# Patient Record
Sex: MALE | Race: WHITE | NOT HISPANIC OR LATINO | Employment: OTHER | URBAN - METROPOLITAN AREA
[De-identification: names, ages, dates, MRNs, and addresses within clinical notes are randomized per-mention and may not be internally consistent; named-entity substitution may affect disease eponyms.]

---

## 2020-09-10 ENCOUNTER — TELEPHONE (OUTPATIENT)
Dept: GASTROENTEROLOGY | Facility: CLINIC | Age: 75
End: 2020-09-10

## 2020-09-10 ENCOUNTER — OFFICE VISIT (OUTPATIENT)
Dept: GASTROENTEROLOGY | Facility: CLINIC | Age: 75
End: 2020-09-10
Payer: OTHER GOVERNMENT

## 2020-09-10 VITALS
BODY MASS INDEX: 26.67 KG/M2 | HEIGHT: 72 IN | SYSTOLIC BLOOD PRESSURE: 122 MMHG | DIASTOLIC BLOOD PRESSURE: 56 MMHG | HEART RATE: 72 BPM | WEIGHT: 196.88 LBS

## 2020-09-10 DIAGNOSIS — Z12.11 COLON CANCER SCREENING: Primary | ICD-10-CM

## 2020-09-10 DIAGNOSIS — Z86.010 HISTORY OF COLON POLYPS: ICD-10-CM

## 2020-09-10 DIAGNOSIS — I85.10 SECONDARY ESOPHAGEAL VARICES WITHOUT BLEEDING: ICD-10-CM

## 2020-09-10 DIAGNOSIS — Z80.0 FAMILY HISTORY OF COLON CANCER: ICD-10-CM

## 2020-09-10 DIAGNOSIS — K74.60 CIRRHOSIS OF LIVER WITHOUT ASCITES, UNSPECIFIED HEPATIC CIRRHOSIS TYPE: ICD-10-CM

## 2020-09-10 PROCEDURE — 99999 PR PBB SHADOW E&M-NEW PATIENT-LVL III: ICD-10-PCS | Mod: PBBFAC,,, | Performed by: NURSE PRACTITIONER

## 2020-09-10 PROCEDURE — 99203 OFFICE O/P NEW LOW 30 MIN: CPT | Mod: PBBFAC | Performed by: NURSE PRACTITIONER

## 2020-09-10 PROCEDURE — 99999 PR PBB SHADOW E&M-NEW PATIENT-LVL III: CPT | Mod: PBBFAC,,, | Performed by: NURSE PRACTITIONER

## 2020-09-10 PROCEDURE — 99204 PR OFFICE/OUTPT VISIT, NEW, LEVL IV, 45-59 MIN: ICD-10-PCS | Mod: S$PBB,,, | Performed by: NURSE PRACTITIONER

## 2020-09-10 PROCEDURE — 99204 OFFICE O/P NEW MOD 45 MIN: CPT | Mod: S$PBB,,, | Performed by: NURSE PRACTITIONER

## 2020-09-10 NOTE — H&P (VIEW-ONLY)
Clinic Consult:  [facility] Consultation Note    Reason for Consult:  The primary encounter diagnosis was Colon cancer screening. Diagnoses of History of colon polyps, Family history of colon cancer, Cirrhosis of liver without ascites, unspecified hepatic cirrhosis type, and Secondary esophageal varices without bleeding were also pertinent to this visit.    PCP: [facility]   [address] / [place] [address]    HPI:  This is a 74 y.o. male here for evaluation of the above.     # colon cancer screening: due. Last done in 2016. +ve polyps. Recall in 3 years. No bowel changes, hematochezia or melena.     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  [facility] [place] Last visit yesterday. Last EGD done at [facility] on 9/10/2020. Grade 3 EV in mid esophagus. Banded x 3. Was told he needed repeat banding.     Review of Systems   Constitutional: Negative for fever, malaise/fatigue and weight loss.   HENT: Negative for sore throat.    Respiratory: Negative for cough and wheezing.    Cardiovascular: Negative for chest pain and palpitations.   Gastrointestinal: Negative for abdominal pain, blood in stool, constipation, diarrhea, heartburn, melena, nausea and vomiting.   Genitourinary: Negative for dysuria and frequency.   Musculoskeletal: Negative for back pain, joint pain, myalgias and neck pain.   Skin: Negative for itching and rash.   Neurological: Negative for dizziness, speech change, seizures, loss of consciousness and headaches.   Psychiatric/Behavioral: Negative for depression and substance abuse. The patient is not nervous/anxious.        Medical History:  has a past medical history of CAD (coronary artery disease), Diabetes, GERD (gastroesophageal reflux disease), HTN (hypertension), and Hyperlipidemia.    Surgical History:  has a past surgical history that includes Coronary stent placement; Elbow surgery; Tonsillectomy;  Colonoscopy; Vasectomy; Cataract extraction; and Colonoscopy (N/A, 7/18/2016).    Family History: family history includes Colon cancer in his brother; Heart disease in his father; No Known Problems in his mother..     Social History:  reports that he has quit smoking. He has a 40.00 pack-year smoking history. He does not have any smokeless tobacco history on file. He reports that he does not drink alcohol or use drugs.    Allergies: Reviewed    Home Medications:   Current Outpatient Medications on File Prior to Visit   Medication Sig Dispense Refill    BLOOD SUGAR DIAGNOSTIC (ACCU-CHEK SHANTELL PLUS TEST STRP MISC) by Misc.(Non-Drug; Combo Route) route.      clopidogrel (PLAVIX) 75 mg tablet Take 75 mg by mouth once daily.      lisinopril (PRINIVIL,ZESTRIL) 40 MG tablet Take 40 mg by mouth once daily.      metformin (GLUCOPHAGE) 1000 MG tablet Take 1,000 mg by mouth 2 (two) times daily with meals.      multivitamin capsule Take 1 capsule by mouth once daily.      trazodone (DESYREL) 100 MG tablet Take 100 mg by mouth every evening.      aspirin (ECOTRIN) 81 MG EC tablet Take 81 mg by mouth once daily.      fish oil-omega-3 fatty acids 300-1,000 mg capsule Take 2 g by mouth once daily.      hydrochlorothiazide (HYDRODIURIL) 50 MG tablet Take 50 mg by mouth once daily.      lovastatin (MEVACOR) 40 MG tablet Take 40 mg by mouth every evening.      metoprolol tartrate (LOPRESSOR) 50 MG tablet Take 50 mg by mouth 2 (two) times daily.      ranitidine (ZANTAC) 150 MG tablet Take 150 mg by mouth 2 (two) times daily.       No current facility-administered medications on file prior to visit.        Physical Exam:  BP (!) 122/56   Pulse 72   Ht 6' (1.829 m)   Wt 89.3 kg (196 lb 13.9 oz)   BMI 26.70 kg/m²   Body mass index is 26.7 kg/m².  Physical Exam   Constitutional: He is oriented to person, place, and time and well-developed, well-nourished, and in no distress. No distress.   HENT:   Head: Normocephalic.    Eyes: Pupils are equal, round, and reactive to light. Conjunctivae are normal.   Cardiovascular: Normal rate, regular rhythm and normal heart sounds.   Pulmonary/Chest: Effort normal and breath sounds normal. No respiratory distress.   Abdominal: Soft. Bowel sounds are normal. He exhibits no distension. There is no abdominal tenderness.   Neurological: He is alert and oriented to person, place, and time. No cranial nerve deficit.   Skin: Skin is warm and dry. No rash noted.   Psychiatric: Mood and affect normal.       Labs: Pertinent labs reviewed.  CRC Screening:     Assessment:  1. Colon cancer screening    2. History of colon polyps    3. Family history of colon cancer    4. Cirrhosis of liver without ascites, unspecified hepatic cirrhosis type    5. Secondary esophageal varices without bleeding        Recommendations:   Colonoscopy with EGV with banding.   Request approval to hold Plavix for procedure.  He is unsure of his Cardiologist's name. [facility].     Colon cancer screening  -     Case request GI: ESOPHAGOGASTRODUODENOSCOPY (EGD), COLONOSCOPY  -     COVID-19 Routine Screening; Future; Expected date: 09/10/2020    History of colon polyps  -     Case request GI: ESOPHAGOGASTRODUODENOSCOPY (EGD), COLONOSCOPY  -     COVID-19 Routine Screening; Future; Expected date: 09/10/2020    Family history of colon cancer  -     Case request GI: ESOPHAGOGASTRODUODENOSCOPY (EGD), COLONOSCOPY  -     COVID-19 Routine Screening; Future; Expected date: 09/10/2020    Cirrhosis of liver without ascites, unspecified hepatic cirrhosis type  -     Case request GI: ESOPHAGOGASTRODUODENOSCOPY (EGD), COLONOSCOPY  -     COVID-19 Routine Screening; Future; Expected date: 09/10/2020    Secondary esophageal varices without bleeding  -     Case request GI: ESOPHAGOGASTRODUODENOSCOPY (EGD), COLONOSCOPY  -     COVID-19 Routine Screening; Future; Expected date: 09/10/2020        Follow up if symptoms worsen or fail to  improve.    Thank you so much for allowing me to participate in the care of RADHA Smith

## 2020-09-10 NOTE — LETTER
September 10, 2020      [facility]  [address] [address]           [facility]  [address] [address]  Phone: [phone]  Fax: [phone]          Patient: Korin Vivas   MR Number: [id number]   YOB: 1945   Date of Visit: 9/10/2020       Dear [facility]:    Thank you for referring Korin Vivas to me for evaluation. Attached you will find relevant portions of my assessment and plan of care.    If you have questions, please do not hesitate to call me. I look forward to following Korin Vivas along with you.    Sincerely,    Kyra Carranza, NP    Enclosure  CC:  No Recipients    If you would like to receive this communication electronically, please contact [email or web address] or [phone] to request more information on [employer] access.    For providers and/or their staff who would like to refer a patient to [facility], please contact us through our one-stop-shop provider referral line, Rebecca Mcclain, at [phone].    If you feel you have received this communication in error or would no longer like to receive these types of communications, please e-mail [email or web address]

## 2020-09-10 NOTE — TELEPHONE ENCOUNTER
[phone][facility] to get clearance for patient to hold Plavix for EGD/Colon on 10/6/20. Unable to reach their office left message to return call regarding patient.

## 2020-09-10 NOTE — PROGRESS NOTES
Clinic Consult:  [facility] Consultation Note    Reason for Consult:  The primary encounter diagnosis was Colon cancer screening. Diagnoses of History of colon polyps, Family history of colon cancer, Cirrhosis of liver without ascites, unspecified hepatic cirrhosis type, and Secondary esophageal varices without bleeding were also pertinent to this visit.    PCP: [facility]   [address] / [place] [address]    HPI:  This is a 74 y.o. male here for evaluation of the above.     # colon cancer screening: due. Last done in 2016. +ve polyps. Recall in 3 years. No bowel changes, hematochezia or melena.     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  [facility] [place] Last visit yesterday. Last EGD done at [facility] on 9/10/2020. Grade 3 EV in mid esophagus. Banded x 3. Was told he needed repeat banding.     Review of Systems   Constitutional: Negative for fever, malaise/fatigue and weight loss.   HENT: Negative for sore throat.    Respiratory: Negative for cough and wheezing.    Cardiovascular: Negative for chest pain and palpitations.   Gastrointestinal: Negative for abdominal pain, blood in stool, constipation, diarrhea, heartburn, melena, nausea and vomiting.   Genitourinary: Negative for dysuria and frequency.   Musculoskeletal: Negative for back pain, joint pain, myalgias and neck pain.   Skin: Negative for itching and rash.   Neurological: Negative for dizziness, speech change, seizures, loss of consciousness and headaches.   Psychiatric/Behavioral: Negative for depression and substance abuse. The patient is not nervous/anxious.        Medical History:  has a past medical history of CAD (coronary artery disease), Diabetes, GERD (gastroesophageal reflux disease), HTN (hypertension), and Hyperlipidemia.    Surgical History:  has a past surgical history that includes Coronary stent placement; Elbow surgery; Tonsillectomy;  Colonoscopy; Vasectomy; Cataract extraction; and Colonoscopy (N/A, 7/18/2016).    Family History: family history includes Colon cancer in his brother; Heart disease in his father; No Known Problems in his mother..     Social History:  reports that he has quit smoking. He has a 40.00 pack-year smoking history. He does not have any smokeless tobacco history on file. He reports that he does not drink alcohol or use drugs.    Allergies: Reviewed    Home Medications:   Current Outpatient Medications on File Prior to Visit   Medication Sig Dispense Refill    BLOOD SUGAR DIAGNOSTIC (ACCU-CHEK SHANTELL PLUS TEST STRP MISC) by Misc.(Non-Drug; Combo Route) route.      clopidogrel (PLAVIX) 75 mg tablet Take 75 mg by mouth once daily.      lisinopril (PRINIVIL,ZESTRIL) 40 MG tablet Take 40 mg by mouth once daily.      metformin (GLUCOPHAGE) 1000 MG tablet Take 1,000 mg by mouth 2 (two) times daily with meals.      multivitamin capsule Take 1 capsule by mouth once daily.      trazodone (DESYREL) 100 MG tablet Take 100 mg by mouth every evening.      aspirin (ECOTRIN) 81 MG EC tablet Take 81 mg by mouth once daily.      fish oil-omega-3 fatty acids 300-1,000 mg capsule Take 2 g by mouth once daily.      hydrochlorothiazide (HYDRODIURIL) 50 MG tablet Take 50 mg by mouth once daily.      lovastatin (MEVACOR) 40 MG tablet Take 40 mg by mouth every evening.      metoprolol tartrate (LOPRESSOR) 50 MG tablet Take 50 mg by mouth 2 (two) times daily.      ranitidine (ZANTAC) 150 MG tablet Take 150 mg by mouth 2 (two) times daily.       No current facility-administered medications on file prior to visit.        Physical Exam:  BP (!) 122/56   Pulse 72   Ht 6' (1.829 m)   Wt 89.3 kg (196 lb 13.9 oz)   BMI 26.70 kg/m²   Body mass index is 26.7 kg/m².  Physical Exam   Constitutional: He is oriented to person, place, and time and well-developed, well-nourished, and in no distress. No distress.   HENT:   Head: Normocephalic.    Eyes: Pupils are equal, round, and reactive to light. Conjunctivae are normal.   Cardiovascular: Normal rate, regular rhythm and normal heart sounds.   Pulmonary/Chest: Effort normal and breath sounds normal. No respiratory distress.   Abdominal: Soft. Bowel sounds are normal. He exhibits no distension. There is no abdominal tenderness.   Neurological: He is alert and oriented to person, place, and time. No cranial nerve deficit.   Skin: Skin is warm and dry. No rash noted.   Psychiatric: Mood and affect normal.       Labs: Pertinent labs reviewed.  CRC Screening:     Assessment:  1. Colon cancer screening    2. History of colon polyps    3. Family history of colon cancer    4. Cirrhosis of liver without ascites, unspecified hepatic cirrhosis type    5. Secondary esophageal varices without bleeding        Recommendations:   Colonoscopy with EGV with banding.   Request approval to hold Plavix for procedure.  He is unsure of his Cardiologist's name. [facility]     Colon cancer screening  -     Case request GI: ESOPHAGOGASTRODUODENOSCOPY (EGD), COLONOSCOPY  -     COVID-19 Routine Screening; Future; Expected date: 09/10/2020    History of colon polyps  -     Case request GI: ESOPHAGOGASTRODUODENOSCOPY (EGD), COLONOSCOPY  -     COVID-19 Routine Screening; Future; Expected date: 09/10/2020    Family history of colon cancer  -     Case request GI: ESOPHAGOGASTRODUODENOSCOPY (EGD), COLONOSCOPY  -     COVID-19 Routine Screening; Future; Expected date: 09/10/2020    Cirrhosis of liver without ascites, unspecified hepatic cirrhosis type  -     Case request GI: ESOPHAGOGASTRODUODENOSCOPY (EGD), COLONOSCOPY  -     COVID-19 Routine Screening; Future; Expected date: 09/10/2020    Secondary esophageal varices without bleeding  -     Case request GI: ESOPHAGOGASTRODUODENOSCOPY (EGD), COLONOSCOPY  -     COVID-19 Routine Screening; Future; Expected date: 09/10/2020        Follow up if symptoms worsen or fail to  improve.    Thank you so much for allowing me to participate in the care of RADHA Smith

## 2020-09-10 NOTE — TELEPHONE ENCOUNTER
"  ENDO screening    1. Have you been admitted for cardiac, kidney or pulmonary causes to the hospital in the past 3 months? no   If yes, schedule an appointment with PCP before scheduling endoscopic procedure.     2. Have you had a stent placed in the last 12 months? no   If yes, for a screening visit, cancel and message the ordering provider.  The patient will need a new order when the time is appropriate.     3. Have you had a stroke or heart attack in the past 6 months? no   If yes, cancel and refer patient to ordering provider for clearance, also message ordering provider to inform.     4. Have you had any chest pain in the past 3 months? no   If yes, Have you been evaluated by your PCP and/or cardiologist and it was determined to not be heart related? not applicable   If No, Pt needs to be seen by PCP or Cardiologist .  Pt can be scheduled once clearance obtained by either of those providers.     5. Do you take prescription weight loss medications?  no   If yes, must stop for 2 weeks prior to procedure.     6. Have you been diagnosed with diverticulitis within the past 3 months? no   If yes, must have been seen by GI within the last 3 months, if not schedule with GI [facility].    If pt has been seen by GI, schedule procedure 8-12 weeks post antibiotic treatment.     7. Are you on Dialysis? no  If yes, schedule procedure for the day AFTER dialysis.  Appt time should be 9am or later, patient arrival time is 2 hours prior.  Nulytely or miralax prep for all patients with kidney disease.     8. Are you diabetic?  yes   If yes, schedule morning appt. Advise pt to hold all diabetic meds day of procedure.     9. If pt is older than 80 years of age and HAS NOT been seen by GI or PCP within the last 6 months, needs appt with GI [facility].   If pt has been seen by the GI provider or PCP within the past 6  months AND meets criteria, schedule procedure AND send message to the endoscopist.     10. Is patient on a "high risk" medication " (blood thinner/antiplatelet agent)?  yes   If yes, has cardiac clearance been obtained within the last 60 days? No   If no, a new clearance needs to be obtained.     Final Questions:    1.I have reviewed the last colonoscopy for recommendations regarding next procedure bowel prep.  yes  2. I have reviewed medications and allergies.  yes  3. I have verified the pharmacy information and appropriate prep sent if needed. yes  4. Prep instructions have been mailed or sent to portal per patient request. yes        All schedulers will have ability to reach out to the ordering GI provider to clarify any issues.

## 2020-09-17 NOTE — TELEPHONE ENCOUNTER
[facility] ([phone]) to return call regarding patient's clearance.

## 2020-10-01 NOTE — TELEPHONE ENCOUNTER
Spoke with patient and he stated that [facility] this morning and told him told hold his Plavix. I informed him that I have been faxing over a clearance for the past two weeks but have not received clearance.  Patient aware that I have not received clearance but will proceed to hold Plavix based off the phone call he received this morning. I informed him that I did refax the clearance and left a message. Clearance is still pending approval.

## 2020-10-02 ENCOUNTER — TELEPHONE (OUTPATIENT)
Dept: GASTROENTEROLOGY | Facility: CLINIC | Age: 75
End: 2020-10-02

## 2020-10-03 ENCOUNTER — LAB VISIT (OUTPATIENT)
Dept: OTOLARYNGOLOGY | Facility: CLINIC | Age: 75
End: 2020-10-03
Payer: COMMERCIAL

## 2020-10-03 DIAGNOSIS — Z12.11 COLON CANCER SCREENING: ICD-10-CM

## 2020-10-03 DIAGNOSIS — Z80.0 FAMILY HISTORY OF COLON CANCER: ICD-10-CM

## 2020-10-03 DIAGNOSIS — Z86.010 HISTORY OF COLON POLYPS: ICD-10-CM

## 2020-10-03 DIAGNOSIS — K74.60 CIRRHOSIS OF LIVER WITHOUT ASCITES, UNSPECIFIED HEPATIC CIRRHOSIS TYPE: ICD-10-CM

## 2020-10-03 DIAGNOSIS — I85.10 SECONDARY ESOPHAGEAL VARICES WITHOUT BLEEDING: ICD-10-CM

## 2020-10-03 PROCEDURE — U0003 INFECTIOUS AGENT DETECTION BY NUCLEIC ACID (DNA OR RNA); SEVERE ACUTE RESPIRATORY SYNDROME CORONAVIRUS 2 (SARS-COV-2) (CORONAVIRUS DISEASE [COVID-19]), AMPLIFIED PROBE TECHNIQUE, MAKING USE OF HIGH THROUGHPUT TECHNOLOGIES AS DESCRIBED BY CMS-2020-01-R: HCPCS

## 2020-10-04 LAB — SARS-COV-2 RNA RESP QL NAA+PROBE: NOT DETECTED

## 2020-10-06 ENCOUNTER — ANESTHESIA EVENT (OUTPATIENT)
Dept: ENDOSCOPY | Facility: HOSPITAL | Age: 75
End: 2020-10-06
Payer: OTHER GOVERNMENT

## 2020-10-06 ENCOUNTER — ANESTHESIA (OUTPATIENT)
Dept: ENDOSCOPY | Facility: HOSPITAL | Age: 75
End: 2020-10-06
Payer: OTHER GOVERNMENT

## 2020-10-06 ENCOUNTER — HOSPITAL ENCOUNTER (OUTPATIENT)
Facility: HOSPITAL | Age: 75
Discharge: HOME | End: 2020-10-06
Attending: INTERNAL MEDICINE | Admitting: INTERNAL MEDICINE
Payer: OTHER GOVERNMENT

## 2020-10-06 VITALS
RESPIRATION RATE: 15 BRPM | HEIGHT: 72 IN | SYSTOLIC BLOOD PRESSURE: 155 MMHG | BODY MASS INDEX: 25.68 KG/M2 | TEMPERATURE: 97 F | WEIGHT: 189.63 LBS | DIASTOLIC BLOOD PRESSURE: 83 MMHG | HEART RATE: 60 BPM | OXYGEN SATURATION: 99 %

## 2020-10-06 DIAGNOSIS — Z86.010 HISTORY OF COLON POLYPS: Primary | ICD-10-CM

## 2020-10-06 DIAGNOSIS — Z12.11 ENCOUNTER FOR SCREENING COLONOSCOPY: ICD-10-CM

## 2020-10-06 DIAGNOSIS — K55.20 AVM (ARTERIOVENOUS MALFORMATION) OF COLON: ICD-10-CM

## 2020-10-06 DIAGNOSIS — I85.10 SECONDARY ESOPHAGEAL VARICES WITHOUT BLEEDING: Primary | ICD-10-CM

## 2020-10-06 DIAGNOSIS — Z12.11 COLON CANCER SCREENING: ICD-10-CM

## 2020-10-06 LAB — POCT GLUCOSE: 156 MG/DL (ref 70–110)

## 2020-10-06 PROCEDURE — 45380 PR COLONOSCOPY,BIOPSY: ICD-10-PCS | Mod: ,,, | Performed by: INTERNAL MEDICINE

## 2020-10-06 PROCEDURE — 37000008 HC ANESTHESIA 1ST 15 MINUTES: Performed by: INTERNAL MEDICINE

## 2020-10-06 PROCEDURE — 43244 PR EGD, FLEX, W/BAND LIGATION, ESOPH/GASTR VARICES: ICD-10-PCS | Mod: 51,,, | Performed by: INTERNAL MEDICINE

## 2020-10-06 PROCEDURE — 00813 ANES UPR LWR GI NDSC PX: CPT | Performed by: INTERNAL MEDICINE

## 2020-10-06 PROCEDURE — 88305 TISSUE EXAM BY PATHOLOGIST: CPT | Mod: 26,,, | Performed by: PATHOLOGY

## 2020-10-06 PROCEDURE — 88305 TISSUE EXAM BY PATHOLOGIST: CPT | Performed by: PATHOLOGY

## 2020-10-06 PROCEDURE — 82962 GLUCOSE BLOOD TEST: CPT | Performed by: INTERNAL MEDICINE

## 2020-10-06 PROCEDURE — 27201012 HC FORCEPS, HOT/COLD, DISP: Performed by: INTERNAL MEDICINE

## 2020-10-06 PROCEDURE — 63600175 PHARM REV CODE 636 W HCPCS: Performed by: NURSE ANESTHETIST, CERTIFIED REGISTERED

## 2020-10-06 PROCEDURE — 45380 COLONOSCOPY AND BIOPSY: CPT | Performed by: INTERNAL MEDICINE

## 2020-10-06 PROCEDURE — 25000003 PHARM REV CODE 250: Performed by: NURSE ANESTHETIST, CERTIFIED REGISTERED

## 2020-10-06 PROCEDURE — 27201022: Performed by: INTERNAL MEDICINE

## 2020-10-06 PROCEDURE — 43244 EGD VARICES LIGATION: CPT | Performed by: INTERNAL MEDICINE

## 2020-10-06 PROCEDURE — 45380 COLONOSCOPY AND BIOPSY: CPT | Mod: ,,, | Performed by: INTERNAL MEDICINE

## 2020-10-06 PROCEDURE — 37000009 HC ANESTHESIA EA ADD 15 MINS: Performed by: INTERNAL MEDICINE

## 2020-10-06 PROCEDURE — 43244 EGD VARICES LIGATION: CPT | Mod: 51,,, | Performed by: INTERNAL MEDICINE

## 2020-10-06 PROCEDURE — 88305 TISSUE EXAM BY PATHOLOGIST: ICD-10-PCS | Mod: 26,,, | Performed by: PATHOLOGY

## 2020-10-06 RX ORDER — PROPOFOL 10 MG/ML
VIAL (ML) INTRAVENOUS
Status: DISCONTINUED | OUTPATIENT
Start: 2020-10-06 | End: 2020-10-06

## 2020-10-06 RX ORDER — LIDOCAINE HYDROCHLORIDE 10 MG/ML
INJECTION, SOLUTION EPIDURAL; INFILTRATION; INTRACAUDAL; PERINEURAL
Status: DISCONTINUED | OUTPATIENT
Start: 2020-10-06 | End: 2020-10-06

## 2020-10-06 RX ORDER — SODIUM CHLORIDE, SODIUM LACTATE, POTASSIUM CHLORIDE, CALCIUM CHLORIDE 600; 310; 30; 20 MG/100ML; MG/100ML; MG/100ML; MG/100ML
INJECTION, SOLUTION INTRAVENOUS CONTINUOUS
Status: DISCONTINUED | OUTPATIENT
Start: 2020-10-06 | End: 2020-10-06 | Stop reason: HOSPADM

## 2020-10-06 RX ADMIN — PROPOFOL 40 MG: 10 INJECTION, EMULSION INTRAVENOUS at 08:10

## 2020-10-06 RX ADMIN — PROPOFOL 80 MG: 10 INJECTION, EMULSION INTRAVENOUS at 08:10

## 2020-10-06 RX ADMIN — LIDOCAINE HYDROCHLORIDE 100 MG: 10 INJECTION, SOLUTION EPIDURAL; INFILTRATION; INTRACAUDAL; PERINEURAL at 08:10

## 2020-10-06 NOTE — ANESTHESIA POSTPROCEDURE EVALUATION
Anesthesia Post Evaluation    Patient: Korin Vivas    Procedure(s) Performed: Procedure(s) (LRB):  ESOPHAGOGASTRODUODENOSCOPY (EGD) (N/A)  COLONOSCOPY (N/A)    Final Anesthesia Type: MAC    Patient location during evaluation: GI PACU  Patient participation: Yes- Able to Participate  Level of consciousness: awake and alert and oriented  Post-procedure vital signs: reviewed and stable  Pain management: adequate  Airway patency: patent    PONV status at discharge: No PONV  Anesthetic complications: no      Cardiovascular status: blood pressure returned to baseline, stable and hemodynamically stable  Respiratory status: unassisted, room air and spontaneous ventilation  Hydration status: euvolemic  Follow-up not needed.          Vitals Value Taken Time   /83 10/06/20 0859   Temp 36.3 °C (97.3 °F) 10/06/20 0839   Pulse 60 10/06/20 0859   Resp 15 10/06/20 0859   SpO2 99 % 10/06/20 0859         Event Time   Out of Recovery 09:25:44         Pain/Sp Score: Sp Score: 10 (10/6/2020  8:59 AM)

## 2020-10-06 NOTE — DISCHARGE INSTRUCTIONS
Upper GI Endoscopy     During endoscopy, a long, flexible tube is used to view the inside of your upper GI tract.      Upper GI endoscopy allows your healthcare provider to look directly into the beginning of your gastrointestinal (GI) tract. The esophagus, stomach, and duodenum (the first part of the small intestine) make up the upper GI tract.   Before the exam  Follow these and any other instructions you are given before your endoscopy. If you dont follow the healthcare providers instructions carefully, the test may need to be canceled or done over:  · Don't eat or drink anything after midnight the night before your exam. If your exam is in the afternoon, drink only clear liquids in the morning. Don't eat or drink anything for 8 hours before the exam. In some cases, you may be able to take medicines with sips of water until 2 hours before the procedure. Speak with your healthcare provider about this.   · Bring your X-rays and any other test results you have.  · Because you will be sedated, arrange for an adult to drive you home after the exam.  · Tell your healthcare provider before the exam if you are taking any medicines or have any medical problems.  The procedure  Here is what to expect:  · You will lie on the endoscopy table. Usually patients lie on the left side.  · You will be monitored and given oxygen.  · Your throat may be numbed with a spray or gargle. You are given medicine through an intravenous (IV) line that will help you relax and remain comfortable. You may be awake or asleep during the procedure.  · The healthcare provider will put the endoscope in your mouth and down your esophagus. It is thinner than most pieces of food that you swallow. It will not affect your breathing. The medicine helps keep you from gagging.  · Air is put into your GI tract to expand it. It can make you burp.  · During the procedure, the healthcare provider can take biopsies (tissue samples), remove abnormalities,  such as polyps, or treat abnormalities through a variety of devices placed through the endoscope. You will not feel this.   · The endoscope carries images of your upper GI tract to a video screen. If you are awake, you may be able to look at the images.  · After the procedure is done, you will rest for a time. An adult must drive you home.  When to call your healthcare provider  Contact your healthcare provider if you have:  · Black or tarry stools, or blood in your stool  · Fever  · Pain in your belly that does not go away  · Nausea and vomiting, or vomiting blood   Date Last Reviewed: 7/1/2016 © 2000-2017 [employer]. [address] [place] [address], [place] [address]. All rights reserved. This information is not intended as a substitute for professional medical care. Always follow your healthcare professional's instructions.        Understanding Colon and Rectal Polyps    The colon (also called the large intestine) is a muscular tube that forms the last part of the digestive tract. It absorbs water and stores food waste. The colon is about 4 to 6 feet long. The rectum is the last 6 inches of the colon. The colon and rectum have a smooth lining composed of millions of cells. Changes in these cells can lead to growths in the colon that can become cancerous and should be removed. Multiple tests are available to screen for colon cancer, but the colonoscopy is the most recommended test. During colonoscopy, these polyps can be removed. How often you need this test depends on many things including your condition, your family history, symptoms, and what the findings were at the previous colonoscopy.   When the colon lining changes  Changes that happen in the cells that line the colon or rectum can lead to growths called polyps. Over a period of years, polyps can turn cancerous. Removing polyps early may prevent cancer from ever forming.  Polyps  Polyps are fleshy clumps of tissue that form on the lining of the  colon or rectum. Small polyps are usually benign (not cancerous). However, over time, cells in a polyp can change and become cancerous. Certain types of polyps known as adenomatous polyps are premalignant. The risk for invasive cancer increases with the size of the polyp and certain cell and gene features. This means that they can become cancerous if they're not removed. Hyperplastic polyps are benign. They can grow quite large and not turn cancerous.   Cancer  Almost all colorectal cancers start when polyp cells begin growing abnormally. As a cancerous tumor grows, it may involve more and more of the colon or rectum. In time, cancer can also grow beyond the colon or rectum and spread to nearby organs or to glands called lymph nodes. The cells can also travel to other parts of the body. This is known as metastasis. The earlier a cancerous tumor is removed, the better the chance of preventing its spread.    Date Last Reviewed: 8/1/2016  © 1159-4708 [employer]. [address] [place] [address], [place] [address]. All rights reserved. This information is not intended as a substitute for professional medical care. Always follow your healthcare professional's instructions.

## 2020-10-06 NOTE — PROVATION PATIENT INSTRUCTIONS
Discharge Summary/Instructions after an Endoscopic Procedure  Patient Name: Korin Vivas  Patient MRN: [id number]  Patient YOB: 1945  Tuesday, October 6, 2020 Kait Isaacs MD  RESTRICTIONS:  During your procedure today, you received medications for sedation.  These   medications may affect your judgment, balance and coordination.  Therefore,   for 24 hours, you have the following restrictions:   - DO NOT drive a car, operate machinery, make legal/financial decisions,   sign important papers or drink alcohol.    ACTIVITY:  Today: no heavy lifting, straining or running due to procedural   sedation/anesthesia.  The following day: return to full activity including work.  DIET:  Eat and drink normally unless instructed otherwise.     TREATMENT FOR COMMON SIDE EFFECTS:  - Mild abdominal pain, nausea, belching, bloating or excessive gas:  rest,   eat lightly and use a heating pad.  - Sore Throat: treat with throat lozenges and/or gargle with warm salt   water.  - Because air was used during the procedure, expelling large amounts of air   from your rectum or belching is normal.  - If a bowel prep was taken, you may not have a bowel movement for 1-3 days.    This is normal.  SYMPTOMS TO WATCH FOR AND REPORT TO YOUR PHYSICIAN:  1. Abdominal pain or bloating, other than gas cramps.  2. Chest pain.  3. Back pain.  4. Signs of infection such as: chills or fever occurring within 24 hours   after the procedure.  5. Rectal bleeding, which would show as bright red, maroon, or black stools.   (A tablespoon of blood from the rectum is not serious, especially if   hemorrhoids are present.)  6. Vomiting.  7. Weakness or dizziness.  GO DIRECTLY TO THE NEAREST EMERGENCY ROOM IF YOU HAVE ANY OF THE FOLLOWING:      Difficulty breathing              Chills and/or fever over 101 F   Persistent vomiting and/or vomiting blood   Severe abdominal pain   Severe chest pain   Black, tarry stools   Bleeding- more than one  tablespoon   Any other symptom or condition that you feel may need urgent attention  Your doctor recommends these additional instructions:  If any biopsies were taken, your doctors clinic will contact you in 1 to 2   weeks with any results.  - Discharge patient to home (with escort).   - Clear liquid diet today.   - Continue present medications.   - Resume Plavix (clopidogrel) at prior dose in 5 days.  Refer to referring   physician for further adjustment of therapy.   - No aspirin, ibuprofen, naproxen, or other non-steroidal anti-inflammatory   drugs.   - Await pathology results.   - Repeat colonoscopy in 6 months because the bowel preparation was poor.   - Multiple attempts were made to reach patient's PCP Silvia Serrano   [phone] with no success.  For questions, problems or results please call your physician Kait Isaacs MD at Work:  [phone]  If you have any questions about the above instructions, call the GI   department at [phone] or call the endoscopy unit at [phone]   from 7am until 3 pm.  [facility] [place], EMERGENCY ROOM PHONE NUMBER:   [phone]  IF A COMPLICATION OR EMERGENCY SITUATION ARISES AND YOU ARE UNABLE TO REACH   YOUR PHYSICIAN - GO DIRECTLY TO THE EMERGENCY ROOM.  I have read or have had read to me these discharge instructions for my   procedure and have received a written copy.  I understand these   instructions and will follow-up with my physician if I have any questions.     __________________________________       _____________________________________  Nurse Signature                                          Patient/Designated   Responsible Party Signature  MD Kait Grace MD  10/6/2020 9:18:32 AM  This report has been verified and signed electronically.  [employer]ATION

## 2020-10-06 NOTE — ANESTHESIA PREPROCEDURE EVALUATION
10/06/2020  Korin Vivas is a 74 y.o., male.    Anesthesia Evaluation    I have reviewed the Patient Summary Reports.    I have reviewed the Nursing Notes. I have reviewed the NPO Status.   I have reviewed the Medications.     Review of Systems  Anesthesia Hx:  No problems with previous Anesthesia Denies Hx of Anesthetic complications  History of prior surgery of interest to airway management or planning: Denies Family Hx of Anesthesia complications.   Denies Personal Hx of Anesthesia complications.   Social:  Former Smoker, No Alcohol Use    Hematology/Oncology:  Hematology Normal   Oncology Normal     EENT/Dental:EENT/Dental Normal   Cardiovascular:   Hypertension CAD  CABG/stent  hyperlipidemia    Pulmonary:  Pulmonary Normal    Renal/:  Renal/ Normal     Hepatic/GI:   GERD Liver Disease,    Musculoskeletal:  Musculoskeletal Normal    Neurological:  Neurology Normal    Endocrine:   Diabetes, type 2    Dermatological:  Skin Normal    Psych:  Psychiatric Normal           Physical Exam  General:  Well nourished    Airway/Jaw/Neck:  Airway Findings: Mouth Opening: Normal Tongue: Normal  General Airway Assessment: Adult  Mallampati: II  TM Distance: Normal, at least 6 cm      Dental:  Dental Findings:Denies loose teeth. Dental implants present.  Pt notified of risk to damage of implants if left in.             Anesthesia Plan  Type of Anesthesia, risks & benefits discussed:  Anesthesia Type:  MAC  Patient's Preference:   Intra-op Monitoring Plan: standard ASA monitors  Intra-op Monitoring Plan Comments:   Post Op Pain Control Plan:   Post Op Pain Control Plan Comments:   Induction:   IV  Beta Blocker:  Patient is on a Beta-Blocker and has received one dose within the past 24 hours (No further documentation required).       Informed Consent: Patient understands risks and agrees with Anesthesia plan.   Questions answered. Anesthesia consent signed with patient.  ASA Score: 3     Day of Surgery Review of History & Physical: I have interviewed and examined the patient. I have reviewed the patient's H&P dated:    H&P update referred to the provider.         Ready For Surgery From Anesthesia Perspective.

## 2020-10-06 NOTE — PROVATION PATIENT INSTRUCTIONS
Discharge Summary/Instructions after an Endoscopic Procedure  Patient Name: Korin Vivas  Patient MRN: [id number]  Patient YOB: 1945  Tuesday, October 6, 2020 Kait Isaacs MD  RESTRICTIONS:  During your procedure today, you received medications for sedation.  These   medications may affect your judgment, balance and coordination.  Therefore,   for 24 hours, you have the following restrictions:   - DO NOT drive a car, operate machinery, make legal/financial decisions,   sign important papers or drink alcohol.    ACTIVITY:  Today: no heavy lifting, straining or running due to procedural   sedation/anesthesia.  The following day: return to full activity including work.  DIET:  Eat and drink normally unless instructed otherwise.     TREATMENT FOR COMMON SIDE EFFECTS:  - Mild abdominal pain, nausea, belching, bloating or excessive gas:  rest,   eat lightly and use a heating pad.  - Sore Throat: treat with throat lozenges and/or gargle with warm salt   water.  - Because air was used during the procedure, expelling large amounts of air   from your rectum or belching is normal.  - If a bowel prep was taken, you may not have a bowel movement for 1-3 days.    This is normal.  SYMPTOMS TO WATCH FOR AND REPORT TO YOUR PHYSICIAN:  1. Abdominal pain or bloating, other than gas cramps.  2. Chest pain.  3. Back pain.  4. Signs of infection such as: chills or fever occurring within 24 hours   after the procedure.  5. Rectal bleeding, which would show as bright red, maroon, or black stools.   (A tablespoon of blood from the rectum is not serious, especially if   hemorrhoids are present.)  6. Vomiting.  7. Weakness or dizziness.  GO DIRECTLY TO THE NEAREST EMERGENCY ROOM IF YOU HAVE ANY OF THE FOLLOWING:      Difficulty breathing              Chills and/or fever over 101 F   Persistent vomiting and/or vomiting blood   Severe abdominal pain   Severe chest pain   Black, tarry stools   Bleeding- more than one  tablespoon   Any other symptom or condition that you feel may need urgent attention  Your doctor recommends these additional instructions:  If any biopsies were taken, your doctors clinic will contact you in 1 to 2   weeks with any results.  - Discharge patient to home after colonoscopy.   - Clear liquid diet today.   - Resume Plavix (clopidogrel) at prior dose in 5 days.  Refer to referring   physician for further adjustment of therapy.   - No aspirin, ibuprofen, naproxen, or other non-steroidal anti-inflammatory   drugs.   - Continue present medications.   - Proceed with colonoscopy.  - Recommend starting a low dose non-selective beta blocker such as   Propanolol 10mg BID. Patient is presently on Metoprolol.  For questions, problems or results please call your physician Kait Isaacs MD at Work:  [phone]  If you have any questions about the above instructions, call the GI   department at [phone] or call the endoscopy unit at [phone]   from 7am until 3 pm.  [facility] [place] [facility], EMERGENCY ROOM PHONE NUMBER:   [phone]  IF A COMPLICATION OR EMERGENCY SITUATION ARISES AND YOU ARE UNABLE TO REACH   YOUR PHYSICIAN - GO DIRECTLY TO THE EMERGENCY ROOM.  I have read or have had read to me these discharge instructions for my   procedure and have received a written copy.  I understand these   instructions and will follow-up with my physician if I have any questions.     __________________________________       _____________________________________  Nurse Signature                                          Patient/Designated   Responsible Party Signature  MD Kait Grace MD  10/6/2020 9:10:55 AM  This report has been verified and signed electronically.  PROVATION

## 2020-10-06 NOTE — INTERVAL H&P NOTE
The patient has been examined and the H&P has been reviewed:    I concur with the findings and changes have been noted since the H&P was written: Held Plavix since 10/1/20.  Reports he had a cardiac stent 9 years ago per [facility] Everywhere.     The risks, benefits and alternatives of the procedure were discussed with the patient in detail. This discussion was had in the presence of endoscopy staff. The risks include, risks of adverse reaction to sedation requiring the use of reversal agents, bleeding requiring blood transfusion, perforation requiring surgical intervention and technical failure. Other risks include aspiration leading to respiratory distress and respiratory failure resulting in endotracheal intubation and mechanical ventilation including death. If anesthesia is being utilized for this procedure, it is up to the anesthesiologist to determine airway safety including elective endotracheal intubation. Questions were answered, they agree to proceed. There was no language barriers.     Anesthesia/Surgery risks, benefits and alternative options discussed and understood by patient/family.          Active Hospital Problems    Diagnosis  POA    Encounter for screening colonoscopy [Z12.11]  Not Applicable      Resolved Hospital Problems   No resolved problems to display.

## 2020-10-08 LAB
FINAL PATHOLOGIC DIAGNOSIS: NORMAL
GROSS: NORMAL
Lab: NORMAL

## 2020-10-12 NOTE — PROGRESS NOTES
AN staff: inform patient that polyp removed was benign. His bowel prep was poor we recommend repeating the colonoscopy. MsHelio LopezKyra to inquire if we need additional approval. She will place order when we know how to proceed.

## 2020-10-19 ENCOUNTER — TELEPHONE (OUTPATIENT)
Dept: GASTROENTEROLOGY | Facility: CLINIC | Age: 75
End: 2020-10-19

## 2020-10-19 NOTE — TELEPHONE ENCOUNTER
----- Message from Nicki Rene LPN sent at 10/8/2020  3:53 PM CDT -----  Carol Carballo Staff  Caller: Unspecified (Today, 10:47 AM)         Good morning,       Lenora with [employer] called due to the number you provided was not working. She can be reached at [phone] ext [phone]         Thanks,   Carol De Los Santos

## 2021-04-08 ENCOUNTER — TELEPHONE (OUTPATIENT)
Dept: HEPATOLOGY | Facility: CLINIC | Age: 76
End: 2021-04-08

## 2021-04-28 ENCOUNTER — PATIENT MESSAGE (OUTPATIENT)
Dept: RESEARCH | Facility: HOSPITAL | Age: 76
End: 2021-04-28

## 2022-06-13 DIAGNOSIS — M79.601 RIGHT ARM PAIN: Primary | ICD-10-CM

## 2022-06-14 ENCOUNTER — OFFICE VISIT (OUTPATIENT)
Dept: ORTHOPEDICS | Facility: CLINIC | Age: 77
End: 2022-06-14
Payer: OTHER GOVERNMENT

## 2022-06-14 ENCOUNTER — HOSPITAL ENCOUNTER (OUTPATIENT)
Dept: RADIOLOGY | Facility: HOSPITAL | Age: 77
Discharge: HOME | End: 2022-06-14
Attending: STUDENT IN AN ORGANIZED HEALTH CARE EDUCATION/TRAINING PROGRAM
Payer: OTHER GOVERNMENT

## 2022-06-14 ENCOUNTER — HOSPITAL ENCOUNTER (OUTPATIENT)
Dept: RADIOLOGY | Facility: HOSPITAL | Age: 77
Discharge: HOME | End: 2022-06-14
Attending: STUDENT IN AN ORGANIZED HEALTH CARE EDUCATION/TRAINING PROGRAM
Payer: MEDICARE

## 2022-06-14 VITALS — HEIGHT: 72 IN | WEIGHT: 189 LBS | BODY MASS INDEX: 25.6 KG/M2

## 2022-06-14 DIAGNOSIS — M79.601 RIGHT ARM PAIN: ICD-10-CM

## 2022-06-14 DIAGNOSIS — S42.411A CLOSED DISPLACED SIMPLE SUPRACONDYLAR FRACTURE OF RIGHT HUMERUS WITHOUT INTERCONDYLAR FRACTURE, INITIAL ENCOUNTER: Primary | ICD-10-CM

## 2022-06-14 DIAGNOSIS — M79.601 RIGHT ARM PAIN: Primary | ICD-10-CM

## 2022-06-14 PROCEDURE — 73080 X-RAY EXAM OF ELBOW: CPT | Mod: 26,RT,, | Performed by: RADIOLOGY

## 2022-06-14 PROCEDURE — 99203 OFFICE O/P NEW LOW 30 MIN: CPT | Mod: S$PBB,,, | Performed by: STUDENT IN AN ORGANIZED HEALTH CARE EDUCATION/TRAINING PROGRAM

## 2022-06-14 PROCEDURE — 99213 OFFICE O/P EST LOW 20 MIN: CPT | Mod: PBBFAC | Performed by: STUDENT IN AN ORGANIZED HEALTH CARE EDUCATION/TRAINING PROGRAM

## 2022-06-14 PROCEDURE — 99999 PR PBB SHADOW E&M-EST. PATIENT-LVL III: ICD-10-PCS | Mod: PBBFAC,,, | Performed by: STUDENT IN AN ORGANIZED HEALTH CARE EDUCATION/TRAINING PROGRAM

## 2022-06-14 PROCEDURE — 73080 XR ELBOW COMPLETE 3 VIEW RIGHT: ICD-10-PCS | Mod: 26,RT,, | Performed by: RADIOLOGY

## 2022-06-14 PROCEDURE — 73080 X-RAY EXAM OF ELBOW: CPT | Mod: TC,RT

## 2022-06-14 PROCEDURE — 99999 PR PBB SHADOW E&M-EST. PATIENT-LVL III: CPT | Mod: PBBFAC,,, | Performed by: STUDENT IN AN ORGANIZED HEALTH CARE EDUCATION/TRAINING PROGRAM

## 2022-06-14 PROCEDURE — 99203 PR OFFICE/OUTPT VISIT, NEW, LEVL III, 30-44 MIN: ICD-10-PCS | Mod: S$PBB,,, | Performed by: STUDENT IN AN ORGANIZED HEALTH CARE EDUCATION/TRAINING PROGRAM

## 2022-06-14 RX ORDER — OXYCODONE AND ACETAMINOPHEN 10; 325 MG/1; MG/1
1 TABLET ORAL EVERY 6 HOURS PRN
COMMUNITY
End: 2022-10-05

## 2022-06-14 NOTE — PROGRESS NOTES
Orthopaedics Sports Medicine     Elbow Initial Visit         6/14/2022    Referring MD: Self, Aaareferral    Chief Complaint   Patient presents with    Right Forearm - Injury         History of Present Illness:   Korin Vivas is a 76 y.o. male who presents with Right elbow pain and dysfunction.    Onset of the symptoms was 6/3/22     Inciting event: had a fall onto the right elbow after tripping on a rug in the bathroom     Current symptoms include pain in the right elbow     Pain is aggravated by ROM of the elbow      Evaluation to date: XR     Treatment to date: splint immobilization     Past Medical History:   Past Medical History:   Diagnosis Date    Arm fracture, right 2022, 2010    CAD (coronary artery disease)     Diabetes     GERD (gastroesophageal reflux disease)     HTN (hypertension)     Hyperlipidemia        Past Surgical History:   Past Surgical History:   Procedure Laterality Date    CATARACT EXTRACTION      COLONOSCOPY      COLONOSCOPY N/A 7/18/2016    Procedure: COLONOSCOPY;  Surgeon: Bryon Hickey MD;  Location: [facility];  Service: Endoscopy;  Laterality: N/A;    COLONOSCOPY N/A 10/6/2020    Procedure: COLONOSCOPY;  Surgeon: Kait Isaacs MD;  Location: [facility];  Service: Endoscopy;  Laterality: N/A;    CORONARY STENT PLACEMENT      ELBOW SURGERY      ESOPHAGOGASTRODUODENOSCOPY N/A 10/6/2020    Procedure: ESOPHAGOGASTRODUODENOSCOPY (EGD);  Surgeon: Kait Isaacs MD;  Location: [facility];  Service: Endoscopy;  Laterality: N/A;    HERNIA REPAIR      2022    TONSILLECTOMY      VASECTOMY         Medications:  Patient's Medications   New Prescriptions    No medications on file   Previous Medications    BLOOD SUGAR DIAGNOSTIC (ACCU-CHEK SHANTELL PLUS TEST STRP MISC)    by Misc.(Non-Drug; Combo Route) route.    CLOPIDOGREL (PLAVIX) 75 MG TABLET    Take 75 mg by mouth once daily.    FISH OIL-OMEGA-3 FATTY ACIDS 300-1,000 MG CAPSULE    Take 2 g by mouth once daily.     HYDROCHLOROTHIAZIDE (HYDRODIURIL) 50 MG TABLET    Take 50 mg by mouth once daily.    LISINOPRIL (PRINIVIL,ZESTRIL) 40 MG TABLET    Take 40 mg by mouth once daily.    LOVASTATIN (MEVACOR) 40 MG TABLET    Take 40 mg by mouth every evening.    METFORMIN (GLUCOPHAGE) 1000 MG TABLET    Take 1,000 mg by mouth 2 (two) times daily with meals.    METOPROLOL TARTRATE (LOPRESSOR) 50 MG TABLET    Take 50 mg by mouth 2 (two) times daily.    MULTIVITAMIN CAPSULE    Take 1 capsule by mouth once daily.    OXYCODONE-ACETAMINOPHEN (PERCOCET)  MG PER TABLET    Take 1 tablet by mouth every 6 (six) hours as needed for Pain.    RANITIDINE (ZANTAC) 150 MG TABLET    Take 150 mg by mouth 2 (two) times daily.    TRAZODONE (DESYREL) 100 MG TABLET    Take 100 mg by mouth every evening.   Modified Medications    No medications on file   Discontinued Medications    No medications on file       Allergies:   Review of patient's allergies indicates:   Allergen Reactions    Lipitor [atorvastatin] Other (See Comments)     Left arm/shooulder pain    Statins-hmg-coa reductase inhibitors Other (See Comments)     muscle aches, joint pain  Joint pain  Joint pain         Social History:   Home town: [place]  Occupation: retired  Alcohol use: He reports no history of alcohol use.  Tobacco use: He reports that he has quit smoking. He has a 40.00 pack-year smoking history. He has never used smokeless tobacco.    Review of systems:  History of recent illness, fevers, shakes, or chills: yes, recent hosptilization for kidney failure  History of cardiac problems or chest pain: HTN  History of pulmonary problems or asthma: no  History of diabetes: yes  History of prior dvt or clotting problems: on plavix  History of sleep apnea: no      Physical Examination:  Estimated body mass index is 25.63 kg/m² as calculated from the following:    Height as of this encounter: 6' (1.829 m).    Weight as of this encounter: 85.7 kg (189 lb).    General  Healthy  appearing male in no acute distress  Alert and oriented, normal mood, appropriate affect    Ortho Exam   Right upper extremity:  Skin is intact  There is significant swelling and bruising around the right elbow  Tenderness palpation along the lateral and medial epicondyles  Tolerates gentle passive range of motion of the elbow  Demonstrates thumb IP joint extension and flexion, finger D IP joint flexion, finger abduction and adduction  Hand is warm well perfused with good capillary refill to the digits  Radial pulses palpable  Sensation is intact to light touch over the hand    Imaging:  X-Ray Elbow Complete 3 view Right  Narrative: EXAMINATION:  XR ELBOW COMPLETE 3 VIEW RIGHT    CLINICAL HISTORY:  . Pain in right arm    TECHNIQUE:  AP, lateral, and oblique views of the right elbow were performed.    COMPARISON:  07/09/2012    FINDINGS:  There is an acute appearing nondisplaced transverse fracture seen in the supracondylar region of the distal right humerus.  Chronic fracture deformity of the olecranon process of the proximal ulna appears similar to prior.  No evidence of dislocation.  Moderate to severe degenerative findings are present at the ulnotrochlear articulation which may be slightly worsened from prior.  Mild degenerative findings also noted at the radiocapitellar articulation which appears similar to prior.  Impression: As above    This report was flagged in Epic as abnormal.    Electronically signed by: Frankie Chavez MD  Date:    06/14/2022  Time:    14:00         Physician Read: I agree with the above impression.      Impression:  76 y.o. male with right distal humerus fracture, extra-articular, minimally displaced      Plan:  1. Discussed diagnosis and treatment options with the patient today.  He has a right distal humerus fracture, extra-articular that is minimally displaced.  Overall good bony contact and alignment.  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  2. 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  3. We discussed treatment options including open reduction and internal fixation versus immobilization followed by range of motion.  Considering his medical comorbidities, we elected to proceed with nonoperative management.  We will immobilize him in a splint for another 10 days.  This will be 3 weeks out from his injury.  4. At that point we will get x-rays of his right elbow.  If he has maintained alignment, then we will transition him into a brace and begin working on range of motion.  5. If he has displaced his fracture has 3 weeks, then we will discuss moving forward operative treatment.  6. Follow up with me in 3 weeks with x-rays of the right elbow out of splint           Gabo Roger MD

## 2022-06-16 ENCOUNTER — TELEPHONE (OUTPATIENT)
Dept: HEPATOLOGY | Facility: CLINIC | Age: 77
End: 2022-06-16
Payer: MEDICARE

## 2022-06-16 NOTE — TELEPHONE ENCOUNTER
----- Message from Dafne Freitas MA sent at 6/16/2022  1:33 PM CDT -----  Contact: patient/[phone]/ Jessica seaman wife    ----- Message -----  From: Azeb Mirza  Sent: 6/16/2022  12:42 PM CDT  To: Russell DOZIER Staff    Type:  Sooner Apoointment Request    Caller is requesting a sooner appointment.  Caller declined first available appointment listed below.  Caller will not accept being placed on the waitlist and is requesting a message be sent to doctor.  Name of Caller:Korin  When is the first available appointment?8'2022  Symptoms:Fluid in Stomach/ Cirrhosis of the Liver not alcohol related  Would the patient rather a call back or a response via [facility]?callback   Best Call Back Number:[phone] or [phone]  Additional Information: He was referred to you by Dr Olivia Cali and the reason is because he can no longer travel that far due to his current condition. [facility] with [facility]. Her [phone]. Ext [phone]  Or [phone].  [email or web address].    Thanks KL

## 2022-06-17 ENCOUNTER — HOSPITAL ENCOUNTER (OUTPATIENT)
Dept: RADIOLOGY | Facility: HOSPITAL | Age: 77
Discharge: HOME | End: 2022-06-17
Attending: INTERNAL MEDICINE
Payer: OTHER GOVERNMENT

## 2022-06-17 ENCOUNTER — OFFICE VISIT (OUTPATIENT)
Dept: HEPATOLOGY | Facility: CLINIC | Age: 77
End: 2022-06-17
Payer: MEDICARE

## 2022-06-17 VITALS
BODY MASS INDEX: 25.24 KG/M2 | HEIGHT: 72 IN | SYSTOLIC BLOOD PRESSURE: 140 MMHG | HEART RATE: 112 BPM | DIASTOLIC BLOOD PRESSURE: 80 MMHG | WEIGHT: 186.31 LBS

## 2022-06-17 VITALS
OXYGEN SATURATION: 100 % | BODY MASS INDEX: 24.92 KG/M2 | SYSTOLIC BLOOD PRESSURE: 138 MMHG | WEIGHT: 184 LBS | RESPIRATION RATE: 18 BRPM | HEIGHT: 72 IN | HEART RATE: 103 BPM | DIASTOLIC BLOOD PRESSURE: 71 MMHG

## 2022-06-17 DIAGNOSIS — K74.60 LIVER CIRRHOSIS SECONDARY TO NASH: ICD-10-CM

## 2022-06-17 DIAGNOSIS — K74.60 LIVER CIRRHOSIS SECONDARY TO NASH: Primary | ICD-10-CM

## 2022-06-17 DIAGNOSIS — K75.81 LIVER CIRRHOSIS SECONDARY TO NASH: ICD-10-CM

## 2022-06-17 DIAGNOSIS — K75.81 LIVER CIRRHOSIS SECONDARY TO NASH: Primary | ICD-10-CM

## 2022-06-17 PROCEDURE — 99999 PR PBB SHADOW E&M-EST. PATIENT-LVL IV: CPT | Mod: PBBFAC,,, | Performed by: INTERNAL MEDICINE

## 2022-06-17 PROCEDURE — 49083 ABD PARACENTESIS W/IMAGING: CPT

## 2022-06-17 PROCEDURE — P9047 ALBUMIN (HUMAN), 25%, 50ML: HCPCS | Mod: JG | Performed by: INTERNAL MEDICINE

## 2022-06-17 PROCEDURE — 99214 OFFICE O/P EST MOD 30 MIN: CPT | Mod: PBBFAC,25 | Performed by: INTERNAL MEDICINE

## 2022-06-17 PROCEDURE — 63600175 PHARM REV CODE 636 W HCPCS: Mod: JG | Performed by: INTERNAL MEDICINE

## 2022-06-17 PROCEDURE — 99999 PR PBB SHADOW E&M-EST. PATIENT-LVL IV: ICD-10-PCS | Mod: PBBFAC,,, | Performed by: INTERNAL MEDICINE

## 2022-06-17 PROCEDURE — 99205 OFFICE O/P NEW HI 60 MIN: CPT | Mod: S$PBB,,, | Performed by: INTERNAL MEDICINE

## 2022-06-17 PROCEDURE — 99205 PR OFFICE/OUTPT VISIT, NEW, LEVL V, 60-74 MIN: ICD-10-PCS | Mod: S$PBB,,, | Performed by: INTERNAL MEDICINE

## 2022-06-17 RX ORDER — FAMOTIDINE 40 MG/1
40 TABLET, FILM COATED ORAL
COMMUNITY
Start: 2022-03-07 | End: 2022-06-22

## 2022-06-17 RX ORDER — ALBUMIN HUMAN 250 G/1000ML
25 SOLUTION INTRAVENOUS ONCE
Status: COMPLETED | OUTPATIENT
Start: 2022-06-17 | End: 2022-06-17

## 2022-06-17 RX ORDER — SPIRONOLACTONE 100 MG/1
100 TABLET, FILM COATED ORAL
COMMUNITY
Start: 2022-04-29 | End: 2022-08-18

## 2022-06-17 RX ORDER — CARVEDILOL 6.25 MG/1
6.25 TABLET ORAL 2 TIMES DAILY
Status: ON HOLD | COMMUNITY
Start: 2022-03-07 | End: 2023-02-14 | Stop reason: HOSPADM

## 2022-06-17 RX ORDER — LACTULOSE 10 G/15ML
SOLUTION ORAL; RECTAL
COMMUNITY
Start: 2022-06-10 | End: 2022-08-18 | Stop reason: SDUPTHER

## 2022-06-17 RX ORDER — FUROSEMIDE 40 MG/1
40 TABLET ORAL DAILY
Qty: 30 TABLET | Refills: 11 | Status: SHIPPED | OUTPATIENT
Start: 2022-06-17 | End: 2023-06-17

## 2022-06-17 RX ORDER — OXYCODONE HYDROCHLORIDE 5 MG/1
2.5 TABLET ORAL
COMMUNITY
Start: 2022-06-10 | End: 2022-06-22 | Stop reason: SDUPTHER

## 2022-06-17 RX ADMIN — ALBUMIN (HUMAN) 25 G: 25 SOLUTION INTRAVENOUS at 03:06

## 2022-06-17 NOTE — H&P (VIEW-ONLY)
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  He was hospitalized on May 31st for ascites, underwent paracentesis, reportedly also had episodes of hepatic encephalopathy.  Then she had incarcerated hernia repaired at [facility] and discharged from the hospital on June 18th.  Previously he had episodes of GI bleed, underwent an upper endoscopy with banding of esophageal varices, taken off of Plavix in 2019, since then no episodes of bleed per patient.  He was referred to us not liver Clinic for management of cirrhosis of liver            Review of Systems   Constitutional: Positive for fatigue. Negative for fever and unexpected weight change.   Respiratory: Positive for shortness of breath.    Gastrointestinal: Positive for abdominal distention. Negative for abdominal pain, blood in stool, nausea and vomiting.   Musculoskeletal: Positive for arthralgias. Negative for gait problem.   Skin: Negative for pallor and rash.   Neurological: Negative for dizziness.   Hematological: Does not bruise/bleed easily.   Psychiatric/Behavioral: Negative for confusion, hallucinations and sleep disturbance.       Objective:     Physical Exam  Vitals and nursing note reviewed.   Constitutional:       Appearance: Normal appearance.   Eyes:      General: No scleral icterus.  Cardiovascular:      Heart sounds: No murmur heard.  Pulmonary:      Effort: Pulmonary effort is normal.      Breath sounds: No wheezing, rhonchi or rales.   Abdominal:      General: Bowel sounds are normal. There is distension.      Palpations: There is no mass.      Tenderness: There is no abdominal tenderness.   Musculoskeletal:         General: No tenderness.      Right lower leg: Edema present.      Left lower leg: Edema present.   Lymphadenopathy:      Cervical: No cervical adenopathy.   Skin:     Coloration:  Skin is not jaundiced.      Findings: No bruising or rash.   Neurological:      Mental Status: He is alert and oriented to person, place, and time.      Coordination: Coordination normal.   Psychiatric:         Behavior: Behavior normal.         Thought Content: Thought content normal.         Computed MELD-Na score unavailable. Necessary lab results were not found in the last year.  Computed MELD score unavailable. Necessary lab results were not found in the last year.    No results found for: WBC, HGB, HCT, PLT  No results found for: BUN, CREATININE, GLU, CALCIUM, NA, K, CL, MG  No results found for: AST, ALT, ALKPHOS, BILITOT, ALBUMIN  No results found for: INR      Assessment/Plan:     1.Cirrhosis:  Decompensated by ascites, hepatic encephalopathy  Will obtain current MELD labs   Add furosemide 40 mg 2 all duct on 100 mg, monitor potassium level and renal function in 1-2 weeks  Will send for therapeutic paracentesis today  Continue with HCC surveillance: US 4/9/2022: shows no liver lesions  Continue lactulose 15 mL twice a day, adjust for 2-3 soft bowel movements per day  Esophageal varices surveillance:  Last EGD 12/2021 with 3 columns of G grade 1 esophagealV varices, questionable gastric polyp versus GV, next  EGD due 12/2022     His comorbidities include hypertension, coronary artery disease with stent in place, long history of diabetes mellitus, hyperlipidemia     RTC - 2 weeks     I have reviewed existing labs, imaging. Educated patient and family about disease process, prognosis. Ordered required labs, images, procedures and discussed treatment plan.       Litzy Goodman MD  Transplant Hepatologist  Dept of Hepatology, [place] [facility]

## 2022-06-17 NOTE — PROGRESS NOTES
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  He was hospitalized on May 31st for ascites, underwent paracentesis, reportedly also had episodes of hepatic encephalopathy.  Then she had incarcerated hernia repaired at [facility] and discharged from the hospital on June 18th.  Previously he had episodes of GI bleed, underwent an upper endoscopy with banding of esophageal varices, taken off of Plavix in 2019, since then no episodes of bleed per patient.  He was referred to us not liver Clinic for management of cirrhosis of liver            Review of Systems   Constitutional: Positive for fatigue. Negative for fever and unexpected weight change.   Respiratory: Positive for shortness of breath.    Gastrointestinal: Positive for abdominal distention. Negative for abdominal pain, blood in stool, nausea and vomiting.   Musculoskeletal: Positive for arthralgias. Negative for gait problem.   Skin: Negative for pallor and rash.   Neurological: Negative for dizziness.   Hematological: Does not bruise/bleed easily.   Psychiatric/Behavioral: Negative for confusion, hallucinations and sleep disturbance.       Objective:     Physical Exam  Vitals and nursing note reviewed.   Constitutional:       Appearance: Normal appearance.   Eyes:      General: No scleral icterus.  Cardiovascular:      Heart sounds: No murmur heard.  Pulmonary:      Effort: Pulmonary effort is normal.      Breath sounds: No wheezing, rhonchi or rales.   Abdominal:      General: Bowel sounds are normal. There is distension.      Palpations: There is no mass.      Tenderness: There is no abdominal tenderness.   Musculoskeletal:         General: No tenderness.      Right lower leg: Edema present.      Left lower leg: Edema present.   Lymphadenopathy:      Cervical: No cervical adenopathy.   Skin:     Coloration:  Skin is not jaundiced.      Findings: No bruising or rash.   Neurological:      Mental Status: He is alert and oriented to person, place, and time.      Coordination: Coordination normal.   Psychiatric:         Behavior: Behavior normal.         Thought Content: Thought content normal.         Computed MELD-Na score unavailable. Necessary lab results were not found in the last year.  Computed MELD score unavailable. Necessary lab results were not found in the last year.    No results found for: WBC, HGB, HCT, PLT  No results found for: BUN, CREATININE, GLU, CALCIUM, NA, K, CL, MG  No results found for: AST, ALT, ALKPHOS, BILITOT, ALBUMIN  No results found for: INR      Assessment/Plan:     1.Cirrhosis:  Decompensated by ascites, hepatic encephalopathy  Will obtain current MELD labs   Add furosemide 40 mg 2 all duct on 100 mg, monitor potassium level and renal function in 1-2 weeks  Will send for therapeutic paracentesis today  Continue with HCC surveillance: US 4/9/2022: shows no liver lesions  Continue lactulose 15 mL twice a day, adjust for 2-3 soft bowel movements per day  Esophageal varices surveillance:  Last EGD 12/2021 with 3 columns of G grade 1 esophagealV varices, questionable gastric polyp versus GV, next  EGD due 12/2022     His comorbidities include hypertension, coronary artery disease with stent in place, long history of diabetes mellitus, hyperlipidemia     RTC - 2 weeks     I have reviewed existing labs, imaging. Educated patient and family about disease process, prognosis. Ordered required labs, images, procedures and discussed treatment plan.       Litzy Goodman MD  Transplant Hepatologist  Dept of Hepatology, [place] [facility]

## 2022-06-17 NOTE — PLAN OF CARE
7.5 liters of suellen fluid removed from abdomen. Patient AAOx4, denies any pain or discomfort related to paracentesis. Catheter removed from right abdominal wall without difficulty, tip intact. Bandage applied, CDI. Vital signs WNL.

## 2022-06-22 ENCOUNTER — OFFICE VISIT (OUTPATIENT)
Dept: ORTHOPEDICS | Facility: CLINIC | Age: 77
End: 2022-06-22
Payer: MEDICARE

## 2022-06-22 ENCOUNTER — HOSPITAL ENCOUNTER (OUTPATIENT)
Dept: RADIOLOGY | Facility: HOSPITAL | Age: 77
Discharge: HOME | End: 2022-06-22
Attending: STUDENT IN AN ORGANIZED HEALTH CARE EDUCATION/TRAINING PROGRAM
Payer: MEDICARE

## 2022-06-22 VITALS — WEIGHT: 184 LBS | HEIGHT: 72 IN | BODY MASS INDEX: 24.92 KG/M2

## 2022-06-22 DIAGNOSIS — M79.601 RIGHT ARM PAIN: ICD-10-CM

## 2022-06-22 DIAGNOSIS — S42.411D CLOSED DISPLACED SIMPLE SUPRACONDYLAR FRACTURE OF RIGHT HUMERUS WITHOUT INTERCONDYLAR FRACTURE WITH ROUTINE HEALING, SUBSEQUENT ENCOUNTER: Primary | ICD-10-CM

## 2022-06-22 PROCEDURE — 99213 PR OFFICE/OUTPT VISIT, EST, LEVL III, 20-29 MIN: ICD-10-PCS | Mod: S$PBB,,, | Performed by: STUDENT IN AN ORGANIZED HEALTH CARE EDUCATION/TRAINING PROGRAM

## 2022-06-22 PROCEDURE — 99999 PR PBB SHADOW E&M-EST. PATIENT-LVL V: CPT | Mod: PBBFAC,,, | Performed by: STUDENT IN AN ORGANIZED HEALTH CARE EDUCATION/TRAINING PROGRAM

## 2022-06-22 PROCEDURE — 99215 OFFICE O/P EST HI 40 MIN: CPT | Mod: PBBFAC,PO | Performed by: STUDENT IN AN ORGANIZED HEALTH CARE EDUCATION/TRAINING PROGRAM

## 2022-06-22 PROCEDURE — 73080 XR ELBOW COMPLETE 3 VIEW RIGHT: ICD-10-PCS | Mod: 26,RT,, | Performed by: RADIOLOGY

## 2022-06-22 PROCEDURE — 99213 OFFICE O/P EST LOW 20 MIN: CPT | Mod: S$PBB,,, | Performed by: STUDENT IN AN ORGANIZED HEALTH CARE EDUCATION/TRAINING PROGRAM

## 2022-06-22 PROCEDURE — 73080 X-RAY EXAM OF ELBOW: CPT | Mod: 26,RT,, | Performed by: RADIOLOGY

## 2022-06-22 PROCEDURE — 73080 X-RAY EXAM OF ELBOW: CPT | Mod: TC,FY,PO,RT

## 2022-06-22 PROCEDURE — 99999 PR PBB SHADOW E&M-EST. PATIENT-LVL V: ICD-10-PCS | Mod: PBBFAC,,, | Performed by: STUDENT IN AN ORGANIZED HEALTH CARE EDUCATION/TRAINING PROGRAM

## 2022-06-22 RX ORDER — OXYCODONE HYDROCHLORIDE 5 MG/1
5 TABLET ORAL EVERY 6 HOURS PRN
Qty: 24 TABLET | Refills: 0 | Status: SHIPPED | OUTPATIENT
Start: 2022-06-22 | End: 2022-10-05
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
## 2022-06-29 ENCOUNTER — CLINICAL SUPPORT (OUTPATIENT)
Dept: REHABILITATION | Facility: HOSPITAL | Age: 77
End: 2022-06-29
Attending: STUDENT IN AN ORGANIZED HEALTH CARE EDUCATION/TRAINING PROGRAM
Payer: MEDICARE

## 2022-06-29 DIAGNOSIS — R53.1 DECREASED STRENGTH: ICD-10-CM

## 2022-06-29 DIAGNOSIS — R60.9 SWELLING: ICD-10-CM

## 2022-06-29 DIAGNOSIS — M25.60 DECREASED RANGE OF MOTION: ICD-10-CM

## 2022-06-29 DIAGNOSIS — Z78.9 SELF-CARE DEFICIT: ICD-10-CM

## 2022-06-29 DIAGNOSIS — S42.411D CLOSED DISPLACED SIMPLE SUPRACONDYLAR FRACTURE OF RIGHT HUMERUS WITHOUT INTERCONDYLAR FRACTURE WITH ROUTINE HEALING, SUBSEQUENT ENCOUNTER: ICD-10-CM

## 2022-06-29 DIAGNOSIS — R52 PAIN: ICD-10-CM

## 2022-06-29 PROCEDURE — 97165 OT EVAL LOW COMPLEX 30 MIN: CPT | Performed by: OCCUPATIONAL THERAPIST

## 2022-06-29 PROCEDURE — 97110 THERAPEUTIC EXERCISES: CPT | Performed by: OCCUPATIONAL THERAPIST
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
## 2022-07-01 ENCOUNTER — CLINICAL SUPPORT (OUTPATIENT)
Dept: REHABILITATION | Facility: HOSPITAL | Age: 77
End: 2022-07-01
Payer: MEDICARE

## 2022-07-01 DIAGNOSIS — R52 PAIN: ICD-10-CM

## 2022-07-01 DIAGNOSIS — R53.1 DECREASED STRENGTH: ICD-10-CM

## 2022-07-01 DIAGNOSIS — Z78.9 SELF-CARE DEFICIT: ICD-10-CM

## 2022-07-01 DIAGNOSIS — R60.9 SWELLING: ICD-10-CM

## 2022-07-01 DIAGNOSIS — M25.60 DECREASED RANGE OF MOTION: Primary | ICD-10-CM

## 2022-07-01 PROCEDURE — 97110 THERAPEUTIC EXERCISES: CPT | Performed by: OCCUPATIONAL THERAPIST

## 2022-07-01 NOTE — PROGRESS NOTES
OCCUPATIONAL THERAPY DAILY NOTE    Name: Korin Vivas  Clinic Number: [id number]    Therapy Diagnosis: No diagnosis found.  Physician: Gabo Roger*    Visit Date: 7/1/2022    Physician Orders: Evaluation and treatment   Right distal humerus fracture  Begin working on elbow ROM  for 3 weeks then progress, continue with hand/wrist/finger ROM  eval and treat with modalities as needed     Medical Diagnosis: S42.411D (ICD-10-CM) - Closed displaced simple supracondylar fracture of right humerus without intercondylar fracture with routine healing,   Surgical Procedure and Date: NA,   Evaluation Date: 6/29/2022  Insurance Authorization Period Expiration: 6/22/2022 - 6/22/2023  Plan of Care Certification Period: 6/29/2022 to 7/29/2022  Progress Note Due: 7/29/2022   Date of Return to MD: 7/13/2022  Visit # / Visits authorized: 2 / 20  FOTO: 1/3     Precautions:  -30 - 110 degrees of elbow range of motion for 3 weeks     Time In:1010  Time Out: 1100  Total Appointment Time (timed & untimed codes): 50 minutes    SUBJECTIVE     Today, pt reports: that he wears his elbow brace when he feels like it.He has his brace on today, but it is not on correctly.  He/She was compliant with home exercise program.  Response to previous treatment: None  Functional change: None    Pre-Treatment Pain: 5/10  Post-Treatment Pain: 4/10  Location: elbow  TREATMENT     Korin received therapeutic exercises to develop ROM and flexibility for 45 minutes including:    Exercise 7/1/2022   Supine shoulder flexion with dowel with brace on. 30x   Sitting elbow flexion/extension with dowel 30x   Forearm rotation with dowel 30x   Passive range of motion of the elbow in flexion and forearm rotation    Scapular protraction/retraction in side lying 20x   Active assist shoulder flexion and abduction in sidelying 20x each               Korin received the following manual therapy techniques: Soft tissue Mobilization were applied to the: elbow  for 5 minutes, including:  STM of right elbow muscles.    Korin received the following supervised modalities after being cleared for contradictions:     Korin received hot pack for 0 minutes.    Korin received cold pack for 0 minutes.      Home Exercises Provided and Patient Education Provided     Education/Self-Care provided: (5 minutes)   Patient educated on biomechanical justification for therapeutic exercise and importance of compliance with HEP in order to improve overall impairments and QOL    Patient educated on postural awareness.    Patient educated on wearing his brace at all times when out of the house and using his arm.Can remove it while sitting in the house.   Proper fit for his elbow brace.   Passive range of motion exercises   Avoid shoulder external rotation    Written Home Exercises Provided: yes.  Exercises were reviewed and Korin was able to demonstrate them prior to the end of the session.  Korin demonstrated good  understanding of the education provided.     See EMR under Media for exercises provided 7/1/2022.    ASSESSMENT   Pt tolerated manual therapy well with reports of decreased pain and tension in musculature following intervention. Pt tolerated exercise well with reports of increased fatigue but no increased pain. Pt demonstrated good understanding of exercises and required minimal cueing to maintain proper form.  Patient has developed an elbow contracture and has significant limitations in elbow flexion and forearm supination.Shoulder range of motion in flexion/abduction and extension improving.    Korin Is progressing well towards his goals.   Pt prognosis is Good.     Pt will continue to benefit from skilled outpatient occupational therapy to address the deficits listed in the problem list box on initial evaluation, provide pt/family education and to maximize pt's level of independence in the home and community environment.     Pt's spiritual, cultural and educational  needs considered and pt agreeable to plan of care and goals.     Anticipated barriers to occupational therapy: previous injury to his elbow with limited    GOALS:     Short Term Goals:  4 weeks     1. Pain: Pt will demonstrate improved pain by reports of less than or equal to 5/10 worst pain on the verbal rating scale in order to progress toward maximal functional ability and improve QOL.     2. Mobility: Patient will improve AROM to 50% of stated goals, listed in objective measures above, in order to progress towards independence with functional activities.      3. Strength: Patient will improve strength to 50% of stated goals, listed in objective measures above, in order to progress towards independence with functional activities.      4. Self Care: Patient will demonstrate improved self care skills listed in objective measure above,in order to progress towards independence with functional activities.      5. HEP: Patient will demonstrate independence with HEP in order to progress toward functional independence.        Long Term Goals:  8 weeks     1. Pain: Pt will demonstrate improved pain by reports of less than or equal to 1/10 worst pain on the verbal rating scale in order to progress toward maximal functional ability and improve QOL.       2. Function: Patient will demonstrate improved function as indicated by a functional limitation score of less than or equal to 37 out of 100 on FOTO.     3. Mobility: Patient will improve AROM to stated goals, listed in objective measures above, in order to return to maximal functional potential and improve quality of life.     4. Strength: Patient will improve strength to stated goals, listed in objective measures above, in order to improve functional independence and quality of life.     5. Self Care: Patient will demonstrate increased self care skills to an independent level or modified independent level with adaptive equipment as needed.     6. Patient will return to  normal ADL's, IADL's, community involvement, recreational activities, and work-related activities with less than or equal to 0/10 pain and maximal function.                  PLAN   Continue Plan of Care (POC) and progress per patient tolerance.    Maday Anderson, OTR, LULÚR

## 2022-07-05 ENCOUNTER — TELEPHONE (OUTPATIENT)
Dept: HEPATOLOGY | Facility: CLINIC | Age: 77
End: 2022-07-05
Payer: MEDICARE

## 2022-07-05 DIAGNOSIS — K74.60 LIVER CIRRHOSIS SECONDARY TO NASH: Primary | ICD-10-CM

## 2022-07-05 DIAGNOSIS — K75.81 LIVER CIRRHOSIS SECONDARY TO NASH: Primary | ICD-10-CM

## 2022-07-05 NOTE — TELEPHONE ENCOUNTER
Spoke with patient who states that he would like to have a paracentesis scheduled for today. He states that he has fluid build up and his abdomen is distended.     Informed patient that I would make Dr. Goodman aware so that she can place an order for the paracentesis. Patient verbalized understanding.

## 2022-07-05 NOTE — TELEPHONE ENCOUNTER
----- Message from Baron Hairston sent at 7/5/2022  9:16 AM CDT -----  Contact: self  Type:  Same Day Appointment Request    Caller is requesting a same day appointment.  Caller declined first available appointment listed below.    Name of Caller:Korin Vivas   When is the first available appointment?7/7  Symptoms:fluid build up  Best Call Back Number:[phone]  Additional Information:

## 2022-07-05 NOTE — TELEPHONE ENCOUNTER
Spoke with patient and made him aware that the soonest appointment for a paracentesis is for Thursday, 07/07/2022 at 2:30 pm with an arrival time of 2:00 pm.     Patient verbalized understanding with no concerns at this time.

## 2022-07-06 ENCOUNTER — CLINICAL SUPPORT (OUTPATIENT)
Dept: REHABILITATION | Facility: HOSPITAL | Age: 77
End: 2022-07-06
Payer: MEDICARE

## 2022-07-06 DIAGNOSIS — R52 PAIN: ICD-10-CM

## 2022-07-06 DIAGNOSIS — Z78.9 SELF-CARE DEFICIT: ICD-10-CM

## 2022-07-06 DIAGNOSIS — K75.81 LIVER CIRRHOSIS SECONDARY TO NASH: Primary | ICD-10-CM

## 2022-07-06 DIAGNOSIS — K74.60 LIVER CIRRHOSIS SECONDARY TO NASH: Primary | ICD-10-CM

## 2022-07-06 DIAGNOSIS — R53.1 DECREASED STRENGTH: ICD-10-CM

## 2022-07-06 DIAGNOSIS — M25.60 DECREASED RANGE OF MOTION: Primary | ICD-10-CM

## 2022-07-06 DIAGNOSIS — R60.9 SWELLING: ICD-10-CM

## 2022-07-06 PROCEDURE — 97140 MANUAL THERAPY 1/> REGIONS: CPT | Performed by: OCCUPATIONAL THERAPIST

## 2022-07-06 PROCEDURE — 97110 THERAPEUTIC EXERCISES: CPT | Performed by: OCCUPATIONAL THERAPIST

## 2022-07-06 NOTE — PROGRESS NOTES
OCCUPATIONAL THERAPY DAILY NOTE    Name: Korin Vivas  Clinic Number: [id number]    Therapy Diagnosis:   Encounter Diagnoses   Name Primary?    Decreased range of motion Yes    Pain     Self-care deficit     Decreased strength     Swelling      Physician: Gabo Roger*    Visit Date: 7/6/2022    Physician Orders: Evaluation and treatment   Right distal humerus fracture  Begin working on elbow ROM  for 3 weeks then progress, continue with hand/wrist/finger ROM  eval and treat with modalities as needed     Medical Diagnosis: S42.411D (ICD-10-CM) - Closed displaced simple supracondylar fracture of right humerus without intercondylar fracture with routine healing,   Surgical Procedure and Date: NA,   Evaluation Date: 6/29/2022  Insurance Authorization Period Expiration: 6/22/2022 - 6/22/2023  Plan of Care Certification Period: 6/29/2022 to 7/29/2022  Progress Note Due: 7/29/2022   Date of Return to MD: 7/13/2022  Visit # / Visits authorized: 3 / 20  FOTO: 1/3     Precautions:  -30 - 110 degrees of elbow range of motion for 3 weeks     Time In: 2:20 pm  Time Out: 3:20 pm  Total Appointment Time (timed & untimed codes): 60 minutes    SUBJECTIVE     Today, pt reports: that his back went out and he fell, but did not hurt his elbow. He informed me of the fall after the treatment session was over.  He/She was compliant with home exercise program.  Response to previous treatment: None  Functional change: None    Pre-Treatment Pain: 5/10  Post-Treatment Pain: 4/10  Location: elbow  TREATMENT     Korin received therapeutic exercises to develop ROM and flexibility for 40 minutes including:    Exercise 7/6/2022   Supine shoulder flexion with dowel  30x   Sitting elbow flexion/extension with dowel 30x   Forearm rotation with dowel 30x   Passive range of motion of the elbow in flexion/extension and forearm rotation    Scapular protraction/retraction in side lying 20x   Active assist shoulder flexion and  abduction in sidelying 20x each   Patient self stretches of forearm supination with towel 4/15 second holds   Supine shoulder flexion self stretches 4/15 second holds   Gravity assisted elbow flexion stretch 10/10 second holds       Korin received the following manual therapy techniques: Soft tissue Mobilization were applied to the: elbow for 10 minutes, including:  STM of right forearm and humerus muscles and shoulder girdle.    Korin received the following supervised modalities after being cleared for contradictions:     Korin received hot pack for 10 minutes.    Korin received cold pack for 0 minutes.      Home Exercises Provided and Patient Education Provided     Education/Self-Care provided: (5 minutes) 7/6/2022   Patient educated on biomechanical justification for therapeutic exercise and importance of compliance with HEP in order to improve overall impairments and QOL     Patient educated on wearing his brace at all times when out of the house and using his arm.Can remove it while sitting in the house.   Proper fit for his elbow brace.   Passive range of motion exercises   Avoid shoulder external rotation    Written Home Exercises Provided: yes.  Exercises were reviewed and Korin was able to demonstrate them prior to the end of the session.  Korin demonstrated good  understanding of the education provided.     See EMR under Media for exercises provided 7/1/2022.    ASSESSMENT   Pt tolerated manual therapy and incisional scar massage well. Pt tolerated exercise well with reports of mild increased pain. Pt demonstrated good understanding of exercises and required minimal cueing to maintain proper form.  Patient demonstrates increased elbow extension and forearm supination and significant limitations in elbow flexion.    Korin Is progressing well towards his goals.   Pt prognosis is Good.     Pt will continue to benefit from skilled outpatient occupational therapy to address the deficits listed in  the problem list box on initial evaluation, provide pt/family education and to maximize pt's level of independence in the home and community environment.     Pt's spiritual, cultural and educational needs considered and pt agreeable to plan of care and goals.     Anticipated barriers to occupational therapy: previous injury to his elbow with limited    GOALS:     Short Term Goals:  4 weeks                                             updated  7/6/2022   1. Pain: Pt will demonstrate improved pain by reports of less than or equal to 5/10 worst pain on the verbal rating scale in order to progress toward maximal functional ability and improve QOL.  Met 7/6/2022   2. Mobility: Patient will improve AROM to 50% of stated goals, listed in objective measures above, in order to progress towards independence with functional activities.   progressing   3. Strength: Patient will improve strength to 50% of stated goals, listed in objective measures above, in order to progress towards independence with functional activities.   ongoing   4. Self Care: Patient will demonstrate improved self care skills listed in objective measure above,in order to progress towards independence with functional activities.   progressing   5. HEP: Patient will demonstrate independence with HEP in order to progress toward functional independence.  ongoing      Long Term Goals:  8 weeks     1. Pain: Pt will demonstrate improved pain by reports of less than or equal to 1/10 worst pain on the verbal rating scale in order to progress toward maximal functional ability and improve QOL.       2. Function: Patient will demonstrate improved function as indicated by a functional limitation score of less than or equal to 37 out of 100 on FOTO.     3. Mobility: Patient will improve AROM to stated goals, listed in objective measures above, in order to return to maximal functional potential and improve quality of life.     4. Strength: Patient will improve strength to  stated goals, listed in objective measures above, in order to improve functional independence and quality of life.     5. Self Care: Patient will demonstrate increased self care skills to an independent level or modified independent level with adaptive equipment as needed.     6. Patient will return to normal ADL's, IADL's, community involvement, recreational activities, and work-related activities with less than or equal to 0/10 pain and maximal function.                  PLAN   Continue Plan of Care (POC) and progress per patient tolerance.    Maday Anderson, OTR, LULÚR

## 2022-07-07 ENCOUNTER — HOSPITAL ENCOUNTER (OUTPATIENT)
Dept: RADIOLOGY | Facility: HOSPITAL | Age: 77
Discharge: HOME | End: 2022-07-07
Attending: INTERNAL MEDICINE
Payer: OTHER GOVERNMENT

## 2022-07-07 VITALS
HEART RATE: 81 BPM | WEIGHT: 184 LBS | HEIGHT: 72 IN | DIASTOLIC BLOOD PRESSURE: 57 MMHG | OXYGEN SATURATION: 99 % | RESPIRATION RATE: 16 BRPM | SYSTOLIC BLOOD PRESSURE: 112 MMHG | BODY MASS INDEX: 24.92 KG/M2

## 2022-07-07 DIAGNOSIS — K75.81 LIVER CIRRHOSIS SECONDARY TO NASH: ICD-10-CM

## 2022-07-07 DIAGNOSIS — K74.60 LIVER CIRRHOSIS SECONDARY TO NASH: ICD-10-CM

## 2022-07-07 LAB
APPEARANCE FLD: CLEAR
BODY FLD TYPE: NORMAL
COLOR FLD: YELLOW
LYMPHOCYTES NFR FLD MANUAL: 15 %
MONOS+MACROS NFR FLD MANUAL: 79 %
NEUTROPHILS NFR FLD MANUAL: 6 %
PROT FLD-MCNC: 1.4 G/DL
SPECIMEN SOURCE: NORMAL
WBC # FLD: 111 /CU MM

## 2022-07-07 PROCEDURE — 84157 ASSAY OF PROTEIN OTHER: CPT | Performed by: INTERNAL MEDICINE

## 2022-07-07 PROCEDURE — 89051 BODY FLUID CELL COUNT: CPT | Performed by: INTERNAL MEDICINE

## 2022-07-07 PROCEDURE — 63600175 PHARM REV CODE 636 W HCPCS: Mod: JG | Performed by: RADIOLOGY

## 2022-07-07 PROCEDURE — P9047 ALBUMIN (HUMAN), 25%, 50ML: HCPCS | Mod: JG | Performed by: RADIOLOGY

## 2022-07-07 PROCEDURE — 49083 ABD PARACENTESIS W/IMAGING: CPT

## 2022-07-07 PROCEDURE — 87070 CULTURE OTHR SPECIMN AEROBIC: CPT | Performed by: INTERNAL MEDICINE

## 2022-07-07 PROCEDURE — 87205 SMEAR GRAM STAIN: CPT | Performed by: INTERNAL MEDICINE

## 2022-07-07 PROCEDURE — 87075 CULTR BACTERIA EXCEPT BLOOD: CPT | Performed by: INTERNAL MEDICINE

## 2022-07-07 RX ORDER — ALBUMIN HUMAN 250 G/1000ML
75 SOLUTION INTRAVENOUS ONCE
Status: COMPLETED | OUTPATIENT
Start: 2022-07-07 | End: 2022-07-07

## 2022-07-07 RX ADMIN — ALBUMIN (HUMAN) 75 G: 25 SOLUTION INTRAVENOUS at 03:07

## 2022-07-07 NOTE — NURSING
Paracenteses completed. Catheter discontinued with tip intact. Pressure held and gauze/tegaderm dressing applied. 9.25 L straw colored fluid removed from patient's abdomen. Pt tolerated well. Albumin given per protocol.

## 2022-07-07 NOTE — INTERVAL H&P NOTE
The patient has been examined and the H&P has been reviewed:    I concur with the findings and no changes have occurred since H&P was written.    Plan for US paracentesis    There are no hospital problems to display for this patient.

## 2022-07-07 NOTE — PLAN OF CARE
Pt ambulated from waiting room to procedure area independently. Pt is Aox4. Pt denies pain and is resting comfortably. VS taken and stable. Will continue to monitor.

## 2022-07-07 NOTE — DISCHARGE SUMMARY
Pre Op Diagnosis: ascites     Post Op Diagnosis: same     Procedure:  paracentesis     Procedure performed by: Maryjane PUCKETT, [place] LOFTON     Written Informed Consent Obtained: Yes     Specimen Removed:  yes     Estimated Blood Loss:  minimal     Findings: Local anesthesia.     The patient tolerated the procedure well and there were no complications.      Sterile technique was performed in the LLQ, lidocaine was used as a local anesthetic.   9.25 liters of suellen fluid removed for therapeutic and diagnostic purposes.  Pt tolerated the procedure well without immediate complications.  Please see radiologist report for details. F/u with PCP and/or ordering physician.

## 2022-07-07 NOTE — NURSING
Paracentesis catheter inserted into pt's left abdomen by TALIA Ghotra, where marked by US. Pt tolerated well. Catheter connected to continuous wall suction. VS taken and stable. Will continue to monitor.

## 2022-07-07 NOTE — NURSING
Verbal and written discharge instructions given to patient including when to seek medical attention and site care. Pt verbalized understanding. PIV safely discontinued. Dressing to procedure site remains CDI. Pt denies pain and NADN. VSS. Pt ambulated to main entrance, escorted by nurse, with all belongings. Pt was stable on discharge.

## 2022-07-08 LAB — PATH INTERP FLD-IMP: NORMAL

## 2022-07-11 ENCOUNTER — CLINICAL SUPPORT (OUTPATIENT)
Dept: REHABILITATION | Facility: HOSPITAL | Age: 77
End: 2022-07-11
Payer: MEDICARE

## 2022-07-11 DIAGNOSIS — R60.9 SWELLING: ICD-10-CM

## 2022-07-11 DIAGNOSIS — R52 PAIN: ICD-10-CM

## 2022-07-11 DIAGNOSIS — R53.1 DECREASED STRENGTH: ICD-10-CM

## 2022-07-11 DIAGNOSIS — Z78.9 SELF-CARE DEFICIT: ICD-10-CM

## 2022-07-11 DIAGNOSIS — M25.60 DECREASED RANGE OF MOTION: Primary | ICD-10-CM

## 2022-07-11 PROCEDURE — 97110 THERAPEUTIC EXERCISES: CPT | Performed by: OCCUPATIONAL THERAPIST

## 2022-07-11 NOTE — PROGRESS NOTES
OCCUPATIONAL THERAPY DAILY NOTE    Name: Korin Vivas  Clinic Number: [id number]    Therapy Diagnosis:   Encounter Diagnoses   Name Primary?    Decreased range of motion Yes    Pain     Self-care deficit     Decreased strength     Swelling      Physician: Gabo Roger    Visit Date: 7/11/2022    Physician Orders: Evaluation and treatment   Right distal humerus fracture  Begin working on elbow ROM  for 3 weeks then progress, continue with hand/wrist/finger ROM  eval and treat with modalities as needed     Medical Diagnosis: S42.411D (ICD-10-CM) - Closed displaced simple supracondylar fracture of right humerus without intercondylar fracture with routine healing,   Surgical Procedure and Date: NA,   Evaluation Date: 6/29/2022  Insurance Authorization Period Expiration: 6/22/2022 - 6/22/2023  Plan of Care Certification Period: 6/29/2022 to 7/29/2022  Progress Note Due: 7/29/2022   Date of Return to MD: 7/13/2022  Visit # / Visits authorized: 4 / 20  FOTO: 1/3     Precautions:  -30 - 110 degrees of elbow range of motion for 3 weeks/avoid external rotation     Time In: 0900  Time Out: 0945  Total Appointment Time (timed & untimed codes): 45 minutes    SUBJECTIVE     Today, pt reports: that he has no pain at rest. He missed his last session secondary to medical problems. He comes in today without his elbow brace on.  He reports that the hot pack was burning him and he didn't say anything because he thought it was supposed to burn.    He/She was compliant with home exercise program.  Response to previous treatment: None  Functional change: None    Pre-Treatment Pain: 0/10  Post-Treatment Pain: 4/10  Location: elbow  TREATMENT   OBJECTIVE:  Passive range of motion:  ELBOW FLEXION: 90  ELBOW EXTENSION: -38    FOREARM SUPINATION/PRONATION: 45/80    SHOULDER FLEXION: PASSIVE RANGE OF MOTION:140      Korin received therapeutic exercises to develop ROM and flexibility for 30 minutes  including:    Exercise 7/11/2022   Supine shoulder flexion with dowel  30x   Sitting elbow flexion/extension with dowel 30x   Forearm rotation with dowel active range of motion  30x   Passive range of motion of the elbow in flexion/extension and forearm rotation    Scapular protraction/retraction in side lying 20x   Active assist shoulder flexion and abduction in sidelying 20x each   Patient self stretches of forearm supination with towel 4/15 second holds not today    Sidelying shoulder flexion  20x   Gravity assisted elbow flexion stretch 10/10 second holds   Table top shoulder flexion stretch 4/15/ second holds   Elbow flexion/extension active range of motion  30x       Korin received the following manual therapy techniques: Soft tissue Mobilization were applied to the: elbow for 10 minutes, including:  STM of right forearm and humerus muscles and shoulder girdle.  IASTM of the posterior elbow    Korin received the following supervised modalities after being cleared for contradictions:     Korin received hot pack for 5 minutes.    Korin received cold pack for 0 minutes.      Home Exercises Provided and Patient Education Provided     Education/Self-Care provided: (5 minutes) 7/6/2022   Patient educated on biomechanical justification for therapeutic exercise and importance of compliance with HEP in order to improve overall impairments and QOL     Patient educated on wearing his brace at all times when out of the house and using his arm.Can remove it while sitting in the house..   Passive range of motion exercises   Avoid shoulder external rotation    Written Home Exercises Provided: yes.  Exercises were reviewed and Korin was able to demonstrate them prior to the end of the session.  Korin demonstrated good  understanding of the education provided.     See EMR under Media for exercises provided 7/1/2022.    ASSESSMENT   Pt did not tolerate manual therapy well and had increased pain in the shoulder and  elbow today with range of motion exercises. Pt demonstrated good understanding of exercises and required minimal cueing to maintain proper form.  Patient exhibited increased shoulder and forearm range of motion,however his elbow range of motion has regressed, which is probably secondary to only coming to therapy 1x last week and increased stiffness and pain.    Patient exhibits mottling and redness on the anterior aspect of his left elbow that was almost gone by the end of the session.    Korin Is progressing well towards his goals.   Pt prognosis is Good.     Pt will continue to benefit from skilled outpatient occupational therapy to address the deficits listed in the problem list box on initial evaluation, provide pt/family education and to maximize pt's level of independence in the home and community environment.     Pt's spiritual, cultural and educational needs considered and pt agreeable to plan of care and goals.     Anticipated barriers to occupational therapy: previous injury to his elbow with limited    GOALS:     Short Term Goals:  4 weeks                                             updated  7/6/2022   1. Pain: Pt will demonstrate improved pain by reports of less than or equal to 5/10 worst pain on the verbal rating scale in order to progress toward maximal functional ability and improve QOL.  Met 7/6/2022   2. Mobility: Patient will improve AROM to 50% of stated goals, listed in objective measures above, in order to progress towards independence with functional activities.   progressing   3. Strength: Patient will improve strength to 50% of stated goals, listed in objective measures above, in order to progress towards independence with functional activities.   ongoing   4. Self Care: Patient will demonstrate improved self care skills listed in objective measure above,in order to progress towards independence with functional activities.   progressing   5. HEP: Patient will demonstrate independence with  HEP in order to progress toward functional independence.  ongoing      Long Term Goals:  8 weeks     1. Pain: Pt will demonstrate improved pain by reports of less than or equal to 1/10 worst pain on the verbal rating scale in order to progress toward maximal functional ability and improve QOL.       2. Function: Patient will demonstrate improved function as indicated by a functional limitation score of less than or equal to 37 out of 100 on FOTO.     3. Mobility: Patient will improve AROM to stated goals, listed in objective measures above, in order to return to maximal functional potential and improve quality of life.     4. Strength: Patient will improve strength to stated goals, listed in objective measures above, in order to improve functional independence and quality of life.     5. Self Care: Patient will demonstrate increased self care skills to an independent level or modified independent level with adaptive equipment as needed.     6. Patient will return to normal ADL's, IADL's, community involvement, recreational activities, and work-related activities with less than or equal to 0/10 pain and maximal function.                  PLAN   Continue Plan of Care (POC) and progress per patient tolerance.    MARIIA Hayes, TONY

## 2022-07-12 LAB
BACTERIA FLD AEROBE CULT: NO GROWTH
GRAM STN SPEC: NORMAL
GRAM STN SPEC: NORMAL

## 2022-07-13 ENCOUNTER — HOSPITAL ENCOUNTER (OUTPATIENT)
Dept: RADIOLOGY | Facility: HOSPITAL | Age: 77
Discharge: HOME | End: 2022-07-13
Attending: STUDENT IN AN ORGANIZED HEALTH CARE EDUCATION/TRAINING PROGRAM
Payer: MEDICARE

## 2022-07-13 ENCOUNTER — OFFICE VISIT (OUTPATIENT)
Dept: ORTHOPEDICS | Facility: CLINIC | Age: 77
End: 2022-07-13
Payer: MEDICARE

## 2022-07-13 VITALS — WEIGHT: 184 LBS | BODY MASS INDEX: 24.92 KG/M2 | HEIGHT: 72 IN

## 2022-07-13 DIAGNOSIS — S42.411D CLOSED DISPLACED SIMPLE SUPRACONDYLAR FRACTURE OF RIGHT HUMERUS WITHOUT INTERCONDYLAR FRACTURE WITH ROUTINE HEALING, SUBSEQUENT ENCOUNTER: Primary | ICD-10-CM

## 2022-07-13 DIAGNOSIS — S42.411D CLOSED DISPLACED SIMPLE SUPRACONDYLAR FRACTURE OF RIGHT HUMERUS WITHOUT INTERCONDYLAR FRACTURE WITH ROUTINE HEALING, SUBSEQUENT ENCOUNTER: ICD-10-CM

## 2022-07-13 PROCEDURE — 99213 PR OFFICE/OUTPT VISIT, EST, LEVL III, 20-29 MIN: ICD-10-PCS | Mod: S$PBB,,, | Performed by: STUDENT IN AN ORGANIZED HEALTH CARE EDUCATION/TRAINING PROGRAM

## 2022-07-13 PROCEDURE — 99213 OFFICE O/P EST LOW 20 MIN: CPT | Mod: S$PBB,,, | Performed by: STUDENT IN AN ORGANIZED HEALTH CARE EDUCATION/TRAINING PROGRAM

## 2022-07-13 PROCEDURE — 99213 OFFICE O/P EST LOW 20 MIN: CPT | Mod: PBBFAC,PO | Performed by: STUDENT IN AN ORGANIZED HEALTH CARE EDUCATION/TRAINING PROGRAM

## 2022-07-13 PROCEDURE — 99999 PR PBB SHADOW E&M-EST. PATIENT-LVL III: CPT | Mod: PBBFAC,,, | Performed by: STUDENT IN AN ORGANIZED HEALTH CARE EDUCATION/TRAINING PROGRAM

## 2022-07-13 PROCEDURE — 99999 PR PBB SHADOW E&M-EST. PATIENT-LVL III: ICD-10-PCS | Mod: PBBFAC,,, | Performed by: STUDENT IN AN ORGANIZED HEALTH CARE EDUCATION/TRAINING PROGRAM

## 2022-07-13 PROCEDURE — 73080 X-RAY EXAM OF ELBOW: CPT | Mod: TC,FY,PO,RT

## 2022-07-13 PROCEDURE — 73080 XR ELBOW COMPLETE 3 VIEW RIGHT: ICD-10-PCS | Mod: 26,RT,, | Performed by: RADIOLOGY

## 2022-07-13 PROCEDURE — 73080 X-RAY EXAM OF ELBOW: CPT | Mod: 26,RT,, | Performed by: RADIOLOGY
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
## 2022-07-14 DIAGNOSIS — K75.81 LIVER CIRRHOSIS SECONDARY TO NASH: Primary | ICD-10-CM

## 2022-07-14 DIAGNOSIS — K74.60 LIVER CIRRHOSIS SECONDARY TO NASH: Primary | ICD-10-CM

## 2022-07-15 ENCOUNTER — HOSPITAL ENCOUNTER (OUTPATIENT)
Dept: RADIOLOGY | Facility: HOSPITAL | Age: 77
Discharge: HOME | End: 2022-07-15
Attending: INTERNAL MEDICINE
Payer: OTHER GOVERNMENT

## 2022-07-15 VITALS
HEIGHT: 72 IN | BODY MASS INDEX: 24.92 KG/M2 | RESPIRATION RATE: 18 BRPM | SYSTOLIC BLOOD PRESSURE: 106 MMHG | OXYGEN SATURATION: 99 % | WEIGHT: 184 LBS | HEART RATE: 73 BPM | DIASTOLIC BLOOD PRESSURE: 54 MMHG

## 2022-07-15 DIAGNOSIS — K74.60 LIVER CIRRHOSIS SECONDARY TO NASH: ICD-10-CM

## 2022-07-15 DIAGNOSIS — K75.81 LIVER CIRRHOSIS SECONDARY TO NASH: ICD-10-CM

## 2022-07-15 LAB
APPEARANCE FLD: NORMAL
BACTERIA SPEC ANAEROBE CULT: NORMAL
BODY FLD TYPE: NORMAL
COLOR FLD: YELLOW
LYMPHOCYTES NFR FLD MANUAL: 31 %
MESOTHL CELL NFR FLD MANUAL: 1 %
MONOS+MACROS NFR FLD MANUAL: 66 %
NEUTROPHILS NFR FLD MANUAL: 2 %
WBC # FLD: 123 /CU MM

## 2022-07-15 PROCEDURE — 87205 SMEAR GRAM STAIN: CPT | Performed by: INTERNAL MEDICINE

## 2022-07-15 PROCEDURE — 89051 BODY FLUID CELL COUNT: CPT | Performed by: INTERNAL MEDICINE

## 2022-07-15 PROCEDURE — P9047 ALBUMIN (HUMAN), 25%, 50ML: HCPCS | Mod: JG | Performed by: RADIOLOGY

## 2022-07-15 PROCEDURE — 87075 CULTR BACTERIA EXCEPT BLOOD: CPT | Performed by: INTERNAL MEDICINE

## 2022-07-15 PROCEDURE — 49083 ABD PARACENTESIS W/IMAGING: CPT

## 2022-07-15 PROCEDURE — 87070 CULTURE OTHR SPECIMN AEROBIC: CPT | Performed by: INTERNAL MEDICINE

## 2022-07-15 PROCEDURE — 63600175 PHARM REV CODE 636 W HCPCS: Mod: JG | Performed by: RADIOLOGY

## 2022-07-15 RX ORDER — ALBUMIN HUMAN 250 G/1000ML
50 SOLUTION INTRAVENOUS ONCE
Status: COMPLETED | OUTPATIENT
Start: 2022-07-15 | End: 2022-07-15

## 2022-07-15 RX ADMIN — ALBUMIN (HUMAN) 50 G: 25 SOLUTION INTRAVENOUS at 01:07

## 2022-07-15 NOTE — NURSING
Paracentesis catheter safely inserted into pt's right abdomen by TALIA Iverson, where marked by US tech. Pt tolerated well. Catheter connected to continuous wall suction. VSS. Will continue to monitor.

## 2022-07-15 NOTE — NURSING
Paracentesis catheter safely discontinued with tip intact. Pt tolerated well. VSS and pt denies pain. 7.75 L straw colored fluid drained from pt's abdomen. Albumin given per protocol. Will continue to monitor.

## 2022-07-15 NOTE — NURSING
Verbal and written discharge instructions given to patient including when to seek medical attention and site care. Pt verbalized understanding. PIV safely discontinued. Dressing to procedure site remains CDI. Pt denies pain and NADN. VSS. Pt ambulated to waiting area where met by spouse. Pt was stable on discharge.

## 2022-07-15 NOTE — DISCHARGE SUMMARY
Pre Op Diagnosis: ascites     Post Op Diagnosis: same     Procedure:  para     Procedure performed by: Maryjane PUCKETT, Kishor LOFTON     Written Informed Consent Obtained: Yes     Specimen Removed:  yes     Estimated Blood Loss:  minimal     Findings: Local anesthesia.     The patient tolerated the procedure well and there were no complications.      Sterile technique was performed in the lower abdomen, lidocaine was used as a local anesthetic.   7.75 liters of straw colored fluid removed for therapeutic purposes.  Pt tolerated the procedure well without immediate complications.  Please see radiologist report for details. F/u with PCP and/or ordering physician.

## 2022-07-18 ENCOUNTER — TELEPHONE (OUTPATIENT)
Dept: HEPATOLOGY | Facility: CLINIC | Age: 77
End: 2022-07-18
Payer: MEDICARE

## 2022-07-18 LAB — PATH INTERP FLD-IMP: NORMAL

## 2022-07-18 NOTE — TELEPHONE ENCOUNTER
----- Message from Renee Lugo sent at 7/18/2022 11:30 AM CDT -----  Regarding: paracentisis  Contact: pateint  Patient needs to speak to nurse about scheduling a Paracentesis, please call him back at [phone]

## 2022-07-18 NOTE — TELEPHONE ENCOUNTER
Returned call to pt and informed him that Dr. OJEDA has put in para orders. Pt verbalied understanding.

## 2022-07-20 LAB
BACTERIA FLD AEROBE CULT: NO GROWTH
GRAM STN SPEC: NORMAL
GRAM STN SPEC: NORMAL

## 2022-07-21 DIAGNOSIS — K75.81 LIVER CIRRHOSIS SECONDARY TO NASH: Primary | ICD-10-CM

## 2022-07-21 DIAGNOSIS — D68.9 COAGULATION DEFECT: ICD-10-CM

## 2022-07-21 DIAGNOSIS — K74.60 LIVER CIRRHOSIS SECONDARY TO NASH: Primary | ICD-10-CM

## 2022-07-22 ENCOUNTER — HOSPITAL ENCOUNTER (OUTPATIENT)
Dept: RADIOLOGY | Facility: HOSPITAL | Age: 77
Discharge: HOME | End: 2022-07-22
Attending: INTERNAL MEDICINE
Payer: MEDICARE

## 2022-07-22 VITALS
SYSTOLIC BLOOD PRESSURE: 116 MMHG | OXYGEN SATURATION: 100 % | HEIGHT: 72 IN | RESPIRATION RATE: 20 BRPM | HEART RATE: 105 BPM | WEIGHT: 184 LBS | DIASTOLIC BLOOD PRESSURE: 64 MMHG | BODY MASS INDEX: 24.92 KG/M2

## 2022-07-22 DIAGNOSIS — K75.81 LIVER CIRRHOSIS SECONDARY TO NASH: ICD-10-CM

## 2022-07-22 DIAGNOSIS — K74.60 LIVER CIRRHOSIS SECONDARY TO NASH: ICD-10-CM

## 2022-07-22 PROCEDURE — 49083 ABD PARACENTESIS W/IMAGING: CPT

## 2022-07-22 PROCEDURE — P9047 ALBUMIN (HUMAN), 25%, 50ML: HCPCS | Mod: JG | Performed by: STUDENT IN AN ORGANIZED HEALTH CARE EDUCATION/TRAINING PROGRAM

## 2022-07-22 PROCEDURE — 63600175 PHARM REV CODE 636 W HCPCS: Mod: JG | Performed by: STUDENT IN AN ORGANIZED HEALTH CARE EDUCATION/TRAINING PROGRAM

## 2022-07-22 RX ORDER — ALBUMIN HUMAN 250 G/1000ML
25 SOLUTION INTRAVENOUS ONCE
Status: COMPLETED | OUTPATIENT
Start: 2022-07-22 | End: 2022-07-22

## 2022-07-22 RX ADMIN — ALBUMIN (HUMAN) 25 G: 25 SOLUTION INTRAVENOUS at 02:07

## 2022-07-22 NOTE — DISCHARGE SUMMARY
Pre Op Diagnosis: ascites     Post Op Diagnosis: same     Procedure:  para     Procedure performed by: Tasia PUCKETT, Kishor LOFTON     Written Informed Consent Obtained: Yes     Specimen Removed:  yes     Estimated Blood Loss:  minimal     Findings: Local anesthesia.     The patient tolerated the procedure well and there were no complications.      Sterile technique was performed in the lower abdomen, lidocaine was used as a local anesthetic.   6.5 liters of straw colored fluid removed for therapeutic purposes.  Pt tolerated the procedure well without immediate complications.  Please see radiologist report for details. F/u with PCP and/or ordering physician.

## 2022-07-22 NOTE — NURSING
Paracentesis catheter safely inserted by TALIA Iverson into pt's left side of abdomen, where marked by US. Pt tolerated well. Catheter connected to wall suction. VS taken and stable. Pt denies pain. Will continue to monitor.

## 2022-07-22 NOTE — NURSING
Paracentesis catheter safely discontinued with tip intact. Pressure held to site and gauze/tegaderm dressing applied. Pt denies any new symptoms and VSS. 6.5 L straw colored fluid drained from patients abdomen and albumin given per protocol. Will continue to monitor.

## 2022-07-22 NOTE — NURSING
Verbal discharge instructions given to patient including when to seek medical attention and site care. Pt declined paper AVS. Pt verbalized understanding. PIV safely discontinued. Dressing to procedure site remains CDI. Pt denies pain and NADN. VSS. Pt ambulated to main entrance with all belongings. Pt was stable on discharge.

## 2022-07-25 LAB — BACTERIA SPEC ANAEROBE CULT: NORMAL

## 2022-07-26 ENCOUNTER — CLINICAL SUPPORT (OUTPATIENT)
Dept: REHABILITATION | Facility: HOSPITAL | Age: 77
End: 2022-07-26
Payer: MEDICARE

## 2022-07-26 DIAGNOSIS — R53.1 DECREASED STRENGTH: ICD-10-CM

## 2022-07-26 DIAGNOSIS — Z78.9 SELF-CARE DEFICIT: ICD-10-CM

## 2022-07-26 DIAGNOSIS — R52 PAIN: ICD-10-CM

## 2022-07-26 DIAGNOSIS — M25.60 DECREASED RANGE OF MOTION: Primary | ICD-10-CM

## 2022-07-26 DIAGNOSIS — R60.9 SWELLING: ICD-10-CM

## 2022-07-26 PROCEDURE — 97110 THERAPEUTIC EXERCISES: CPT

## 2022-07-26 PROCEDURE — 97140 MANUAL THERAPY 1/> REGIONS: CPT

## 2022-07-26 NOTE — PROGRESS NOTES
OCCUPATIONAL THERAPY DAILY NOTE    Name: Korin Vivas  Clinic Number: [id number]    Therapy Diagnosis:   Encounter Diagnoses   Name Primary?    Decreased range of motion Yes    Pain     Self-care deficit     Decreased strength     Swelling      Physician: Gabo Roger    Visit Date: 7/26/2022    Physician Orders: Evaluation and treatment   Right distal humerus fracture  Begin working on elbow ROM  for 3 weeks then progress, continue with hand/wrist/finger ROM  eval and treat with modalities as needed     Medical Diagnosis: S42.411D (ICD-10-CM) - Closed displaced simple supracondylar fracture of right humerus without intercondylar fracture with routine healing,   Surgical Procedure and Date: NA,   Evaluation Date: 6/29/2022  Insurance Authorization Period Expiration: 6/22/2022 - 6/22/2023  Plan of Care Certification Period: 6/29/2022 to 7/29/2022  Progress Note Due: 7/29/2022   Date of Return to MD: 7/13/2022  Visit # / Visits authorized: 4 / 20  FOTO: 1/3     Precautions:  -30 - 110 degrees of elbow range of motion for 3 weeks/avoid external rotation     Time In: 1:45  Time Out: 2:15  Total Appointment Time (timed & untimed codes): 30 minutes    SUBJECTIVE     Today, pt reports: He has missed the last few sessions due to medical problems but he is doing better. He is very tired and weak today.    He/She was compliant with home exercise program.  Response to previous treatment: None  Functional change: None    Pre-Treatment Pain: 0/10  Post-Treatment Pain: 4/10  Location: elbow    OBJECTIVE:  Objective measures will be updated at progress note unless otherwise stated.    TREATMENT       Korin received therapeutic exercises to develop ROM and flexibility for 30 minutes including:    Exercise Completed today    Supine shoulder flexion with dowel  x 30x   Sitting elbow flexion/extension with dowel x 30x   Forearm rotation with dowel active range of motion  x 30x   Passive range of motion of  the elbow in flexion/extension and forearm rotation x    Scapular protraction/retraction in side lying  30x   Active assist shoulder flexion and abduction in sidelying  30x   Patient self stretches of forearm supination with towel  4/15 second holds not today    Sidelying shoulder flexion   20x   Gravity assisted elbow flexion stretch x 10/10 second holds   Table top shoulder flexion stretch x 4/15/ second holds   Elbow flexion/extension active range of motion  x 30x       Korin received the following manual therapy techniques: Soft tissue Mobilization were applied to the: elbow for 10 minutes, including:  STM of right forearm and humerus muscles and shoulder girdle.  IASTM of the posterior elbow    Korin received the following supervised modalities after being cleared for contradictions:     Korin received hot pack for 5 minutes.    Korin received cold pack for 0 minutes.      Home Exercises Provided and Patient Education Provided     Education/Self-Care provided: (5 minutes) 7/6/2022   Patient educated on biomechanical justification for therapeutic exercise and importance of compliance with HEP in order to improve overall impairments and QOL     Patient educated on wearing his brace at all times when out of the house and using his arm.Can remove it while sitting in the house..   Passive range of motion exercises   Avoid shoulder external rotation    Written Home Exercises Provided: yes.  Exercises were reviewed and Korin was able to demonstrate them prior to the end of the session.  Korin demonstrated good  understanding of the education provided.     See EMR under Media for exercises provided 7/1/2022.    ASSESSMENT   Pt did not tolerate manual therapy well and had increased pain in the shoulder and elbow today with range of motion exercises. Pt demonstrated good understanding of exercises and required minimal cueing to maintain proper form. Patient requires frequent rest breaks due to fatigue and was  not able to complete all exercises compared to last session. Patient needed to leave early due to extreme fatigue.     Patient exhibits mottling and redness on the anterior aspect of his left elbow that was almost gone by the end of the session.    Korin Is progressing well towards his goals.   Pt prognosis is Good.     Pt will continue to benefit from skilled outpatient occupational therapy to address the deficits listed in the problem list box on initial evaluation, provide pt/family education and to maximize pt's level of independence in the home and community environment.     Pt's spiritual, cultural and educational needs considered and pt agreeable to plan of care and goals.     Anticipated barriers to occupational therapy: previous injury to his elbow with limited    GOALS:     Short Term Goals:  4 weeks                                             updated  7/6/2022   1. Pain: Pt will demonstrate improved pain by reports of less than or equal to 5/10 worst pain on the verbal rating scale in order to progress toward maximal functional ability and improve QOL.  Met 7/6/2022   2. Mobility: Patient will improve AROM to 50% of stated goals, listed in objective measures above, in order to progress towards independence with functional activities.   progressing   3. Strength: Patient will improve strength to 50% of stated goals, listed in objective measures above, in order to progress towards independence with functional activities.   ongoing   4. Self Care: Patient will demonstrate improved self care skills listed in objective measure above,in order to progress towards independence with functional activities.   progressing   5. HEP: Patient will demonstrate independence with HEP in order to progress toward functional independence.  ongoing      Long Term Goals:  8 weeks     1. Pain: Pt will demonstrate improved pain by reports of less than or equal to 1/10 worst pain on the verbal rating scale in order to progress  toward maximal functional ability and improve QOL.       2. Function: Patient will demonstrate improved function as indicated by a functional limitation score of less than or equal to 37 out of 100 on FOTO.     3. Mobility: Patient will improve AROM to stated goals, listed in objective measures above, in order to return to maximal functional potential and improve quality of life.     4. Strength: Patient will improve strength to stated goals, listed in objective measures above, in order to improve functional independence and quality of life.     5. Self Care: Patient will demonstrate increased self care skills to an independent level or modified independent level with adaptive equipment as needed.     6. Patient will return to normal ADL's, IADL's, community involvement, recreational activities, and work-related activities with less than or equal to 0/10 pain and maximal function.                  PLAN   Continue Plan of Care (POC) and progress per patient tolerance.    Arianna Marie OT, TONY

## 2022-07-29 ENCOUNTER — HOSPITAL ENCOUNTER (OUTPATIENT)
Dept: RADIOLOGY | Facility: HOSPITAL | Age: 77
Discharge: HOME | End: 2022-07-29
Attending: INTERNAL MEDICINE
Payer: OTHER GOVERNMENT

## 2022-07-29 VITALS
OXYGEN SATURATION: 97 % | BODY MASS INDEX: 24.92 KG/M2 | DIASTOLIC BLOOD PRESSURE: 68 MMHG | HEIGHT: 72 IN | RESPIRATION RATE: 16 BRPM | HEART RATE: 101 BPM | SYSTOLIC BLOOD PRESSURE: 133 MMHG | WEIGHT: 184 LBS

## 2022-07-29 DIAGNOSIS — K74.60 LIVER CIRRHOSIS SECONDARY TO NASH: ICD-10-CM

## 2022-07-29 DIAGNOSIS — K75.81 LIVER CIRRHOSIS SECONDARY TO NASH: ICD-10-CM

## 2022-07-29 PROCEDURE — 49083 ABD PARACENTESIS W/IMAGING: CPT

## 2022-07-29 PROCEDURE — 63600175 PHARM REV CODE 636 W HCPCS: Mod: JG | Performed by: INTERNAL MEDICINE

## 2022-07-29 PROCEDURE — P9047 ALBUMIN (HUMAN), 25%, 50ML: HCPCS | Mod: JG | Performed by: INTERNAL MEDICINE

## 2022-07-29 RX ORDER — ALBUMIN HUMAN 250 G/1000ML
25 SOLUTION INTRAVENOUS ONCE
Status: COMPLETED | OUTPATIENT
Start: 2022-07-29 | End: 2022-07-29

## 2022-07-29 RX ADMIN — ALBUMIN (HUMAN) 25 G: 25 SOLUTION INTRAVENOUS at 04:07

## 2022-07-29 NOTE — H&P (VIEW-ONLY)
[facility]  History & Physical    Subjective:      Chief Complaint/Reason for Admission: Liver cirrhosis     Korin Vivas is a 76 y.o. male.    Past Medical History:   Diagnosis Date    Arm fracture, right 2022, 2010    CAD (coronary artery disease)     Diabetes     GERD (gastroesophageal reflux disease)     HTN (hypertension)     Hyperlipidemia      Past Surgical History:   Procedure Laterality Date    CATARACT EXTRACTION      COLONOSCOPY      COLONOSCOPY N/A 7/18/2016    Procedure: COLONOSCOPY;  Surgeon: Bryon Hickey MD;  Location: [facility];  Service: Endoscopy;  Laterality: N/A;    COLONOSCOPY N/A 10/6/2020    Procedure: COLONOSCOPY;  Surgeon: Kait Isaacs MD;  Location: [facility];  Service: Endoscopy;  Laterality: N/A;    CORONARY STENT PLACEMENT      ELBOW SURGERY      ESOPHAGOGASTRODUODENOSCOPY N/A 10/6/2020    Procedure: ESOPHAGOGASTRODUODENOSCOPY (EGD);  Surgeon: Kait Isaacs MD;  Location: [facility];  Service: Endoscopy;  Laterality: N/A;    HERNIA REPAIR      2022    TONSILLECTOMY      VASECTOMY       Family History   Problem Relation Age of Onset    Colon cancer Brother     No Known Problems Mother     Heart disease Father      Social History     Tobacco Use    Smoking status: Former Smoker     Packs/day: 1.00     Years: 40.00     Pack years: 40.00    Smokeless tobacco: Never Used    Tobacco comment: quit 3 years ago   Substance Use Topics    Alcohol use: No    Drug use: No       (Not in a hospital admission)    Review of patient's allergies indicates:   Allergen Reactions    Lipitor [atorvastatin] Other (See Comments)     Left arm/shooulder pain    Statins-hmg-coa reductase inhibitors Other (See Comments)     muscle aches, joint pain  Joint pain  Joint pain          Review of Systems   Constitutional: Negative.    Respiratory: Negative.    Cardiovascular: Negative for chest pain, palpitations and leg swelling.   Gastrointestinal: Negative  for abdominal pain.       Objective:      Vital Signs (Most Recent)  Pulse: 106 (07/29/22 1430)  Resp: 16 (07/29/22 1430)  BP: (!) 149/74 (07/29/22 1436)  SpO2: 98 % (07/29/22 1430)    Vital Signs Range (Last 24H):  Pulse:  [106]   Resp:  [16]   BP: (149)/(74)   SpO2:  [98 %]     Physical Exam  Eyes:      Extraocular Movements: Extraocular movements intact.   Cardiovascular:      Heart sounds: Normal heart sounds.   Pulmonary:      Breath sounds: Normal breath sounds.   Neurological:      Mental Status: He is alert.         Data Review:    CBC:   Lab Results   Component Value Date    WBC 6.66 07/07/2022    RBC 3.90 (L) 07/07/2022    HGB 12.0 (L) 07/07/2022    HCT 36.1 (L) 07/07/2022     07/07/2022      ECG: no prior ECG.     Assessment:      There are no hospital problems to display for this patient.      Plan:    Paracentesis

## 2022-07-29 NOTE — DISCHARGE SUMMARY
Pre Op Diagnosis: ascites     Post Op Diagnosis: same     Procedure:  para     Procedure performed by: Tasia PUCKETT, Kishor LOFTON     Written Informed Consent Obtained: Yes     Specimen Removed:  yes     Estimated Blood Loss:  minimal     Findings: Local anesthesia.     The patient tolerated the procedure well and there were no complications.      Sterile technique was performed in the lower abdomen, lidocaine was used as a local anesthetic.   6.25 liters of suellen fluid removed for therapeutic purposes.  Pt tolerated the procedure well without immediate complications.  Please see radiologist report for details. F/u with PCP and/or ordering physician.

## 2022-07-29 NOTE — PLAN OF CARE
__6.25 liters _ of clear blood tinged suellen drainage removed from abdomen. Patient AAOx4, denies any pain or discomfort related to paracentesis. Catheter removed from _left__ abdominal wall without difficulty, tip intact. Bandage applied, CDI. Vital signs WNL.

## 2022-07-29 NOTE — H&P (VIEW-ONLY)
Neurology Initial Visit     Referred By: Dr. Sen ref. provider found    Chief Complaint: Patient presents with:  Tremors: LOV 5-15-18 pt is here to f/u on tremors. HPI:     Raimundo Cannon is a 66year old female, who presents for tremors.   Apparently [facility]  History & Physical    Subjective:      Chief Complaint/Reason for Admission: Liver cirrhosis     Korin Vivas is a 76 y.o. male.    Past Medical History:   Diagnosis Date    Arm fracture, right 2022, 2010    CAD (coronary artery disease)     Diabetes     GERD (gastroesophageal reflux disease)     HTN (hypertension)     Hyperlipidemia      Past Surgical History:   Procedure Laterality Date    CATARACT EXTRACTION      COLONOSCOPY      COLONOSCOPY N/A 7/18/2016    Procedure: COLONOSCOPY;  Surgeon: Bryon Hickey MD;  Location: [facility];  Service: Endoscopy;  Laterality: N/A;    COLONOSCOPY N/A 10/6/2020    Procedure: COLONOSCOPY;  Surgeon: Kait Isaacs MD;  Location: [facility];  Service: Endoscopy;  Laterality: N/A;    CORONARY STENT PLACEMENT      ELBOW SURGERY      ESOPHAGOGASTRODUODENOSCOPY N/A 10/6/2020    Procedure: ESOPHAGOGASTRODUODENOSCOPY (EGD);  Surgeon: Kait Isaacs MD;  Location: [facility];  Service: Endoscopy;  Laterality: N/A;    HERNIA REPAIR      2022    TONSILLECTOMY      VASECTOMY       Family History   Problem Relation Age of Onset    Colon cancer Brother     No Known Problems Mother     Heart disease Father      Social History     Tobacco Use    Smoking status: Former Smoker     Packs/day: 1.00     Years: 40.00     Pack years: 40.00    Smokeless tobacco: Never Used    Tobacco comment: quit 3 years ago   Substance Use Topics    Alcohol use: No    Drug use: No       (Not in a hospital admission)    Review of patient's allergies indicates:   Allergen Reactions    Lipitor [atorvastatin] Other (See Comments)     Left arm/shooulder pain    Statins-hmg-coa reductase inhibitors Other (See Comments)     muscle aches, joint pain  Joint pain  Joint pain          Review of Systems   Constitutional: Negative.    Respiratory: Negative.    Cardiovascular: Negative for chest pain, palpitations and leg swelling.   Gastrointestinal: Negative  RIGHT  No date: SPINE SURGERY PROCEDURE UNLISTED  No date: TOTAL ABDOM HYSTERECTOMY  10/14/16: TOTAL HIP REPLACEMENT Right    Social history:    Smoking status: Never Smoker   Smokeless tobacco: Never Used       Alcohol use Yes 0.6 - 1.2 oz/week 1 - 2 [employer] for abdominal pain.       Objective:      Vital Signs (Most Recent)  Pulse: 106 (07/29/22 1430)  Resp: 16 (07/29/22 1430)  BP: (!) 149/74 (07/29/22 1436)  SpO2: 98 % (07/29/22 1430)    Vital Signs Range (Last 24H):  Pulse:  [106]   Resp:  [16]   BP: (149)/(74)   SpO2:  [98 %]     Physical Exam  Eyes:      Extraocular Movements: Extraocular movements intact.   Cardiovascular:      Heart sounds: Normal heart sounds.   Pulmonary:      Breath sounds: Normal breath sounds.   Neurological:      Mental Status: He is alert.         Data Review:    CBC:   Lab Results   Component Value Date    WBC 6.66 07/07/2022    RBC 3.90 (L) 07/07/2022    HGB 12.0 (L) 07/07/2022    HCT 36.1 (L) 07/07/2022     07/07/2022      ECG: no prior ECG.     Assessment:      There are no hospital problems to display for this patient.      Plan:    Paracentesis     intake, smell preserved, normal glutition  Neck- No masses or adenopathy  Cv: pulses were palpable and normal, no cyanosis or edema     Neurological:     Mental Status- Alert and oriented x3.   Normal attention span and concentration  Thought process intact Results  Component Value Date   BUN 8 08/11/2018   CA 9.1 08/11/2018   ALT 25 08/11/2018   AST 25 08/11/2018   ALKPHOS 83 09/07/2016   ALB 4.1 08/11/2018    (L) 08/11/2018   K 4.1 08/11/2018    08/11/2018   CO2 26 08/11/2018      I have reviewe

## 2022-07-29 NOTE — H&P
[facility]  History & Physical    Subjective:      Chief Complaint/Reason for Admission: Liver cirrhosis     Koirn Vivas is a 76 y.o. male.    Past Medical History:   Diagnosis Date    Arm fracture, right 2022, 2010    CAD (coronary artery disease)     Diabetes     GERD (gastroesophageal reflux disease)     HTN (hypertension)     Hyperlipidemia      Past Surgical History:   Procedure Laterality Date    CATARACT EXTRACTION      COLONOSCOPY      COLONOSCOPY N/A 7/18/2016    Procedure: COLONOSCOPY;  Surgeon: Bryon Hickey MD;  Location: [facility];  Service: Endoscopy;  Laterality: N/A;    COLONOSCOPY N/A 10/6/2020    Procedure: COLONOSCOPY;  Surgeon: Kait Isaacs MD;  Location: [facility];  Service: Endoscopy;  Laterality: N/A;    CORONARY STENT PLACEMENT      ELBOW SURGERY      ESOPHAGOGASTRODUODENOSCOPY N/A 10/6/2020    Procedure: ESOPHAGOGASTRODUODENOSCOPY (EGD);  Surgeon: Kait Isaacs MD;  Location: [facility];  Service: Endoscopy;  Laterality: N/A;    HERNIA REPAIR      2022    TONSILLECTOMY      VASECTOMY       Family History   Problem Relation Age of Onset    Colon cancer Brother     No Known Problems Mother     Heart disease Father      Social History     Tobacco Use    Smoking status: Former Smoker     Packs/day: 1.00     Years: 40.00     Pack years: 40.00    Smokeless tobacco: Never Used    Tobacco comment: quit 3 years ago   Substance Use Topics    Alcohol use: No    Drug use: No       (Not in a hospital admission)    Review of patient's allergies indicates:   Allergen Reactions    Lipitor [atorvastatin] Other (See Comments)     Left arm/shooulder pain    Statins-hmg-coa reductase inhibitors Other (See Comments)     muscle aches, joint pain  Joint pain  Joint pain          Review of Systems   Constitutional: Negative.    Respiratory: Negative.    Cardiovascular: Negative for chest pain, palpitations and leg swelling.   Gastrointestinal: Negative  for abdominal pain.       Objective:      Vital Signs (Most Recent)  Pulse: 106 (07/29/22 1430)  Resp: 16 (07/29/22 1430)  BP: (!) 149/74 (07/29/22 1436)  SpO2: 98 % (07/29/22 1430)    Vital Signs Range (Last 24H):  Pulse:  [106]   Resp:  [16]   BP: (149)/(74)   SpO2:  [98 %]     Physical Exam  Eyes:      Extraocular Movements: Extraocular movements intact.   Cardiovascular:      Heart sounds: Normal heart sounds.   Pulmonary:      Breath sounds: Normal breath sounds.   Neurological:      Mental Status: He is alert.         Data Review:    CBC:   Lab Results   Component Value Date    WBC 6.66 07/07/2022    RBC 3.90 (L) 07/07/2022    HGB 12.0 (L) 07/07/2022    HCT 36.1 (L) 07/07/2022     07/07/2022      ECG: no prior ECG.     Assessment:      There are no hospital problems to display for this patient.      Plan:    Paracentesis

## 2022-08-01 ENCOUNTER — CLINICAL SUPPORT (OUTPATIENT)
Dept: REHABILITATION | Facility: HOSPITAL | Age: 77
End: 2022-08-01
Attending: STUDENT IN AN ORGANIZED HEALTH CARE EDUCATION/TRAINING PROGRAM
Payer: MEDICARE

## 2022-08-01 DIAGNOSIS — R60.9 SWELLING: ICD-10-CM

## 2022-08-01 DIAGNOSIS — Z78.9 SELF-CARE DEFICIT: ICD-10-CM

## 2022-08-01 DIAGNOSIS — R52 PAIN: ICD-10-CM

## 2022-08-01 DIAGNOSIS — R53.1 DECREASED STRENGTH: ICD-10-CM

## 2022-08-01 DIAGNOSIS — M25.60 DECREASED RANGE OF MOTION: Primary | ICD-10-CM

## 2022-08-01 PROCEDURE — 97140 MANUAL THERAPY 1/> REGIONS: CPT | Performed by: OCCUPATIONAL THERAPIST

## 2022-08-01 PROCEDURE — 97110 THERAPEUTIC EXERCISES: CPT | Performed by: OCCUPATIONAL THERAPIST

## 2022-08-01 NOTE — PROGRESS NOTES
OCCUPATIONAL THERAPY PROGRESS NOTE    Name: Korin Vivas  [facility] Number: [id number]    Therapy Diagnosis:   Encounter Diagnoses   Name Primary?    Decreased range of motion Yes    Pain     Self-care deficit     Decreased strength     Swelling      Physician: Gabo Roger    Visit Date: 8/1/2022    Physician Orders: Evaluation and treatment   Right distal humerus fracture  Begin working on elbow ROM  for 3 weeks then progress, continue with hand/wrist/finger ROM  eval and treat with modalities as needed     Medical Diagnosis: S42.411D (ICD-10-CM) - Closed displaced simple supracondylar fracture of right humerus without intercondylar fracture with routine healing,   Surgical Procedure and Date: NA,   Evaluation Date: 6/29/2022  Insurance Authorization Period Expiration: 6/22/2022 - 6/22/2023  Plan of Care Certification Period: 8/1/2022 TO 9/1/2022  Progress Note Due: 9/1/2022   Date of Return to MD: 7/13/2022  Visit # / Visits authorized: 6 / 20  FOTO: 1/3     Precautions:  -30 - 110 degrees of elbow range of motion for 3 weeks/avoid external rotation     Time In: 0945  Time Out: 1045  Total Appointment Time (timed & untimed codes): 60 minutes    SUBJECTIVE     Today, pt reports: he wants to attend therapy more times a week to improve his range of motion    He/She was compliant with home exercise program.  Response to previous treatment: None  Functional change: None    Pre-Treatment Pain: 0/10  Post-Treatment Pain: 0/10  Location: elbow    OBJECTIVE:  Objective measures will be updated at progress note unless otherwise stated.    TREATMENT   OBJECTIVE: 8/1/2022 updated    Range of Motion:   Right: limited as follows:      Shoulder flexion: active range of motion : 115 to 140 degrees     Passive range of motion: 120 to 145 degrees  Shoulder abduction: active range of motion: 90 to 130 degrees   Passive range of motion : 98 to 140 degrees  Elbow extension/flexionL -30/110  Has decreased to  -45/105 (improved to -30 after hot pack)  Forearm supination/pronation: 35/55   to 55/70  Wrist : within normal limits   Hand: within normal limits     Korin received therapeutic exercises to develop ROM and flexibility for 45 minutes including:    Exercise Completed today    Supine shoulder flexion with dowel  x 30x   Sitting elbow flexion/extension with dowel x 30x   Forearm rotation with dowel active range of motion  x 30x   Passive range of motion of the elbow in flexion/extension and forearm rotation x    Scapular protraction/retraction in side lying X 30x   Active assist shoulder flexion and abduction in sidelying  30x   Patient self stretches of forearm supination with towel  4/15 second holds     Sidelying shoulder flexion   20x   Gravity assisted elbow flexion stretch x 10/10 second holds   PULLEY EXERCISES    3 minutes each for stretching  Shoulder flexion  Shoulder abduction x 3 minutes each   Elbow flexion/extension active range of motion  x 30x       Korin received the following manual therapy techniques: Soft tissue Mobilization were applied to the: elbow for 10 minutes, including:  STM of right forearm and humerus muscles,elbow and shoulder girdle.    Korin received the following supervised modalities after being cleared for contradictions:     Korin received hot pack for 5 minutes.    Korin received cold pack for 0 minutes.      Home Exercises Provided and Patient Education Provided     Education/Self-Care provided: (5 minutes) 7/6/2022   Patient educated on biomechanical justification for therapeutic exercise and importance of compliance with HEP in order to improve overall impairments and QOL     Passive range of motion exercises    Written Home Exercises Provided: yes.  Exercises were reviewed and Korin was able to demonstrate them prior to the end of the session.  Korin demonstrated good  understanding of the education provided.     See EMR under Media for exercises provided  7/1/2022.    ASSESSMENT   Pt did  tolerate manual therapy well. Pt demonstrated good understanding of exercises and required minimal cueing to maintain proper form. Patient's muscle guarding during passive range of motion exercises increases shoulder pain and limits range of motion. Patients rhomboid muscle spasm has decreased since his last session. Patient's shoulder and forearm range of motion improving,however he has regressed with his elbow range of motion during his hernia surgery and having to miss therapy sessions.      Korin Is progressing well towards his goals.   Pt prognosis is Good.     Pt will continue to benefit from skilled outpatient occupational therapy to address the deficits listed in the problem list box on initial evaluation, provide pt/family education and to maximize pt's level of independence in the home and community environment.     Pt's spiritual, cultural and educational needs considered and pt agreeable to plan of care and goals.     Anticipated barriers to occupational therapy: previous injury to his elbow with limited    GOALS:     Short Term Goals:  4 weeks                                             updated  8/1//2022   1. Pain: Pt will demonstrate improved pain by reports of less than or equal to 5/10 worst pain on the verbal rating scale in order to progress toward maximal functional ability and improve QOL.  Met 7/6/2022   2. Mobility: Patient will improve AROM to 50% of stated goals, listed in objective measures above, in order to progress towards independence with functional activities.   progressing with shoulder and forearm   3. Strength: Patient will improve strength to 50% of stated goals, listed in objective measures above, in order to progress towards independence with functional activities.   ongoing   4. Self Care: Patient will demonstrate improved self care skills listed in objective measure above,in order to progress towards independence with functional activities.    progressing   5. HEP: Patient will demonstrate independence with HEP in order to progress toward functional independence.  ongoing      Long Term Goals:  8 weeks     1. Pain: Pt will demonstrate improved pain by reports of less than or equal to 1/10 worst pain on the verbal rating scale in order to progress toward maximal functional ability and improve QOL.       2. Function: Patient will demonstrate improved function as indicated by a functional limitation score of less than or equal to 37 out of 100 on FOTO.     3. Mobility: Patient will improve AROM to stated goals, listed in objective measures above, in order to return to maximal functional potential and improve quality of life.     4. Strength: Patient will improve strength to stated goals, listed in objective measures above, in order to improve functional independence and quality of life.     5. Self Care: Patient will demonstrate increased self care skills to an independent level or modified independent level with adaptive equipment as needed.     6. Patient will return to normal ADL's, IADL's, community involvement, recreational activities, and work-related activities with less than or equal to 0/10 pain and maximal function.                  PLAN   Continue occupational therapy 2-3x week for 6 weeks.    MARIIA Hayes, TONY

## 2022-08-02 ENCOUNTER — TELEPHONE (OUTPATIENT)
Dept: HEPATOLOGY | Facility: CLINIC | Age: 77
End: 2022-08-02

## 2022-08-02 DIAGNOSIS — K75.81 LIVER CIRRHOSIS SECONDARY TO NASH: Primary | ICD-10-CM

## 2022-08-02 DIAGNOSIS — K74.60 LIVER CIRRHOSIS SECONDARY TO NASH: Primary | ICD-10-CM

## 2022-08-02 NOTE — TELEPHONE ENCOUNTER
----- Message from Carisa Collins sent at 8/2/2022  3:50 PM CDT -----  Contact: [phone]  Pt is calling in regards to getting an medical clearance. Pt stated that he needs fluid removed from his back. Please call him back at [phone]. Thanks KB

## 2022-08-03 ENCOUNTER — CLINICAL SUPPORT (OUTPATIENT)
Dept: REHABILITATION | Facility: HOSPITAL | Age: 77
End: 2022-08-03
Payer: MEDICARE

## 2022-08-03 DIAGNOSIS — R53.1 DECREASED STRENGTH: ICD-10-CM

## 2022-08-03 DIAGNOSIS — R60.9 SWELLING: ICD-10-CM

## 2022-08-03 DIAGNOSIS — Z78.9 SELF-CARE DEFICIT: ICD-10-CM

## 2022-08-03 DIAGNOSIS — M25.60 DECREASED RANGE OF MOTION: Primary | ICD-10-CM

## 2022-08-03 DIAGNOSIS — R52 PAIN: ICD-10-CM

## 2022-08-03 PROCEDURE — 97140 MANUAL THERAPY 1/> REGIONS: CPT | Performed by: OCCUPATIONAL THERAPIST

## 2022-08-03 PROCEDURE — 97110 THERAPEUTIC EXERCISES: CPT | Performed by: OCCUPATIONAL THERAPIST

## 2022-08-03 NOTE — PROGRESS NOTES
OCCUPATIONAL THERAPY DAILY NOTE    Name: Korin Vivas  Clinic Number: [id number]    Therapy Diagnosis:   Encounter Diagnoses   Name Primary?    Decreased range of motion Yes    Pain     Self-care deficit     Decreased strength     Swelling      Physician: Gabo Roger    Visit Date: 8/3/2022    Physician Orders: Evaluation and treatment   Right distal humerus fracture  Begin working on elbow ROM  for 3 weeks then progress, continue with hand/wrist/finger ROM  eval and treat with modalities as needed     Medical Diagnosis: S42.411D (ICD-10-CM) - Closed displaced simple supracondylar fracture of right humerus without intercondylar fracture with routine healing,   Surgical Procedure and Date: NA,   Evaluation Date: 6/29/2022  Insurance Authorization Period Expiration: 6/22/2022 - 6/22/2023  Plan of Care Certification Period: 8/1/2022 TO 9/1/2022  Progress Note Due: 9/1/2022   Date of Return to MD: 7/13/2022  Visit # / Visits authorized: 7 / 20  FOTO: 1/3     Precautions:  -30 - 110 degrees of elbow range of motion for 3 weeks/avoid external rotation     Time In: 1:15 pm  Time Out: 2:15 pm  Total Appointment Time (timed & untimed codes): 60 minutes    SUBJECTIVE     Today, pt reports: he has trouble bringing a spoon to his mouth but he is trying to feed himself.    He/She was compliant with home exercise program.  Response to previous treatment: None  Functional change: None    Pre-Treatment Pain: 0/10  Post-Treatment Pain: 0/10  Location: elbow    OBJECTIVE:  Objective measures will be updated at progress note unless otherwise stated.    TREATMENT       Korin received therapeutic exercises to develop ROM and flexibility for 45 minutes including:    Exercise Completed today    Supine shoulder flexion with dowel  x 30x   Sitting elbow flexion/extension with dowel x 30x   Forearm rotation with dowel active range of motion  x 30x   Passive range of motion of the elbow in flexion/extension and  forearm rotation x    Scapular protraction/retraction in side lying X 30x   Active shoulder flexion and abduction in sidelying X 30x   Patient self stretches of forearm supination with towel  4/15 second holds     Gravity assisted elbow flexion stretch x 10/10 second holds   PULLEY EXERCISES    3 minutes each for stretching  Shoulder flexion  Shoulder abduction x 3 minutes each   Elbow flexion/extension active range of motion  x 30x       Korin received the following manual therapy techniques: Soft tissue Mobilization were applied to the: elbow for 10 minutes, including:  STM of right forearm and humerus muscles and shoulder girdle.  IASTM of the posterior elbow    Korin received the following supervised modalities after being cleared for contradictions:     Korin received hot pack for 5 minutes.    Korin received cold pack for 0 minutes.      Home Exercises Provided and Patient Education Provided     Education/Self-Care provided: (5 minutes) 7/6/2022   Patient educated on biomechanical justification for therapeutic exercise and importance of compliance with HEP in order to improve overall impairments and QOL     Passive range of motion exercises for elbow flexion recommended 4-6x day.       Written Home Exercises Provided: yes.  Exercises were reviewed and Korin was able to demonstrate them prior to the end of the session.  Korin demonstrated good  understanding of the education provided.     See EMR under Media for exercises provided 7/1/2022.    ASSESSMENT   Pt did tolerate manual therapy well  Pt demonstrated good understanding of exercises and required minimal cueing to maintain proper form. Shoulder range of motion improves after pulley exercises and hot pack, patient does not tolerate stretching well secondary to increased end range pain at the glenohmeral joint and muscle guarding.    Korin Is progressing well towards his goals.   Pt prognosis is Good.     Pt will continue to benefit from skilled  outpatient occupational therapy to address the deficits listed in the problem list box on initial evaluation, provide pt/family education and to maximize pt's level of independence in the home and community environment.     Pt's spiritual, cultural and educational needs considered and pt agreeable to plan of care and goals.     Anticipated barriers to occupational therapy: previous injury to his elbow with limited    GOALS:     Short Term Goals:  4 weeks                                             updated  8/1/2022   1. Pain: Pt will demonstrate improved pain by reports of less than or equal to 5/10 worst pain on the verbal rating scale in order to progress toward maximal functional ability and improve QOL.  Met 7/6/2022   2. Mobility: Patient will improve AROM to 50% of stated goals, listed in objective measures above, in order to progress towards independence with functional activities.   progressing forearm and shoulder   3. Strength: Patient will improve strength to 50% of stated goals, listed in objective measures above, in order to progress towards independence with functional activities.   ongoing   4. Self Care: Patient will demonstrate improved self care skills listed in objective measure above,in order to progress towards independence with functional activities.   8/1/2022 met   5. HEP: Patient will demonstrate independence with HEP in order to progress toward functional independence.  ongoing      Long Term Goals:  8 weeks                                                updated  8/1/2022   1. Pain: Pt will demonstrate improved pain by reports of less than or equal to 1/10 worst pain on the verbal rating scale in order to progress toward maximal functional ability and improve QOL.       2. Function: Patient will demonstrate improved function as indicated by a functional limitation score of less than or equal to 37 out of 100 on FOTO.     3. Mobility: Patient will improve AROM to stated goals, listed in  objective measures above, in order to return to maximal functional potential and improve quality of life.     4. Strength: Patient will improve strength to stated goals, listed in objective measures above, in order to improve functional independence and quality of life.     5. Self Care: Patient will demonstrate increased self care skills to an independent level or modified independent level with adaptive equipment as needed.  progressing   6. Patient will return to normal ADL's, IADL's, community involvement, recreational activities, and work-related activities with less than or equal to 0/10 pain and maximal function.                  PLAN   Continue Plan of Care (POC) and progress per patient tolerance.    Maday Anderson, OTR, LOTR

## 2022-08-05 ENCOUNTER — HOSPITAL ENCOUNTER (OUTPATIENT)
Dept: RADIOLOGY | Facility: HOSPITAL | Age: 77
Discharge: HOME | End: 2022-08-05
Attending: INTERNAL MEDICINE
Payer: MEDICARE

## 2022-08-05 VITALS
BODY MASS INDEX: 24.92 KG/M2 | HEIGHT: 72 IN | HEART RATE: 95 BPM | RESPIRATION RATE: 20 BRPM | SYSTOLIC BLOOD PRESSURE: 114 MMHG | WEIGHT: 184 LBS | OXYGEN SATURATION: 99 % | DIASTOLIC BLOOD PRESSURE: 61 MMHG

## 2022-08-05 DIAGNOSIS — K74.60 LIVER CIRRHOSIS SECONDARY TO NASH: ICD-10-CM

## 2022-08-05 DIAGNOSIS — K75.81 LIVER CIRRHOSIS SECONDARY TO NASH: ICD-10-CM

## 2022-08-05 LAB
APPEARANCE FLD: CLEAR
BODY FLD TYPE: NORMAL
COLOR FLD: YELLOW
LYMPHOCYTES NFR FLD MANUAL: 38 %
MONOS+MACROS NFR FLD MANUAL: 53 %
NEUTROPHILS NFR FLD MANUAL: 9 %
WBC # FLD: 105 /CU MM

## 2022-08-05 PROCEDURE — 89051 BODY FLUID CELL COUNT: CPT | Performed by: INTERNAL MEDICINE

## 2022-08-05 PROCEDURE — 49083 ABD PARACENTESIS W/IMAGING: CPT

## 2022-08-05 NOTE — DISCHARGE SUMMARY
Pre Op Diagnosis: ascites     Post Op Diagnosis: same     Procedure:  para     Procedure performed by: Tasia PUCKETT, Kishor LOFTON     Written Informed Consent Obtained: Yes     Specimen Removed:  yes     Estimated Blood Loss:  minimal     Findings: Local anesthesia.     The patient tolerated the procedure well and there were no complications.      Sterile technique was performed in the lower abdomen, lidocaine was used as a local anesthetic.   5.5 liters of dark suellen fluid removed for therapeutic purposes.  Pt tolerated the procedure well without immediate complications.  Please see radiologist report for details. F/u with PCP and/or ordering physician.

## 2022-08-05 NOTE — PLAN OF CARE
PROCEDURE COMPLETE.  PARACENTESIS CATHETER REMOVED FROM LEFT SIDE OF ABD. 5 AND A HALF LITERS OF DARK YELLOW FLUID REMOVED.  PT TOLERATED WELL. DRESSING APPLIED TO SITE. CATH TIP IN TACT.  NO COMPLAINTS FROM PT.

## 2022-08-08 LAB — PATH INTERP FLD-IMP: NORMAL

## 2022-08-10 ENCOUNTER — HOSPITAL ENCOUNTER (EMERGENCY)
Facility: HOSPITAL | Age: 77
Discharge: HOME | End: 2022-08-10
Attending: EMERGENCY MEDICINE
Payer: MEDICARE

## 2022-08-10 VITALS
SYSTOLIC BLOOD PRESSURE: 103 MMHG | RESPIRATION RATE: 20 BRPM | HEART RATE: 72 BPM | BODY MASS INDEX: 25.61 KG/M2 | WEIGHT: 188.81 LBS | DIASTOLIC BLOOD PRESSURE: 55 MMHG | TEMPERATURE: 98 F | OXYGEN SATURATION: 100 %

## 2022-08-10 DIAGNOSIS — K74.60 LIVER CIRRHOSIS SECONDARY TO NASH: Primary | ICD-10-CM

## 2022-08-10 DIAGNOSIS — R18.8 OTHER ASCITES: ICD-10-CM

## 2022-08-10 DIAGNOSIS — K75.81 LIVER CIRRHOSIS SECONDARY TO NASH: Primary | ICD-10-CM

## 2022-08-10 LAB
ALBUMIN SERPL BCP-MCNC: 2.6 G/DL (ref 3.5–5.2)
ALP SERPL-CCNC: 174 U/L (ref 55–135)
ALT SERPL W/O P-5'-P-CCNC: 28 U/L (ref 10–44)
ANION GAP SERPL CALC-SCNC: 10 MMOL/L (ref 8–16)
APTT BLDCRRT: 30.6 SEC (ref 21–32)
AST SERPL-CCNC: 38 U/L (ref 10–40)
BASOPHILS # BLD AUTO: 0.06 K/UL (ref 0–0.2)
BASOPHILS NFR BLD: 1 % (ref 0–1.9)
BILIRUB SERPL-MCNC: 0.7 MG/DL (ref 0.1–1)
BUN SERPL-MCNC: 35 MG/DL (ref 8–23)
CALCIUM SERPL-MCNC: 8.8 MG/DL (ref 8.7–10.5)
CHLORIDE SERPL-SCNC: 100 MMOL/L (ref 95–110)
CO2 SERPL-SCNC: 23 MMOL/L (ref 23–29)
CREAT SERPL-MCNC: 1.8 MG/DL (ref 0.5–1.4)
DIFFERENTIAL METHOD: ABNORMAL
EOSINOPHIL # BLD AUTO: 0.2 K/UL (ref 0–0.5)
EOSINOPHIL NFR BLD: 3.3 % (ref 0–8)
ERYTHROCYTE [DISTWIDTH] IN BLOOD BY AUTOMATED COUNT: 16.4 % (ref 11.5–14.5)
EST. GFR  (NO RACE VARIABLE): 39 ML/MIN/1.73 M^2
GLUCOSE SERPL-MCNC: 230 MG/DL (ref 70–110)
HCT VFR BLD AUTO: 36.6 % (ref 40–54)
HGB BLD-MCNC: 12 G/DL (ref 14–18)
IMM GRANULOCYTES # BLD AUTO: 0.06 K/UL (ref 0–0.04)
IMM GRANULOCYTES NFR BLD AUTO: 1 % (ref 0–0.5)
INR PPP: 1.3 (ref 0.8–1.2)
LYMPHOCYTES # BLD AUTO: 0.5 K/UL (ref 1–4.8)
LYMPHOCYTES NFR BLD: 8.6 % (ref 18–48)
MCH RBC QN AUTO: 30.7 PG (ref 27–31)
MCHC RBC AUTO-ENTMCNC: 32.8 G/DL (ref 32–36)
MCV RBC AUTO: 94 FL (ref 82–98)
MONOCYTES # BLD AUTO: 1 K/UL (ref 0.3–1)
MONOCYTES NFR BLD: 17.6 % (ref 4–15)
NEUTROPHILS # BLD AUTO: 4 K/UL (ref 1.8–7.7)
NEUTROPHILS NFR BLD: 68.5 % (ref 38–73)
NRBC BLD-RTO: 0 /100 WBC
PLATELET # BLD AUTO: 155 K/UL (ref 150–450)
PMV BLD AUTO: 10.2 FL (ref 9.2–12.9)
POTASSIUM SERPL-SCNC: 4.6 MMOL/L (ref 3.5–5.1)
PROT SERPL-MCNC: 6.7 G/DL (ref 6–8.4)
PROTHROMBIN TIME: 14 SEC (ref 9–12.5)
RBC # BLD AUTO: 3.91 M/UL (ref 4.6–6.2)
SODIUM SERPL-SCNC: 133 MMOL/L (ref 136–145)
WBC # BLD AUTO: 5.81 K/UL (ref 3.9–12.7)

## 2022-08-10 PROCEDURE — 49083 ABD PARACENTESIS W/IMAGING: CPT

## 2022-08-10 PROCEDURE — 25000003 PHARM REV CODE 250: Performed by: EMERGENCY MEDICINE

## 2022-08-10 PROCEDURE — 85025 COMPLETE CBC W/AUTO DIFF WBC: CPT | Performed by: EMERGENCY MEDICINE

## 2022-08-10 PROCEDURE — 36000 PLACE NEEDLE IN VEIN: CPT

## 2022-08-10 PROCEDURE — 80053 COMPREHEN METABOLIC PANEL: CPT | Performed by: EMERGENCY MEDICINE

## 2022-08-10 PROCEDURE — 99284 EMERGENCY DEPT VISIT MOD MDM: CPT | Mod: 25

## 2022-08-10 PROCEDURE — 85610 PROTHROMBIN TIME: CPT | Performed by: EMERGENCY MEDICINE

## 2022-08-10 PROCEDURE — 85730 THROMBOPLASTIN TIME PARTIAL: CPT | Performed by: EMERGENCY MEDICINE

## 2022-08-10 RX ORDER — LIDOCAINE HYDROCHLORIDE 10 MG/ML
10 INJECTION, SOLUTION EPIDURAL; INFILTRATION; INTRACAUDAL; PERINEURAL
Status: COMPLETED | OUTPATIENT
Start: 2022-08-10 | End: 2022-08-10

## 2022-08-10 RX ADMIN — LIDOCAINE HYDROCHLORIDE 100 MG: 10 INJECTION, SOLUTION EPIDURAL; INFILTRATION; INTRACAUDAL; PERINEURAL at 03:08

## 2022-08-10 NOTE — OP NOTE
Pre Op Diagnosis: ascites     Post Op Diagnosis: same     Procedure:  para     Procedure performed by: Tal PUCKETT, Kishor LOFTON     Written Informed Consent Obtained: Yes     Specimen Removed:  yes     Estimated Blood Loss:  minimal     Findings: Local anesthesia.     The patient tolerated the procedure well and there were no complications.      Sterile technique was performed in the lower abdomen, lidocaine was used as a local anesthetic.   6.5 liters of suellen fluid removed for therapeutic purposes.  Pt tolerated the procedure well without immediate complications.  Please see radiologist report for details. F/u with PCP and/or ordering physician.

## 2022-08-10 NOTE — ED PROVIDER NOTES
SCRIBE #1 NOTE: I, Jeremiah Leal, am scribing for, and in the presence of, Shanthi Regan MD. I have scribed the entire note.      History      Chief Complaint   Patient presents with    Shortness of Breath     Pt has an appointment Friday to have fluid drained from abdomen but states he can't make it to Friday due to difficulty breathing.      Review of patient's allergies indicates:   Allergen Reactions    Lipitor [atorvastatin] Other (See Comments)     Left arm/shooulder pain    Statins-hmg-coa reductase inhibitors Other (See Comments)     muscle aches, joint pain  Joint pain  Joint pain          HPI   HPI    8/10/2022, 2:09 PM   History obtained from the patient      History of Present Illness: Korin Vivas is a 76 y.o. 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 Symptoms are constant and moderate in severity. No mitigating or exacerbating factors reported. Associated sxs include abdominal distention. Patient denies any fever, chills, n/v/d, CP, weakness, numbness, dizziness, headache, and all other sxs at this time. Pt states that he has weekly paracenteses and is scheduled for one in 2 days, but pt notes that he cannot wait that long due to his sxs. No further complaints or concerns at this time.     Arrival mode: Personal vehicle    PCP: [facility]       Past Medical History:  Past Medical History:   Diagnosis Date    Arm fracture, right 2022, 2010    CAD (coronary artery disease)     Diabetes     GERD (gastroesophageal reflux disease)     HTN (hypertension)     Hyperlipidemia        Past Surgical History:  Past Surgical History:   Procedure Laterality Date    CATARACT EXTRACTION      COLONOSCOPY      COLONOSCOPY N/A 7/18/2016    Procedure: COLONOSCOPY;  Surgeon: Bryon Hickey MD;  Location: [facility];  Service: Endoscopy;  Laterality: N/A;    COLONOSCOPY N/A 10/6/2020     Procedure: COLONOSCOPY;  Surgeon: Kait Isaacs MD;  Location: [facility];  Service: Endoscopy;  Laterality: N/A;    CORONARY STENT PLACEMENT      ELBOW SURGERY      ESOPHAGOGASTRODUODENOSCOPY N/A 10/6/2020    Procedure: ESOPHAGOGASTRODUODENOSCOPY (EGD);  Surgeon: Kait Isaacs MD;  Location: [facility];  Service: Endoscopy;  Laterality: N/A;    HERNIA REPAIR      2022    TONSILLECTOMY      VASECTOMY           Family History:  Family History   Problem Relation Age of Onset    Colon cancer Brother     No Known Problems Mother     Heart disease Father        Social History:  Social History     Tobacco Use    Smoking status: Former Smoker     Packs/day: 1.00     Years: 40.00     Pack years: 40.00    Smokeless tobacco: Never Used    Tobacco comment: quit 3 years ago   Substance and Sexual Activity    Alcohol use: No    Drug use: No    Sexual activity: Not on file       ROS   Review of Systems   Constitutional: Negative for chills and fever.   HENT: Negative for sore throat.    Respiratory: Positive for shortness of breath.    Cardiovascular: Negative for chest pain.   Gastrointestinal: Positive for abdominal distention. Negative for diarrhea, nausea and vomiting.   Genitourinary: Negative for dysuria.   Musculoskeletal: Negative for back pain.   Skin: Negative for rash.   Neurological: Negative for dizziness, weakness, light-headedness, numbness and headaches.   Hematological: Does not bruise/bleed easily.   All other systems reviewed and are negative.    Physical Exam      Initial Vitals [08/10/22 1343]   BP Pulse Resp Temp SpO2   100/62 104 16 97.8 °F (36.6 °C) 99 %      MAP       --          Physical Exam  Nursing Notes and Vital Signs Reviewed.  Constitutional: Patient is in no acute distress. Well-developed and well-nourished.  Head: Atraumatic. Normocephalic.  Eyes: PERRL. EOM intact. Conjunctivae are not pale. No scleral icterus.  ENT: Mucous membranes are moist. Oropharynx is clear and  symmetric.    Neck: Supple. Full ROM. No lymphadenopathy.  Cardiovascular: Regular rate. Regular rhythm. No murmurs, rubs, or gallops. Distal pulses are 2+ and symmetric.  Pulmonary/Chest: No respiratory distress. Clear to auscultation bilaterally. No wheezing or rales.  Abdominal: Distended with positive fluid wave. There is no tenderness.  No rebound or guarding.   Musculoskeletal: Moves all extremities. No obvious deformities. No edema.  Skin: Warm and dry.  Neurological:  Alert, awake, and appropriate.  Normal speech.  No acute focal neurological deficits are appreciated.  Psychiatric: Normal affect. Good eye contact. Appropriate in content.    ED Course    Procedures  ED Vital Signs:  Vitals:    08/10/22 1343 08/10/22 1416 08/10/22 1423 08/10/22 1503   BP: 100/62  119/62 117/65   Pulse: 104 84 85 86   Resp: 16      Temp: 97.8 °F (36.6 °C)      TempSrc: Oral      SpO2: 99%  100% 99%   Weight: 85.6 kg (188 lb 13.2 oz)       08/10/22 1513 08/10/22 1618   BP: 118/62 (!) 102/55   Pulse: 86 73   Resp:  (!) 22   Temp:     TempSrc:     SpO2: 100% 100%   Weight:         Abnormal Lab Results:  Labs Reviewed   CBC W/ AUTO DIFFERENTIAL - Abnormal; Notable for the following components:       Result Value    RBC 3.91 (*)     Hemoglobin 12.0 (*)     Hematocrit 36.6 (*)     RDW 16.4 (*)     Immature Granulocytes 1.0 (*)     Immature Grans (Abs) 0.06 (*)     Lymph # 0.5 (*)     Lymph % 8.6 (*)     Mono % 17.6 (*)     All other components within normal limits   COMPREHENSIVE METABOLIC PANEL - Abnormal; Notable for the following components:    Sodium 133 (*)     Glucose 230 (*)     BUN 35 (*)     Creatinine 1.8 (*)     Albumin 2.6 (*)     Alkaline Phosphatase 174 (*)     eGFR 39 (*)     All other components within normal limits   PROTIME-INR - Abnormal; Notable for the following components:    Prothrombin Time 14.0 (*)     INR 1.3 (*)     All other components within normal limits   APTT        All Lab Results:  Results for orders  placed or performed during the hospital encounter of 08/10/22   CBC auto differential   Result Value Ref Range    WBC 5.81 3.90 - 12.70 K/uL    RBC 3.91 (L) 4.60 - 6.20 M/uL    Hemoglobin 12.0 (L) 14.0 - 18.0 g/dL    Hematocrit 36.6 (L) 40.0 - 54.0 %    MCV 94 82 - 98 fL    MCH 30.7 27.0 - 31.0 pg    MCHC 32.8 32.0 - 36.0 g/dL    RDW 16.4 (H) 11.5 - 14.5 %    Platelets 155 150 - 450 K/uL    MPV 10.2 9.2 - 12.9 fL    Immature Granulocytes 1.0 (H) 0.0 - 0.5 %    Gran # (ANC) 4.0 1.8 - 7.7 K/uL    Immature Grans (Abs) 0.06 (H) 0.00 - 0.04 K/uL    Lymph # 0.5 (L) 1.0 - 4.8 K/uL    Mono # 1.0 0.3 - 1.0 K/uL    Eos # 0.2 0.0 - 0.5 K/uL    Baso # 0.06 0.00 - 0.20 K/uL    nRBC 0 0 /100 WBC    Gran % 68.5 38.0 - 73.0 %    Lymph % 8.6 (L) 18.0 - 48.0 %    Mono % 17.6 (H) 4.0 - 15.0 %    Eosinophil % 3.3 0.0 - 8.0 %    Basophil % 1.0 0.0 - 1.9 %    Differential Method Automated    Comprehensive metabolic panel   Result Value Ref Range    Sodium 133 (L) 136 - 145 mmol/L    Potassium 4.6 3.5 - 5.1 mmol/L    Chloride 100 95 - 110 mmol/L    CO2 23 23 - 29 mmol/L    Glucose 230 (H) 70 - 110 mg/dL    BUN 35 (H) 8 - 23 mg/dL    Creatinine 1.8 (H) 0.5 - 1.4 mg/dL    Calcium 8.8 8.7 - 10.5 mg/dL    Total Protein 6.7 6.0 - 8.4 g/dL    Albumin 2.6 (L) 3.5 - 5.2 g/dL    Total Bilirubin 0.7 0.1 - 1.0 mg/dL    Alkaline Phosphatase 174 (H) 55 - 135 U/L    AST 38 10 - 40 U/L    ALT 28 10 - 44 U/L    Anion Gap 10 8 - 16 mmol/L    eGFR 39 (A) >60 mL/min/1.73 m^2   Protime-INR   Result Value Ref Range    Prothrombin Time 14.0 (H) 9.0 - 12.5 sec    INR 1.3 (H) 0.8 - 1.2   APTT   Result Value Ref Range    aPTT 30.6 21.0 - 32.0 sec     Imaging Results:  Imaging Results          US Guided Paracentesis (Final result)  Result time 08/10/22 16:21:34    Final result by Juan Antonio Parada MD (08/10/22 16:21:34)                 Impression:      Right lower quadrant paracentesis with removal of 6.5 L of clear suellen fluid without  difficulty.      Electronically signed by: Juan Antonio Parada  Date:    08/10/2022  Time:    16:21             Narrative:    EXAMINATION:  US GUIDED PARACENTESIS INC IMAGING    CLINICAL HISTORY:  ascites;    TECHNIQUE:  : Dr. Parada, Gonzalo MELGAR assisted in this procedure.    Written and verbal informed consent was obtained.  Targeted ultrasound was performed and the skin was marked in the lower abdomen superficial to a safe pocket of abdominal ascites.  The area was prepped and draped in the usual sterile fashion.  A time-out was performed.  Lidocaine was instilled for local anesthesia.  5 Armenian catheter was inserted into the peritoneal cavity with the assistance of Gonzalo Matias, under direct supervision (Dr. Parada was immediately available).  Ascites was removed.    The catheter was removed.  Local hemostasis was achieved.  A sterile dressing was applied. The patient tolerated the procedure well.    FINDINGS:  Ultrasound images demonstrate a moderate volume of abdominal ascites and safe pocket for paracentesis.                                        The Emergency Provider reviewed the vital signs and test results, which are outlined above.    ED Discussion     4:20 PM: Reassessed pt at this time. Paracentesis was successfully performed by Interventional Radiology 6.5 liters of ascitic fluid removed without difficulty. Discussed with pt all pertinent ED information and results. Discussed pt dx and plan of tx. Gave pt all f/u and return to the ED instructions. All questions and concerns were addressed at this time. Pt expresses understanding of information and instructions, and is comfortable with plan to discharge. Pt is stable for discharge.    I discussed with patient and/or family/caretaker that evaluation in the ED does not suggest any emergent or life threatening medical conditions requiring immediate intervention beyond what was provided in the ED, and I believe patient is  safe for discharge.  Regardless, an unremarkable evaluation in the ED does not preclude the development or presence of a serious of life threatening condition. As such, patient was instructed to return immediately for any worsening or change in current symptoms.         ED Medication(s):  Medications   LIDOcaine (PF) 10 mg/ml (1%) injection 100 mg (has no administration in time range)        Follow-up Information     Litzy Goodman MD. Schedule an appointment as soon as possible for a visit in 2 days.    Specialties: Gastroenterology, Hepatology  Why: Return to the Emergency Room, If symptoms worsen  Contact information:  [address]  [place] [address]  [phone]                        New Prescriptions    No medications on file         Medical Decision Making    Medical Decision Making:   Clinical Tests:   Lab Tests: Ordered and Reviewed  Radiological Study: Ordered and Reviewed           Scribe Attestation:   Scribe #1: I performed the above scribed service and the documentation accurately describes the services I performed. I attest to the accuracy of the note.    Attending:   Physician Attestation Statement for Scribe #1: I, Shanthi Regan MD, personally performed the services described in this documentation, as scribed by Jeremiah Leal, in my presence, and it is both accurate and complete.          Clinical Impression       ICD-10-CM ICD-9-CM   1. Liver cirrhosis secondary to MEDNEZ  K75.81 571.8    K74.60 571.5   2. Other ascites  R18.8 789.59       Disposition:   Disposition: Discharged  Condition: Stable         Shanthi Regan MD  08/10/22 9077

## 2022-08-12 ENCOUNTER — HOSPITAL ENCOUNTER (OUTPATIENT)
Dept: RADIOLOGY | Facility: HOSPITAL | Age: 77
Discharge: HOME | End: 2022-08-12
Attending: INTERNAL MEDICINE
Payer: MEDICARE

## 2022-08-12 ENCOUNTER — DOCUMENTATION ONLY (OUTPATIENT)
Dept: REHABILITATION | Facility: HOSPITAL | Age: 77
End: 2022-08-12
Payer: MEDICARE

## 2022-08-12 ENCOUNTER — TELEPHONE (OUTPATIENT)
Dept: RADIOLOGY | Facility: HOSPITAL | Age: 77
End: 2022-08-12
Payer: MEDICARE

## 2022-08-12 VITALS
HEIGHT: 72 IN | OXYGEN SATURATION: 100 % | BODY MASS INDEX: 24.92 KG/M2 | DIASTOLIC BLOOD PRESSURE: 59 MMHG | WEIGHT: 184 LBS | HEART RATE: 97 BPM | SYSTOLIC BLOOD PRESSURE: 110 MMHG | RESPIRATION RATE: 16 BRPM

## 2022-08-12 DIAGNOSIS — K74.60 LIVER CIRRHOSIS SECONDARY TO NASH: ICD-10-CM

## 2022-08-12 DIAGNOSIS — K75.81 LIVER CIRRHOSIS SECONDARY TO NASH: ICD-10-CM

## 2022-08-12 LAB
APPEARANCE FLD: NORMAL
BASOPHILS NFR FLD MANUAL: 2 %
BODY FLD TYPE: NORMAL
COLOR FLD: YELLOW
EOSINOPHIL NFR FLD MANUAL: 1 %
LYMPHOCYTES NFR FLD MANUAL: 42 %
MESOTHL CELL NFR FLD MANUAL: 5 %
MONOS+MACROS NFR FLD MANUAL: 40 %
NEUTROPHILS NFR FLD MANUAL: 10 %
WBC # FLD: 65 /CU MM

## 2022-08-12 PROCEDURE — P9047 ALBUMIN (HUMAN), 25%, 50ML: HCPCS | Mod: JG | Performed by: INTERNAL MEDICINE

## 2022-08-12 PROCEDURE — 63600175 PHARM REV CODE 636 W HCPCS: Mod: JG | Performed by: INTERNAL MEDICINE

## 2022-08-12 PROCEDURE — 49083 ABD PARACENTESIS W/IMAGING: CPT

## 2022-08-12 PROCEDURE — 89051 BODY FLUID CELL COUNT: CPT | Performed by: INTERNAL MEDICINE

## 2022-08-12 RX ORDER — ALBUMIN HUMAN 250 G/1000ML
25 SOLUTION INTRAVENOUS ONCE
Status: COMPLETED | OUTPATIENT
Start: 2022-08-12 | End: 2022-08-12

## 2022-08-12 RX ADMIN — ALBUMIN (HUMAN) 25 G: 25 SOLUTION INTRAVENOUS at 02:08

## 2022-08-12 NOTE — PROGRESS NOTES
Patient called secondary to missed appointments on 8/10 and 8/12. He has been to the emergency room for other medical problems and states he will come back to therapy as soon as he is able.

## 2022-08-12 NOTE — DISCHARGE SUMMARY
Pre Op Diagnosis: ascites     Post Op Diagnosis: same     Procedure:  para     Procedure performed by: Tal PUCKETT, Kishor LOFTON     Written Informed Consent Obtained: Yes     Specimen Removed:  yes     Estimated Blood Loss:  minimal     Findings: Local anesthesia.     The patient tolerated the procedure well and there were no complications.      Sterile technique was performed in the lower abdomen, lidocaine was used as a local anesthetic.   5.5 liters of suellen fluid removed for therapeutic purposes.  Pt tolerated the procedure well without immediate complications.  Please see radiologist report for details. F/u with PCP and/or ordering physician.

## 2022-08-12 NOTE — TELEPHONE ENCOUNTER
Interventional Radiology:    Called to check on pt after receiving para in the ED 2 days ago and see if he planned on keeping his appt for today at 1pm or if he was going to hold off until Monday.  Left  requesting for him to call back.

## 2022-08-12 NOTE — PLAN OF CARE
_5.5 liters__ of clear light suellen drainage removed from abdomen. Patient AAOx4, denies any pain or discomfort related to paracentesis. Catheter removed from _left__ abdominal wall without difficulty, tip intact. Bandage applied, CDI. Vital signs WNL. Albumin running.

## 2022-08-15 LAB — PATH INTERP FLD-IMP: NORMAL

## 2022-08-17 ENCOUNTER — CLINICAL SUPPORT (OUTPATIENT)
Dept: REHABILITATION | Facility: HOSPITAL | Age: 77
End: 2022-08-17
Payer: MEDICARE

## 2022-08-17 DIAGNOSIS — R60.9 SWELLING: ICD-10-CM

## 2022-08-17 DIAGNOSIS — M25.60 DECREASED RANGE OF MOTION: Primary | ICD-10-CM

## 2022-08-17 DIAGNOSIS — R52 PAIN: ICD-10-CM

## 2022-08-17 DIAGNOSIS — R53.1 DECREASED STRENGTH: ICD-10-CM

## 2022-08-17 DIAGNOSIS — Z78.9 SELF-CARE DEFICIT: ICD-10-CM

## 2022-08-17 PROCEDURE — 97110 THERAPEUTIC EXERCISES: CPT

## 2022-08-17 PROCEDURE — 97010 HOT OR COLD PACKS THERAPY: CPT

## 2022-08-17 PROCEDURE — 97140 MANUAL THERAPY 1/> REGIONS: CPT

## 2022-08-17 NOTE — PROGRESS NOTES
OCCUPATIONAL THERAPY DAILY NOTE    Name: Korin Vivas  Clinic Number: [id number]    Therapy Diagnosis:   Encounter Diagnoses   Name Primary?    Decreased range of motion Yes    Pain     Self-care deficit     Decreased strength     Swelling      Physician: Gabo Roger    Visit Date: 8/17/2022    Physician Orders: Evaluation and treatment   Right distal humerus fracture  Begin working on elbow ROM  for 3 weeks then progress, continue with hand/wrist/finger ROM  eval and treat with modalities as needed     Medical Diagnosis: S42.411D (ICD-10-CM) - Closed displaced simple supracondylar fracture of right humerus without intercondylar fracture with routine healing,   Surgical Procedure and Date: NA,   Evaluation Date: 6/29/2022  Insurance Authorization Period Expiration: 6/22/2022 - 6/22/2023  Plan of Care Certification Period: 8/1/2022 TO 9/1/2022  Progress Note Due: 9/1/2022   Date of Return to MD: 7/13/2022  Visit # / Visits authorized: 7 / 20  FOTO: 1/3     Precautions:  -30 - 110 degrees of elbow range of motion for 3 weeks/avoid external rotation     Time In: 1:30  Time Out: 2:15  Total Appointment Time (timed & untimed codes): 45 minutes    SUBJECTIVE     Today, pt reports: he has in the hospital again recently due to some medical problems. He is feeling better but is weak.    He/She was compliant with home exercise program.  Response to previous treatment: Tolerated well  Functional change: Improved range    Pre-Treatment Pain: 0/10  Post-Treatment Pain: 0/10  Location: elbow    OBJECTIVE:  Objective measures will be updated at progress note unless otherwise stated.    TREATMENT       Korin received therapeutic exercises to develop ROM and flexibility for 30 minutes including:    Exercise Completed today    Supine shoulder flexion with dowel  x 30x   Sitting elbow flexion/extension with dowel x 30x   Forearm rotation with dowel active range of motion   30x   Passive range of motion of  the elbow in flexion/extension and forearm rotation x    Scapular protraction/retraction in side lying  30x   Active shoulder flexion and abduction in sidelying X 30x   Patient self stretches of forearm supination with towel  4/15 second holds     Gravity assisted elbow flexion stretch x 10/10 second holds   PULLEY EXERCISES    3 minutes each for stretching  Shoulder flexion  Shoulder abduction x 3 minutes each   Elbow flexion/extension active range of motion  x 30x   Wrist wheel for supination and pronation x 20x                      Korin received the following manual therapy techniques: Soft tissue Mobilization were applied to the: elbow for 10 minutes, including:  STM of right forearm and humerus muscles and shoulder girdle.  IASTM of the posterior elbow    Korin received the following supervised modalities after being cleared for contradictions:     Korin received hot pack for 5 minutes.    Korin received cold pack for 0 minutes.      Home Exercises Provided and Patient Education Provided     Education/Self-Care provided: (5 minutes) 7/6/2022   Patient educated on biomechanical justification for therapeutic exercise and importance of compliance with HEP in order to improve overall impairments and QOL     Passive range of motion exercises for elbow flexion recommended 4-6x day.       Written Home Exercises Provided: yes.  Exercises were reviewed and Korin was able to demonstrate them prior to the end of the session.  Korin demonstrated good  understanding of the education provided.     See EMR under Media for exercises provided 7/1/2022.    ASSESSMENT   Pt did tolerate manual therapy well  Pt demonstrated good understanding of exercises and required minimal cueing to maintain proper form. Shoulder range of motion improves after pulley exercises and hot pack, patient does not tolerate stretching well secondary to increased end range pain at the glenohmeral joint and muscle guarding.    Korin Is  progressing well towards his goals.   Pt prognosis is Good.     Pt will continue to benefit from skilled outpatient occupational therapy to address the deficits listed in the problem list box on initial evaluation, provide pt/family education and to maximize pt's level of independence in the home and community environment.     Pt's spiritual, cultural and educational needs considered and pt agreeable to plan of care and goals.     Anticipated barriers to occupational therapy: previous injury to his elbow with limited    GOALS:     Short Term Goals:  4 weeks                                             updated  8/1/2022   1. Pain: Pt will demonstrate improved pain by reports of less than or equal to 5/10 worst pain on the verbal rating scale in order to progress toward maximal functional ability and improve QOL.  Met 7/6/2022   2. Mobility: Patient will improve AROM to 50% of stated goals, listed in objective measures above, in order to progress towards independence with functional activities.   progressing forearm and shoulder   3. Strength: Patient will improve strength to 50% of stated goals, listed in objective measures above, in order to progress towards independence with functional activities.   ongoing   4. Self Care: Patient will demonstrate improved self care skills listed in objective measure above,in order to progress towards independence with functional activities.   8/1/2022 met   5. HEP: Patient will demonstrate independence with HEP in order to progress toward functional independence.  ongoing      Long Term Goals:  8 weeks                                                updated  8/1/2022   1. Pain: Pt will demonstrate improved pain by reports of less than or equal to 1/10 worst pain on the verbal rating scale in order to progress toward maximal functional ability and improve QOL.       2. Function: Patient will demonstrate improved function as indicated by a functional limitation score of less than or  equal to 37 out of 100 on FOTO.     3. Mobility: Patient will improve AROM to stated goals, listed in objective measures above, in order to return to maximal functional potential and improve quality of life.     4. Strength: Patient will improve strength to stated goals, listed in objective measures above, in order to improve functional independence and quality of life.     5. Self Care: Patient will demonstrate increased self care skills to an independent level or modified independent level with adaptive equipment as needed.  progressing   6. Patient will return to normal ADL's, IADL's, community involvement, recreational activities, and work-related activities with less than or equal to 0/10 pain and maximal function.                  PLAN   Continue Plan of Care (POC) and progress per patient tolerance.    Arianna Marie OT, LULÚR

## 2022-08-18 ENCOUNTER — OFFICE VISIT (OUTPATIENT)
Dept: HEPATOLOGY | Facility: CLINIC | Age: 77
End: 2022-08-18
Payer: MEDICARE

## 2022-08-18 ENCOUNTER — LAB VISIT (OUTPATIENT)
Dept: LAB | Facility: HOSPITAL | Age: 77
End: 2022-08-18
Attending: INTERNAL MEDICINE
Payer: MEDICARE

## 2022-08-18 VITALS
HEART RATE: 73 BPM | DIASTOLIC BLOOD PRESSURE: 74 MMHG | BODY MASS INDEX: 24.96 KG/M2 | WEIGHT: 184.31 LBS | HEIGHT: 72 IN | SYSTOLIC BLOOD PRESSURE: 112 MMHG

## 2022-08-18 DIAGNOSIS — K75.81 LIVER CIRRHOSIS SECONDARY TO NASH: ICD-10-CM

## 2022-08-18 DIAGNOSIS — K74.60 LIVER CIRRHOSIS SECONDARY TO NASH: ICD-10-CM

## 2022-08-18 DIAGNOSIS — K74.60 LIVER CIRRHOSIS SECONDARY TO NASH: Primary | ICD-10-CM

## 2022-08-18 DIAGNOSIS — K75.81 LIVER CIRRHOSIS SECONDARY TO NASH: Primary | ICD-10-CM

## 2022-08-18 LAB
ALBUMIN SERPL BCP-MCNC: 2.8 G/DL (ref 3.5–5.2)
ALP SERPL-CCNC: 171 U/L (ref 55–135)
ALT SERPL W/O P-5'-P-CCNC: 36 U/L (ref 10–44)
ANION GAP SERPL CALC-SCNC: 12 MMOL/L (ref 8–16)
AST SERPL-CCNC: 61 U/L (ref 10–40)
BILIRUB SERPL-MCNC: 0.7 MG/DL (ref 0.1–1)
BUN SERPL-MCNC: 37 MG/DL (ref 8–23)
CALCIUM SERPL-MCNC: 9.1 MG/DL (ref 8.7–10.5)
CHLORIDE SERPL-SCNC: 100 MMOL/L (ref 95–110)
CO2 SERPL-SCNC: 22 MMOL/L (ref 23–29)
CREAT SERPL-MCNC: 1.8 MG/DL (ref 0.5–1.4)
ERYTHROCYTE [DISTWIDTH] IN BLOOD BY AUTOMATED COUNT: 17.2 % (ref 11.5–14.5)
EST. GFR  (NO RACE VARIABLE): 39 ML/MIN/1.73 M^2
GLUCOSE SERPL-MCNC: 189 MG/DL (ref 70–110)
HCT VFR BLD AUTO: 37.9 % (ref 40–54)
HGB BLD-MCNC: 12.2 G/DL (ref 14–18)
INR PPP: 1.3 (ref 0.8–1.2)
MCH RBC QN AUTO: 31.4 PG (ref 27–31)
MCHC RBC AUTO-ENTMCNC: 32.2 G/DL (ref 32–36)
MCV RBC AUTO: 98 FL (ref 82–98)
PLATELET # BLD AUTO: 191 K/UL (ref 150–450)
PMV BLD AUTO: 10.9 FL (ref 9.2–12.9)
POTASSIUM SERPL-SCNC: 4.7 MMOL/L (ref 3.5–5.1)
PROT SERPL-MCNC: 7.2 G/DL (ref 6–8.4)
PROTHROMBIN TIME: 14.2 SEC (ref 9–12.5)
RBC # BLD AUTO: 3.88 M/UL (ref 4.6–6.2)
SODIUM SERPL-SCNC: 134 MMOL/L (ref 136–145)
WBC # BLD AUTO: 6.9 K/UL (ref 3.9–12.7)

## 2022-08-18 PROCEDURE — 36415 COLL VENOUS BLD VENIPUNCTURE: CPT | Performed by: INTERNAL MEDICINE

## 2022-08-18 PROCEDURE — 85610 PROTHROMBIN TIME: CPT | Performed by: INTERNAL MEDICINE

## 2022-08-18 PROCEDURE — 99215 OFFICE O/P EST HI 40 MIN: CPT | Mod: S$PBB,,, | Performed by: INTERNAL MEDICINE

## 2022-08-18 PROCEDURE — 99215 PR OFFICE/OUTPT VISIT, EST, LEVL V, 40-54 MIN: ICD-10-PCS | Mod: S$PBB,,, | Performed by: INTERNAL MEDICINE

## 2022-08-18 PROCEDURE — 85027 COMPLETE CBC AUTOMATED: CPT | Performed by: INTERNAL MEDICINE

## 2022-08-18 PROCEDURE — 99999 PR PBB SHADOW E&M-EST. PATIENT-LVL IV: CPT | Mod: PBBFAC,,, | Performed by: INTERNAL MEDICINE

## 2022-08-18 PROCEDURE — 99999 PR PBB SHADOW E&M-EST. PATIENT-LVL IV: ICD-10-PCS | Mod: PBBFAC,,, | Performed by: INTERNAL MEDICINE

## 2022-08-18 PROCEDURE — 80053 COMPREHEN METABOLIC PANEL: CPT | Performed by: INTERNAL MEDICINE

## 2022-08-18 PROCEDURE — 99214 OFFICE O/P EST MOD 30 MIN: CPT | Mod: PBBFAC | Performed by: INTERNAL MEDICINE

## 2022-08-18 RX ORDER — EZETIMIBE 10 MG/1
10 TABLET ORAL DAILY
COMMUNITY

## 2022-08-18 RX ORDER — LACTULOSE 10 G/15ML
20 SOLUTION ORAL; RECTAL 2 TIMES DAILY
Qty: 250 ML | Refills: 11 | Status: ON HOLD | OUTPATIENT
Start: 2022-08-18 | End: 2023-02-10 | Stop reason: SDUPTHER

## 2022-08-18 RX ORDER — SPIRONOLACTONE 100 MG/1
100 TABLET, FILM COATED ORAL 2 TIMES DAILY
Qty: 60 TABLET | Refills: 6 | Status: ON HOLD | OUTPATIENT
Start: 2022-08-18 | End: 2023-03-14 | Stop reason: HOSPADM

## 2022-08-18 RX ORDER — FAMOTIDINE 40 MG/1
40 TABLET, FILM COATED ORAL
COMMUNITY
End: 2022-10-05

## 2022-08-18 RX ORDER — BUSPIRONE HYDROCHLORIDE 15 MG/1
15 TABLET ORAL 2 TIMES DAILY PRN
COMMUNITY

## 2022-08-18 NOTE — H&P (VIEW-ONLY)
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  He was hospitalized on May 31st for ascites, underwent paracentesis, reportedly also had episodes of hepatic encephalopathy.  Then he had incarcerated hernia repaired at [facility] and discharged from the hospital on June 18th.  Previously he had episodes of GI bleed, underwent an upper endoscopy with banding of esophageal varices, taken off of Plavix in 2019, since then no episodes of bleed per patient.  He was referred to us not liver Clinic for management of cirrhosis of liver          Interval history: 8/18/22  He was asked to come to clinic ASAP as he has been getting  paracentesis every week, says he gets breathless, stays on the bed for 2-3 days, feels fatigue. Has been removing 5-7 L every time.  He was asked to come 2 weeks after last appointment, however says he didn't have an appointment and didn't know about it. He had SBP in the past with encephalopathy that was improved with lactulose, now he is taking  lactulose almost daily.     Review of Systems   Constitutional: Positive for fatigue. Negative for fever and unexpected weight change.   Respiratory: Positive for shortness of breath.    Gastrointestinal: Positive for abdominal distention. Negative for abdominal pain, blood in stool, nausea and vomiting.   Musculoskeletal: Positive for arthralgias. Negative for gait problem.   Skin: Negative for pallor and rash.   Neurological: Negative for dizziness.   Hematological: Does not bruise/bleed easily.   Psychiatric/Behavioral: Negative for confusion, hallucinations and sleep disturbance.       Objective:     Physical Exam  Vitals and nursing note reviewed.   Constitutional:       Appearance: Normal appearance.   Eyes:      General: No scleral icterus.  Cardiovascular:      Heart sounds: No murmur  heard.  Pulmonary:      Effort: Pulmonary effort is normal.      Breath sounds: No wheezing, rhonchi or rales.   Abdominal:      General: Bowel sounds are normal. There is distension.      Palpations: There is no mass.      Tenderness: There is no abdominal tenderness.   Musculoskeletal:         General: No tenderness.      Right lower leg: No edema.      Left lower leg: No edema.      Comments: LE edema improved. He is easily able to get up and walk.     Lymphadenopathy:      Cervical: No cervical adenopathy.   Skin:     Coloration: Skin is not jaundiced.      Findings: No bruising or rash.   Neurological:      Mental Status: He is alert and oriented to person, place, and time.      Coordination: Coordination normal.   Psychiatric:         Behavior: Behavior normal.         Thought Content: Thought content normal.         MELD-Na score: 18 at 8/10/2022  2:18 PM  MELD score: 15 at 8/10/2022  2:18 PM  Calculated from:  Serum Creatinine: 1.8 mg/dL at 8/10/2022  2:18 PM  Serum Sodium: 133 mmol/L at 8/10/2022  2:18 PM  Total Bilirubin: 0.7 mg/dL (Using min of 1 mg/dL) at 8/10/2022  2:18 PM  INR(ratio): 1.3 at 8/10/2022  2:18 PM  Age: 76 years    WBC   Date Value Ref Range Status   08/10/2022 5.81 3.90 - 12.70 K/uL Final     Hemoglobin   Date Value Ref Range Status   08/10/2022 12.0 (L) 14.0 - 18.0 g/dL Final     Hematocrit   Date Value Ref Range Status   08/10/2022 36.6 (L) 40.0 - 54.0 % Final     Platelets   Date Value Ref Range Status   08/10/2022 155 150 - 450 K/uL Final     BUN   Date Value Ref Range Status   08/10/2022 35 (H) 8 - 23 mg/dL Final     Creatinine   Date Value Ref Range Status   08/10/2022 1.8 (H) 0.5 - 1.4 mg/dL Final     Glucose   Date Value Ref Range Status   08/10/2022 230 (H) 70 - 110 mg/dL Final     Calcium   Date Value Ref Range Status   08/10/2022 8.8 8.7 - 10.5 mg/dL Final     Sodium   Date Value Ref Range Status   08/10/2022 133 (L) 136 - 145 mmol/L Final     Potassium   Date Value Ref Range  Status   08/10/2022 4.6 3.5 - 5.1 mmol/L Final     Chloride   Date Value Ref Range Status   08/10/2022 100 95 - 110 mmol/L Final     AST   Date Value Ref Range Status   08/10/2022 38 10 - 40 U/L Final     ALT   Date Value Ref Range Status   08/10/2022 28 10 - 44 U/L Final     Alkaline Phosphatase   Date Value Ref Range Status   08/10/2022 174 (H) 55 - 135 U/L Final     Total Bilirubin   Date Value Ref Range Status   08/10/2022 0.7 0.1 - 1.0 mg/dL Final     Comment:     For infants and newborns, interpretation of results should be based  on gestational age, weight and in agreement with clinical  observations.    Premature Infant recommended reference ranges:  Up to 24 hours.............<8.0 mg/dL  Up to 48 hours............<12.0 mg/dL  3-5 days..................<15.0 mg/dL  6-29 days.................<15.0 mg/dL       Albumin   Date Value Ref Range Status   08/10/2022 2.6 (L) 3.5 - 5.2 g/dL Final     INR   Date Value Ref Range Status   08/10/2022 1.3 (H) 0.8 - 1.2 Final     Comment:     Coumadin Therapy:  2.0 - 3.0 for INR for all indicators except mechanical heart valves  and antiphospholipid syndromes which should use 2.5 - 3.5.           Assessment/Plan:     1.Cirrhosis:  Decompensated by ascites, hepatic encephalopathy  Last MELD - 20  Creatinine  increased to 1.8, will decrease furosemide 40 mg QD  and increase spironolactone to100 mg BID, monitor potassium level and renal function in 1-2 weeks  Will send for therapeutic paracentesis today and as needed   Continue with HCC surveillance: US 4/9/2022: shows no liver lesions  Continue lactulose 15 mL twice a day, adjust for 2-3 soft bowel movements per day  Esophageal varices surveillance:  Last EGD 12/2021 with 3 columns of G grade 1 esophagealV varices, questionable gastric polyp versus GV, next  EGD due 12/2022     His comorbidities include hypertension, coronary artery disease with stent in place 15 yrs ago, long history of diabetes mellitus, hyperlipidemia,  fraility with 2 falls in the last month says balance issues happen when his ascites is worse..     With previous episode of encephalopathy he may be at high risk for recurrence with TIPS, not sure with his age and not so high MELD he will be liver a TXP candidate. He seems to ambulate without difficulty  in the clinic. Patient  is interested in TXP, no living donor . Will wait for repeat labs today and watch with diuretics.     RTC - 2 weeks     I have reviewed existing labs, imaging. Educated patient and family about disease process, prognosis. Ordered required labs, images, procedures and discussed treatment plan.       Litzy Goodman MD  Transplant Hepatologist  Dept of Hepatology, [facility]

## 2022-08-18 NOTE — PROGRESS NOTES
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  He was hospitalized on May 31st for ascites, underwent paracentesis, reportedly also had episodes of hepatic encephalopathy.  Then he had incarcerated hernia repaired at [facility] and discharged from the hospital on June 18th.  Previously he had episodes of GI bleed, underwent an upper endoscopy with banding of esophageal varices, taken off of Plavix in 2019, since then no episodes of bleed per patient.  He was referred to us not liver Clinic for management of cirrhosis of liver          Interval history: 8/18/22  He was asked to come to clinic ASAP as he has been getting  paracentesis every week, says he gets breathless, stays on the bed for 2-3 days, feels fatigue. Has been removing 5-7 L every time.  He was asked to come 2 weeks after last appointment, however says he didn't have an appointment and didn't know about it. He had SBP in the past with encephalopathy that was improved with lactulose, now he is taking  lactulose almost daily.     Review of Systems   Constitutional: Positive for fatigue. Negative for fever and unexpected weight change.   Respiratory: Positive for shortness of breath.    Gastrointestinal: Positive for abdominal distention. Negative for abdominal pain, blood in stool, nausea and vomiting.   Musculoskeletal: Positive for arthralgias. Negative for gait problem.   Skin: Negative for pallor and rash.   Neurological: Negative for dizziness.   Hematological: Does not bruise/bleed easily.   Psychiatric/Behavioral: Negative for confusion, hallucinations and sleep disturbance.       Objective:     Physical Exam  Vitals and nursing note reviewed.   Constitutional:       Appearance: Normal appearance.   Eyes:      General: No scleral icterus.  Cardiovascular:      Heart sounds: No murmur  heard.  Pulmonary:      Effort: Pulmonary effort is normal.      Breath sounds: No wheezing, rhonchi or rales.   Abdominal:      General: Bowel sounds are normal. There is distension.      Palpations: There is no mass.      Tenderness: There is no abdominal tenderness.   Musculoskeletal:         General: No tenderness.      Right lower leg: No edema.      Left lower leg: No edema.      Comments: LE edema improved. He is easily able to get up and walk.     Lymphadenopathy:      Cervical: No cervical adenopathy.   Skin:     Coloration: Skin is not jaundiced.      Findings: No bruising or rash.   Neurological:      Mental Status: He is alert and oriented to person, place, and time.      Coordination: Coordination normal.   Psychiatric:         Behavior: Behavior normal.         Thought Content: Thought content normal.         MELD-Na score: 18 at 8/10/2022  2:18 PM  MELD score: 15 at 8/10/2022  2:18 PM  Calculated from:  Serum Creatinine: 1.8 mg/dL at 8/10/2022  2:18 PM  Serum Sodium: 133 mmol/L at 8/10/2022  2:18 PM  Total Bilirubin: 0.7 mg/dL (Using min of 1 mg/dL) at 8/10/2022  2:18 PM  INR(ratio): 1.3 at 8/10/2022  2:18 PM  Age: 76 years    WBC   Date Value Ref Range Status   08/10/2022 5.81 3.90 - 12.70 K/uL Final     Hemoglobin   Date Value Ref Range Status   08/10/2022 12.0 (L) 14.0 - 18.0 g/dL Final     Hematocrit   Date Value Ref Range Status   08/10/2022 36.6 (L) 40.0 - 54.0 % Final     Platelets   Date Value Ref Range Status   08/10/2022 155 150 - 450 K/uL Final     BUN   Date Value Ref Range Status   08/10/2022 35 (H) 8 - 23 mg/dL Final     Creatinine   Date Value Ref Range Status   08/10/2022 1.8 (H) 0.5 - 1.4 mg/dL Final     Glucose   Date Value Ref Range Status   08/10/2022 230 (H) 70 - 110 mg/dL Final     Calcium   Date Value Ref Range Status   08/10/2022 8.8 8.7 - 10.5 mg/dL Final     Sodium   Date Value Ref Range Status   08/10/2022 133 (L) 136 - 145 mmol/L Final     Potassium   Date Value Ref Range  Status   08/10/2022 4.6 3.5 - 5.1 mmol/L Final     Chloride   Date Value Ref Range Status   08/10/2022 100 95 - 110 mmol/L Final     AST   Date Value Ref Range Status   08/10/2022 38 10 - 40 U/L Final     ALT   Date Value Ref Range Status   08/10/2022 28 10 - 44 U/L Final     Alkaline Phosphatase   Date Value Ref Range Status   08/10/2022 174 (H) 55 - 135 U/L Final     Total Bilirubin   Date Value Ref Range Status   08/10/2022 0.7 0.1 - 1.0 mg/dL Final     Comment:     For infants and newborns, interpretation of results should be based  on gestational age, weight and in agreement with clinical  observations.    Premature Infant recommended reference ranges:  Up to 24 hours.............<8.0 mg/dL  Up to 48 hours............<12.0 mg/dL  3-5 days..................<15.0 mg/dL  6-29 days.................<15.0 mg/dL       Albumin   Date Value Ref Range Status   08/10/2022 2.6 (L) 3.5 - 5.2 g/dL Final     INR   Date Value Ref Range Status   08/10/2022 1.3 (H) 0.8 - 1.2 Final     Comment:     Coumadin Therapy:  2.0 - 3.0 for INR for all indicators except mechanical heart valves  and antiphospholipid syndromes which should use 2.5 - 3.5.           Assessment/Plan:     1.Cirrhosis:  Decompensated by ascites, hepatic encephalopathy  Last MELD - 20  Creatinine  increased to 1.8, will decrease furosemide 40 mg QD  and increase spironolactone to100 mg BID, monitor potassium level and renal function in 1-2 weeks  Will send for therapeutic paracentesis today and as needed   Continue with HCC surveillance: US 4/9/2022: shows no liver lesions  Continue lactulose 15 mL twice a day, adjust for 2-3 soft bowel movements per day  Esophageal varices surveillance:  Last EGD 12/2021 with 3 columns of G grade 1 esophagealV varices, questionable gastric polyp versus GV, next  EGD due 12/2022     His comorbidities include hypertension, coronary artery disease with stent in place 15 yrs ago, long history of diabetes mellitus, hyperlipidemia,  fraility with 2 falls in the last month says balance issues happen when his ascites is worse..     With previous episode of encephalopathy he may be at high risk for recurrence with TIPS, not sure with his age and not so high MELD he will be liver a TXP candidate. He seems to ambulate without difficulty  in the clinic. Patient  is interested in TXP, no living donor . Will wait for repeat labs today and watch with diuretics.     RTC - 2 weeks     I have reviewed existing labs, imaging. Educated patient and family about disease process, prognosis. Ordered required labs, images, procedures and discussed treatment plan.       Litzy Goodman MD  Transplant Hepatologist  Dept of Hepatology, [place] [facility]

## 2022-08-19 ENCOUNTER — HOSPITAL ENCOUNTER (OUTPATIENT)
Dept: RADIOLOGY | Facility: HOSPITAL | Age: 77
Discharge: HOME | End: 2022-08-19
Attending: INTERNAL MEDICINE
Payer: MEDICARE

## 2022-08-19 VITALS
BODY MASS INDEX: 24.92 KG/M2 | HEIGHT: 72 IN | RESPIRATION RATE: 16 BRPM | SYSTOLIC BLOOD PRESSURE: 118 MMHG | DIASTOLIC BLOOD PRESSURE: 62 MMHG | HEART RATE: 77 BPM | OXYGEN SATURATION: 100 % | WEIGHT: 184 LBS

## 2022-08-19 DIAGNOSIS — K75.81 LIVER CIRRHOSIS SECONDARY TO NASH: ICD-10-CM

## 2022-08-19 DIAGNOSIS — K74.60 LIVER CIRRHOSIS SECONDARY TO NASH: ICD-10-CM

## 2022-08-19 LAB
APPEARANCE FLD: CLEAR
BODY FLD TYPE: NORMAL
COLOR FLD: YELLOW
LYMPHOCYTES NFR FLD MANUAL: 17 %
MONOS+MACROS NFR FLD MANUAL: 74 %
NEUTROPHILS NFR FLD MANUAL: 9 %
WBC # FLD: 102 /CU MM

## 2022-08-19 PROCEDURE — P9047 ALBUMIN (HUMAN), 25%, 50ML: HCPCS | Mod: JG | Performed by: INTERNAL MEDICINE

## 2022-08-19 PROCEDURE — 87075 CULTR BACTERIA EXCEPT BLOOD: CPT | Performed by: INTERNAL MEDICINE

## 2022-08-19 PROCEDURE — 49083 ABD PARACENTESIS W/IMAGING: CPT

## 2022-08-19 PROCEDURE — 89051 BODY FLUID CELL COUNT: CPT | Performed by: INTERNAL MEDICINE

## 2022-08-19 PROCEDURE — 87070 CULTURE OTHR SPECIMN AEROBIC: CPT | Performed by: INTERNAL MEDICINE

## 2022-08-19 PROCEDURE — 63600175 PHARM REV CODE 636 W HCPCS: Mod: JG | Performed by: INTERNAL MEDICINE

## 2022-08-19 PROCEDURE — 87205 SMEAR GRAM STAIN: CPT | Performed by: INTERNAL MEDICINE

## 2022-08-19 RX ORDER — ALBUMIN HUMAN 250 G/1000ML
50 SOLUTION INTRAVENOUS ONCE
Status: COMPLETED | OUTPATIENT
Start: 2022-08-19 | End: 2022-08-19

## 2022-08-19 RX ADMIN — ALBUMIN (HUMAN) 50 G: 25 SOLUTION INTRAVENOUS at 04:08

## 2022-08-19 NOTE — PLAN OF CARE
_8 liters__ of suellen drainage removed from abdomen. Patient AAOx4, denies any pain or discomfort related to paracentesis. Catheter removed from _right__ abdominal wall without difficulty, tip intact. Bandage applied, CDI. Vital signs WNL.

## 2022-08-22 ENCOUNTER — TELEPHONE (OUTPATIENT)
Dept: ORTHOPEDICS | Facility: CLINIC | Age: 77
End: 2022-08-22
Payer: MEDICARE

## 2022-08-22 LAB — PATH INTERP FLD-IMP: NORMAL

## 2022-08-22 NOTE — TELEPHONE ENCOUNTER
Left voicemail to patient letting him know that his appointment on 8/24 at 11:20am has been moved to 11:00am due to Dr. Roger's schedule changing this day.

## 2022-08-23 DIAGNOSIS — K75.81 LIVER CIRRHOSIS SECONDARY TO NASH: Primary | ICD-10-CM

## 2022-08-23 DIAGNOSIS — K74.60 LIVER CIRRHOSIS SECONDARY TO NASH: Primary | ICD-10-CM

## 2022-08-24 ENCOUNTER — HOSPITAL ENCOUNTER (OUTPATIENT)
Dept: RADIOLOGY | Facility: HOSPITAL | Age: 77
Discharge: HOME | End: 2022-08-24
Attending: STUDENT IN AN ORGANIZED HEALTH CARE EDUCATION/TRAINING PROGRAM
Payer: MEDICARE

## 2022-08-24 ENCOUNTER — OFFICE VISIT (OUTPATIENT)
Dept: ORTHOPEDICS | Facility: CLINIC | Age: 77
End: 2022-08-24
Payer: MEDICARE

## 2022-08-24 VITALS — BODY MASS INDEX: 24.92 KG/M2 | WEIGHT: 184 LBS | HEIGHT: 72 IN

## 2022-08-24 DIAGNOSIS — S42.411D CLOSED DISPLACED SIMPLE SUPRACONDYLAR FRACTURE OF RIGHT HUMERUS WITHOUT INTERCONDYLAR FRACTURE WITH ROUTINE HEALING, SUBSEQUENT ENCOUNTER: Primary | ICD-10-CM

## 2022-08-24 DIAGNOSIS — S42.411D CLOSED DISPLACED SIMPLE SUPRACONDYLAR FRACTURE OF RIGHT HUMERUS WITHOUT INTERCONDYLAR FRACTURE WITH ROUTINE HEALING, SUBSEQUENT ENCOUNTER: ICD-10-CM

## 2022-08-24 LAB
BACTERIA FLD AEROBE CULT: NO GROWTH
GRAM STN SPEC: NORMAL
GRAM STN SPEC: NORMAL

## 2022-08-24 PROCEDURE — 99213 OFFICE O/P EST LOW 20 MIN: CPT | Mod: PBBFAC,PO | Performed by: STUDENT IN AN ORGANIZED HEALTH CARE EDUCATION/TRAINING PROGRAM

## 2022-08-24 PROCEDURE — 99999 PR PBB SHADOW E&M-EST. PATIENT-LVL III: CPT | Mod: PBBFAC,,, | Performed by: STUDENT IN AN ORGANIZED HEALTH CARE EDUCATION/TRAINING PROGRAM

## 2022-08-24 PROCEDURE — 99999 PR PBB SHADOW E&M-EST. PATIENT-LVL III: ICD-10-PCS | Mod: PBBFAC,,, | Performed by: STUDENT IN AN ORGANIZED HEALTH CARE EDUCATION/TRAINING PROGRAM

## 2022-08-24 PROCEDURE — 99213 PR OFFICE/OUTPT VISIT, EST, LEVL III, 20-29 MIN: ICD-10-PCS | Mod: S$PBB,,, | Performed by: STUDENT IN AN ORGANIZED HEALTH CARE EDUCATION/TRAINING PROGRAM

## 2022-08-24 PROCEDURE — 73080 XR ELBOW COMPLETE 3 VIEW RIGHT: ICD-10-PCS | Mod: 26,RT,, | Performed by: RADIOLOGY

## 2022-08-24 PROCEDURE — 99213 OFFICE O/P EST LOW 20 MIN: CPT | Mod: S$PBB,,, | Performed by: STUDENT IN AN ORGANIZED HEALTH CARE EDUCATION/TRAINING PROGRAM

## 2022-08-24 PROCEDURE — 73080 X-RAY EXAM OF ELBOW: CPT | Mod: TC,RT

## 2022-08-24 PROCEDURE — 73080 X-RAY EXAM OF ELBOW: CPT | Mod: 26,RT,, | Performed by: RADIOLOGY
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
## 2022-08-26 ENCOUNTER — HOSPITAL ENCOUNTER (OUTPATIENT)
Dept: RADIOLOGY | Facility: HOSPITAL | Age: 77
Discharge: HOME | End: 2022-08-26
Attending: INTERNAL MEDICINE
Payer: MEDICARE

## 2022-08-26 VITALS
HEART RATE: 88 BPM | BODY MASS INDEX: 24.92 KG/M2 | SYSTOLIC BLOOD PRESSURE: 112 MMHG | WEIGHT: 184 LBS | HEIGHT: 72 IN | RESPIRATION RATE: 18 BRPM | OXYGEN SATURATION: 100 % | DIASTOLIC BLOOD PRESSURE: 56 MMHG

## 2022-08-26 DIAGNOSIS — K75.81 LIVER CIRRHOSIS SECONDARY TO NASH: ICD-10-CM

## 2022-08-26 DIAGNOSIS — K74.60 LIVER CIRRHOSIS SECONDARY TO NASH: ICD-10-CM

## 2022-08-26 PROCEDURE — 63600175 PHARM REV CODE 636 W HCPCS: Mod: JG | Performed by: INTERNAL MEDICINE

## 2022-08-26 PROCEDURE — 49083 ABD PARACENTESIS W/IMAGING: CPT

## 2022-08-26 PROCEDURE — P9047 ALBUMIN (HUMAN), 25%, 50ML: HCPCS | Mod: JG | Performed by: INTERNAL MEDICINE

## 2022-08-26 RX ORDER — ALBUMIN HUMAN 250 G/1000ML
50 SOLUTION INTRAVENOUS ONCE
Status: COMPLETED | OUTPATIENT
Start: 2022-08-26 | End: 2022-08-26

## 2022-08-26 RX ADMIN — ALBUMIN (HUMAN) 50 G: 25 SOLUTION INTRAVENOUS at 02:08

## 2022-08-26 NOTE — DISCHARGE SUMMARY
Pre Op Diagnosis: ascites     Post Op Diagnosis: same     Procedure:  paracentesis     Procedure performed by: Maryjane PUCKETT, [place] LOFTON     Written Informed Consent Obtained: Yes     Specimen Removed:  yes     Estimated Blood Loss:  minimal     Findings: Local anesthesia.     The patient tolerated the procedure well and there were no complications.      Sterile technique was performed in the RLQ, lidocaine was used as a local anesthetic.   8 liters of suellen fluid removed for therapeutic purposes.  Pt tolerated the procedure well without immediate complications.  Please see radiologist report for details. F/u with PCP and/or ordering physician.

## 2022-08-26 NOTE — PLAN OF CARE
1517    Discharged to home.  VSS.  Ambulated without difficulty to main [facility] accompanied by RN.  Verbal ds inst reviewed with patient as he declined paper copies of dc  inst.  8L fluid removal.  IV dc'd with cath tip intact.  Paracentesis catheter removed with cath tip intact.  Pressure held to site.  Dsg applied

## 2022-08-29 LAB — BACTERIA SPEC ANAEROBE CULT: NORMAL

## 2022-08-30 NOTE — PROGRESS NOTES
"      OCCUPATIONAL THERAPY DAILY NOTE    Name: Korin Vivas  Clinic Number: [id number]    Therapy Diagnosis:   Encounter Diagnoses   Name Primary?    Closed displaced simple supracondylar fracture of right humerus without intercondylar fracture with routine healing, subsequent encounter     Decreased range of motion Yes    Pain     Self-care deficit     Decreased strength     Swelling      Physician: Gabo Roger    Visit Date: 8/31/2022    Physician Orders: New occupational therapy orders received from MD on 8/24/2022 for continuation of services   Medical Diagnosis: S42.411D (ICD-10-CM) - Closed displaced simple supracondylar fracture of right humerus without intercondylar fracture with routine healing  Evaluation Date: 6/29/2022  Insurance Authorization Period Expiration: 6/22/2022 - 6/22/2023  Plan of Care Certification Period: 8/1/2022 TO 9/1/2022 (POC to be updated next tx session)  Progress Note Due: 9/1/2022     Surgical Procedure and Date: NA  Date of Return to MD: PRN    Visit # / Visits authorized: 8/20  FOTO: 1/3     Precautions: standard     Time In: 1300  Time Out: 1345  Total Appointment Time (timed & untimed codes): 45 minutes    SUBJECTIVE     Today, pt reports: "I can use my elbow freely. I can't reach my face or head yet."    He was compliant with home exercise program.  Response to previous treatment: Tolerated well  Functional change: Improved range    Pre-Treatment Pain: 0/10  Post-Treatment Pain: 0/10  Location: right elbow    OBJECTIVE:  Objective measures will be updated at progress note unless otherwise stated.    TREATMENT       Korin received therapeutic exercises to develop ROM and flexibility for 40 minutes including:    Exercise Completed today    Supine shoulder flexion with dowel   30x   Sitting elbow flexion/extension with dowel  30x   Forearm rotation with 1# pronator wand x 20x   Passive self-stretching of elbow into flexion (with simultaneous moist heat pack) x 5 min "   Passive stretching (by therapist) of elbow into flexion and extension x 30 sec each, 3 rounds   Scapular protraction/retraction in side lying  30x   Active shoulder flexion and abduction in sidelying  30x   Patient self stretches of forearm supination with towel  4/15 second holds     Gravity assisted elbow flexion stretch  10/10 second holds   PULLEY EXERCISES    3 minutes each for stretching  Shoulder flexion  Shoulder abduction  3 minutes each   Elbow flexion/extension active range of motion with 1# DB (3 ways: forearm pronated, neutral, and supinated) x 15x each   Wrist wheel for supination and pronation  20x   Wrist flexor and extensor stretch x 30 sec each   Wrist active range of motion with 2# DB over bolster (3 ways: forearm pronated, neutral, and supinated) x 15x each   Elbow flex stretch (3 ways: forearm pronated, neutral, and supinated) x 30 sec each       Korin received the following manual therapy techniques for 5 minutes including:  Soft tissue mobilization to posterior elbow to increase circulation and tissue extensibility  STM of right forearm and humerus muscles and shoulder girdle (not today)  IASTM of the posterior elbow (not today)    Korin received the following supervised modalities after being cleared for contradictions:    Korin received hot pack for 5 minutes to elbow (during simultaneous passive self-stretching into flexion)    Korin received cold pack for 0 minutes.      Home Exercises Provided and Patient Education Provided     Education/Self-Care provided:   - progress toward goals    Written Home Exercises Provided: Pt instructed to continue previously-issued home exercise program   Exercises were reviewed and Korin was able to demonstrate them prior to the end of the session.  Korin demonstrated good  understanding of the education provided.     See EMR under Media for exercises provided on 7/1/2022.    ASSESSMENT     New/continuation orders were received from MD but pt was  mis-scheduled at [facility] location instead of at [facility] where he was currently doing his therapy. Pt seen for treatment session on this date, but plan of care to be updated by primary therapist when pt returns to [facility]. Pain limited his tolerance for passive stretching and manual techniques. Hard-end feeling felt with passive flexion stretching of elbow. However, he tolerated strengthening exercises well.    Korin Is progressing well towards his goals.   Pt prognosis is Good.     Pt will continue to benefit from skilled outpatient occupational therapy to address the deficits listed in the problem list box on initial evaluation, provide pt/family education and to maximize pt's level of independence in the home and community environment.     Pt's spiritual, cultural and educational needs considered and pt agreeable to plan of care and goals.     Anticipated barriers to occupational therapy: previous injury to his elbow with limited    GOALS:     Short Term Goals:  4 weeks                                             updated  8/1/2022   Pain: Pt will demonstrate improved pain by reports of less than or equal to 5/10 worst pain on the verbal rating scale in order to progress toward maximal functional ability and improve QOL.  Met 7/6/2022   Mobility: Patient will improve AROM to 50% of stated goals, listed in objective measures above, in order to progress towards independence with functional activities.   progressing forearm and shoulder   Strength: Patient will improve strength to 50% of stated goals, listed in objective measures above, in order to progress towards independence with functional activities.   ongoing   Self Care: Patient will demonstrate improved self care skills listed in objective measure above,in order to progress towards independence with functional activities.   8/1/2022 met   HEP: Patient will demonstrate independence with HEP in order to progress toward functional independence.  ongoing       Long Term Goals:  8 weeks                                                updated  8/1/2022   Pain: Pt will demonstrate improved pain by reports of less than or equal to 1/10 worst pain on the verbal rating scale in order to progress toward maximal functional ability and improve QOL.       Function: Patient will demonstrate improved function as indicated by a functional limitation score of less than or equal to 37 out of 100 on FOTO.     Mobility: Patient will improve AROM to stated goals, listed in objective measures above, in order to return to maximal functional potential and improve quality of life.     Strength: Patient will improve strength to stated goals, listed in objective measures above, in order to improve functional independence and quality of life.     Self Care: Patient will demonstrate increased self care skills to an independent level or modified independent level with adaptive equipment as needed.  progressing   Patient will return to normal ADL's, IADL's, community involvement, recreational activities, and work-related activities with less than or equal to 0/10 pain and maximal function.                  PLAN   Continue Plan of Care (POC) and progress per patient tolerance.    ENRIQUE BARRIGA OT, TONY

## 2022-08-31 ENCOUNTER — CLINICAL SUPPORT (OUTPATIENT)
Dept: REHABILITATION | Facility: HOSPITAL | Age: 77
End: 2022-08-31
Attending: STUDENT IN AN ORGANIZED HEALTH CARE EDUCATION/TRAINING PROGRAM
Payer: MEDICARE

## 2022-08-31 DIAGNOSIS — Z78.9 SELF-CARE DEFICIT: ICD-10-CM

## 2022-08-31 DIAGNOSIS — M25.60 DECREASED RANGE OF MOTION: Primary | ICD-10-CM

## 2022-08-31 DIAGNOSIS — R52 PAIN: ICD-10-CM

## 2022-08-31 DIAGNOSIS — R60.9 SWELLING: ICD-10-CM

## 2022-08-31 DIAGNOSIS — S42.411D CLOSED DISPLACED SIMPLE SUPRACONDYLAR FRACTURE OF RIGHT HUMERUS WITHOUT INTERCONDYLAR FRACTURE WITH ROUTINE HEALING, SUBSEQUENT ENCOUNTER: ICD-10-CM

## 2022-08-31 DIAGNOSIS — R53.1 DECREASED STRENGTH: ICD-10-CM

## 2022-08-31 PROCEDURE — 97110 THERAPEUTIC EXERCISES: CPT

## 2022-09-01 ENCOUNTER — OFFICE VISIT (OUTPATIENT)
Dept: HEPATOLOGY | Facility: CLINIC | Age: 77
End: 2022-09-01
Payer: MEDICARE

## 2022-09-01 VITALS
HEART RATE: 90 BPM | DIASTOLIC BLOOD PRESSURE: 60 MMHG | HEIGHT: 72 IN | BODY MASS INDEX: 23.77 KG/M2 | SYSTOLIC BLOOD PRESSURE: 114 MMHG | WEIGHT: 175.5 LBS

## 2022-09-01 DIAGNOSIS — K74.60 LIVER CIRRHOSIS SECONDARY TO NASH: Primary | ICD-10-CM

## 2022-09-01 DIAGNOSIS — K75.81 LIVER CIRRHOSIS SECONDARY TO NASH: Primary | ICD-10-CM

## 2022-09-01 PROCEDURE — 99214 OFFICE O/P EST MOD 30 MIN: CPT | Mod: PBBFAC | Performed by: INTERNAL MEDICINE

## 2022-09-01 PROCEDURE — 99999 PR PBB SHADOW E&M-EST. PATIENT-LVL IV: ICD-10-PCS | Mod: PBBFAC,,, | Performed by: INTERNAL MEDICINE

## 2022-09-01 PROCEDURE — 99214 OFFICE O/P EST MOD 30 MIN: CPT | Mod: S$PBB,,, | Performed by: INTERNAL MEDICINE

## 2022-09-01 PROCEDURE — 99999 PR PBB SHADOW E&M-EST. PATIENT-LVL IV: CPT | Mod: PBBFAC,,, | Performed by: INTERNAL MEDICINE

## 2022-09-01 PROCEDURE — 99214 PR OFFICE/OUTPT VISIT, EST, LEVL IV, 30-39 MIN: ICD-10-PCS | Mod: S$PBB,,, | Performed by: INTERNAL MEDICINE

## 2022-09-01 RX ORDER — HYDROXYZINE HYDROCHLORIDE 25 MG/1
25 TABLET, FILM COATED ORAL 3 TIMES DAILY PRN
Qty: 30 TABLET | Refills: 1 | Status: SHIPPED | OUTPATIENT
Start: 2022-09-01 | End: 2022-10-13 | Stop reason: SDUPTHER

## 2022-09-01 NOTE — H&P (VIEW-ONLY)
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  He was hospitalized on May 31st for ascites, underwent paracentesis, reportedly also had episodes of hepatic encephalopathy.  Then he had incarcerated hernia repaired at [facility] and discharged from the hospital on June 18th.  Previously he had episodes of GI bleed, underwent an upper endoscopy with banding of esophageal varices, taken off of Plavix in 2019, since then no episodes of bleed per patient.  He was referred to us not liver Clinic for management of cirrhosis of liver          Interval history: 8/18/22  He was asked to come to clinic ASAP as he has been getting  paracentesis every week, says he gets breathless, stays on the bed for 2-3 days, feels fatigue. Has been removing 5-7 L every time.  He was asked to come 2 weeks after last appointment, however says he didn't have an appointment and didn't know about it. He had SBP in the past with encephalopathy that was improved with lactulose, now he is taking  lactulose almost daily.     9/1/22  Last paracentesis was on 08/18/2022.  He returned to the clinic with his wife to discuss the possibility of liver transplantation with a living donor as boyfriend of his daughter is willing to donate     Review of Systems   Constitutional:  Positive for fatigue. Negative for fever and unexpected weight change.   Respiratory:  Positive for shortness of breath.    Gastrointestinal:  Positive for abdominal distention. Negative for abdominal pain, blood in stool, nausea and vomiting.   Musculoskeletal:  Positive for arthralgias. Negative for gait problem.   Skin:  Negative for pallor and rash.   Neurological:  Negative for dizziness.   Hematological:  Does not bruise/bleed easily.   Psychiatric/Behavioral:  Negative for confusion, hallucinations and sleep disturbance.       Objective:     Physical Exam  Vitals and nursing note reviewed.   Constitutional:       Appearance: Normal appearance.   Eyes:      General: No scleral icterus.  Cardiovascular:      Heart sounds: No murmur heard.  Pulmonary:      Effort: Pulmonary effort is normal.      Breath sounds: No wheezing, rhonchi or rales.   Abdominal:      General: Bowel sounds are normal. There is distension.      Palpations: There is no mass.      Tenderness: There is no abdominal tenderness.   Musculoskeletal:         General: No tenderness.      Right lower leg: No edema.      Left lower leg: No edema.      Comments: LE edema improved. He is easily able to get up and walk.     Lymphadenopathy:      Cervical: No cervical adenopathy.   Skin:     Coloration: Skin is not jaundiced.      Findings: No bruising or rash.   Neurological:      Mental Status: He is alert and oriented to person, place, and time.      Coordination: Coordination normal.   Psychiatric:         Behavior: Behavior normal.         Thought Content: Thought content normal.       MELD-Na score: 17 at 8/18/2022 11:48 AM  MELD score: 15 at 8/18/2022 11:48 AM  Calculated from:  Serum Creatinine: 1.8 mg/dL at 8/18/2022 11:48 AM  Serum Sodium: 134 mmol/L at 8/18/2022 11:48 AM  Total Bilirubin: 0.7 mg/dL (Using min of 1 mg/dL) at 8/18/2022 11:48 AM  INR(ratio): 1.3 at 8/18/2022 11:48 AM  Age: 76 years    WBC   Date Value Ref Range Status   08/18/2022 6.90 3.90 - 12.70 K/uL Final     Hemoglobin   Date Value Ref Range Status   08/18/2022 12.2 (L) 14.0 - 18.0 g/dL Final     Hematocrit   Date Value Ref Range Status   08/18/2022 37.9 (L) 40.0 - 54.0 % Final     Platelets   Date Value Ref Range Status   08/18/2022 191 150 - 450 K/uL Final     BUN   Date Value Ref Range Status   08/18/2022 37 (H) 8 - 23 mg/dL Final     Creatinine   Date Value Ref Range Status   08/18/2022 1.8 (H) 0.5 - 1.4 mg/dL Final     Glucose   Date Value Ref Range Status   08/18/2022 189 (H) 70 - 110 mg/dL  Final     Calcium   Date Value Ref Range Status   08/18/2022 9.1 8.7 - 10.5 mg/dL Final     Sodium   Date Value Ref Range Status   08/18/2022 134 (L) 136 - 145 mmol/L Final     Potassium   Date Value Ref Range Status   08/18/2022 4.7 3.5 - 5.1 mmol/L Final     Chloride   Date Value Ref Range Status   08/18/2022 100 95 - 110 mmol/L Final     AST   Date Value Ref Range Status   08/18/2022 61 (H) 10 - 40 U/L Final     ALT   Date Value Ref Range Status   08/18/2022 36 10 - 44 U/L Final     Alkaline Phosphatase   Date Value Ref Range Status   08/18/2022 171 (H) 55 - 135 U/L Final     Total Bilirubin   Date Value Ref Range Status   08/18/2022 0.7 0.1 - 1.0 mg/dL Final     Comment:     For infants and newborns, interpretation of results should be based  on gestational age, weight and in agreement with clinical  observations.    Premature Infant recommended reference ranges:  Up to 24 hours.............<8.0 mg/dL  Up to 48 hours............<12.0 mg/dL  3-5 days..................<15.0 mg/dL  6-29 days.................<15.0 mg/dL       Albumin   Date Value Ref Range Status   08/18/2022 2.8 (L) 3.5 - 5.2 g/dL Final     INR   Date Value Ref Range Status   08/18/2022 1.3 (H) 0.8 - 1.2 Final     Comment:     Coumadin Therapy:  2.0 - 3.0 for INR for all indicators except mechanical heart valves  and antiphospholipid syndromes which should use 2.5 - 3.5.           Assessment/Plan:     1.Cirrhosis:  Decompensated by ascites, hepatic encephalopathy  Last MELD - 20  Creatinine  increased to 1.8, will decrease furosemide 40 mg QD  and increase spironolactone to100 mg BID, monitor potassium level and renal function in 1-2 weeks  Therapeutic paracentesis Q2 weeks as needed   Continue with HCC surveillance: US 4/9/2022: shows no liver lesions  Continue lactulose 15 mL twice a day, adjust for 2-3 soft bowel movements per day  Esophageal varices surveillance:  Last EGD 12/2021 with 3 columns of G grade 1 esophagea lV varices, questionable  gastric polyp versus GV, next  EGD due 12/2022     His comorbidities include hypertension, coronary artery disease with stent in place 15 yrs ago, long history of diabetes mellitus, hyperlipidemia, fraility with falls says balance issues happen when his ascites is worse otherwise he ambulates well.    With previous episode of encephalopathy he is at high risk for recurrence with TIPS, not sure with his age and not so high MELD he will be liver a TXP candidate. He seems to ambulate without difficulty  in the clinic. Patient  is interested in TXP, now has a potential living donor. Per patients wishes will present to committee discussion.     RTC - 3 months     I have reviewed existing labs, imaging. Educated patient and family about disease process, prognosis. Ordered required labs, images, procedures and discussed treatment plan.       Litzy Goodman MD  Transplant Hepatologist  Dept of Hepatology, [facility]

## 2022-09-01 NOTE — H&P (VIEW-ONLY)
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  He was hospitalized on May 31st for ascites, underwent paracentesis, reportedly also had episodes of hepatic encephalopathy.  Then he had incarcerated hernia repaired at [facility] and discharged from the hospital on June 18th.  Previously he had episodes of GI bleed, underwent an upper endoscopy with banding of esophageal varices, taken off of Plavix in 2019, since then no episodes of bleed per patient.  He was referred to us not liver Clinic for management of cirrhosis of liver          Interval history: 8/18/22  He was asked to come to clinic ASAP as he has been getting  paracentesis every week, says he gets breathless, stays on the bed for 2-3 days, feels fatigue. Has been removing 5-7 L every time.  He was asked to come 2 weeks after last appointment, however says he didn't have an appointment and didn't know about it. He had SBP in the past with encephalopathy that was improved with lactulose, now he is taking  lactulose almost daily.     9/1/22  Last paracentesis was on 08/18/2022.  He returned to the clinic with his wife to discuss the possibility of liver transplantation with a living donor as boyfriend of his daughter is willing to donate     Review of Systems   Constitutional:  Positive for fatigue. Negative for fever and unexpected weight change.   Respiratory:  Positive for shortness of breath.    Gastrointestinal:  Positive for abdominal distention. Negative for abdominal pain, blood in stool, nausea and vomiting.   Musculoskeletal:  Positive for arthralgias. Negative for gait problem.   Skin:  Negative for pallor and rash.   Neurological:  Negative for dizziness.   Hematological:  Does not bruise/bleed easily.   Psychiatric/Behavioral:  Negative for confusion, hallucinations and sleep disturbance.       Objective:     Physical Exam  Vitals and nursing note reviewed.   Constitutional:       Appearance: Normal appearance.   Eyes:      General: No scleral icterus.  Cardiovascular:      Heart sounds: No murmur heard.  Pulmonary:      Effort: Pulmonary effort is normal.      Breath sounds: No wheezing, rhonchi or rales.   Abdominal:      General: Bowel sounds are normal. There is distension.      Palpations: There is no mass.      Tenderness: There is no abdominal tenderness.   Musculoskeletal:         General: No tenderness.      Right lower leg: No edema.      Left lower leg: No edema.      Comments: LE edema improved. He is easily able to get up and walk.     Lymphadenopathy:      Cervical: No cervical adenopathy.   Skin:     Coloration: Skin is not jaundiced.      Findings: No bruising or rash.   Neurological:      Mental Status: He is alert and oriented to person, place, and time.      Coordination: Coordination normal.   Psychiatric:         Behavior: Behavior normal.         Thought Content: Thought content normal.       MELD-Na score: 17 at 8/18/2022 11:48 AM  MELD score: 15 at 8/18/2022 11:48 AM  Calculated from:  Serum Creatinine: 1.8 mg/dL at 8/18/2022 11:48 AM  Serum Sodium: 134 mmol/L at 8/18/2022 11:48 AM  Total Bilirubin: 0.7 mg/dL (Using min of 1 mg/dL) at 8/18/2022 11:48 AM  INR(ratio): 1.3 at 8/18/2022 11:48 AM  Age: 76 years    WBC   Date Value Ref Range Status   08/18/2022 6.90 3.90 - 12.70 K/uL Final     Hemoglobin   Date Value Ref Range Status   08/18/2022 12.2 (L) 14.0 - 18.0 g/dL Final     Hematocrit   Date Value Ref Range Status   08/18/2022 37.9 (L) 40.0 - 54.0 % Final     Platelets   Date Value Ref Range Status   08/18/2022 191 150 - 450 K/uL Final     BUN   Date Value Ref Range Status   08/18/2022 37 (H) 8 - 23 mg/dL Final     Creatinine   Date Value Ref Range Status   08/18/2022 1.8 (H) 0.5 - 1.4 mg/dL Final     Glucose   Date Value Ref Range Status   08/18/2022 189 (H) 70 - 110 mg/dL  Final     Calcium   Date Value Ref Range Status   08/18/2022 9.1 8.7 - 10.5 mg/dL Final     Sodium   Date Value Ref Range Status   08/18/2022 134 (L) 136 - 145 mmol/L Final     Potassium   Date Value Ref Range Status   08/18/2022 4.7 3.5 - 5.1 mmol/L Final     Chloride   Date Value Ref Range Status   08/18/2022 100 95 - 110 mmol/L Final     AST   Date Value Ref Range Status   08/18/2022 61 (H) 10 - 40 U/L Final     ALT   Date Value Ref Range Status   08/18/2022 36 10 - 44 U/L Final     Alkaline Phosphatase   Date Value Ref Range Status   08/18/2022 171 (H) 55 - 135 U/L Final     Total Bilirubin   Date Value Ref Range Status   08/18/2022 0.7 0.1 - 1.0 mg/dL Final     Comment:     For infants and newborns, interpretation of results should be based  on gestational age, weight and in agreement with clinical  observations.    Premature Infant recommended reference ranges:  Up to 24 hours.............<8.0 mg/dL  Up to 48 hours............<12.0 mg/dL  3-5 days..................<15.0 mg/dL  6-29 days.................<15.0 mg/dL       Albumin   Date Value Ref Range Status   08/18/2022 2.8 (L) 3.5 - 5.2 g/dL Final     INR   Date Value Ref Range Status   08/18/2022 1.3 (H) 0.8 - 1.2 Final     Comment:     Coumadin Therapy:  2.0 - 3.0 for INR for all indicators except mechanical heart valves  and antiphospholipid syndromes which should use 2.5 - 3.5.           Assessment/Plan:     1.Cirrhosis:  Decompensated by ascites, hepatic encephalopathy  Last MELD - 20  Creatinine  increased to 1.8, will decrease furosemide 40 mg QD  and increase spironolactone to100 mg BID, monitor potassium level and renal function in 1-2 weeks  Therapeutic paracentesis Q2 weeks as needed   Continue with HCC surveillance: US 4/9/2022: shows no liver lesions  Continue lactulose 15 mL twice a day, adjust for 2-3 soft bowel movements per day  Esophageal varices surveillance:  Last EGD 12/2021 with 3 columns of G grade 1 esophagea lV varices, questionable  gastric polyp versus GV, next  EGD due 12/2022     His comorbidities include hypertension, coronary artery disease with stent in place 15 yrs ago, long history of diabetes mellitus, hyperlipidemia, fraility with falls says balance issues happen when his ascites is worse otherwise he ambulates well.    With previous episode of encephalopathy he is at high risk for recurrence with TIPS, not sure with his age and not so high MELD he will be liver a TXP candidate. He seems to ambulate without difficulty  in the clinic. Patient  is interested in TXP, now has a potential living donor. Per patients wishes will present to committee discussion.     RTC - 3 months     I have reviewed existing labs, imaging. Educated patient and family about disease process, prognosis. Ordered required labs, images, procedures and discussed treatment plan.       Litzy Goodman MD  Transplant Hepatologist  Dept of Hepatology, [facility] [place]

## 2022-09-01 NOTE — H&P (VIEW-ONLY)
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  He was hospitalized on May 31st for ascites, underwent paracentesis, reportedly also had episodes of hepatic encephalopathy.  Then he had incarcerated hernia repaired at [facility] and discharged from the hospital on June 18th.  Previously he had episodes of GI bleed, underwent an upper endoscopy with banding of esophageal varices, taken off of Plavix in 2019, since then no episodes of bleed per patient.  He was referred to us not liver Clinic for management of cirrhosis of liver          Interval history: 8/18/22  He was asked to come to clinic ASAP as he has been getting  paracentesis every week, says he gets breathless, stays on the bed for 2-3 days, feels fatigue. Has been removing 5-7 L every time.  He was asked to come 2 weeks after last appointment, however says he didn't have an appointment and didn't know about it. He had SBP in the past with encephalopathy that was improved with lactulose, now he is taking  lactulose almost daily.     9/1/22  Last paracentesis was on 08/18/2022.  He returned to the clinic with his wife to discuss the possibility of liver transplantation with a living donor as boyfriend of his daughter is willing to donate     Review of Systems   Constitutional:  Positive for fatigue. Negative for fever and unexpected weight change.   Respiratory:  Positive for shortness of breath.    Gastrointestinal:  Positive for abdominal distention. Negative for abdominal pain, blood in stool, nausea and vomiting.   Musculoskeletal:  Positive for arthralgias. Negative for gait problem.   Skin:  Negative for pallor and rash.   Neurological:  Negative for dizziness.   Hematological:  Does not bruise/bleed easily.   Psychiatric/Behavioral:  Negative for confusion, hallucinations and sleep disturbance.       Objective:     Physical Exam  Vitals and nursing note reviewed.   Constitutional:       Appearance: Normal appearance.   Eyes:      General: No scleral icterus.  Cardiovascular:      Heart sounds: No murmur heard.  Pulmonary:      Effort: Pulmonary effort is normal.      Breath sounds: No wheezing, rhonchi or rales.   Abdominal:      General: Bowel sounds are normal. There is distension.      Palpations: There is no mass.      Tenderness: There is no abdominal tenderness.   Musculoskeletal:         General: No tenderness.      Right lower leg: No edema.      Left lower leg: No edema.      Comments: LE edema improved. He is easily able to get up and walk.     Lymphadenopathy:      Cervical: No cervical adenopathy.   Skin:     Coloration: Skin is not jaundiced.      Findings: No bruising or rash.   Neurological:      Mental Status: He is alert and oriented to person, place, and time.      Coordination: Coordination normal.   Psychiatric:         Behavior: Behavior normal.         Thought Content: Thought content normal.       MELD-Na score: 17 at 8/18/2022 11:48 AM  MELD score: 15 at 8/18/2022 11:48 AM  Calculated from:  Serum Creatinine: 1.8 mg/dL at 8/18/2022 11:48 AM  Serum Sodium: 134 mmol/L at 8/18/2022 11:48 AM  Total Bilirubin: 0.7 mg/dL (Using min of 1 mg/dL) at 8/18/2022 11:48 AM  INR(ratio): 1.3 at 8/18/2022 11:48 AM  Age: 76 years    WBC   Date Value Ref Range Status   08/18/2022 6.90 3.90 - 12.70 K/uL Final     Hemoglobin   Date Value Ref Range Status   08/18/2022 12.2 (L) 14.0 - 18.0 g/dL Final     Hematocrit   Date Value Ref Range Status   08/18/2022 37.9 (L) 40.0 - 54.0 % Final     Platelets   Date Value Ref Range Status   08/18/2022 191 150 - 450 K/uL Final     BUN   Date Value Ref Range Status   08/18/2022 37 (H) 8 - 23 mg/dL Final     Creatinine   Date Value Ref Range Status   08/18/2022 1.8 (H) 0.5 - 1.4 mg/dL Final     Glucose   Date Value Ref Range Status   08/18/2022 189 (H) 70 - 110 mg/dL  Final     Calcium   Date Value Ref Range Status   08/18/2022 9.1 8.7 - 10.5 mg/dL Final     Sodium   Date Value Ref Range Status   08/18/2022 134 (L) 136 - 145 mmol/L Final     Potassium   Date Value Ref Range Status   08/18/2022 4.7 3.5 - 5.1 mmol/L Final     Chloride   Date Value Ref Range Status   08/18/2022 100 95 - 110 mmol/L Final     AST   Date Value Ref Range Status   08/18/2022 61 (H) 10 - 40 U/L Final     ALT   Date Value Ref Range Status   08/18/2022 36 10 - 44 U/L Final     Alkaline Phosphatase   Date Value Ref Range Status   08/18/2022 171 (H) 55 - 135 U/L Final     Total Bilirubin   Date Value Ref Range Status   08/18/2022 0.7 0.1 - 1.0 mg/dL Final     Comment:     For infants and newborns, interpretation of results should be based  on gestational age, weight and in agreement with clinical  observations.    Premature Infant recommended reference ranges:  Up to 24 hours.............<8.0 mg/dL  Up to 48 hours............<12.0 mg/dL  3-5 days..................<15.0 mg/dL  6-29 days.................<15.0 mg/dL       Albumin   Date Value Ref Range Status   08/18/2022 2.8 (L) 3.5 - 5.2 g/dL Final     INR   Date Value Ref Range Status   08/18/2022 1.3 (H) 0.8 - 1.2 Final     Comment:     Coumadin Therapy:  2.0 - 3.0 for INR for all indicators except mechanical heart valves  and antiphospholipid syndromes which should use 2.5 - 3.5.           Assessment/Plan:     1.Cirrhosis:  Decompensated by ascites, hepatic encephalopathy  Last MELD - 20  Creatinine  increased to 1.8, will decrease furosemide 40 mg QD  and increase spironolactone to100 mg BID, monitor potassium level and renal function in 1-2 weeks  Therapeutic paracentesis Q2 weeks as needed   Continue with HCC surveillance: US 4/9/2022: shows no liver lesions  Continue lactulose 15 mL twice a day, adjust for 2-3 soft bowel movements per day  Esophageal varices surveillance:  Last EGD 12/2021 with 3 columns of G grade 1 esophagea lV varices, questionable  gastric polyp versus GV, next  EGD due 12/2022     His comorbidities include hypertension, coronary artery disease with stent in place 15 yrs ago, long history of diabetes mellitus, hyperlipidemia, fraility with falls says balance issues happen when his ascites is worse otherwise he ambulates well.    With previous episode of encephalopathy he is at high risk for recurrence with TIPS, not sure with his age and not so high MELD he will be liver a TXP candidate. He seems to ambulate without difficulty  in the clinic. Patient  is interested in TXP, now has a potential living donor. Per patients wishes will present to committee discussion.     RTC - 3 months     I have reviewed existing labs, imaging. Educated patient and family about disease process, prognosis. Ordered required labs, images, procedures and discussed treatment plan.       Litzy Goodman MD  Transplant Hepatologist  Dept of Hepatology, [place] [facility] [place]

## 2022-09-01 NOTE — PROGRESS NOTES
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  He was hospitalized on May 31st for ascites, underwent paracentesis, reportedly also had episodes of hepatic encephalopathy.  Then he had incarcerated hernia repaired at [facility] and discharged from the hospital on June 18th.  Previously he had episodes of GI bleed, underwent an upper endoscopy with banding of esophageal varices, taken off of Plavix in 2019, since then no episodes of bleed per patient.  He was referred to us not liver Clinic for management of cirrhosis of liver          Interval history: 8/18/22  He was asked to come to clinic ASAP as he has been getting  paracentesis every week, says he gets breathless, stays on the bed for 2-3 days, feels fatigue. Has been removing 5-7 L every time.  He was asked to come 2 weeks after last appointment, however says he didn't have an appointment and didn't know about it. He had SBP in the past with encephalopathy that was improved with lactulose, now he is taking  lactulose almost daily.     9/1/22  Last paracentesis was on 08/18/2022.  He returned to the clinic with his wife to discuss the possibility of liver transplantation with a living donor as boyfriend of his daughter is willing to donate     Review of Systems   Constitutional:  Positive for fatigue. Negative for fever and unexpected weight change.   Respiratory:  Positive for shortness of breath.    Gastrointestinal:  Positive for abdominal distention. Negative for abdominal pain, blood in stool, nausea and vomiting.   Musculoskeletal:  Positive for arthralgias. Negative for gait problem.   Skin:  Negative for pallor and rash.   Neurological:  Negative for dizziness.   Hematological:  Does not bruise/bleed easily.   Psychiatric/Behavioral:  Negative for confusion, hallucinations and sleep disturbance.       Objective:     Physical Exam  Vitals and nursing note reviewed.   Constitutional:       Appearance: Normal appearance.   Eyes:      General: No scleral icterus.  Cardiovascular:      Heart sounds: No murmur heard.  Pulmonary:      Effort: Pulmonary effort is normal.      Breath sounds: No wheezing, rhonchi or rales.   Abdominal:      General: Bowel sounds are normal. There is distension.      Palpations: There is no mass.      Tenderness: There is no abdominal tenderness.   Musculoskeletal:         General: No tenderness.      Right lower leg: No edema.      Left lower leg: No edema.      Comments: LE edema improved. He is easily able to get up and walk.     Lymphadenopathy:      Cervical: No cervical adenopathy.   Skin:     Coloration: Skin is not jaundiced.      Findings: No bruising or rash.   Neurological:      Mental Status: He is alert and oriented to person, place, and time.      Coordination: Coordination normal.   Psychiatric:         Behavior: Behavior normal.         Thought Content: Thought content normal.       MELD-Na score: 17 at 8/18/2022 11:48 AM  MELD score: 15 at 8/18/2022 11:48 AM  Calculated from:  Serum Creatinine: 1.8 mg/dL at 8/18/2022 11:48 AM  Serum Sodium: 134 mmol/L at 8/18/2022 11:48 AM  Total Bilirubin: 0.7 mg/dL (Using min of 1 mg/dL) at 8/18/2022 11:48 AM  INR(ratio): 1.3 at 8/18/2022 11:48 AM  Age: 76 years    WBC   Date Value Ref Range Status   08/18/2022 6.90 3.90 - 12.70 K/uL Final     Hemoglobin   Date Value Ref Range Status   08/18/2022 12.2 (L) 14.0 - 18.0 g/dL Final     Hematocrit   Date Value Ref Range Status   08/18/2022 37.9 (L) 40.0 - 54.0 % Final     Platelets   Date Value Ref Range Status   08/18/2022 191 150 - 450 K/uL Final     BUN   Date Value Ref Range Status   08/18/2022 37 (H) 8 - 23 mg/dL Final     Creatinine   Date Value Ref Range Status   08/18/2022 1.8 (H) 0.5 - 1.4 mg/dL Final     Glucose   Date Value Ref Range Status   08/18/2022 189 (H) 70 - 110 mg/dL  Final     Calcium   Date Value Ref Range Status   08/18/2022 9.1 8.7 - 10.5 mg/dL Final     Sodium   Date Value Ref Range Status   08/18/2022 134 (L) 136 - 145 mmol/L Final     Potassium   Date Value Ref Range Status   08/18/2022 4.7 3.5 - 5.1 mmol/L Final     Chloride   Date Value Ref Range Status   08/18/2022 100 95 - 110 mmol/L Final     AST   Date Value Ref Range Status   08/18/2022 61 (H) 10 - 40 U/L Final     ALT   Date Value Ref Range Status   08/18/2022 36 10 - 44 U/L Final     Alkaline Phosphatase   Date Value Ref Range Status   08/18/2022 171 (H) 55 - 135 U/L Final     Total Bilirubin   Date Value Ref Range Status   08/18/2022 0.7 0.1 - 1.0 mg/dL Final     Comment:     For infants and newborns, interpretation of results should be based  on gestational age, weight and in agreement with clinical  observations.    Premature Infant recommended reference ranges:  Up to 24 hours.............<8.0 mg/dL  Up to 48 hours............<12.0 mg/dL  3-5 days..................<15.0 mg/dL  6-29 days.................<15.0 mg/dL       Albumin   Date Value Ref Range Status   08/18/2022 2.8 (L) 3.5 - 5.2 g/dL Final     INR   Date Value Ref Range Status   08/18/2022 1.3 (H) 0.8 - 1.2 Final     Comment:     Coumadin Therapy:  2.0 - 3.0 for INR for all indicators except mechanical heart valves  and antiphospholipid syndromes which should use 2.5 - 3.5.           Assessment/Plan:     1.Cirrhosis:  Decompensated by ascites, hepatic encephalopathy  Last MELD - 20  Creatinine  increased to 1.8, will decrease furosemide 40 mg QD  and increase spironolactone to100 mg BID, monitor potassium level and renal function in 1-2 weeks  Therapeutic paracentesis Q2 weeks as needed   Continue with HCC surveillance: US 4/9/2022: shows no liver lesions  Continue lactulose 15 mL twice a day, adjust for 2-3 soft bowel movements per day  Esophageal varices surveillance:  Last EGD 12/2021 with 3 columns of G grade 1 esophagea lV varices, questionable  gastric polyp versus GV, next  EGD due 12/2022     His comorbidities include hypertension, coronary artery disease with stent in place 15 yrs ago, long history of diabetes mellitus, hyperlipidemia, fraility with falls says balance issues happen when his ascites is worse otherwise he ambulates well.    With previous episode of encephalopathy he is at high risk for recurrence with TIPS, not sure with his age and not so high MELD he will be liver a TXP candidate. He seems to ambulate without difficulty  in the clinic. Patient  is interested in TXP, now has a potential living donor. Per patients wishes will present to committee discussion.     RTC - 3 months     I have reviewed existing labs, imaging. Educated patient and family about disease process, prognosis. Ordered required labs, images, procedures and discussed treatment plan.       Litzy Goodman MD  Transplant Hepatologist  Dept of Hepatology, [place] [facility] [place]

## 2022-09-01 NOTE — H&P (VIEW-ONLY)
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  He was hospitalized on May 31st for ascites, underwent paracentesis, reportedly also had episodes of hepatic encephalopathy.  Then he had incarcerated hernia repaired at [facility] and discharged from the hospital on June 18th.  Previously he had episodes of GI bleed, underwent an upper endoscopy with banding of esophageal varices, taken off of Plavix in 2019, since then no episodes of bleed per patient.  He was referred to us not liver Clinic for management of cirrhosis of liver          Interval history: 8/18/22  He was asked to come to clinic ASAP as he has been getting  paracentesis every week, says he gets breathless, stays on the bed for 2-3 days, feels fatigue. Has been removing 5-7 L every time.  He was asked to come 2 weeks after last appointment, however says he didn't have an appointment and didn't know about it. He had SBP in the past with encephalopathy that was improved with lactulose, now he is taking  lactulose almost daily.     9/1/22  Last paracentesis was on 08/18/2022.  He returned to the clinic with his wife to discuss the possibility of liver transplantation with a living donor as boyfriend of his daughter is willing to donate     Review of Systems   Constitutional:  Positive for fatigue. Negative for fever and unexpected weight change.   Respiratory:  Positive for shortness of breath.    Gastrointestinal:  Positive for abdominal distention. Negative for abdominal pain, blood in stool, nausea and vomiting.   Musculoskeletal:  Positive for arthralgias. Negative for gait problem.   Skin:  Negative for pallor and rash.   Neurological:  Negative for dizziness.   Hematological:  Does not bruise/bleed easily.   Psychiatric/Behavioral:  Negative for confusion, hallucinations and sleep disturbance.       Objective:     Physical Exam  Vitals and nursing note reviewed.   Constitutional:       Appearance: Normal appearance.   Eyes:      General: No scleral icterus.  Cardiovascular:      Heart sounds: No murmur heard.  Pulmonary:      Effort: Pulmonary effort is normal.      Breath sounds: No wheezing, rhonchi or rales.   Abdominal:      General: Bowel sounds are normal. There is distension.      Palpations: There is no mass.      Tenderness: There is no abdominal tenderness.   Musculoskeletal:         General: No tenderness.      Right lower leg: No edema.      Left lower leg: No edema.      Comments: LE edema improved. He is easily able to get up and walk.     Lymphadenopathy:      Cervical: No cervical adenopathy.   Skin:     Coloration: Skin is not jaundiced.      Findings: No bruising or rash.   Neurological:      Mental Status: He is alert and oriented to person, place, and time.      Coordination: Coordination normal.   Psychiatric:         Behavior: Behavior normal.         Thought Content: Thought content normal.       MELD-Na score: 17 at 8/18/2022 11:48 AM  MELD score: 15 at 8/18/2022 11:48 AM  Calculated from:  Serum Creatinine: 1.8 mg/dL at 8/18/2022 11:48 AM  Serum Sodium: 134 mmol/L at 8/18/2022 11:48 AM  Total Bilirubin: 0.7 mg/dL (Using min of 1 mg/dL) at 8/18/2022 11:48 AM  INR(ratio): 1.3 at 8/18/2022 11:48 AM  Age: 76 years    WBC   Date Value Ref Range Status   08/18/2022 6.90 3.90 - 12.70 K/uL Final     Hemoglobin   Date Value Ref Range Status   08/18/2022 12.2 (L) 14.0 - 18.0 g/dL Final     Hematocrit   Date Value Ref Range Status   08/18/2022 37.9 (L) 40.0 - 54.0 % Final     Platelets   Date Value Ref Range Status   08/18/2022 191 150 - 450 K/uL Final     BUN   Date Value Ref Range Status   08/18/2022 37 (H) 8 - 23 mg/dL Final     Creatinine   Date Value Ref Range Status   08/18/2022 1.8 (H) 0.5 - 1.4 mg/dL Final     Glucose   Date Value Ref Range Status   08/18/2022 189 (H) 70 - 110 mg/dL  Final     Calcium   Date Value Ref Range Status   08/18/2022 9.1 8.7 - 10.5 mg/dL Final     Sodium   Date Value Ref Range Status   08/18/2022 134 (L) 136 - 145 mmol/L Final     Potassium   Date Value Ref Range Status   08/18/2022 4.7 3.5 - 5.1 mmol/L Final     Chloride   Date Value Ref Range Status   08/18/2022 100 95 - 110 mmol/L Final     AST   Date Value Ref Range Status   08/18/2022 61 (H) 10 - 40 U/L Final     ALT   Date Value Ref Range Status   08/18/2022 36 10 - 44 U/L Final     Alkaline Phosphatase   Date Value Ref Range Status   08/18/2022 171 (H) 55 - 135 U/L Final     Total Bilirubin   Date Value Ref Range Status   08/18/2022 0.7 0.1 - 1.0 mg/dL Final     Comment:     For infants and newborns, interpretation of results should be based  on gestational age, weight and in agreement with clinical  observations.    Premature Infant recommended reference ranges:  Up to 24 hours.............<8.0 mg/dL  Up to 48 hours............<12.0 mg/dL  3-5 days..................<15.0 mg/dL  6-29 days.................<15.0 mg/dL       Albumin   Date Value Ref Range Status   08/18/2022 2.8 (L) 3.5 - 5.2 g/dL Final     INR   Date Value Ref Range Status   08/18/2022 1.3 (H) 0.8 - 1.2 Final     Comment:     Coumadin Therapy:  2.0 - 3.0 for INR for all indicators except mechanical heart valves  and antiphospholipid syndromes which should use 2.5 - 3.5.           Assessment/Plan:     1.Cirrhosis:  Decompensated by ascites, hepatic encephalopathy  Last MELD - 20  Creatinine  increased to 1.8, will decrease furosemide 40 mg QD  and increase spironolactone to100 mg BID, monitor potassium level and renal function in 1-2 weeks  Therapeutic paracentesis Q2 weeks as needed   Continue with HCC surveillance: US 4/9/2022: shows no liver lesions  Continue lactulose 15 mL twice a day, adjust for 2-3 soft bowel movements per day  Esophageal varices surveillance:  Last EGD 12/2021 with 3 columns of G grade 1 esophagea lV varices, questionable  gastric polyp versus GV, next  EGD due 12/2022     His comorbidities include hypertension, coronary artery disease with stent in place 15 yrs ago, long history of diabetes mellitus, hyperlipidemia, fraility with falls says balance issues happen when his ascites is worse otherwise he ambulates well.    With previous episode of encephalopathy he is at high risk for recurrence with TIPS, not sure with his age and not so high MELD he will be liver a TXP candidate. He seems to ambulate without difficulty  in the clinic. Patient  is interested in TXP, now has a potential living donor. Per patients wishes will present to committee discussion.     RTC - 3 months     I have reviewed existing labs, imaging. Educated patient and family about disease process, prognosis. Ordered required labs, images, procedures and discussed treatment plan.       Litzy Goodman MD  Transplant Hepatologist  Dept of Hepatology, [place] [facility] [place]

## 2022-09-06 ENCOUNTER — HOSPITAL ENCOUNTER (OUTPATIENT)
Dept: RADIOLOGY | Facility: HOSPITAL | Age: 77
Discharge: HOME | End: 2022-09-06
Attending: INTERNAL MEDICINE
Payer: MEDICARE

## 2022-09-06 VITALS
RESPIRATION RATE: 18 BRPM | HEIGHT: 72 IN | BODY MASS INDEX: 23.84 KG/M2 | DIASTOLIC BLOOD PRESSURE: 55 MMHG | OXYGEN SATURATION: 100 % | HEART RATE: 89 BPM | WEIGHT: 176 LBS | SYSTOLIC BLOOD PRESSURE: 117 MMHG

## 2022-09-06 DIAGNOSIS — K74.60 LIVER CIRRHOSIS SECONDARY TO NASH: ICD-10-CM

## 2022-09-06 DIAGNOSIS — K75.81 LIVER CIRRHOSIS SECONDARY TO NASH: ICD-10-CM

## 2022-09-06 PROCEDURE — 63600175 PHARM REV CODE 636 W HCPCS: Mod: JG | Performed by: RADIOLOGY

## 2022-09-06 PROCEDURE — 49083 ABD PARACENTESIS W/IMAGING: CPT

## 2022-09-06 PROCEDURE — P9047 ALBUMIN (HUMAN), 25%, 50ML: HCPCS | Mod: JG | Performed by: RADIOLOGY

## 2022-09-06 RX ORDER — ALBUMIN HUMAN 250 G/1000ML
25 SOLUTION INTRAVENOUS ONCE
Status: COMPLETED | OUTPATIENT
Start: 2022-09-06 | End: 2022-09-06

## 2022-09-06 RX ADMIN — ALBUMIN (HUMAN) 25 G: 25 SOLUTION INTRAVENOUS at 02:09

## 2022-09-06 NOTE — DISCHARGE SUMMARY
[facility] (Hospital)  Discharge Note  Short Stay    US guided paracentesis    OUTCOME: Patient tolerated treatment/procedure well without complication and is now ready for discharge.    DISPOSITION: Home or Self Care    FINAL DIAGNOSIS:  Liver cirrhosis secondary to MENDEZ, ascites    FOLLOWUP: In clinic    DISCHARGE INSTRUCTIONS:  No discharge procedures on file.      Clinical Reference Documents Added to Patient Instructions         Document    ABDOMINAL PARACENTESIS DISCHARGE INSTRUCTIONS (ENGLISH)            TIME SPENT ON DISCHARGE: 15 minutes    Pre Op Diagnosis: ascites     Post Op Diagnosis: same     Procedure:  para     Procedure performed by: Maryjane PUCKETT, Kishor LOFTON     Written Informed Consent Obtained: Yes     Specimen Removed:  yes     Estimated Blood Loss:  minimal     Findings: Local anesthesia.     The patient tolerated the procedure well and there were no complications.      Sterile technique was performed in the lower abdomen, lidocaine was used as a local anesthetic.   6 liters of light suellen fluid removed for therapeutic purposes.  Pt tolerated the procedure well without immediate complications.  Please see radiologist report for details. F/u with PCP and/or ordering physician.

## 2022-09-06 NOTE — NURSING
Paracentesis catheter safely inserted into pt's left lateral abdomen by TALIA Iverson  where marked by US. Pt tolerated well. VSS. Catheter connected to wall suction. Will continue to monitor.

## 2022-09-06 NOTE — NURSING
6 L light suellen fluid drained from patient's abdomen. Paracentesis catheter safely discontinued with tip intact, by Lizbeth LYN. Pressure held to site and gauze/tegaderm dressing applied. Albumin given per protocol. VSS. Pt tolerated well and remains asymptomatic.

## 2022-09-06 NOTE — NURSING
Verbal discharge instructions given to patient including when to seek medical attention and site care. Pt verbalized understanding. Pt declined paper AVS but copy available on I-lightinghart. PIV safely discontinued. Dressing to procedure site remains CDI. Pt denies pain and NADN. VSS. Pt Ambulated ed to main entrance with all belongings. Pt was stable on discharge.

## 2022-09-06 NOTE — PLAN OF CARE
Pt ambulated from waiting room to pre-procedure area independently. Pt is Aox4. Pt denies pain and is resting comfortably. VS taken and stable. Will continue to monitor.

## 2022-09-12 ENCOUNTER — HOSPITAL ENCOUNTER (OUTPATIENT)
Dept: RADIOLOGY | Facility: HOSPITAL | Age: 77
Discharge: HOME | End: 2022-09-12
Attending: INTERNAL MEDICINE
Payer: MEDICARE

## 2022-09-12 VITALS
BODY MASS INDEX: 23.57 KG/M2 | SYSTOLIC BLOOD PRESSURE: 109 MMHG | HEART RATE: 98 BPM | WEIGHT: 174 LBS | RESPIRATION RATE: 18 BRPM | OXYGEN SATURATION: 99 % | HEIGHT: 72 IN | DIASTOLIC BLOOD PRESSURE: 56 MMHG

## 2022-09-12 DIAGNOSIS — K74.60 LIVER CIRRHOSIS SECONDARY TO NASH: ICD-10-CM

## 2022-09-12 DIAGNOSIS — K75.81 LIVER CIRRHOSIS SECONDARY TO NASH: ICD-10-CM

## 2022-09-12 PROCEDURE — P9047 ALBUMIN (HUMAN), 25%, 50ML: HCPCS | Mod: JG | Performed by: STUDENT IN AN ORGANIZED HEALTH CARE EDUCATION/TRAINING PROGRAM

## 2022-09-12 PROCEDURE — 49083 ABD PARACENTESIS W/IMAGING: CPT

## 2022-09-12 PROCEDURE — 63600175 PHARM REV CODE 636 W HCPCS: Mod: JG | Performed by: STUDENT IN AN ORGANIZED HEALTH CARE EDUCATION/TRAINING PROGRAM

## 2022-09-12 RX ORDER — ALBUMIN HUMAN 250 G/1000ML
25 SOLUTION INTRAVENOUS ONCE
Status: COMPLETED | OUTPATIENT
Start: 2022-09-12 | End: 2022-09-12

## 2022-09-12 RX ADMIN — ALBUMIN (HUMAN) 25 G: 25 SOLUTION INTRAVENOUS at 02:09

## 2022-09-12 NOTE — DISCHARGE SUMMARY
Pre Op Diagnosis: ascites     Post Op Diagnosis: same    Procedure:  para     Procedure performed by: Tasia PUCKETT, Kihsor LOFTON     Written Informed Consent Obtained: Yes     Specimen Removed:  yes     Estimated Blood Loss:  minimal     Findings: Local anesthesia and moderate sedation were used.     The patient tolerated the procedure well and there were no complications.      Disposition:  F/U in clinic    Discharge instructions:  Light activity for 24 hours.  Remove band aid in 24 hours.  No baths (showers are appropriate).    F/U with ordering physician    Sterile technique was performed in the lower abdomen, lidocaine was used as a local anesthetic.   8 liters of clear fluid removed for therapeutic purposes.  Pt tolerated the procedure well without immediate complications.  Please see radiologist report for details. F/u with PCP and/or ordering physician.     Time Spent on discharge:  15 mins

## 2022-09-12 NOTE — PLAN OF CARE
8L CLEAR FLUID REMOVED FROM ABDOMEN.  CATHETER REMOVED FROM RIGHT LATERAL ABDOMEN.  NO BLEEDING NOTED.  GAUZE AND TEGADERM DRESSING IN PLACE.  PT DISCHARGED.  REFUSED PAPER DISCHARGE INSTRUCTIONS AND WHEELCHAIR.  AMBULATED TO MAIN ENTRANCE WITHOUT DIFFICULTLY. VSS. NADN.  DRESSING CDI.

## 2022-09-20 DIAGNOSIS — K75.81 LIVER CIRRHOSIS SECONDARY TO NASH: Primary | ICD-10-CM

## 2022-09-20 DIAGNOSIS — K74.60 LIVER CIRRHOSIS SECONDARY TO NASH: Primary | ICD-10-CM

## 2022-09-21 ENCOUNTER — HOSPITAL ENCOUNTER (OUTPATIENT)
Dept: RADIOLOGY | Facility: HOSPITAL | Age: 77
Discharge: HOME | End: 2022-09-21
Attending: INTERNAL MEDICINE
Payer: MEDICARE

## 2022-09-21 VITALS
WEIGHT: 174 LBS | OXYGEN SATURATION: 100 % | RESPIRATION RATE: 18 BRPM | BODY MASS INDEX: 23.57 KG/M2 | HEART RATE: 83 BPM | HEIGHT: 72 IN | DIASTOLIC BLOOD PRESSURE: 58 MMHG | SYSTOLIC BLOOD PRESSURE: 121 MMHG

## 2022-09-21 DIAGNOSIS — K74.60 LIVER CIRRHOSIS SECONDARY TO NASH: ICD-10-CM

## 2022-09-21 DIAGNOSIS — K75.81 LIVER CIRRHOSIS SECONDARY TO NASH: ICD-10-CM

## 2022-09-21 PROCEDURE — P9047 ALBUMIN (HUMAN), 25%, 50ML: HCPCS | Mod: JG | Performed by: INTERNAL MEDICINE

## 2022-09-21 PROCEDURE — 63600175 PHARM REV CODE 636 W HCPCS: Mod: JG | Performed by: INTERNAL MEDICINE

## 2022-09-21 PROCEDURE — 49083 ABD PARACENTESIS W/IMAGING: CPT

## 2022-09-21 RX ORDER — ALBUMIN HUMAN 250 G/1000ML
50 SOLUTION INTRAVENOUS ONCE
Status: COMPLETED | OUTPATIENT
Start: 2022-09-21 | End: 2022-09-21

## 2022-09-21 RX ADMIN — ALBUMIN (HUMAN) 50 G: 25 SOLUTION INTRAVENOUS at 01:09

## 2022-09-21 NOTE — PLAN OF CARE
6 liters of straw colored fluid removed during paracentesis. VSS, NADN, and pt tolerated procedure well. Band aid to right flank puncture site C/D/I with no bleeding/redness/swelling noted. VSS, NADN, and pt meets criteria for discharge. Discharge instructions given to and reviewed with pt, and pt verbalized understanding of all. Pt discharged to home, taken out via wheelchair and driven home by friend.

## 2022-09-21 NOTE — DISCHARGE SUMMARY
Pre Op Diagnosis: ascites     Post Op Diagnosis: same    Procedure:  para     Procedure performed by: Maryjane PUCKETT, Kishor LOFTON     Written Informed Consent Obtained: Yes     Specimen Removed:  yes     Estimated Blood Loss:  minimal     Findings: Local anesthesia and moderate sedation were used.     The patient tolerated the procedure well and there were no complications.      Disposition:  F/U in clinic    Discharge instructions:  Light activity for 24 hours.  Remove band aid in 24 hours.  No baths (showers are appropriate).    F/U with ordering physician    Sterile technique was performed in the lower abdomen, lidocaine was used as a local anesthetic.   6 liters of straw colored fluid removed for therapeutic purposes.  Pt tolerated the procedure well without immediate complications.  Please see radiologist report for details. F/u with PCP and/or ordering physician.     Time Spent on discharge:  15 mins

## 2022-09-26 ENCOUNTER — TELEPHONE (OUTPATIENT)
Dept: GASTROENTEROLOGY | Facility: CLINIC | Age: 77
End: 2022-09-26
Payer: MEDICARE

## 2022-09-26 DIAGNOSIS — D68.9 COAGULATION DEFECT, UNSPECIFIED: Primary | ICD-10-CM

## 2022-09-26 DIAGNOSIS — I85.10 SECONDARY ESOPHAGEAL VARICES WITHOUT BLEEDING: ICD-10-CM

## 2022-09-26 NOTE — TELEPHONE ENCOUNTER
Message sent to Franchesca regarding status of patient's referral, and patient was advised to follow up with [facility] if they've received referral or not.    ----- Message from Valerie Parra sent at 9/26/2022  2:16 PM CDT -----  Contact: Pt Mobile [phone]  Patient is returning a phone call.  Who left a message for the patient: Yolanda Wick MA  Does patient know what this is regarding:  A message from Yolanda Wick MA  Would you like a call back, or a response through your [facility] portal?:  Call

## 2022-09-26 NOTE — TELEPHONE ENCOUNTER
LM to return call. I am not sure the status of patient's referral being sent to [facility], so patient should probably call the facility where referral was supposed to go and see if it is there.    ----- Message from Haritha Fajardo sent at 9/26/2022  1:37 PM CDT -----  Contact: Pt  Pt is calling to follow up on referral request that was sent over on Friday 09/23 / to  [facility] / pt can be reached at [phone]//thanks/dbw

## 2022-09-27 ENCOUNTER — TELEPHONE (OUTPATIENT)
Dept: HEPATOLOGY | Facility: CLINIC | Age: 77
End: 2022-09-27
Payer: MEDICARE

## 2022-09-27 NOTE — TELEPHONE ENCOUNTER
----- Message from Yolanda Wick MA sent at 9/26/2022  3:37 PM CDT -----  Hey, patient is checking on the status of his referral to [facility]. Patient said he will call [facility] to see if anything was received, as he hasn't done that yet. Just let him know what's up, I wasn't able to see a sent referral anywhere in the system or on your desk... thanks!!

## 2022-09-27 NOTE — TELEPHONE ENCOUNTER
Hey can you put in a referral for [facility] pt would like to be seen their for cirrhosis and need a referral placed

## 2022-09-28 ENCOUNTER — HOSPITAL ENCOUNTER (OUTPATIENT)
Dept: RADIOLOGY | Facility: HOSPITAL | Age: 77
Discharge: HOME | End: 2022-09-28
Attending: INTERNAL MEDICINE
Payer: MEDICARE

## 2022-09-28 VITALS
WEIGHT: 174 LBS | HEIGHT: 72 IN | BODY MASS INDEX: 23.57 KG/M2 | SYSTOLIC BLOOD PRESSURE: 95 MMHG | RESPIRATION RATE: 16 BRPM | HEART RATE: 68 BPM | DIASTOLIC BLOOD PRESSURE: 52 MMHG | OXYGEN SATURATION: 100 %

## 2022-09-28 DIAGNOSIS — K74.60 LIVER CIRRHOSIS SECONDARY TO NASH: ICD-10-CM

## 2022-09-28 DIAGNOSIS — K75.81 LIVER CIRRHOSIS SECONDARY TO NASH: ICD-10-CM

## 2022-09-28 PROCEDURE — 49083 ABD PARACENTESIS W/IMAGING: CPT

## 2022-09-28 PROCEDURE — 63600175 PHARM REV CODE 636 W HCPCS: Mod: JG | Performed by: RADIOLOGY

## 2022-09-28 PROCEDURE — P9047 ALBUMIN (HUMAN), 25%, 50ML: HCPCS | Mod: JG | Performed by: RADIOLOGY

## 2022-09-28 RX ORDER — ALBUMIN HUMAN 250 G/1000ML
50 SOLUTION INTRAVENOUS ONCE
Status: COMPLETED | OUTPATIENT
Start: 2022-09-28 | End: 2022-09-28

## 2022-09-28 RX ADMIN — ALBUMIN (HUMAN) 50 G: 25 SOLUTION INTRAVENOUS at 02:09

## 2022-09-28 NOTE — DISCHARGE SUMMARY
[facility]  Discharge Note  Short Stay    * No procedures listed *    OUTCOME: Patient tolerated treatment/procedure well without complication and is now ready for discharge.    DISPOSITION: Home or Self Care    FINAL DIAGNOSIS:  <principal problem not specified>    FOLLOWUP: In clinic    DISCHARGE INSTRUCTIONS:  No discharge procedures on file.      Clinical Reference Documents Added to Patient Instructions         Document    ABDOMINAL PARACENTESIS DISCHARGE INSTRUCTIONS (ENGLISH)            TIME SPENT ON DISCHARGE: 15 minutes    7.75 liters of light suellen removed for therapeutic purposes.

## 2022-09-28 NOTE — NURSING
Paracentesis catheter inserted by TALIA Iverson into pt's left lateral abdomen, where marked by US. Pt tolerated well. Catheter connected to wall suction. VS taken and stable.

## 2022-09-28 NOTE — NURSING
Paracentesis completed at this time. Catheter safely discontinued with tip intact. Pressure held to site and gauze/tegaderm dressing applied. Pt tolerated well. 7.75 L light suellen fluid drained from patient's abdomen. Albumin given per protocol. VSS. Will continue to monitor.

## 2022-09-28 NOTE — NURSING
Verbal and written discharge instructions given to patient including when to seek medical attention and site care. Pt verbalized understanding. PIV safely discontinued. Dressing to procedure site remains CDI. Pt denies pain and NADN. VSS. Pt ambulated to waiting room where met by pt's wife. Pt was stable on discharge.

## 2022-09-30 ENCOUNTER — TELEPHONE (OUTPATIENT)
Dept: HEPATOLOGY | Facility: CLINIC | Age: 77
End: 2022-09-30
Payer: MEDICARE

## 2022-09-30 NOTE — TELEPHONE ENCOUNTER
Returned patient's call, he's requesting that Dr. Goodman write a rx for ensure. Patient was advised to contact his PCP to which he voiced understanding.

## 2022-10-04 ENCOUNTER — TELEPHONE (OUTPATIENT)
Dept: HEPATOLOGY | Facility: CLINIC | Age: 77
End: 2022-10-04
Payer: MEDICARE

## 2022-10-04 NOTE — TELEPHONE ENCOUNTER
----- Message from Franchesca Mehta MA sent at 10/4/2022  2:49 PM CDT -----  Contact: patient    ----- Message -----  From: Lynda Wang  Sent: 10/4/2022  12:37 PM CDT  To: [facility] Staff    Korin Vivas would like a call back at [phone], in regards to if his referral has been sent to [facility].

## 2022-10-04 NOTE — TELEPHONE ENCOUNTER
Spoke with patient and his wife and informed him that I could fax over recent clinicals. They asked for clinicals to be faxed to Fadumo Price at [facility]. Per wife, fax number is [phone]. Recent clinicals faxed.     Attempted to contact Fadumo at [phone] and had to leave a voicemail.    Patient and wife will await a follow-up from Fadumo.

## 2022-10-05 ENCOUNTER — HOSPITAL ENCOUNTER (OUTPATIENT)
Dept: RADIOLOGY | Facility: HOSPITAL | Age: 77
Discharge: HOME | End: 2022-10-05
Attending: INTERNAL MEDICINE
Payer: MEDICARE

## 2022-10-05 VITALS
HEART RATE: 70 BPM | RESPIRATION RATE: 16 BRPM | HEIGHT: 72 IN | DIASTOLIC BLOOD PRESSURE: 58 MMHG | BODY MASS INDEX: 23.57 KG/M2 | OXYGEN SATURATION: 100 % | WEIGHT: 174 LBS | SYSTOLIC BLOOD PRESSURE: 119 MMHG

## 2022-10-05 DIAGNOSIS — K75.81 LIVER CIRRHOSIS SECONDARY TO NASH: ICD-10-CM

## 2022-10-05 DIAGNOSIS — K74.60 LIVER CIRRHOSIS SECONDARY TO NASH: ICD-10-CM

## 2022-10-05 LAB
APPEARANCE FLD: NORMAL
BODY FLD TYPE: NORMAL
COLOR FLD: YELLOW
LYMPHOCYTES NFR FLD MANUAL: 36 %
MESOTHL CELL NFR FLD MANUAL: 1 %
MONOS+MACROS NFR FLD MANUAL: 45 %
NEUTROPHILS NFR FLD MANUAL: 18 %
WBC # FLD: 114 /CU MM

## 2022-10-05 PROCEDURE — 87205 SMEAR GRAM STAIN: CPT | Performed by: INTERNAL MEDICINE

## 2022-10-05 PROCEDURE — 87070 CULTURE OTHR SPECIMN AEROBIC: CPT | Performed by: INTERNAL MEDICINE

## 2022-10-05 PROCEDURE — 49083 ABD PARACENTESIS W/IMAGING: CPT

## 2022-10-05 PROCEDURE — 87075 CULTR BACTERIA EXCEPT BLOOD: CPT | Performed by: INTERNAL MEDICINE

## 2022-10-05 PROCEDURE — 89051 BODY FLUID CELL COUNT: CPT | Performed by: INTERNAL MEDICINE

## 2022-10-05 PROCEDURE — 63600175 PHARM REV CODE 636 W HCPCS: Mod: JG | Performed by: RADIOLOGY

## 2022-10-05 PROCEDURE — P9047 ALBUMIN (HUMAN), 25%, 50ML: HCPCS | Mod: JG | Performed by: RADIOLOGY

## 2022-10-05 RX ORDER — ALBUMIN HUMAN 250 G/1000ML
25 SOLUTION INTRAVENOUS ONCE
Status: COMPLETED | OUTPATIENT
Start: 2022-10-05 | End: 2022-10-05

## 2022-10-05 RX ADMIN — ALBUMIN (HUMAN) 25 G: 25 SOLUTION INTRAVENOUS at 02:10

## 2022-10-05 NOTE — NURSING
Pt ambulated from waiting room to pre-procedure area independently. Pt is Aox4. Pt denies pain and is resting comfortably. Will continue to monitor.

## 2022-10-05 NOTE — NURSING
Paracentesis catheter safely removed with tip intact. Pressure held to site and bandaid applied. Pt tolerated well. 5.25 L suellen fluid drained from pt's abdomen. Albumin given per protocol. VSS. Will continue to monitor.

## 2022-10-05 NOTE — H&P (VIEW-ONLY)
[facility]  History & Physical    Subjective:      Chief Complaint/Reason for Admission: Ascites    Korin Vivas is a 76 y.o. male.    Past Medical History:   Diagnosis Date    Arm fracture, right 2022, 2010    CAD (coronary artery disease)     Diabetes     GERD (gastroesophageal reflux disease)     HTN (hypertension)     Hyperlipidemia      Past Surgical History:   Procedure Laterality Date    CATARACT EXTRACTION      COLONOSCOPY      COLONOSCOPY N/A 7/18/2016    Procedure: COLONOSCOPY;  Surgeon: Bryon Hickey MD;  Location: [facility];  Service: Endoscopy;  Laterality: N/A;    COLONOSCOPY N/A 10/6/2020    Procedure: COLONOSCOPY;  Surgeon: Kait Isaacs MD;  Location: [facility];  Service: Endoscopy;  Laterality: N/A;    CORONARY STENT PLACEMENT      ELBOW SURGERY      ESOPHAGOGASTRODUODENOSCOPY N/A 10/6/2020    Procedure: ESOPHAGOGASTRODUODENOSCOPY (EGD);  Surgeon: Kait Isaacs MD;  Location: [facility];  Service: Endoscopy;  Laterality: N/A;    HERNIA REPAIR      2022    TONSILLECTOMY      VASECTOMY       Family History   Problem Relation Age of Onset    Colon cancer Brother     No Known Problems Mother     Heart disease Father      Social History     Tobacco Use    Smoking status: Former     Packs/day: 1.00     Years: 40.00     Pack years: 40.00     Types: Cigarettes    Smokeless tobacco: Never    Tobacco comments:     quit 3 years ago   Substance Use Topics    Alcohol use: No    Drug use: No       (Not in a hospital admission)    Review of patient's allergies indicates:   Allergen Reactions    Lipitor [atorvastatin] Other (See Comments)     Left arm/shooulder pain    Statins-hmg-coa reductase inhibitors Other (See Comments)     muscle aches, joint pain  Joint pain  Joint pain          Review of Systems   Constitutional: Negative.    Gastrointestinal: Negative.    Skin: Negative.      Objective:      Vital Signs (Most Recent)  Pulse: 70 (10/05/22 1425)  Resp: 16 (10/05/22  1425)  BP: (!) 119/58 (10/05/22 1425)  SpO2: 100 % (10/05/22 1425)    Vital Signs Range (Last 24H):  Pulse:  [70-72]   Resp:  [16]   BP: (114-125)/(57-64)   SpO2:  [100 %]     Physical Exam  Eyes:      Pupils: Pupils are equal, round, and reactive to light.   Pulmonary:      Effort: Pulmonary effort is normal.   Abdominal:      General: There is distension.       Data Review:  CBC:   Lab Results   Component Value Date    WBC 5.10 09/28/2022    RBC 3.43 (L) 09/28/2022    HGB 10.8 (L) 09/28/2022    HCT 32.7 (L) 09/28/2022     09/28/2022      ECG: no prior ECG.     Assessment:      There are no hospital problems to display for this patient.      Plan:    paracentesis

## 2022-10-05 NOTE — NURSING
Verbal and written discharge instructions given to patient including when to seek medical attention and site care. Pt verbalized understanding. PIV safely discontinued. Bandaid to procedure site remains CDI. Pt denies pain and NADN. VSS. Pt escorted by RN to waiting area where met by spouse. Pt was stable on discharge.

## 2022-10-05 NOTE — H&P
[facility]  History & Physical    Subjective:      Chief Complaint/Reason for Admission: Ascites    Korin Vivas is a 76 y.o. male.    Past Medical History:   Diagnosis Date    Arm fracture, right 2022, 2010    CAD (coronary artery disease)     Diabetes     GERD (gastroesophageal reflux disease)     HTN (hypertension)     Hyperlipidemia      Past Surgical History:   Procedure Laterality Date    CATARACT EXTRACTION      COLONOSCOPY      COLONOSCOPY N/A 7/18/2016    Procedure: COLONOSCOPY;  Surgeon: Bryon Hickey MD;  Location: [facility];  Service: Endoscopy;  Laterality: N/A;    COLONOSCOPY N/A 10/6/2020    Procedure: COLONOSCOPY;  Surgeon: Kait Isaacs MD;  Location: [facility];  Service: Endoscopy;  Laterality: N/A;    CORONARY STENT PLACEMENT      ELBOW SURGERY      ESOPHAGOGASTRODUODENOSCOPY N/A 10/6/2020    Procedure: ESOPHAGOGASTRODUODENOSCOPY (EGD);  Surgeon: Kait Isaacs MD;  Location: [facility];  Service: Endoscopy;  Laterality: N/A;    HERNIA REPAIR      2022    TONSILLECTOMY      VASECTOMY       Family History   Problem Relation Age of Onset    Colon cancer Brother     No Known Problems Mother     Heart disease Father      Social History     Tobacco Use    Smoking status: Former     Packs/day: 1.00     Years: 40.00     Pack years: 40.00     Types: Cigarettes    Smokeless tobacco: Never    Tobacco comments:     quit 3 years ago   Substance Use Topics    Alcohol use: No    Drug use: No       (Not in a hospital admission)    Review of patient's allergies indicates:   Allergen Reactions    Lipitor [atorvastatin] Other (See Comments)     Left arm/shooulder pain    Statins-hmg-coa reductase inhibitors Other (See Comments)     muscle aches, joint pain  Joint pain  Joint pain          Review of Systems   Constitutional: Negative.    Gastrointestinal: Negative.    Skin: Negative.      Objective:      Vital Signs (Most Recent)  Pulse: 70 (10/05/22 1425)  Resp: 16 (10/05/22  1425)  BP: (!) 119/58 (10/05/22 1425)  SpO2: 100 % (10/05/22 1425)    Vital Signs Range (Last 24H):  Pulse:  [70-72]   Resp:  [16]   BP: (114-125)/(57-64)   SpO2:  [100 %]     Physical Exam  Eyes:      Pupils: Pupils are equal, round, and reactive to light.   Pulmonary:      Effort: Pulmonary effort is normal.   Abdominal:      General: There is distension.       Data Review:  CBC:   Lab Results   Component Value Date    WBC 5.10 09/28/2022    RBC 3.43 (L) 09/28/2022    HGB 10.8 (L) 09/28/2022    HCT 32.7 (L) 09/28/2022     09/28/2022      ECG: no prior ECG.     Assessment:      There are no hospital problems to display for this patient.      Plan:    paracentesis

## 2022-10-05 NOTE — DISCHARGE SUMMARY
[facility]  Discharge Note  Short Stay    [facility]  [facility]  [facility]      OUTCOME: Patient tolerated treatment/procedure well without complication and is now ready for discharge.    DISPOSITION: Home or Self Care    FINAL DIAGNOSIS:  <principal problem not specified>    FOLLOWUP: In clinic    DISCHARGE INSTRUCTIONS:  No discharge procedures on file.      Clinical Reference Documents Added to Patient Instructions         Document    ABDOMINAL PARACENTESIS DISCHARGE INSTRUCTIONS (ENGLISH)            TIME SPENT ON DISCHARGE: 15 minutes    5.25 liters of suellen fluid removed and sent to lab

## 2022-10-05 NOTE — H&P (VIEW-ONLY)
[facility]  History & Physical    Subjective:      Chief Complaint/Reason for Admission: Ascites    Korin Vivas is a 76 y.o. male.    Past Medical History:   Diagnosis Date    Arm fracture, right 2022, 2010    CAD (coronary artery disease)     Diabetes     GERD (gastroesophageal reflux disease)     HTN (hypertension)     Hyperlipidemia      Past Surgical History:   Procedure Laterality Date    CATARACT EXTRACTION      COLONOSCOPY      COLONOSCOPY N/A 7/18/2016    Procedure: COLONOSCOPY;  Surgeon: Bryon Hickey MD;  Location: [facility];  Service: Endoscopy;  Laterality: N/A;    COLONOSCOPY N/A 10/6/2020    Procedure: COLONOSCOPY;  Surgeon: Kait Isaacs MD;  Location: [facility];  Service: Endoscopy;  Laterality: N/A;    CORONARY STENT PLACEMENT      ELBOW SURGERY      ESOPHAGOGASTRODUODENOSCOPY N/A 10/6/2020    Procedure: ESOPHAGOGASTRODUODENOSCOPY (EGD);  Surgeon: Kait Isaacs MD;  Location: [facility];  Service: Endoscopy;  Laterality: N/A;    HERNIA REPAIR      2022    TONSILLECTOMY      VASECTOMY       Family History   Problem Relation Age of Onset    Colon cancer Brother     No Known Problems Mother     Heart disease Father      Social History     Tobacco Use    Smoking status: Former     Packs/day: 1.00     Years: 40.00     Pack years: 40.00     Types: Cigarettes    Smokeless tobacco: Never    Tobacco comments:     quit 3 years ago   Substance Use Topics    Alcohol use: No    Drug use: No       (Not in a hospital admission)    Review of patient's allergies indicates:   Allergen Reactions    Lipitor [atorvastatin] Other (See Comments)     Left arm/shooulder pain    Statins-hmg-coa reductase inhibitors Other (See Comments)     muscle aches, joint pain  Joint pain  Joint pain          Review of Systems   Constitutional: Negative.    Gastrointestinal: Negative.    Skin: Negative.      Objective:      Vital Signs (Most Recent)  Pulse: 70 (10/05/22 1425)  Resp: 16 (10/05/22  1425)  BP: (!) 119/58 (10/05/22 1425)  SpO2: 100 % (10/05/22 1425)    Vital Signs Range (Last 24H):  Pulse:  [70-72]   Resp:  [16]   BP: (114-125)/(57-64)   SpO2:  [100 %]     Physical Exam  Eyes:      Pupils: Pupils are equal, round, and reactive to light.   Pulmonary:      Effort: Pulmonary effort is normal.   Abdominal:      General: There is distension.       Data Review:  CBC:   Lab Results   Component Value Date    WBC 5.10 09/28/2022    RBC 3.43 (L) 09/28/2022    HGB 10.8 (L) 09/28/2022    HCT 32.7 (L) 09/28/2022     09/28/2022      ECG: no prior ECG.     Assessment:      There are no hospital problems to display for this patient.      Plan:    paracentesis

## 2022-10-05 NOTE — H&P (VIEW-ONLY)
[facility]  History & Physical    Subjective:      Chief Complaint/Reason for Admission: Ascites    Korin Vivas is a 76 y.o. male.    Past Medical History:   Diagnosis Date    Arm fracture, right 2022, 2010    CAD (coronary artery disease)     Diabetes     GERD (gastroesophageal reflux disease)     HTN (hypertension)     Hyperlipidemia      Past Surgical History:   Procedure Laterality Date    CATARACT EXTRACTION      COLONOSCOPY      COLONOSCOPY N/A 7/18/2016    Procedure: COLONOSCOPY;  Surgeon: Bryon Hickey MD;  Location: [facility];  Service: Endoscopy;  Laterality: N/A;    COLONOSCOPY N/A 10/6/2020    Procedure: COLONOSCOPY;  Surgeon: Kait Isaacs MD;  Location: [facility];  Service: Endoscopy;  Laterality: N/A;    CORONARY STENT PLACEMENT      ELBOW SURGERY      ESOPHAGOGASTRODUODENOSCOPY N/A 10/6/2020    Procedure: ESOPHAGOGASTRODUODENOSCOPY (EGD);  Surgeon: Kait Isaacs MD;  Location: [facility];  Service: Endoscopy;  Laterality: N/A;    HERNIA REPAIR      2022    TONSILLECTOMY      VASECTOMY       Family History   Problem Relation Age of Onset    Colon cancer Brother     No Known Problems Mother     Heart disease Father      Social History     Tobacco Use    Smoking status: Former     Packs/day: 1.00     Years: 40.00     Pack years: 40.00     Types: Cigarettes    Smokeless tobacco: Never    Tobacco comments:     quit 3 years ago   Substance Use Topics    Alcohol use: No    Drug use: No       (Not in a hospital admission)    Review of patient's allergies indicates:   Allergen Reactions    Lipitor [atorvastatin] Other (See Comments)     Left arm/shooulder pain    Statins-hmg-coa reductase inhibitors Other (See Comments)     muscle aches, joint pain  Joint pain  Joint pain          Review of Systems   Constitutional: Negative.    Gastrointestinal: Negative.    Skin: Negative.      Objective:      Vital Signs (Most Recent)  Pulse: 70 (10/05/22 1425)  Resp: 16 (10/05/22  1425)  BP: (!) 119/58 (10/05/22 1425)  SpO2: 100 % (10/05/22 1425)    Vital Signs Range (Last 24H):  Pulse:  [70-72]   Resp:  [16]   BP: (114-125)/(57-64)   SpO2:  [100 %]     Physical Exam  Eyes:      Pupils: Pupils are equal, round, and reactive to light.   Pulmonary:      Effort: Pulmonary effort is normal.   Abdominal:      General: There is distension.       Data Review:  CBC:   Lab Results   Component Value Date    WBC 5.10 09/28/2022    RBC 3.43 (L) 09/28/2022    HGB 10.8 (L) 09/28/2022    HCT 32.7 (L) 09/28/2022     09/28/2022      ECG: no prior ECG.     Assessment:      There are no hospital problems to display for this patient.      Plan:    paracentesis

## 2022-10-06 LAB — PATH INTERP FLD-IMP: NORMAL

## 2022-10-10 LAB
BACTERIA FLD AEROBE CULT: NO GROWTH
GRAM STN SPEC: NORMAL
GRAM STN SPEC: NORMAL

## 2022-10-12 ENCOUNTER — HOSPITAL ENCOUNTER (OUTPATIENT)
Dept: RADIOLOGY | Facility: HOSPITAL | Age: 77
Discharge: HOME | End: 2022-10-12
Attending: INTERNAL MEDICINE
Payer: OTHER GOVERNMENT

## 2022-10-12 VITALS
WEIGHT: 174 LBS | OXYGEN SATURATION: 98 % | BODY MASS INDEX: 23.57 KG/M2 | DIASTOLIC BLOOD PRESSURE: 53 MMHG | RESPIRATION RATE: 17 BRPM | HEART RATE: 68 BPM | HEIGHT: 72 IN | SYSTOLIC BLOOD PRESSURE: 100 MMHG

## 2022-10-12 DIAGNOSIS — K74.60 LIVER CIRRHOSIS SECONDARY TO NASH: ICD-10-CM

## 2022-10-12 DIAGNOSIS — K75.81 LIVER CIRRHOSIS SECONDARY TO NASH: ICD-10-CM

## 2022-10-12 LAB
APPEARANCE FLD: NORMAL
BODY FLD TYPE: NORMAL
COLOR FLD: YELLOW
LYMPHOCYTES NFR FLD MANUAL: 13 %
MONOS+MACROS NFR FLD MANUAL: 83 %
NEUTROPHILS NFR FLD MANUAL: 4 %
WBC # FLD: 93 /CU MM

## 2022-10-12 PROCEDURE — P9047 ALBUMIN (HUMAN), 25%, 50ML: HCPCS | Mod: JG | Performed by: RADIOLOGY

## 2022-10-12 PROCEDURE — 49083 ABD PARACENTESIS W/IMAGING: CPT

## 2022-10-12 PROCEDURE — 89051 BODY FLUID CELL COUNT: CPT | Performed by: INTERNAL MEDICINE

## 2022-10-12 PROCEDURE — 63600175 PHARM REV CODE 636 W HCPCS: Mod: JG | Performed by: RADIOLOGY

## 2022-10-12 RX ORDER — ALBUMIN HUMAN 250 G/1000ML
50 SOLUTION INTRAVENOUS ONCE
Status: COMPLETED | OUTPATIENT
Start: 2022-10-12 | End: 2022-10-12

## 2022-10-12 RX ADMIN — ALBUMIN (HUMAN) 50 G: 25 SOLUTION INTRAVENOUS at 02:10

## 2022-10-12 NOTE — NURSING
Verbal discharge instructions given to patient including when to seek medical attention and site care. Pt declined paper AVS. Pt verbalized understanding. PIV safely discontinued. Dressing to procedure site remains CDI. Pt denies pain and NADN. VSS. Pt ambulated independently  to main entrance with all belongings. Pt was stable on discharge.

## 2022-10-12 NOTE — PLAN OF CARE
6.5 L Steff fluid drained from patient's abdomen on right side. Paracentesis catheter safely discontinued by Lizbeth LYN. Pressure held to site and gauze/tegaderm dressing applied to site. Pt tolerated well. VSS. Albumin given per protocol.

## 2022-10-13 LAB
BACTERIA SPEC ANAEROBE CULT: NORMAL
PATH INTERP FLD-IMP: NORMAL

## 2022-10-13 RX ORDER — HYDROXYZINE HYDROCHLORIDE 25 MG/1
25 TABLET, FILM COATED ORAL 3 TIMES DAILY PRN
Qty: 30 TABLET | Refills: 1 | Status: SHIPPED | OUTPATIENT
Start: 2022-10-13 | End: 2023-02-13

## 2022-10-13 NOTE — DISCHARGE SUMMARY
[facility]  Discharge Note  Short Stay    [facility]  [facility]  [facility]  [facility]      OUTCOME: Patient tolerated treatment/procedure well without complication and is now ready for discharge.    DISPOSITION: Home or Self Care    FINAL DIAGNOSIS:  <principal problem not specified>    FOLLOWUP: In clinic    DISCHARGE INSTRUCTIONS:  No discharge procedures on file.      Clinical Reference Documents Added to Patient Instructions         Document    ABDOMINAL PARACENTESIS DISCHARGE INSTRUCTIONS (ENGLISH)            TIME SPENT ON DISCHARGE: 15 minutes    Pre Op Diagnosis: ascites     Post Op Diagnosis: same    Procedure:  para     Procedure performed by: Maryjane PUCKETT, Kishor LOFTON     Written Informed Consent Obtained: Yes     Specimen Removed:  yes     Estimated Blood Loss:  minimal     Findings: Local anesthesia and moderate sedation were used.     The patient tolerated the procedure well and there were no complications.      Disposition:  F/U in clinic    Discharge instructions:  Light activity for 24 hours.  Remove band aid in 24 hours.  No baths (showers are appropriate).    F/U with ordering physician    Sterile technique was performed in the lower abdomen, lidocaine was used as a local anesthetic.   6.5 liters of suellen fluid removed for therapeutic purposes.  Pt tolerated the procedure well without immediate complications.  Please see radiologist report for details. F/u with PCP and/or ordering physician.

## 2022-10-19 ENCOUNTER — HOSPITAL ENCOUNTER (OUTPATIENT)
Dept: RADIOLOGY | Facility: HOSPITAL | Age: 77
Discharge: HOME | End: 2022-10-19
Attending: INTERNAL MEDICINE
Payer: OTHER GOVERNMENT

## 2022-10-19 VITALS
RESPIRATION RATE: 16 BRPM | BODY MASS INDEX: 23.57 KG/M2 | WEIGHT: 174 LBS | HEART RATE: 70 BPM | SYSTOLIC BLOOD PRESSURE: 109 MMHG | HEIGHT: 72 IN | DIASTOLIC BLOOD PRESSURE: 53 MMHG | OXYGEN SATURATION: 100 %

## 2022-10-19 DIAGNOSIS — K75.81 LIVER CIRRHOSIS SECONDARY TO NASH: ICD-10-CM

## 2022-10-19 DIAGNOSIS — K74.60 LIVER CIRRHOSIS SECONDARY TO NASH: ICD-10-CM

## 2022-10-19 LAB
APPEARANCE FLD: NORMAL
BASOPHILS NFR FLD MANUAL: 1 %
BODY FLD TYPE: NORMAL
COLOR FLD: YELLOW
LYMPHOCYTES NFR FLD MANUAL: 17 %
MONOS+MACROS NFR FLD MANUAL: 79 %
NEUTROPHILS NFR FLD MANUAL: 3 %
WBC # FLD: 129 /CU MM

## 2022-10-19 PROCEDURE — 63600175 PHARM REV CODE 636 W HCPCS: Mod: JG | Performed by: RADIOLOGY

## 2022-10-19 PROCEDURE — 49083 ABD PARACENTESIS W/IMAGING: CPT

## 2022-10-19 PROCEDURE — P9047 ALBUMIN (HUMAN), 25%, 50ML: HCPCS | Mod: JG | Performed by: RADIOLOGY

## 2022-10-19 PROCEDURE — 89051 BODY FLUID CELL COUNT: CPT | Performed by: INTERNAL MEDICINE

## 2022-10-19 RX ORDER — ALBUMIN HUMAN 250 G/1000ML
50 SOLUTION INTRAVENOUS ONCE
Status: COMPLETED | OUTPATIENT
Start: 2022-10-19 | End: 2022-10-19

## 2022-10-19 RX ADMIN — ALBUMIN (HUMAN) 50 G: 25 SOLUTION INTRAVENOUS at 01:10

## 2022-10-19 NOTE — NURSING
Verbal discharge instructions given to patient including when to seek medical attention and site care. Pt verbalized understanding. Pt declined paper AVS. PIV safely discontinued. Bandaid to procedure site remains CDI. Pt denies pain and NADN. VSS. Pt ambulateed to main entrance with all belongings. Pt was stable on discharge.

## 2022-10-19 NOTE — NURSING
Paracentesis catheter inserted by TALIA Iverson into pt's RUQ, where marked by US tech. Specimen collected and catheter connected to wall suction. VSS. Will continue to monitor.

## 2022-10-19 NOTE — NURSING
7 L light suellen fluid drained from patient's abdomen. Paracentesis catheter safely discontinued with tip intact. Pressure held to site and bandaid applied. Pt tolerated well. VSS. Albumin given per protocol.

## 2022-10-20 LAB — PATH INTERP FLD-IMP: NORMAL

## 2022-10-20 NOTE — DISCHARGE SUMMARY
[facility]  Discharge Note  Short Stay    [facility]  [facility]  [facility]  [facility]  [facility]      OUTCOME: Patient tolerated treatment/procedure well without complication and is now ready for discharge.    DISPOSITION: Home or Self Care    FINAL DIAGNOSIS:  <principal problem not specified>    FOLLOWUP: In clinic    DISCHARGE INSTRUCTIONS:  No discharge procedures on file.      Clinical Reference Documents Added to Patient Instructions         Document    ABDOMINAL PARACENTESIS DISCHARGE INSTRUCTIONS (ENGLISH)            TIME SPENT ON DISCHARGE: 15 minutes    Pre Op Diagnosis: ascites     Post Op Diagnosis: same    Procedure:  para     Procedure performed by: Maryjane PUCKETT, Kishor LOFTON     Written Informed Consent Obtained: Yes     Specimen Removed:  yes     Estimated Blood Loss:  minimal     Findings: Local anesthesia and moderate sedation were used.     The patient tolerated the procedure well and there were no complications.      Disposition:  F/U in clinic    Discharge instructions:  Light activity for 24 hours.  Remove band aid in 24 hours.  No baths (showers are appropriate).    F/U with ordering physician    Sterile technique was performed in the lower abdomen, lidocaine was used as a local anesthetic.   7 liters of light suellen fluid removed for therapeutic purposes.  Pt tolerated the procedure well without immediate complications.  Please see radiologist report for details. F/u with PCP and/or ordering physician.

## 2022-10-25 DIAGNOSIS — K74.60 LIVER CIRRHOSIS SECONDARY TO NASH: ICD-10-CM

## 2022-10-25 DIAGNOSIS — D68.9 COAGULATION DEFECT, UNSPECIFIED: Primary | ICD-10-CM

## 2022-10-25 DIAGNOSIS — K75.81 LIVER CIRRHOSIS SECONDARY TO NASH: ICD-10-CM

## 2022-10-27 DIAGNOSIS — K74.60 LIVER CIRRHOSIS SECONDARY TO NASH: ICD-10-CM

## 2022-10-27 DIAGNOSIS — K75.81 LIVER CIRRHOSIS SECONDARY TO NASH: ICD-10-CM

## 2022-10-27 DIAGNOSIS — D68.9 COAGULATION DEFECT, UNSPECIFIED: Primary | ICD-10-CM

## 2022-10-28 ENCOUNTER — HOSPITAL ENCOUNTER (OUTPATIENT)
Dept: RADIOLOGY | Facility: HOSPITAL | Age: 77
Discharge: HOME | End: 2022-10-28
Attending: INTERNAL MEDICINE
Payer: OTHER GOVERNMENT

## 2022-10-28 VITALS
DIASTOLIC BLOOD PRESSURE: 56 MMHG | WEIGHT: 174 LBS | HEART RATE: 68 BPM | RESPIRATION RATE: 16 BRPM | HEIGHT: 72 IN | SYSTOLIC BLOOD PRESSURE: 112 MMHG | OXYGEN SATURATION: 100 % | BODY MASS INDEX: 23.57 KG/M2

## 2022-10-28 DIAGNOSIS — K74.60 LIVER CIRRHOSIS SECONDARY TO NASH: ICD-10-CM

## 2022-10-28 DIAGNOSIS — K75.81 LIVER CIRRHOSIS SECONDARY TO NASH: ICD-10-CM

## 2022-10-28 LAB
APPEARANCE FLD: NORMAL
BODY FLD TYPE: NORMAL
COLOR FLD: YELLOW
EOSINOPHIL NFR FLD MANUAL: 1 %
LYMPHOCYTES NFR FLD MANUAL: 22 %
MONOS+MACROS NFR FLD MANUAL: 69 %
NEUTROPHILS NFR FLD MANUAL: 8 %
WBC # FLD: 141 /CU MM

## 2022-10-28 PROCEDURE — 63600175 PHARM REV CODE 636 W HCPCS: Mod: JG | Performed by: RADIOLOGY

## 2022-10-28 PROCEDURE — 89051 BODY FLUID CELL COUNT: CPT | Performed by: INTERNAL MEDICINE

## 2022-10-28 PROCEDURE — 49083 ABD PARACENTESIS W/IMAGING: CPT

## 2022-10-28 PROCEDURE — P9047 ALBUMIN (HUMAN), 25%, 50ML: HCPCS | Mod: JG | Performed by: RADIOLOGY

## 2022-10-28 RX ORDER — ALBUMIN HUMAN 250 G/1000ML
25 SOLUTION INTRAVENOUS ONCE
Status: COMPLETED | OUTPATIENT
Start: 2022-10-28 | End: 2022-10-28

## 2022-10-28 RX ADMIN — ALBUMIN (HUMAN) 25 G: 25 SOLUTION INTRAVENOUS at 02:10

## 2022-10-28 NOTE — NURSING
Verbal discharge instructions given to patient including when to seek medical attention and site care. Pt verbalized understanding. Pt declined paper AVS, cut sent electronically. PIV safely discontinued. Dressing to procedure site remains CDI. Pt denies pain and NADN. VSS. Pt escorted to waiting area with all belongings. Pt was stable on discharge.

## 2022-10-28 NOTE — NURSING
Paracentesis catheter safely discontinued with tip intact. Pressure held to site and gauze/bandaid applied. Pt tolerated well. 6.75 L light suellen fluid removed from patients abdomen. Albumin given per protocol.

## 2022-10-28 NOTE — NURSING
Paracentesis catheter safely inserted into left side of pt's abdomen by TALIA Iverson, where marked by US. Catheter connected to wall suction. Pt tolerated well. VSS. Will continue to monitor.

## 2022-10-28 NOTE — DISCHARGE SUMMARY
[facility] (Hospital)  Discharge Note  Short Stay    [facility]  [facility]  [facility]  [facility]  [facility]  [facility]      OUTCOME: Patient tolerated treatment/procedure well without complication and is now ready for discharge.    DISPOSITION: Home or Self Care    FINAL DIAGNOSIS:  <principal problem not specified>    FOLLOWUP: In clinic    DISCHARGE INSTRUCTIONS:  No discharge procedures on file.      Clinical Reference Documents Added to Patient Instructions         Document    ABDOMINAL PARACENTESIS DISCHARGE INSTRUCTIONS (ENGLISH)            Pre Op Diagnosis: ascites     Post Op Diagnosis: same    Procedure:  para     Procedure performed by: Maryjane PUCKETT, Kishor LOFTON     Written Informed Consent Obtained: Yes     Specimen Removed:  yes     Estimated Blood Loss:  minimal     Findings: Local anesthesia and moderate sedation were used.     The patient tolerated the procedure well and there were no complications.      Disposition:  F/U in clinic    Discharge instructions:  Light activity for 24 hours.  Remove band aid in 24 hours.  No baths (showers are appropriate).    F/U with ordering physician    Sterile technique was performed in the lower abdomen, lidocaine was used as a local anesthetic.   6.75 liters of suellen fluid removed for therapeutic purposes.  Pt tolerated the procedure well without immediate complications.  Please see radiologist report for details. F/u with PCP and/or ordering physician.

## 2022-10-31 LAB — PATH INTERP FLD-IMP: NORMAL

## 2022-11-10 ENCOUNTER — HOSPITAL ENCOUNTER (OUTPATIENT)
Dept: RADIOLOGY | Facility: HOSPITAL | Age: 77
Discharge: HOME | End: 2022-11-10
Attending: INTERNAL MEDICINE
Payer: OTHER GOVERNMENT

## 2022-11-10 VITALS
OXYGEN SATURATION: 100 % | SYSTOLIC BLOOD PRESSURE: 102 MMHG | DIASTOLIC BLOOD PRESSURE: 54 MMHG | BODY MASS INDEX: 23.57 KG/M2 | HEIGHT: 72 IN | WEIGHT: 174 LBS | RESPIRATION RATE: 18 BRPM | HEART RATE: 68 BPM

## 2022-11-10 DIAGNOSIS — K75.81 LIVER CIRRHOSIS SECONDARY TO NASH: ICD-10-CM

## 2022-11-10 DIAGNOSIS — K74.60 LIVER CIRRHOSIS SECONDARY TO NASH: ICD-10-CM

## 2022-11-10 LAB
APPEARANCE FLD: NORMAL
BODY FLD TYPE: NORMAL
COLOR FLD: YELLOW
LYMPHOCYTES NFR FLD MANUAL: 30 %
MESOTHL CELL NFR FLD MANUAL: 3 %
MONOS+MACROS NFR FLD MANUAL: 61 %
NEUTROPHILS NFR FLD MANUAL: 6 %
WBC # FLD: 71 /CU MM

## 2022-11-10 PROCEDURE — 63600175 PHARM REV CODE 636 W HCPCS: Mod: JG | Performed by: RADIOLOGY

## 2022-11-10 PROCEDURE — P9047 ALBUMIN (HUMAN), 25%, 50ML: HCPCS | Mod: JG | Performed by: RADIOLOGY

## 2022-11-10 PROCEDURE — 49083 ABD PARACENTESIS W/IMAGING: CPT

## 2022-11-10 PROCEDURE — 89051 BODY FLUID CELL COUNT: CPT | Performed by: INTERNAL MEDICINE

## 2022-11-10 RX ORDER — ALBUMIN HUMAN 250 G/1000ML
50 SOLUTION INTRAVENOUS ONCE
Status: COMPLETED | OUTPATIENT
Start: 2022-11-10 | End: 2022-11-10

## 2022-11-10 RX ADMIN — ALBUMIN (HUMAN) 50 G: 25 SOLUTION INTRAVENOUS at 04:11

## 2022-11-10 NOTE — NURSING
Verbal and written discharge instructions given to patient including when to seek medical attention and site care. Pt verbalized understanding. PIV safely discontinued. Dressing to procedure site remains CDI. Pt denies pain and NADN. VSS. Pt escorted by RN to main entrance with all belongings. Pt was stable on discharge.

## 2022-11-10 NOTE — NURSING
Paracentesis catheter inserted into right side of patient's abdomen by TALIA Iverson, where marked by US tech. Pt tolerated well.  Catheter connected to wall suction. VS taken and stable. Will continue to monitor.

## 2022-11-10 NOTE — NURSING
6.75 L suellen fluid drained from patient's abdomen. Paracentesis catheter safely discontinued with tip intact. Pressure held to site and bandaid applied. VSS and NADN. Albumin given per protocol. Will continue to monitor.

## 2022-11-11 LAB — PATH INTERP FLD-IMP: NORMAL

## 2022-11-14 ENCOUNTER — HOSPITAL ENCOUNTER (OUTPATIENT)
Facility: HOSPITAL | Age: 77
Discharge: HOME | End: 2022-11-15
Attending: EMERGENCY MEDICINE | Admitting: INTERNAL MEDICINE
Payer: OTHER GOVERNMENT

## 2022-11-14 DIAGNOSIS — K62.5 RECTAL BLEEDING: Primary | ICD-10-CM

## 2022-11-14 DIAGNOSIS — K74.60 HEPATIC CIRRHOSIS, UNSPECIFIED HEPATIC CIRRHOSIS TYPE, UNSPECIFIED WHETHER ASCITES PRESENT: ICD-10-CM

## 2022-11-14 PROBLEM — F32.A DEPRESSION: Status: ACTIVE | Noted: 2022-11-14

## 2022-11-14 PROBLEM — K42.9 UMBILICAL HERNIA WITHOUT OBSTRUCTION OR GANGRENE: Status: ACTIVE | Noted: 2022-11-14

## 2022-11-14 PROBLEM — H52.7 UNSPECIFIED DISORDER OF REFRACTION: Status: ACTIVE | Noted: 2022-11-14

## 2022-11-14 PROBLEM — E87.1 HYPO-OSMOLALITY AND HYPONATREMIA: Status: ACTIVE | Noted: 2022-11-14

## 2022-11-14 PROBLEM — E87.5 HYPERKALEMIA: Status: ACTIVE | Noted: 2022-11-14

## 2022-11-14 PROBLEM — E78.2 MIXED HYPERLIPIDEMIA: Status: ACTIVE | Noted: 2022-11-14

## 2022-11-14 PROBLEM — D62 ACUTE BLOOD LOSS ANEMIA: Status: ACTIVE | Noted: 2022-11-14

## 2022-11-14 PROBLEM — R19.7 DIARRHEA: Status: ACTIVE | Noted: 2022-11-14

## 2022-11-14 PROBLEM — E55.9 VITAMIN D DEFICIENCY: Status: ACTIVE | Noted: 2022-11-14

## 2022-11-14 PROBLEM — D69.6 THROMBOCYTOPENIC DISORDER: Status: ACTIVE | Noted: 2022-11-14

## 2022-11-14 PROBLEM — E11.9 TYPE 2 DIABETES MELLITUS WITHOUT RETINOPATHY: Status: ACTIVE | Noted: 2022-11-14

## 2022-11-14 PROBLEM — K21.9 GASTROESOPHAGEAL REFLUX DISEASE: Status: ACTIVE | Noted: 2022-11-14

## 2022-11-14 PROBLEM — R97.20 ELEVATED PSA: Status: ACTIVE | Noted: 2022-11-14

## 2022-11-14 PROBLEM — I10 HYPERTENSION: Status: ACTIVE | Noted: 2022-11-14

## 2022-11-14 PROBLEM — F43.12 POST-TRAUMATIC STRESS DISORDER, CHRONIC: Status: ACTIVE | Noted: 2022-11-14

## 2022-11-14 PROBLEM — R18.8 OTHER ASCITES: Status: ACTIVE | Noted: 2022-11-14

## 2022-11-14 PROBLEM — I11.9 HYPERTENSIVE HEART DISEASE WITHOUT HEART FAILURE: Status: ACTIVE | Noted: 2022-11-14

## 2022-11-14 PROBLEM — I25.10 CORONARY ARTERY DISEASE: Status: ACTIVE | Noted: 2022-11-14

## 2022-11-14 LAB
ALBUMIN SERPL BCP-MCNC: 2.8 G/DL (ref 3.5–5.2)
ALP SERPL-CCNC: 176 U/L (ref 55–135)
ALT SERPL W/O P-5'-P-CCNC: 26 U/L (ref 10–44)
ANION GAP SERPL CALC-SCNC: 7 MMOL/L (ref 8–16)
APTT BLDCRRT: 31.9 SEC (ref 21–32)
AST SERPL-CCNC: 36 U/L (ref 10–40)
BACTERIA #/AREA URNS HPF: ABNORMAL /HPF
BASOPHILS # BLD AUTO: 0.04 K/UL (ref 0–0.2)
BASOPHILS NFR BLD: 0.5 % (ref 0–1.9)
BILIRUB SERPL-MCNC: 0.4 MG/DL (ref 0.1–1)
BILIRUB UR QL STRIP: NEGATIVE
BUN SERPL-MCNC: 42 MG/DL (ref 8–23)
CALCIUM SERPL-MCNC: 8.9 MG/DL (ref 8.7–10.5)
CHLORIDE SERPL-SCNC: 107 MMOL/L (ref 95–110)
CLARITY UR: CLEAR
CO2 SERPL-SCNC: 14 MMOL/L (ref 23–29)
COLOR UR: YELLOW
CREAT SERPL-MCNC: 1.7 MG/DL (ref 0.5–1.4)
CTP QC/QA: YES
DIFFERENTIAL METHOD: ABNORMAL
EOSINOPHIL # BLD AUTO: 0.2 K/UL (ref 0–0.5)
EOSINOPHIL NFR BLD: 2.3 % (ref 0–8)
ERYTHROCYTE [DISTWIDTH] IN BLOOD BY AUTOMATED COUNT: 15.6 % (ref 11.5–14.5)
EST. GFR  (NO RACE VARIABLE): 42 ML/MIN/1.73 M^2
GLUCOSE SERPL-MCNC: 142 MG/DL (ref 70–110)
GLUCOSE UR QL STRIP: ABNORMAL
HCT VFR BLD AUTO: 26.3 % (ref 40–54)
HCT VFR BLD AUTO: 27 % (ref 40–54)
HGB BLD-MCNC: 8.6 G/DL (ref 14–18)
HGB BLD-MCNC: 8.6 G/DL (ref 14–18)
HGB UR QL STRIP: NEGATIVE
HYALINE CASTS #/AREA URNS LPF: 6 /LPF
IMM GRANULOCYTES # BLD AUTO: 0.21 K/UL (ref 0–0.04)
IMM GRANULOCYTES NFR BLD AUTO: 2.6 % (ref 0–0.5)
INR PPP: 1.5 (ref 0.8–1.2)
KETONES UR QL STRIP: NEGATIVE
LEUKOCYTE ESTERASE UR QL STRIP: NEGATIVE
LYMPHOCYTES # BLD AUTO: 0.5 K/UL (ref 1–4.8)
LYMPHOCYTES NFR BLD: 6.3 % (ref 18–48)
MCH RBC QN AUTO: 30.9 PG (ref 27–31)
MCHC RBC AUTO-ENTMCNC: 31.9 G/DL (ref 32–36)
MCV RBC AUTO: 97 FL (ref 82–98)
MICROSCOPIC COMMENT: ABNORMAL
MONOCYTES # BLD AUTO: 1.3 K/UL (ref 0.3–1)
MONOCYTES NFR BLD: 15.5 % (ref 4–15)
NEUTROPHILS # BLD AUTO: 6 K/UL (ref 1.8–7.7)
NEUTROPHILS NFR BLD: 72.8 % (ref 38–73)
NITRITE UR QL STRIP: NEGATIVE
NRBC BLD-RTO: 0 /100 WBC
PH UR STRIP: 6 [PH] (ref 5–8)
PLATELET # BLD AUTO: 168 K/UL (ref 150–450)
PMV BLD AUTO: 9.8 FL (ref 9.2–12.9)
POTASSIUM SERPL-SCNC: 5.6 MMOL/L (ref 3.5–5.1)
PROT SERPL-MCNC: 6.7 G/DL (ref 6–8.4)
PROT UR QL STRIP: ABNORMAL
PROTHROMBIN TIME: 15.8 SEC (ref 9–12.5)
RBC # BLD AUTO: 2.78 M/UL (ref 4.6–6.2)
SARS-COV-2 RDRP RESP QL NAA+PROBE: NEGATIVE
SODIUM SERPL-SCNC: 128 MMOL/L (ref 136–145)
SP GR UR STRIP: 1.02 (ref 1–1.03)
URN SPEC COLLECT METH UR: ABNORMAL
UROBILINOGEN UR STRIP-ACNC: NEGATIVE EU/DL
WBC # BLD AUTO: 8.21 K/UL (ref 3.9–12.7)
YEAST URNS QL MICRO: ABNORMAL

## 2022-11-14 PROCEDURE — 80053 COMPREHEN METABOLIC PANEL: CPT | Performed by: EMERGENCY MEDICINE

## 2022-11-14 PROCEDURE — 85025 COMPLETE CBC W/AUTO DIFF WBC: CPT | Performed by: EMERGENCY MEDICINE

## 2022-11-14 PROCEDURE — 25000003 PHARM REV CODE 250: Performed by: NURSE PRACTITIONER

## 2022-11-14 PROCEDURE — 99214 PR OFFICE/OUTPT VISIT, EST, LEVL IV, 30-39 MIN: ICD-10-PCS | Mod: ,,, | Performed by: INTERNAL MEDICINE

## 2022-11-14 PROCEDURE — 96360 HYDRATION IV INFUSION INIT: CPT

## 2022-11-14 PROCEDURE — 96361 HYDRATE IV INFUSION ADD-ON: CPT

## 2022-11-14 PROCEDURE — 87449 NOS EACH ORGANISM AG IA: CPT | Performed by: NURSE PRACTITIONER

## 2022-11-14 PROCEDURE — 85730 THROMBOPLASTIN TIME PARTIAL: CPT | Performed by: EMERGENCY MEDICINE

## 2022-11-14 PROCEDURE — 99285 EMERGENCY DEPT VISIT HI MDM: CPT | Mod: 25,CS

## 2022-11-14 PROCEDURE — 36415 COLL VENOUS BLD VENIPUNCTURE: CPT | Performed by: NURSE PRACTITIONER

## 2022-11-14 PROCEDURE — 85014 HEMATOCRIT: CPT | Mod: 59 | Performed by: NURSE PRACTITIONER

## 2022-11-14 PROCEDURE — 81000 URINALYSIS NONAUTO W/SCOPE: CPT | Performed by: EMERGENCY MEDICINE

## 2022-11-14 PROCEDURE — G0378 HOSPITAL OBSERVATION PER HR: HCPCS

## 2022-11-14 PROCEDURE — 85018 HEMOGLOBIN: CPT | Mod: 59 | Performed by: NURSE PRACTITIONER

## 2022-11-14 PROCEDURE — 85610 PROTHROMBIN TIME: CPT | Performed by: EMERGENCY MEDICINE

## 2022-11-14 PROCEDURE — 99214 OFFICE O/P EST MOD 30 MIN: CPT | Mod: ,,, | Performed by: INTERNAL MEDICINE

## 2022-11-14 PROCEDURE — 87635 SARS-COV-2 COVID-19 AMP PRB: CPT | Performed by: EMERGENCY MEDICINE

## 2022-11-14 PROCEDURE — 82272 OCCULT BLD FECES 1-3 TESTS: CPT | Performed by: NURSE PRACTITIONER

## 2022-11-14 RX ORDER — PROCHLORPERAZINE EDISYLATE 5 MG/ML
5 INJECTION INTRAMUSCULAR; INTRAVENOUS EVERY 6 HOURS PRN
Status: DISCONTINUED | OUTPATIENT
Start: 2022-11-14 | End: 2022-11-15 | Stop reason: HOSPADM

## 2022-11-14 RX ORDER — HYDROXYZINE HYDROCHLORIDE 25 MG/1
25 TABLET, FILM COATED ORAL 3 TIMES DAILY PRN
Status: DISCONTINUED | OUTPATIENT
Start: 2022-11-14 | End: 2022-11-15 | Stop reason: HOSPADM

## 2022-11-14 RX ORDER — SODIUM CHLORIDE 9 MG/ML
INJECTION, SOLUTION INTRAVENOUS CONTINUOUS
Status: DISCONTINUED | OUTPATIENT
Start: 2022-11-14 | End: 2022-11-15 | Stop reason: HOSPADM

## 2022-11-14 RX ORDER — SODIUM CHLORIDE 0.9 % (FLUSH) 0.9 %
10 SYRINGE (ML) INJECTION
Status: DISCONTINUED | OUTPATIENT
Start: 2022-11-14 | End: 2022-11-15 | Stop reason: HOSPADM

## 2022-11-14 RX ORDER — METOPROLOL TARTRATE 50 MG/1
50 TABLET ORAL 2 TIMES DAILY
Status: DISCONTINUED | OUTPATIENT
Start: 2022-11-14 | End: 2022-11-14

## 2022-11-14 RX ORDER — CARVEDILOL 6.25 MG/1
6.25 TABLET ORAL 2 TIMES DAILY
Status: DISCONTINUED | OUTPATIENT
Start: 2022-11-14 | End: 2022-11-15 | Stop reason: HOSPADM

## 2022-11-14 RX ORDER — LACTULOSE 10 G/15ML
20 SOLUTION ORAL 2 TIMES DAILY
Status: DISCONTINUED | OUTPATIENT
Start: 2022-11-14 | End: 2022-11-15 | Stop reason: HOSPADM

## 2022-11-14 RX ORDER — TRAZODONE HYDROCHLORIDE 100 MG/1
100 TABLET ORAL NIGHTLY
Status: DISCONTINUED | OUTPATIENT
Start: 2022-11-14 | End: 2022-11-15 | Stop reason: HOSPADM

## 2022-11-14 RX ORDER — EZETIMIBE 10 MG/1
10 TABLET ORAL DAILY
Status: DISCONTINUED | OUTPATIENT
Start: 2022-11-15 | End: 2022-11-15 | Stop reason: HOSPADM

## 2022-11-14 RX ORDER — FAMOTIDINE 20 MG/1
20 TABLET, FILM COATED ORAL DAILY
Status: DISCONTINUED | OUTPATIENT
Start: 2022-11-15 | End: 2022-11-15 | Stop reason: HOSPADM

## 2022-11-14 RX ORDER — FAMOTIDINE 20 MG/1
40 TABLET, FILM COATED ORAL DAILY
Status: DISCONTINUED | OUTPATIENT
Start: 2022-11-15 | End: 2022-11-14 | Stop reason: DRUGHIGH

## 2022-11-14 RX ORDER — FAMOTIDINE 40 MG/1
40 TABLET, FILM COATED ORAL DAILY
COMMUNITY

## 2022-11-14 RX ORDER — ONDANSETRON 2 MG/ML
4 INJECTION INTRAMUSCULAR; INTRAVENOUS EVERY 8 HOURS PRN
Status: DISCONTINUED | OUTPATIENT
Start: 2022-11-14 | End: 2022-11-15 | Stop reason: HOSPADM

## 2022-11-14 RX ADMIN — LACTULOSE 20 G: 20 SOLUTION ORAL at 09:11

## 2022-11-14 RX ADMIN — CARVEDILOL 6.25 MG: 6.25 TABLET, FILM COATED ORAL at 09:11

## 2022-11-14 RX ADMIN — SODIUM CHLORIDE: 0.9 INJECTION, SOLUTION INTRAVENOUS at 02:11

## 2022-11-14 RX ADMIN — TRAZODONE HYDROCHLORIDE 100 MG: 100 TABLET ORAL at 09:11

## 2022-11-14 NOTE — HPI
76 y.o. male patient with a PMHx of HTN, CAD, GERD, DM, non-alcoholic cirrhosis of the liver, who presents to the Emergency Department for evaluation of bright red rectal bleeding which onset last night. Endorses diarrhea daily, multiple BMs daily for the past 10 days. Pt is not on blood thinners. Symptoms are constant and moderate in severity. No mitigating or exacerbating factors reported. Associated sxs include diarrhea. Patient denies any fever, chills, nausea, vomiting, CP, SOB, lightheadedness, numbness, and all other sxs at this time. No prior Tx reported. Patient has frequent paracentesis, most recent was 11/10/22. In the ED, H/H: 8.6/27.0, INR: 1.5, Na: 128, K+: 5.6, BUN/Cr: 42/1.7, Alk Phos: 176. Patient is a full code, surrogate decision maker is his spouse, Jessica Aguilar. Placed in observation under the care of hospital medicine.

## 2022-11-14 NOTE — ASSESSMENT & PLAN NOTE
- Associated with main complaint of diarrhea, small amount of blood noticed today. ED rectal exam revealed this to be separate from stool, stool was brown. Suspect hemorrhoidal bleeding exacerbated from diarrhea  - Agree with stool studies to r/o infection. Will f/u results  - Would hold lactulose in the setting of diarrhea and instead place on to Xifaxan while in house (550 mg po BID) for HE  - Hb is 8.6 today from 9.8 two weeks ago but has slowly declined over the past few months from baseline of 12 which is concerning. Pending stool study results, patient may need colonoscopy for further evaluation. Also given his history of esophageal/? gastric varices would add on EGD at the same time for evaluation. He is due for surveillance EGD next month anyhow  - OK for clear liquid diet today  - Trend H&H and transfuse PRN to keep > 7  - PPI

## 2022-11-14 NOTE — ASSESSMENT & PLAN NOTE
Reports began overnight, diarrhea for the past 10 days    --Consult GI  --Monitor stools  --Vitals Q4H

## 2022-11-14 NOTE — PHARMACY MED REC
"Admission Medication History     The home medication history was taken by Diony Duff.    You may go to "Admission" then "Reconcile Home Medications" tabs to review and/or act upon these items.     The home medication list has been updated by the Pharmacy department.   Please read ALL comments highlighted in yellow.   Please address this information as you see fit.    Feel free to contact us if you have any questions or require assistance.      Medications listed below were obtained from: Patient/family and Analytic [employer]- [employer]  (Not in a hospital admission)      Diony Duff  [id number]    Current Outpatient Medications on File Prior to Encounter   Medication Sig Dispense Refill Last Dose    busPIRone (BUSPAR) 15 MG tablet Take 15 mg by mouth 2 (two) times daily as needed (anxiety).   11/13/2022    carvediloL (COREG) 6.25 MG tablet Take 6.25 mg by mouth 2 (two) times daily.   11/14/2022    empagliflozin (JARDIANCE) 25 mg tablet Take 1 tablet by mouth every morning.   11/14/2022    ezetimibe (ZETIA) 10 mg tablet Take 10 mg by mouth once daily.   11/14/2022    famotidine (PEPCID) 40 MG tablet Take 40 mg by mouth once daily.   11/14/2022    furosemide (LASIX) 40 MG tablet Take 1 tablet (40 mg total) by mouth once daily at 6am. 30 tablet 11 11/14/2022    hydrOXYzine HCL (ATARAX) 25 MG tablet Take 1 tablet (25 mg total) by mouth 3 (three) times daily as needed for Itching. 30 tablet 1 Past Week    lactulose (CHRONULAC) 10 gram/15 mL solution Take 30 mLs (20 g total) by mouth 2 (two) times daily. 250 mL 11 11/14/2022    lovastatin (MEVACOR) 40 MG tablet Take 40 mg by mouth every evening.   11/13/2022    metformin (GLUCOPHAGE) 1000 MG tablet Take 1,000 mg by mouth 2 (two) times daily with meals.   11/14/2022    metoprolol tartrate (LOPRESSOR) 50 MG tablet Take 50 mg by mouth 2 (two) times daily.   11/14/2022    multivitamin capsule Take 1 capsule by mouth once daily.   11/14/2022    spironolactone " (ALDACTONE) 100 MG tablet Take 1 tablet (100 mg total) by mouth 2 (two) times daily. 60 tablet 6 11/14/2022    trazodone (DESYREL) 100 MG tablet Take 100 mg by mouth every evening.   11/13/2022    lisinopril (PRINIVIL,ZESTRIL) 40 MG tablet Take 40 mg by mouth once daily.   Not taking                           .

## 2022-11-14 NOTE — PROGRESS NOTES
Pharmacist Renal Dose Adjustment Note    Korin Vivas is a 76 y.o. male being treated with the medication famotidine    Patient Data:    Vital Signs (Most Recent):  Temp: 97.8 °F (36.6 °C) (11/14/22 1358)  Pulse: (!) 59 (11/14/22 1358)  Resp: 18 (11/14/22 1358)  BP: (!) 106/55 (11/14/22 1358)  SpO2: 100 % (11/14/22 1358)   Vital Signs (72h Range):  Temp:  [97.4 °F (36.3 °C)-97.8 °F (36.6 °C)]   Pulse:  [55-65]   Resp:  [16-18]   BP: ()/(42-55)   SpO2:  [100 %]      Recent Labs   Lab 11/14/22  1115   CREATININE 1.7*     Serum creatinine: 1.7 mg/dL (H) 11/14/22 1115  Estimated creatinine clearance: 41.6 mL/min (A)    Medication:famotidine 40mg daily will be changed to famotidine 20mg daily for crcl <50ml/min    Pharmacist's Name: Lizbeth Mittal  Pharmacist's Extension: [phone]

## 2022-11-14 NOTE — H&P
[facility]  History & Physical    Patient Name: Korin Vivas  MRN: [id number]  Patient Class: OP- Observation  Admission Date: 11/14/2022  Attending Physician: Mike Bearden, *   Primary Care Provider: [facility]         Patient information was obtained from patient, spouse/SO, past medical records and ER records.     Subjective:     Principal Problem:Rectal bleeding    Chief Complaint:   Chief Complaint   Patient presents with    Rectal Bleeding     Pt has been having diarrhea for the last week and a half. Pt states last night after an episode of diarrhea he noted bright red blood in his stool. Hx of non alcoholic cirrhosis of the liver.          HPI: 76 y.o. male patient with a PMHx of HTN, CAD, GERD, DM, non-alcoholic cirrhosis of the liver, who presents to the Emergency Department for evaluation of bright red rectal bleeding which onset last night. Endorses diarrhea daily, multiple BMs daily for the past 10 days. Pt is not on blood thinners. Symptoms are constant and moderate in severity. No mitigating or exacerbating factors reported. Associated sxs include diarrhea. Patient denies any fever, chills, nausea, vomiting, CP, SOB, lightheadedness, numbness, and all other sxs at this time. No prior Tx reported. Patient has frequent paracentesis, most recent was 11/10/22. In the ED, H/H: 8.6/27.0, INR: 1.5, Na: 128, K+: 5.6, BUN/Cr: 42/1.7, Alk Phos: 176. Patient is a full code, surrogate decision maker is his spouse, Jessica Aguilar. Placed in observation under the care of [facility] medicine.       Past Medical History:   Diagnosis Date    Arm fracture, right 2022, 2010    CAD (coronary artery disease)     Diabetes     GERD (gastroesophageal reflux disease)     HTN (hypertension)     Hyperlipidemia        Past Surgical History:   Procedure Laterality Date    CATARACT EXTRACTION      COLONOSCOPY      COLONOSCOPY N/A 7/18/2016    Procedure:  COLONOSCOPY;  Surgeon: Bryon Hickey MD;  Location: [facility] ENDO;  Service: Endoscopy;  Laterality: N/A;    COLONOSCOPY N/A 10/6/2020    Procedure: COLONOSCOPY;  Surgeon: Kait Isaacs MD;  Location: [facility] ENDO;  Service: Endoscopy;  Laterality: N/A;    CORONARY STENT PLACEMENT      ELBOW SURGERY      ESOPHAGOGASTRODUODENOSCOPY N/A 10/6/2020    Procedure: ESOPHAGOGASTRODUODENOSCOPY (EGD);  Surgeon: Kait Isaacs MD;  Location: [facility] ENDO;  Service: Endoscopy;  Laterality: N/A;    HERNIA REPAIR      2022    TONSILLECTOMY      VASECTOMY         Review of patient's allergies indicates:   Allergen Reactions    Lipitor [atorvastatin] Other (See Comments)     Left arm/shooulder pain    Statins-hmg-coa reductase inhibitors Other (See Comments)     muscle aches, joint pain  Joint pain  Joint pain         No current facility-administered medications on file prior to encounter.     Current Outpatient Medications on File Prior to Encounter   Medication Sig    busPIRone (BUSPAR) 15 MG tablet Take 15 mg by mouth 2 (two) times daily as needed (anxiety).    carvediloL (COREG) 6.25 MG tablet Take 6.25 mg by mouth 2 (two) times daily.    empagliflozin (JARDIANCE) 25 mg tablet Take 1 tablet by mouth every morning.    ezetimibe (ZETIA) 10 mg tablet Take 10 mg by mouth once daily.    famotidine (PEPCID) 40 MG tablet Take 40 mg by mouth once daily.    furosemide (LASIX) 40 MG tablet Take 1 tablet (40 mg total) by mouth once daily at 6am.    hydrOXYzine HCL (ATARAX) 25 MG tablet Take 1 tablet (25 mg total) by mouth 3 (three) times daily as needed for Itching.    lactulose (CHRONULAC) 10 gram/15 mL solution Take 30 mLs (20 g total) by mouth 2 (two) times daily.    lovastatin (MEVACOR) 40 MG tablet Take 40 mg by mouth every evening.    metformin (GLUCOPHAGE) 1000 MG tablet Take 1,000 mg by mouth 2 (two) times daily with meals.    metoprolol tartrate (LOPRESSOR) 50 MG tablet Take 50 mg by mouth 2 (two) times daily.     multivitamin capsule Take 1 capsule by mouth once daily.    spironolactone (ALDACTONE) 100 MG tablet Take 1 tablet (100 mg total) by mouth 2 (two) times daily.    trazodone (DESYREL) 100 MG tablet Take 100 mg by mouth every evening.    lisinopril (PRINIVIL,ZESTRIL) 40 MG tablet Take 40 mg by mouth once daily.    [DISCONTINUED] BLOOD SUGAR DIAGNOSTIC (ACCU-CHEK SHANTELL PLUS TEST STRP MISC) by Misc.(Non-Drug; Combo Route) route.    [DISCONTINUED] ranitidine (ZANTAC) 75 MG tablet Take 75 mg by mouth 2 (two) times daily.     Family History       Problem Relation (Age of Onset)    Colon cancer Brother    Heart disease Father    No Known Problems Mother          Tobacco Use    Smoking status: Former     Packs/day: 1.00     Years: 40.00     Pack years: 40.00     Types: Cigarettes    Smokeless tobacco: Never    Tobacco comments:     quit 3 years ago   Substance and Sexual Activity    Alcohol use: No    Drug use: No    Sexual activity: Not on file     Review of Systems   Constitutional:  Positive for activity change, appetite change and fatigue. Negative for chills, diaphoresis, fever and unexpected weight change.   HENT:  Negative for congestion, hearing loss, nosebleeds, postnasal drip, rhinorrhea and trouble swallowing.    Eyes:  Negative for discharge and visual disturbance.   Respiratory:  Negative for cough, chest tightness and shortness of breath.    Cardiovascular:  Negative for chest pain, palpitations and leg swelling.   Gastrointestinal:  Positive for abdominal distention, anal bleeding, blood in stool and diarrhea. Negative for abdominal pain, constipation, nausea and vomiting.   Endocrine: Negative for cold intolerance and heat intolerance.   Genitourinary:  Negative for difficulty urinating, dysuria, frequency and hematuria.   Musculoskeletal:  Positive for arthralgias and back pain. Negative for gait problem and myalgias.   Skin: Negative.    Neurological:  Negative for dizziness, weakness,  light-headedness and headaches.   Hematological:  Negative for adenopathy. Does not bruise/bleed easily.   Psychiatric/Behavioral:  The patient is nervous/anxious.    Objective:     Vital Signs (Most Recent):  Temp: 97.8 °F (36.6 °C) (11/14/22 1358)  Pulse: (!) 59 (11/14/22 1358)  Resp: 18 (11/14/22 1358)  BP: (!) 106/55 (11/14/22 1358)  SpO2: 100 % (11/14/22 1358)   Vital Signs (24h Range):  Temp:  [97.4 °F (36.3 °C)-97.8 °F (36.6 °C)] 97.8 °F (36.6 °C)  Pulse:  [55-65] 59  Resp:  [16-18] 18  SpO2:  [100 %] 100 %  BP: ()/(42-55) 106/55     Weight: 77.2 kg (170 lb 3.1 oz)  Body mass index is 23.08 kg/m².    Physical Exam  Vitals and nursing note reviewed.   Constitutional:       General: He is not in acute distress.     Appearance: He is well-developed. He is ill-appearing. He is not diaphoretic.   HENT:      Head: Normocephalic and atraumatic.      Right Ear: Hearing and external ear normal.      Left Ear: Hearing and external ear normal.      Nose: Nose normal. No mucosal edema or rhinorrhea.      Mouth/Throat:      Pharynx: Uvula midline.   Eyes:      General:         Right eye: No discharge.         Left eye: No discharge.      Conjunctiva/sclera: Conjunctivae normal.      Right eye: No chemosis.     Left eye: No chemosis.     Pupils: Pupils are equal, round, and reactive to light.   Neck:      Thyroid: No thyroid mass or thyromegaly.      Trachea: Trachea normal.   Cardiovascular:      Rate and Rhythm: Normal rate and regular rhythm.      Pulses:           Dorsalis pedis pulses are 2+ on the right side and 2+ on the left side.      Heart sounds: Normal heart sounds. No murmur heard.  Pulmonary:      Effort: Pulmonary effort is normal. No respiratory distress.      Breath sounds: Normal breath sounds. No decreased breath sounds or wheezing.   Abdominal:      General: Bowel sounds are normal. There is distension (Mild).      Palpations: Abdomen is soft.      Tenderness: There is no abdominal tenderness.    Musculoskeletal:         General: Normal range of motion.      Cervical back: Normal range of motion and neck supple.   Lymphadenopathy:      Cervical: No cervical adenopathy.      Upper Body:      Right upper body: No supraclavicular adenopathy.      Left upper body: No supraclavicular adenopathy.   Skin:     General: Skin is warm and dry.      Capillary Refill: Capillary refill takes less than 2 seconds.      Coloration: Skin is pale.      Findings: No rash.   Neurological:      Mental Status: He is alert and oriented to person, place, and time.   Psychiatric:         Mood and Affect: Mood is not anxious.         Speech: Speech normal.         Behavior: Behavior normal.         Thought Content: Thought content normal.         Judgment: Judgment normal.         CRANIAL NERVES     CN III, IV, VI   Pupils are equal, round, and reactive to light.     Significant Labs: All pertinent labs within the past 24 hours have been reviewed.  CBC:   Recent Labs   Lab 11/14/22  1115   WBC 8.21   HGB 8.6*   HCT 27.0*        CMP:   Recent Labs   Lab 11/14/22  1115   *   K 5.6*      CO2 14*   *   BUN 42*   CREATININE 1.7*   CALCIUM 8.9   PROT 6.7   ALBUMIN 2.8*   BILITOT 0.4   ALKPHOS 176*   AST 36   ALT 26   ANIONGAP 7*       Significant Imaging: I have reviewed all pertinent imaging results/findings within the past 24 hours.    Assessment/Plan:     * Rectal bleeding  Reports began overnight, diarrhea for the past 10 days    --Consult GI  --Monitor stools  --Vitals Q4H        Diarrhea  Reports frequent diarrhea over the past 10 days    --Stool culture      Hyperkalemia  K+: 5.6 on admission, likely secondary to dehydration    --NS @ 100cc/hr overnight  --Repeat CMP in AM      Hypo-osmolality and hyponatremia  Chronic, baseline 133-134, Na: 128 on admission, likely worsened due to dehydration from diarrhea    --NS @ 100cc/hr overnight  --Repeat CMP in AM      Acute blood loss anemia  Secondary to rectal  bleeding    --Daily CBC  --Transfuse with 1 unit PRBC for Hgb <7.0 or <8.0 if symptomatic   --Hgb: 8.6, on admission, repeat H/H @1630      Hepatic cirrhosis  Seen by [facility], seeking living donor, has a donor. Has regular paracentesis, most recent 11/10/22, planned for next one 11/18/22.     MELD-Na score: 23 at 11/14/2022 11:15 AM  MELD score: 16 at 11/14/2022 11:15 AM  Calculated from:  Serum Creatinine: 1.7 mg/dL at 11/14/2022 11:15 AM  Serum Sodium: 128 mmol/L at 11/14/2022 11:15 AM  Total Bilirubin: 0.4 mg/dL (Using min of 1 mg/dL) at 11/14/2022 11:15 AM  INR(ratio): 1.5 at 11/14/2022 11:15 AM  Age: 76 years    --Consult GI  --Cautious Hydration    AVM (arteriovenous malformation) of colon  Per last colonoscopy, 10/6/20    --Monitor stools  --Monitor H/H      Secondary esophageal varices without bleeding  Continue famotidine        VTE Risk Mitigation (From admission, onward)         Ordered     Reason for No Pharmacological VTE Prophylaxis  Once        Question:  Reasons:  Answer:  Active Bleeding    11/14/22 1402     IP VTE HIGH RISK PATIENT  Once         11/14/22 1402     Place sequential compression device  Until discontinued         11/14/22 1402 April DHRUV Hampton NP  Department of Hospital Medicine   [facility] - Telemetry ([facility])

## 2022-11-14 NOTE — ASSESSMENT & PLAN NOTE
Seen by [facility] Hepatology, seeking living donor, has a donor. Has regular paracentesis, most recent 11/10/22, planned for next one 11/18/22.     MELD-Na score: 23 at 11/14/2022 11:15 AM  MELD score: 16 at 11/14/2022 11:15 AM  Calculated from:  Serum Creatinine: 1.7 mg/dL at 11/14/2022 11:15 AM  Serum Sodium: 128 mmol/L at 11/14/2022 11:15 AM  Total Bilirubin: 0.4 mg/dL (Using min of 1 mg/dL) at 11/14/2022 11:15 AM  INR(ratio): 1.5 at 11/14/2022 11:15 AM  Age: 76 years    --Consult GI  --Cautious Hydration

## 2022-11-14 NOTE — ASSESSMENT & PLAN NOTE
Secondary to rectal bleeding    --Daily CBC  --Transfuse with 1 unit PRBC for Hgb <7.0 or <8.0 if symptomatic   --Hgb: 8.6, on admission, repeat H/H @1630

## 2022-11-14 NOTE — HPI
Patient is a 75 y/o WM with PMH of MENDEZ cirrhosis who presents with complaints of diarrhea x the past 1-2 weeks and some BRBPR that was noted earlier today. Patient reports that he takes lactulose and ran out of medication. He then started onto a new prescription of lactulose and this gave him severe diarrhea. He states he has since stopped his lactulose over the past 5 days but continues to have diarrhea daily. Today, he noticed some red blood in his stool so he came into the ED for further evaluation. In the ED, VSS. Labs showed Hb of 8.6 from 9.8 two weeks ago and a baseline of 12 two months ago. He also had several electrolyte abnormalities with Na- 128, K- 5.6. Cr- 1.7, INR-1.5. Rectal exam in ED showed brown stool but there was some red blood to the side of it most suggestive of some hemorrhoidal bleeding. 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 Colonoscopy had poor prep, external hemorrhoids and a small polyp removed. Repeat scopes in 12/2021 showed small esophageal varices and polypoid lesion in stomach felt to be what was previously described as IGV2 gastric varices. Colonoscopy again had a poor prep. Patient denies any more bleeding episodes since presentation here. Patient placed into observation and GI consulted for further evaluation.

## 2022-11-14 NOTE — SUBJECTIVE & OBJECTIVE
Past Medical History:   Diagnosis Date    Arm fracture, right 2022, 2010    CAD (coronary artery disease)     Diabetes     GERD (gastroesophageal reflux disease)     HTN (hypertension)     Hyperlipidemia        Past Surgical History:   Procedure Laterality Date    CATARACT EXTRACTION      COLONOSCOPY      COLONOSCOPY N/A 7/18/2016    Procedure: COLONOSCOPY;  Surgeon: Bryon Hickey MD;  Location: [facility] ENDO;  Service: Endoscopy;  Laterality: N/A;    COLONOSCOPY N/A 10/6/2020    Procedure: COLONOSCOPY;  Surgeon: Kait Isaasc MD;  Location: [facility] ENDO;  Service: Endoscopy;  Laterality: N/A;    CORONARY STENT PLACEMENT      ELBOW SURGERY      ESOPHAGOGASTRODUODENOSCOPY N/A 10/6/2020    Procedure: ESOPHAGOGASTRODUODENOSCOPY (EGD);  Surgeon: Kait Isaacs MD;  Location: [facility] ENDO;  Service: Endoscopy;  Laterality: N/A;    HERNIA REPAIR      2022    TONSILLECTOMY      VASECTOMY         Review of patient's allergies indicates:   Allergen Reactions    Lipitor [atorvastatin] Other (See Comments)     Left arm/shooulder pain    Statins-hmg-coa reductase inhibitors Other (See Comments)     muscle aches, joint pain  Joint pain  Joint pain       Family History       Problem Relation (Age of Onset)    Colon cancer Brother    Heart disease Father    No Known Problems Mother          Tobacco Use    Smoking status: Former     Packs/day: 1.00     Years: 40.00     Pack years: 40.00     Types: Cigarettes    Smokeless tobacco: Never    Tobacco comments:     quit 3 years ago   Substance and Sexual Activity    Alcohol use: No    Drug use: No    Sexual activity: Not on file     Review of Systems   Constitutional:  Negative for appetite change, fever and unexpected weight change.   HENT:  Negative for sore throat and trouble swallowing.    Eyes:  Negative for visual disturbance.   Respiratory:  Negative for cough, shortness of breath and wheezing.    Cardiovascular:  Negative for chest pain, palpitations and leg swelling.    Gastrointestinal:  Positive for anal bleeding and diarrhea. Negative for abdominal pain, blood in stool, constipation, nausea and vomiting.   Genitourinary:  Negative for dysuria.   Musculoskeletal:  Negative for arthralgias and myalgias.   Skin:  Negative for color change, pallor and rash.   Neurological:  Negative for dizziness and weakness.   Hematological:  Negative for adenopathy.   Psychiatric/Behavioral:  Negative for agitation.    Objective:     Vital Signs (Most Recent):  Temp: 97.8 °F (36.6 °C) (11/14/22 1358)  Pulse: (!) 59 (11/14/22 1358)  Resp: 18 (11/14/22 1358)  BP: (!) 106/55 (11/14/22 1358)  SpO2: 100 % (11/14/22 1358)   Vital Signs (24h Range):  Temp:  [97.4 °F (36.3 °C)-97.8 °F (36.6 °C)] 97.8 °F (36.6 °C)  Pulse:  [55-65] 59  Resp:  [16-18] 18  SpO2:  [100 %] 100 %  BP: ()/(42-55) 106/55     Weight: 77.2 kg (170 lb 3.1 oz) (11/14/22 1358)  Body mass index is 23.08 kg/m².      Intake/Output Summary (Last 24 hours) at 11/14/2022 1650  Last data filed at 11/14/2022 1456  Gross per 24 hour   Intake --   Output 1 ml   Net -1 ml       Lines/Drains/Airways       Peripheral Intravenous Line  Duration                  Peripheral IV - Single Lumen 11/14/22 1114 20 G Right Forearm <1 day                    Physical Exam  Vitals reviewed.   Constitutional:       General: He is not in acute distress.     Appearance: He is not diaphoretic.   HENT:      Head: Normocephalic and atraumatic.      Mouth/Throat:      Pharynx: No oropharyngeal exudate.   Eyes:      General: No scleral icterus.        Right eye: No discharge.         Left eye: No discharge.      Conjunctiva/sclera: Conjunctivae normal.      Pupils: Pupils are equal, round, and reactive to light.   Cardiovascular:      Rate and Rhythm: Normal rate and regular rhythm.      Heart sounds: Normal heart sounds. No murmur heard.    No friction rub. No gallop.   Pulmonary:      Effort: Pulmonary effort is normal. No respiratory distress.      Breath  sounds: Normal breath sounds. No stridor. No wheezing or rales.   Abdominal:      General: Bowel sounds are normal. There is no distension.      Palpations: Abdomen is soft. There is no mass.      Tenderness: There is no abdominal tenderness. There is no guarding.   Musculoskeletal:         General: Normal range of motion.      Cervical back: Normal range of motion.   Skin:     General: Skin is warm and dry.      Coloration: Skin is not pale.      Findings: No erythema or rash.   Neurological:      Mental Status: He is alert and oriented to person, place, and time.       Significant Labs:  All pertinent lab results from the last 24 hours have been reviewed.    Significant Imaging:  Imaging results within the past 24 hours have been reviewed.

## 2022-11-14 NOTE — ASSESSMENT & PLAN NOTE
Chronic, baseline 133-134, Na: 128 on admission, likely worsened due to dehydration from diarrhea    --NS @ 100cc/hr overnight  --Repeat CMP in AM

## 2022-11-14 NOTE — PLAN OF CARE
Patient and family updated on plan of care. Admit from ED; report received from Nelia LYN. Instructed patient to use call light for assistance, call light in reach. Hourly rounding performed, bed locked, side rails up, personal items within reach, and in low position. Patient requests bed alarm to be off; education on falls provided. Contact precaution initiated for C-diff rule out; stool samples pending.

## 2022-11-14 NOTE — ASSESSMENT & PLAN NOTE
- MELD-Na score is 23 today  - Followed by [facility] transplant team. He was denied by [facility] transplant service due to severity of comorbidities, per patient

## 2022-11-14 NOTE — CONSULTS
'[facility] [place] [facility]\"")  Gastroenterology  Consult Note    Patient Name: Korin Vivas  MRN: [id number]  Admission Date: 11/14/2022  Hospital Length of Stay: 0 days  Code Status: Full Code   Attending Provider: Mike Bearden, *   Consulting Provider: Nicolasa Daniel MD  Primary Care Physician: [facility]  Principal Problem:Rectal bleeding    Inpatient consult to Gastroenterology  Consult performed by: Nicolasa Daniel MD  Consult ordered by: Heide Hampton NP  Reason for consult: Diarrhea, Rectal Bleeding        Subjective:     HPI:  Patient is a 75 y/o WM with PMH of MENDEZ cirrhosis who presents with complaints of diarrhea x the past 1-2 weeks and some BRBPR that was noted earlier today. Patient reports that he takes lactulose and ran out of medication. He then started onto a new prescription of lactulose and this gave him severe diarrhea. He states he has since stopped his lactulose over the past 5 days but continues to have diarrhea daily. Today, he noticed some red blood in his stool so he came into the ED for further evaluation. In the ED, VSS. Labs showed Hb of 8.6 from 9.8 two weeks ago and a baseline of 12 two months ago. He also had several electrolyte abnormalities with Na- 128, K- 5.6. Cr- 1.7, INR-1.5. Rectal exam in ED showed brown stool but there was some red blood to the side of it most suggestive of some hemorrhoidal bleeding. 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 Colonoscopy had poor prep, external hemorrhoids and a small polyp removed. Repeat scopes in 12/2021 showed small esophageal varices and polypoid lesion in stomach felt to be what was previously described as IGV2 gastric varices. Colonoscopy again had a poor prep. Patient denies any more bleeding episodes since presentation here. Patient placed into observation and GI consulted for further evaluation.       Past Medical History:   Diagnosis  Date    Arm fracture, right 2022, 2010    CAD (coronary artery disease)     Diabetes     GERD (gastroesophageal reflux disease)     HTN (hypertension)     Hyperlipidemia        Past Surgical History:   Procedure Laterality Date    CATARACT EXTRACTION      COLONOSCOPY      COLONOSCOPY N/A 7/18/2016    Procedure: COLONOSCOPY;  Surgeon: Bryon Hickey MD;  Location: [facility] ENDO;  Service: Endoscopy;  Laterality: N/A;    COLONOSCOPY N/A 10/6/2020    Procedure: COLONOSCOPY;  Surgeon: Kait Isaacs MD;  Location: [facility] ENDO;  Service: Endoscopy;  Laterality: N/A;    CORONARY STENT PLACEMENT      ELBOW SURGERY      ESOPHAGOGASTRODUODENOSCOPY N/A 10/6/2020    Procedure: ESOPHAGOGASTRODUODENOSCOPY (EGD);  Surgeon: Kait Isaacs MD;  Location: [facility] ENDO;  Service: Endoscopy;  Laterality: N/A;    HERNIA REPAIR      2022    TONSILLECTOMY      VASECTOMY         Review of patient's allergies indicates:   Allergen Reactions    Lipitor [atorvastatin] Other (See Comments)     Left arm/shooulder pain    Statins-hmg-coa reductase inhibitors Other (See Comments)     muscle aches, joint pain  Joint pain  Joint pain       Family History       Problem Relation (Age of Onset)    Colon cancer Brother    Heart disease Father    No Known Problems Mother          Tobacco Use    Smoking status: Former     Packs/day: 1.00     Years: 40.00     Pack years: 40.00     Types: Cigarettes    Smokeless tobacco: Never    Tobacco comments:     quit 3 years ago   Substance and Sexual Activity    Alcohol use: No    Drug use: No    Sexual activity: Not on file     Review of Systems   Constitutional:  Negative for appetite change, fever and unexpected weight change.   HENT:  Negative for sore throat and trouble swallowing.    Eyes:  Negative for visual disturbance.   Respiratory:  Negative for cough, shortness of breath and wheezing.    Cardiovascular:  Negative for chest pain, palpitations and leg swelling.   Gastrointestinal:   Positive for anal bleeding and diarrhea. Negative for abdominal pain, blood in stool, constipation, nausea and vomiting.   Genitourinary:  Negative for dysuria.   Musculoskeletal:  Negative for arthralgias and myalgias.   Skin:  Negative for color change, pallor and rash.   Neurological:  Negative for dizziness and weakness.   Hematological:  Negative for adenopathy.   Psychiatric/Behavioral:  Negative for agitation.    Objective:     Vital Signs (Most Recent):  Temp: 97.8 °F (36.6 °C) (11/14/22 1358)  Pulse: (!) 59 (11/14/22 1358)  Resp: 18 (11/14/22 1358)  BP: (!) 106/55 (11/14/22 1358)  SpO2: 100 % (11/14/22 1358)   Vital Signs (24h Range):  Temp:  [97.4 °F (36.3 °C)-97.8 °F (36.6 °C)] 97.8 °F (36.6 °C)  Pulse:  [55-65] 59  Resp:  [16-18] 18  SpO2:  [100 %] 100 %  BP: ()/(42-55) 106/55     Weight: 77.2 kg (170 lb 3.1 oz) (11/14/22 1358)  Body mass index is 23.08 kg/m².      Intake/Output Summary (Last 24 hours) at 11/14/2022 1650  Last data filed at 11/14/2022 1456  Gross per 24 hour   Intake --   Output 1 ml   Net -1 ml       Lines/Drains/Airways       Peripheral Intravenous Line  Duration                  Peripheral IV - Single Lumen 11/14/22 1114 20 G Right Forearm <1 day                    Physical Exam  Vitals reviewed.   Constitutional:       General: He is not in acute distress.     Appearance: He is not diaphoretic.   HENT:      Head: Normocephalic and atraumatic.      Mouth/Throat:      Pharynx: No oropharyngeal exudate.   Eyes:      General: No scleral icterus.        Right eye: No discharge.         Left eye: No discharge.      Conjunctiva/sclera: Conjunctivae normal.      Pupils: Pupils are equal, round, and reactive to light.   Cardiovascular:      Rate and Rhythm: Normal rate and regular rhythm.      Heart sounds: Normal heart sounds. No murmur heard.    No friction rub. No gallop.   Pulmonary:      Effort: Pulmonary effort is normal. No respiratory distress.      Breath sounds: Normal breath  sounds. No stridor. No wheezing or rales.   Abdominal:      General: Bowel sounds are normal. There is no distension.      Palpations: Abdomen is soft. There is no mass.      Tenderness: There is no abdominal tenderness. There is no guarding.   Musculoskeletal:         General: Normal range of motion.      Cervical back: Normal range of motion.   Skin:     General: Skin is warm and dry.      Coloration: Skin is not pale.      Findings: No erythema or rash.   Neurological:      Mental Status: He is alert and oriented to person, place, and time.       Significant Labs:  All pertinent lab results from the last 24 hours have been reviewed.    Significant Imaging:  Imaging results within the past 24 hours have been reviewed.    Assessment/Plan:     * Rectal bleeding  - Associated with main complaint of diarrhea, small amount of blood noticed today. ED rectal exam revealed this to be separate from stool, stool was brown. Suspect hemorrhoidal bleeding exacerbated from diarrhea  - Agree with stool studies to r/o infection. Will f/u results  - Would hold lactulose in the setting of diarrhea and instead place on to Xifaxan while in house (550 mg po BID) for HE  - Hb is 8.6 today from 9.8 two weeks ago but has slowly declined over the past few months from baseline of 12 which is concerning. Pending stool study results, patient may need colonoscopy for further evaluation. Also given his history of esophageal/? gastric varices would add on EGD at the same time for evaluation. He is due for surveillance EGD next month anyhow  - OK for clear liquid diet today  - Trend H&H and transfuse PRN to keep > 7  - PPI    Hepatic cirrhosis  - MELD-Na score is 23 today  - Followed by [facility] transplant team. He was denied by [facility] transplant service due to severity of comorbidities, per patient        Thank you for your consult. I will follow-up with patient. Please contact us if you have any additional questions.    Nicolasa Daniel  MD  Gastroenterology  [facility] ([facility]

## 2022-11-14 NOTE — ED PROVIDER NOTES
SCRIBE #1 NOTE: I, Namrata Thomson, am scribing for, and in the presence of, Pedro Nuñze MD. I have scribed the entire note.       History     Chief Complaint   Patient presents with    Rectal Bleeding     Pt has been having diarrhea for the last week and a half. Pt states last night after an episode of diarrhea he noted bright red blood in his stool. Hx of non alcoholic cirrhosis of the liver.       Review of patient's allergies indicates:   Allergen Reactions    Lipitor [atorvastatin] Other (See Comments)     Left arm/shooulder pain    Statins-hmg-coa reductase inhibitors Other (See Comments)     muscle aches, joint pain  Joint pain  Joint pain           History of Present Illness     HPI    11/14/2022, 10:43 AM  History obtained from the patient      History of Present Illness: Korin Vivas is a 76 y.o. male patient with a PMHx of HTN, CAD, GERD, DM, non-alcoholic perosis of the liver, who presents to the Emergency Department for evaluation of bright red rectal bleeding which onset last night. Pt is not on blood thinners. Symptoms are constant and moderate in severity. No mitigating or exacerbating factors reported. Associated sxs include diarrhea. Patient denies any fever, chills, nausea, vomiting, CP, SOB, lightheadedness, numbness, and all other sxs at this time. No prior Tx reported. No further complaints or concerns at this time.       Arrival mode: Personal vehicle    PCP: [facility]        Past Medical History:  Past Medical History:   Diagnosis Date    Arm fracture, right 2022, 2010    CAD (coronary artery disease)     Diabetes     GERD (gastroesophageal reflux disease)     HTN (hypertension)     Hyperlipidemia        Past Surgical History:  Past Surgical History:   Procedure Laterality Date    CATARACT EXTRACTION      COLONOSCOPY      COLONOSCOPY N/A 7/18/2016    Procedure: COLONOSCOPY;  Surgeon: Bryon Hickey MD;  Location: [facility];  Service: Endoscopy;  Laterality: N/A;     COLONOSCOPY N/A 10/6/2020    Procedure: COLONOSCOPY;  Surgeon: Kait Isaacs MD;  Location: [facility];  Service: Endoscopy;  Laterality: N/A;    CORONARY STENT PLACEMENT      ELBOW SURGERY      ESOPHAGOGASTRODUODENOSCOPY N/A 10/6/2020    Procedure: ESOPHAGOGASTRODUODENOSCOPY (EGD);  Surgeon: Kait Isaacs MD;  Location: [facility];  Service: Endoscopy;  Laterality: N/A;    HERNIA REPAIR      2022    TONSILLECTOMY      VASECTOMY           Family History:  Family History   Problem Relation Age of Onset    Colon cancer Brother     No Known Problems Mother     Heart disease Father        Social History:  Social History     Tobacco Use    Smoking status: Former     Packs/day: 1.00     Years: 40.00     Pack years: 40.00     Types: Cigarettes    Smokeless tobacco: Never    Tobacco comments:     quit 3 years ago   Substance and Sexual Activity    Alcohol use: No    Drug use: No    Sexual activity: Not on file        Review of Systems     Review of Systems   Constitutional:  Negative for chills and fever.   HENT:  Negative for sore throat.    Respiratory:  Negative for shortness of breath.    Cardiovascular:  Negative for chest pain.   Gastrointestinal:  Positive for blood in stool (bright red) and diarrhea. Negative for nausea and vomiting.   Genitourinary:  Negative for dysuria.   Musculoskeletal:  Negative for back pain.   Skin:  Negative for rash.   Neurological:  Negative for weakness, light-headedness and numbness.   Hematological:  Does not bruise/bleed easily.   All other systems reviewed and are negative.     Physical Exam     Initial Vitals [11/14/22 1034]   BP Pulse Resp Temp SpO2   (!) 89/42 60 16 97.4 °F (36.3 °C) 100 %      MAP       --          Physical Exam  Nursing Notes and Vital Signs Reviewed.  Constitutional: Patient is in no acute distress. Well-developed and well-nourished.  Head: Atraumatic. Normocephalic.  Eyes: PERRL. EOM intact. Conjunctivae are not pale. No scleral icterus.  ENT: Mucous  membranes are moist. Oropharynx is clear and symmetric.    Neck: Supple. Full ROM.   Cardiovascular: Regular rate. Regular rhythm. No murmurs, rubs, or gallops. Distal pulses are 2+ and symmetric.  Pulmonary/Chest: No respiratory distress. Clear to auscultation bilaterally. No wheezing or rales.  Abdominal: Soft and non-distended.  There is no tenderness.  No rebound, guarding, or rigidity.  Musculoskeletal: Moves all extremities. No obvious deformities. No edema. No calf tenderness.  Skin: Warm and dry.  Neurological:  Alert, awake, and appropriate.  Normal speech.  No acute focal neurological deficits are appreciated.  Psychiatric: Normal affect. Good eye contact. Appropriate in content.     ED Course   Procedures  ED Vital Signs:  Vitals:    11/14/22 1034 11/14/22 1036 11/14/22 1050 11/14/22 1052   BP: (!) 89/42 (!) 100/42 (!) 106/53 (!) 111/53   Pulse: 60  (!) 55 62   Resp: 16      Temp: 97.4 °F (36.3 °C)      TempSrc: Oral      SpO2: 100%      Weight: 75.8 kg (167 lb 1.7 oz)       11/14/22 1054 11/14/22 1240 11/14/22 1358   BP: (!) 102/50  (!) 106/55   Pulse: 65 63 (!) 59   Resp:  17 18   Temp:   97.8 °F (36.6 °C)   TempSrc:   Oral   SpO2:  100% 100%   Weight:   77.2 kg (170 lb 3.1 oz)       Abnormal Lab Results:  Labs Reviewed   CBC W/ AUTO DIFFERENTIAL - Abnormal; Notable for the following components:       Result Value    RBC 2.78 (*)     Hemoglobin 8.6 (*)     Hematocrit 27.0 (*)     MCHC 31.9 (*)     RDW 15.6 (*)     Immature Granulocytes 2.6 (*)     Immature Grans (Abs) 0.21 (*)     Lymph # 0.5 (*)     Mono # 1.3 (*)     Lymph % 6.3 (*)     Mono % 15.5 (*)     All other components within normal limits   COMPREHENSIVE METABOLIC PANEL - Abnormal; Notable for the following components:    Sodium 128 (*)     Potassium 5.6 (*)     CO2 14 (*)     Glucose 142 (*)     BUN 42 (*)     Creatinine 1.7 (*)     Albumin 2.8 (*)     Alkaline Phosphatase 176 (*)     Anion Gap 7 (*)     eGFR 42 (*)     All other components  within normal limits   URINALYSIS, REFLEX TO URINE CULTURE - Abnormal; Notable for the following components:    Protein, UA Trace (*)     Glucose, UA 4+ (*)     All other components within normal limits    Narrative:     Specimen Source->Urine   PROTIME-INR - Abnormal; Notable for the following components:    Prothrombin Time 15.8 (*)     INR 1.5 (*)     All other components within normal limits   URINALYSIS MICROSCOPIC - Abnormal; Notable for the following components:    Hyaline Casts, UA 6 (*)     All other components within normal limits    Narrative:     Specimen Source->Urine   APTT   SARS-COV-2 RDRP GENE        All Lab Results:  Results for orders placed or performed during the hospital encounter of 11/14/22   CBC Auto Differential   Result Value Ref Range    WBC 8.21 3.90 - 12.70 K/uL    RBC 2.78 (L) 4.60 - 6.20 M/uL    Hemoglobin 8.6 (L) 14.0 - 18.0 g/dL    Hematocrit 27.0 (L) 40.0 - 54.0 %    MCV 97 82 - 98 fL    MCH 30.9 27.0 - 31.0 pg    MCHC 31.9 (L) 32.0 - 36.0 g/dL    RDW 15.6 (H) 11.5 - 14.5 %    Platelets 168 150 - 450 K/uL    MPV 9.8 9.2 - 12.9 fL    Immature Granulocytes 2.6 (H) 0.0 - 0.5 %    Gran # (ANC) 6.0 1.8 - 7.7 K/uL    Immature Grans (Abs) 0.21 (H) 0.00 - 0.04 K/uL    Lymph # 0.5 (L) 1.0 - 4.8 K/uL    Mono # 1.3 (H) 0.3 - 1.0 K/uL    Eos # 0.2 0.0 - 0.5 K/uL    Baso # 0.04 0.00 - 0.20 K/uL    nRBC 0 0 /100 WBC    Gran % 72.8 38.0 - 73.0 %    Lymph % 6.3 (L) 18.0 - 48.0 %    Mono % 15.5 (H) 4.0 - 15.0 %    Eosinophil % 2.3 0.0 - 8.0 %    Basophil % 0.5 0.0 - 1.9 %    Differential Method Automated    Comprehensive Metabolic Panel   Result Value Ref Range    Sodium 128 (L) 136 - 145 mmol/L    Potassium 5.6 (H) 3.5 - 5.1 mmol/L    Chloride 107 95 - 110 mmol/L    CO2 14 (L) 23 - 29 mmol/L    Glucose 142 (H) 70 - 110 mg/dL    BUN 42 (H) 8 - 23 mg/dL    Creatinine 1.7 (H) 0.5 - 1.4 mg/dL    Calcium 8.9 8.7 - 10.5 mg/dL    Total Protein 6.7 6.0 - 8.4 g/dL    Albumin 2.8 (L) 3.5 - 5.2 g/dL    Total  Bilirubin 0.4 0.1 - 1.0 mg/dL    Alkaline Phosphatase 176 (H) 55 - 135 U/L    AST 36 10 - 40 U/L    ALT 26 10 - 44 U/L    Anion Gap 7 (L) 8 - 16 mmol/L    eGFR 42 (A) >60 mL/min/1.73 m^2   Urinalysis, Reflex to Urine Culture Urine, Clean Catch    Specimen: Urine   Result Value Ref Range    Specimen UA Urine, Clean Catch     Color, UA Yellow Yellow, Straw, Steff    Appearance, UA Clear Clear    pH, UA 6.0 5.0 - 8.0    Specific Gravity, UA 1.020 1.005 - 1.030    Protein, UA Trace (A) Negative    Glucose, UA 4+ (A) Negative    Ketones, UA Negative Negative    Bilirubin (UA) Negative Negative    Occult Blood UA Negative Negative    Nitrite, UA Negative Negative    Urobilinogen, UA Negative <2.0 EU/dL    Leukocytes, UA Negative Negative   APTT   Result Value Ref Range    aPTT 31.9 21.0 - 32.0 sec   Protime-INR   Result Value Ref Range    Prothrombin Time 15.8 (H) 9.0 - 12.5 sec    INR 1.5 (H) 0.8 - 1.2   Urinalysis Microscopic   Result Value Ref Range    Bacteria None None-Occ /hpf    Yeast, UA None None    Hyaline Casts, UA 6 (A) 0-1/lpf /lpf    Microscopic Comment SEE COMMENT    POCT COVID-19 Rapid Screening   Result Value Ref Range    POC Rapid COVID Negative Negative     Acceptable Yes               The Emergency Provider reviewed the vital signs and test results, which are outlined above.     ED Discussion       1:25 PM: Discussed case with Bucky Holbrook NP ([facility]). Dr. Mary agrees with current care and management of pt and accepts admission.   Admitting Service: [facility]  Admitting Physician: Dr. Mary  Admit to: [facility]    1:25 PM: Re-evaluated pt. I have discussed test results, shared treatment plan, and the need for admission with patient and family at bedside. Pt and family express understanding at this time and agree with all information. All questions answered. Pt and family have no further questions or concerns at this time. Pt is ready for admit.            Medical Decision Making:   Clinical Tests:   Lab Tests: Ordered and Reviewed         ED Medication(s):  Medications   busPIRone tablet 15 mg (has no administration in time range)   carvediloL tablet 6.25 mg (has no administration in time range)   ezetimibe tablet 10 mg (has no administration in time range)   hydrOXYzine HCL tablet 25 mg (has no administration in time range)   lactulose 20 gram/30 mL solution Soln 20 g (has no administration in time range)   metoprolol tartrate (LOPRESSOR) tablet 50 mg (has no administration in time range)   multivitamin tablet (has no administration in time range)   traZODone tablet 100 mg (has no administration in time range)   sodium chloride 0.9% flush 10 mL (has no administration in time range)   ondansetron injection 4 mg (has no administration in time range)   prochlorperazine injection Soln 5 mg (has no administration in time range)   famotidine tablet 20 mg (has no administration in time range)       Current Discharge Medication List                  Scribe Attestation:   Scribe #1: I performed the above scribed service and the documentation accurately describes the services I performed. I attest to the accuracy of the note.     Attending:   Physician Attestation Statement for Scribe #1: I, Pedro Nuñez MD, personally performed the services described in this documentation, as scribed by Namrata Thomson, in my presence, and it is both accurate and complete.           Clinical Impression       ICD-10-CM ICD-9-CM   1. Rectal bleeding  K62.5 569.3   2. Hepatic cirrhosis, unspecified hepatic cirrhosis type, unspecified whether ascites present  K74.60 571.5       Disposition:   Disposition: Placed in Observation  Condition: Fair       Pedro Nuñez MD  11/14/22 8451

## 2022-11-14 NOTE — Clinical Note
Diagnosis: Rectal bleeding [[id number]]   Future Attending Provider: SANDY DOTSON [[id number]]   Admitting Provider:: SANDY DOTSON [[id number]]

## 2022-11-14 NOTE — ASSESSMENT & PLAN NOTE
K+: 5.6 on admission, likely secondary to dehydration    --NS @ 100cc/hr overnight  --Repeat CMP in AM

## 2022-11-15 VITALS
DIASTOLIC BLOOD PRESSURE: 50 MMHG | HEIGHT: 72 IN | OXYGEN SATURATION: 99 % | WEIGHT: 170 LBS | TEMPERATURE: 99 F | HEART RATE: 64 BPM | BODY MASS INDEX: 23.03 KG/M2 | SYSTOLIC BLOOD PRESSURE: 96 MMHG | RESPIRATION RATE: 18 BRPM

## 2022-11-15 LAB
ALBUMIN SERPL BCP-MCNC: 2.5 G/DL (ref 3.5–5.2)
ALP SERPL-CCNC: 153 U/L (ref 55–135)
ALT SERPL W/O P-5'-P-CCNC: 21 U/L (ref 10–44)
ANION GAP SERPL CALC-SCNC: 6 MMOL/L (ref 8–16)
AST SERPL-CCNC: 29 U/L (ref 10–40)
BASOPHILS # BLD AUTO: 0.03 K/UL (ref 0–0.2)
BASOPHILS NFR BLD: 0.6 % (ref 0–1.9)
BILIRUB SERPL-MCNC: 0.6 MG/DL (ref 0.1–1)
BUN SERPL-MCNC: 36 MG/DL (ref 8–23)
C DIFF GDH STL QL: NEGATIVE
C DIFF TOX A+B STL QL IA: NEGATIVE
CALCIUM SERPL-MCNC: 8 MG/DL (ref 8.7–10.5)
CHLORIDE SERPL-SCNC: 113 MMOL/L (ref 95–110)
CO2 SERPL-SCNC: 14 MMOL/L (ref 23–29)
CREAT SERPL-MCNC: 1.4 MG/DL (ref 0.5–1.4)
DIFFERENTIAL METHOD: ABNORMAL
EOSINOPHIL # BLD AUTO: 0.2 K/UL (ref 0–0.5)
EOSINOPHIL NFR BLD: 3.7 % (ref 0–8)
ERYTHROCYTE [DISTWIDTH] IN BLOOD BY AUTOMATED COUNT: 15.6 % (ref 11.5–14.5)
EST. GFR  (NO RACE VARIABLE): 52 ML/MIN/1.73 M^2
GLUCOSE SERPL-MCNC: 97 MG/DL (ref 70–110)
HCT VFR BLD AUTO: 23.9 % (ref 40–54)
HGB BLD-MCNC: 7.7 G/DL (ref 14–18)
IMM GRANULOCYTES # BLD AUTO: 0.1 K/UL (ref 0–0.04)
IMM GRANULOCYTES NFR BLD AUTO: 2 % (ref 0–0.5)
LYMPHOCYTES # BLD AUTO: 0.4 K/UL (ref 1–4.8)
LYMPHOCYTES NFR BLD: 7.1 % (ref 18–48)
MCH RBC QN AUTO: 31.7 PG (ref 27–31)
MCHC RBC AUTO-ENTMCNC: 32.2 G/DL (ref 32–36)
MCV RBC AUTO: 98 FL (ref 82–98)
MONOCYTES # BLD AUTO: 0.9 K/UL (ref 0.3–1)
MONOCYTES NFR BLD: 17.5 % (ref 4–15)
NEUTROPHILS # BLD AUTO: 3.5 K/UL (ref 1.8–7.7)
NEUTROPHILS NFR BLD: 69.1 % (ref 38–73)
NRBC BLD-RTO: 0 /100 WBC
OB PNL STL: NEGATIVE
PLATELET # BLD AUTO: 139 K/UL (ref 150–450)
PMV BLD AUTO: 10 FL (ref 9.2–12.9)
POCT GLUCOSE: 157 MG/DL (ref 70–110)
POTASSIUM SERPL-SCNC: 5 MMOL/L (ref 3.5–5.1)
PROT SERPL-MCNC: 5.7 G/DL (ref 6–8.4)
RBC # BLD AUTO: 2.43 M/UL (ref 4.6–6.2)
SODIUM SERPL-SCNC: 133 MMOL/L (ref 136–145)
WBC # BLD AUTO: 5.1 K/UL (ref 3.9–12.7)
WBC #/AREA STL HPF: NORMAL /[HPF]

## 2022-11-15 PROCEDURE — 25000003 PHARM REV CODE 250: Performed by: INTERNAL MEDICINE

## 2022-11-15 PROCEDURE — 99214 OFFICE O/P EST MOD 30 MIN: CPT | Mod: ,,, | Performed by: PHYSICIAN ASSISTANT

## 2022-11-15 PROCEDURE — 80053 COMPREHEN METABOLIC PANEL: CPT | Performed by: NURSE PRACTITIONER

## 2022-11-15 PROCEDURE — 25000003 PHARM REV CODE 250: Performed by: NURSE PRACTITIONER

## 2022-11-15 PROCEDURE — 87209 SMEAR COMPLEX STAIN: CPT | Performed by: NURSE PRACTITIONER

## 2022-11-15 PROCEDURE — 96361 HYDRATE IV INFUSION ADD-ON: CPT

## 2022-11-15 PROCEDURE — 85025 COMPLETE CBC W/AUTO DIFF WBC: CPT | Performed by: NURSE PRACTITIONER

## 2022-11-15 PROCEDURE — 89055 LEUKOCYTE ASSESSMENT FECAL: CPT | Performed by: NURSE PRACTITIONER

## 2022-11-15 PROCEDURE — 99214 PR OFFICE/OUTPT VISIT, EST, LEVL IV, 30-39 MIN: ICD-10-PCS | Mod: ,,, | Performed by: PHYSICIAN ASSISTANT

## 2022-11-15 PROCEDURE — G0378 HOSPITAL OBSERVATION PER HR: HCPCS

## 2022-11-15 PROCEDURE — 36415 COLL VENOUS BLD VENIPUNCTURE: CPT | Performed by: NURSE PRACTITIONER

## 2022-11-15 RX ADMIN — CARVEDILOL 6.25 MG: 6.25 TABLET, FILM COATED ORAL at 09:11

## 2022-11-15 RX ADMIN — FAMOTIDINE 20 MG: 20 TABLET ORAL at 09:11

## 2022-11-15 RX ADMIN — THERA TABS 1 TABLET: TAB at 09:11

## 2022-11-15 RX ADMIN — EZETIMIBE 10 MG: 10 TABLET ORAL at 09:11

## 2022-11-15 NOTE — ASSESSMENT & PLAN NOTE
- Patient was started on lactulose yesterday evening. Discussed goal of 3 BMs per day and role of titration.

## 2022-11-15 NOTE — PLAN OF CARE
[facility] Tele[facility] (Hospital)  Discharge Final Note    Primary Care Provider: [facility]    Expected Discharge Date: 11/15/2022    Final Discharge Note (most recent)       Final Note - 11/15/22 1417          Final Note    Assessment Type Discharge Planning Assessment     Anticipated Discharge Disposition Home or Self Care        Post-Acute Status    Discharge Delays None known at this time                     Important Message from Medicare             Contact Info       [facility]   Specialty: Behavioral Health, Psychiatry, Psychology   Relationship: PCP - General    [address]  [place] [address]       Next Steps: Schedule an appointment as soon as possible for a visit in 1 week(s)          [facility] up.

## 2022-11-15 NOTE — H&P (VIEW-ONLY)
[facility] ([facility])  Gastroenterology  Progress Note    Patient Name: Korin Vivas  MRN: [id number]  Admission Date: 11/14/2022  Hospital Length of Stay: 0 days  Code Status: Full Code   Attending Provider: Mike Bearden, *  Consulting Provider: Tod Mondragon PA-C  Primary Care Physician: [facility]  Principal Problem: Rectal bleeding      Subjective:     Interval History: The patient is feeling ok. He reports having a small loose BM this morning. Denies blood. No nausea, vomiting or abdominal pain. Hgb down a little but possibly related to hydration.     Review of Systems   Constitutional:         See Interval History for daily ROS.    Objective:     Vital Signs (Most Recent):  Temp: 98.5 °F (36.9 °C) (11/15/22 1141)  Pulse: 64 (11/15/22 1141)  Resp: 18 (11/15/22 1141)  BP: (!) 96/50 (11/15/22 1141)  SpO2: 99 % (11/15/22 1141)   Vital Signs (24h Range):  Temp:  [97.8 °F (36.6 °C)-99.1 °F (37.3 °C)] 98.5 °F (36.9 °C)  Pulse:  [59-91] 64  Resp:  [18-20] 18  SpO2:  [99 %-100 %] 99 %  BP: ()/(50-56) 96/50     Weight: 77.1 kg (169 lb 15.6 oz) (11/14/22 2358)  Body mass index is 23.05 kg/m².      Intake/Output Summary (Last 24 hours) at 11/15/2022 1357  Last data filed at 11/15/2022 1115  Gross per 24 hour   Intake 2193.12 ml   Output 301 ml   Net 1892.12 ml       Lines/Drains/Airways       None                   Physical Exam  Constitutional:       General: He is not in acute distress.     Appearance: He is well-developed. He is not diaphoretic.   HENT:      Head: Normocephalic and atraumatic.   Eyes:      Extraocular Movements: Extraocular movements intact.   Cardiovascular:      Rate and Rhythm: Normal rate and regular rhythm.   Pulmonary:      Effort: Pulmonary effort is normal. No respiratory distress.      Breath sounds: Normal breath sounds. No wheezing.   Abdominal:      General: Bowel sounds are normal. There is no distension.      Palpations: Abdomen is soft.       Tenderness: There is no abdominal tenderness.   Neurological:      Mental Status: He is alert and oriented to person, place, and time.      Cranial Nerves: No cranial nerve deficit.   Psychiatric:         Behavior: Behavior normal.       Significant Labs:  CBC:   Recent Labs   Lab 11/14/22  1115 11/14/22  1538 11/15/22  0551   WBC 8.21  --  5.10   HGB 8.6* 8.6* 7.7*   HCT 27.0* 26.3* 23.9*     --  139*     CMP:   Recent Labs   Lab 11/15/22  0551   GLU 97   CALCIUM 8.0*   ALBUMIN 2.5*   PROT 5.7*   *   K 5.0   CO2 14*   *   BUN 36*   CREATININE 1.4   ALKPHOS 153*   ALT 21   AST 29   BILITOT 0.6     Coagulation:   Recent Labs   Lab 11/14/22  1115   INR 1.5*   APTT 31.9         Significant Imaging:  Imaging results within the past 24 hours have been reviewed.    Assessment/Plan:     * Rectal bleeding  - Associated with main complaint of diarrhea, small amount of blood noticed yesterday. ED rectal exam revealed this to be separate from stool, stool was brown. Suspect hemorrhoidal bleeding exacerbated from diarrhea  - Patient without GI symptoms or bleeding today. Hgb down a little, likely related to hydration. Patient desires to go home. He is currently stable so GI ok with D/c. He will keep his appt on Monday as scheduled. Counseled about returning to ER if he develops worsening bleeding or symptoms. His wife reported understanding.   - Consideration should be given to outpatient colonoscopy at the time of his repeat EGD.    Diarrhea  - Patient was started on lactulose yesterday evening. Discussed goal of 3 BMs per day and role of titration.     Hepatic cirrhosis  MELD decreased from 23 - 17 after hydration.   Followed by [facility] transplant team. He has an appt on Monday.           Thank you for your consult. I will sign off. Please contact us if you have any additional questions.    Tod Mondragon PA-C  Gastroenterology  [facility] ([facility])

## 2022-11-15 NOTE — ASSESSMENT & PLAN NOTE
- Associated with main complaint of diarrhea, small amount of blood noticed yesterday. ED rectal exam revealed this to be separate from stool, stool was brown. Suspect hemorrhoidal bleeding exacerbated from diarrhea  - Patient without GI symptoms or bleeding today. Hgb down a little, likely related to hydration. Patient desires to go home. He is currently stable so GI ok with D/c. He will keep his appt on Monday as scheduled. Counseled about returning to ER if he develops worsening bleeding or symptoms. His wife reported understanding.   - Consideration should be given to outpatient colonoscopy at the time of his repeat EGD.

## 2022-11-15 NOTE — ASSESSMENT & PLAN NOTE
Chronic, baseline 133-134, Na: 128 on admission, likely worsened due to dehydration from diarrhea    --Improved, at baseline

## 2022-11-15 NOTE — SUBJECTIVE & OBJECTIVE
Subjective:     Interval History: The patient is feeling ok. He reports having a small loose BM this morning. Denies blood. No nausea, vomiting or abdominal pain. Hgb down a little but possibly related to hydration.     Review of Systems   Constitutional:         See Interval History for daily ROS.    Objective:     Vital Signs (Most Recent):  Temp: 98.5 °F (36.9 °C) (11/15/22 1141)  Pulse: 64 (11/15/22 1141)  Resp: 18 (11/15/22 1141)  BP: (!) 96/50 (11/15/22 1141)  SpO2: 99 % (11/15/22 1141)   Vital Signs (24h Range):  Temp:  [97.8 °F (36.6 °C)-99.1 °F (37.3 °C)] 98.5 °F (36.9 °C)  Pulse:  [59-91] 64  Resp:  [18-20] 18  SpO2:  [99 %-100 %] 99 %  BP: ()/(50-56) 96/50     Weight: 77.1 kg (169 lb 15.6 oz) (11/14/22 2358)  Body mass index is 23.05 kg/m².      Intake/Output Summary (Last 24 hours) at 11/15/2022 1357  Last data filed at 11/15/2022 1115  Gross per 24 hour   Intake 2193.12 ml   Output 301 ml   Net 1892.12 ml       Lines/Drains/Airways       None                   Physical Exam  Constitutional:       General: He is not in acute distress.     Appearance: He is well-developed. He is not diaphoretic.   HENT:      Head: Normocephalic and atraumatic.   Eyes:      Extraocular Movements: Extraocular movements intact.   Cardiovascular:      Rate and Rhythm: Normal rate and regular rhythm.   Pulmonary:      Effort: Pulmonary effort is normal. No respiratory distress.      Breath sounds: Normal breath sounds. No wheezing.   Abdominal:      General: Bowel sounds are normal. There is no distension.      Palpations: Abdomen is soft.      Tenderness: There is no abdominal tenderness.   Neurological:      Mental Status: He is alert and oriented to person, place, and time.      Cranial Nerves: No cranial nerve deficit.   Psychiatric:         Behavior: Behavior normal.       Significant Labs:  CBC:   Recent Labs   Lab 11/14/22  1115 11/14/22  1538 11/15/22  0551   WBC 8.21  --  5.10   HGB 8.6* 8.6* 7.7*   HCT 27.0*  26.3* 23.9*     --  139*     CMP:   Recent Labs   Lab 11/15/22  0551   GLU 97   CALCIUM 8.0*   ALBUMIN 2.5*   PROT 5.7*   *   K 5.0   CO2 14*   *   BUN 36*   CREATININE 1.4   ALKPHOS 153*   ALT 21   AST 29   BILITOT 0.6     Coagulation:   Recent Labs   Lab 11/14/22  1115   INR 1.5*   APTT 31.9         Significant Imaging:  Imaging results within the past 24 hours have been reviewed.

## 2022-11-15 NOTE — H&P (VIEW-ONLY)
[facility] ([facility])  Gastroenterology  Progress Note    Patient Name: Korin Vivas  MRN: [id number]  Admission Date: 11/14/2022  Hospital Length of Stay: 0 days  Code Status: Full Code   Attending Provider: Mike Bearden, *  Consulting Provider: Tod Mondragon PA-C  Primary Care Physician: [facility]  Principal Problem: Rectal bleeding      Subjective:     Interval History: The patient is feeling ok. He reports having a small loose BM this morning. Denies blood. No nausea, vomiting or abdominal pain. Hgb down a little but possibly related to hydration.     Review of Systems   Constitutional:         See Interval History for daily ROS.    Objective:     Vital Signs (Most Recent):  Temp: 98.5 °F (36.9 °C) (11/15/22 1141)  Pulse: 64 (11/15/22 1141)  Resp: 18 (11/15/22 1141)  BP: (!) 96/50 (11/15/22 1141)  SpO2: 99 % (11/15/22 1141)   Vital Signs (24h Range):  Temp:  [97.8 °F (36.6 °C)-99.1 °F (37.3 °C)] 98.5 °F (36.9 °C)  Pulse:  [59-91] 64  Resp:  [18-20] 18  SpO2:  [99 %-100 %] 99 %  BP: ()/(50-56) 96/50     Weight: 77.1 kg (169 lb 15.6 oz) (11/14/22 2358)  Body mass index is 23.05 kg/m².      Intake/Output Summary (Last 24 hours) at 11/15/2022 1357  Last data filed at 11/15/2022 1115  Gross per 24 hour   Intake 2193.12 ml   Output 301 ml   Net 1892.12 ml       Lines/Drains/Airways       None                   Physical Exam  Constitutional:       General: He is not in acute distress.     Appearance: He is well-developed. He is not diaphoretic.   HENT:      Head: Normocephalic and atraumatic.   Eyes:      Extraocular Movements: Extraocular movements intact.   Cardiovascular:      Rate and Rhythm: Normal rate and regular rhythm.   Pulmonary:      Effort: Pulmonary effort is normal. No respiratory distress.      Breath sounds: Normal breath sounds. No wheezing.   Abdominal:      General: Bowel sounds are normal. There is no distension.      Palpations: Abdomen is soft.       Tenderness: There is no abdominal tenderness.   Neurological:      Mental Status: He is alert and oriented to person, place, and time.      Cranial Nerves: No cranial nerve deficit.   Psychiatric:         Behavior: Behavior normal.       Significant Labs:  CBC:   Recent Labs   Lab 11/14/22  1115 11/14/22  1538 11/15/22  0551   WBC 8.21  --  5.10   HGB 8.6* 8.6* 7.7*   HCT 27.0* 26.3* 23.9*     --  139*     CMP:   Recent Labs   Lab 11/15/22  0551   GLU 97   CALCIUM 8.0*   ALBUMIN 2.5*   PROT 5.7*   *   K 5.0   CO2 14*   *   BUN 36*   CREATININE 1.4   ALKPHOS 153*   ALT 21   AST 29   BILITOT 0.6     Coagulation:   Recent Labs   Lab 11/14/22  1115   INR 1.5*   APTT 31.9         Significant Imaging:  Imaging results within the past 24 hours have been reviewed.    Assessment/Plan:     * Rectal bleeding  - Associated with main complaint of diarrhea, small amount of blood noticed yesterday. ED rectal exam revealed this to be separate from stool, stool was brown. Suspect hemorrhoidal bleeding exacerbated from diarrhea  - Patient without GI symptoms or bleeding today. Hgb down a little, likely related to hydration. Patient desires to go home. He is currently stable so GI ok with D/c. He will keep his appt on Monday as scheduled. Counseled about returning to ER if he develops worsening bleeding or symptoms. His wife reported understanding.   - Consideration should be given to outpatient colonoscopy at the time of his repeat EGD.    Diarrhea  - Patient was started on lactulose yesterday evening. Discussed goal of 3 BMs per day and role of titration.     Hepatic cirrhosis  MELD decreased from 23 - 17 after hydration.   Followed by [facility] transplant team. He has an appt on Monday.           Thank you for your consult. I will sign off. Please contact us if you have any additional questions.    Tod Mondragon PA-C  Gastroenterology  [facility] ([facility])

## 2022-11-15 NOTE — DISCHARGE INSTRUCTIONS
Please keep all scheduled appointments, and continue to follow-up with your Hepatologist at [facility].

## 2022-11-15 NOTE — PLAN OF CARE
[facility] Tele[facility] (Hospital)  Discharge Assessment    Primary Care Provider: [facility]     Discharge Assessment (most recent)       BRIEF DISCHARGE ASSESSMENT - 11/15/22 1036          Discharge Planning    Assessment Type Discharge Planning Brief Assessment     Resource/Environmental Concerns none     Support Systems Spouse/significant other     Equipment Currently Used at Home none     Current Living Arrangements home/apartment/condo     Patient/Family Anticipates Transition to home     Patient/Family Anticipated Services at Transition none     DME Needed Upon Discharge  none     Discharge Plan A Home        Physical Activity    On average, how many days per week do you engage in moderate to strenuous exercise (like a brisk walk)? 0 days     On average, how many minutes do you engage in exercise at this level? 0 min

## 2022-11-15 NOTE — HOSPITAL COURSE
77 year old male, Hx: Liver Cirrhosis, AVM's, Ascites, HTN, presented to the ED with c/o diarrhea for 10 days and new onset rectal bleeding. Stool cultures collected: negative for C. Diff, occult blood. Hgb stable during stay, did not require blood transfusion. Patient did not have any further episodes of blood in stool since admission. Evaluated by Gastroenterology, no plans for further intervention at this time, patient is planned for EGD next month, GI plans to add a colonoscopy. Patient is to continue to follow-up with Hepatologist at [facility],and keep appointment for paracentesis on Friday.

## 2022-11-15 NOTE — ASSESSMENT & PLAN NOTE
MELD decreased from 23 - 17 after hydration.   Followed by [facility] transplant team. He has an appt on Monday.

## 2022-11-15 NOTE — DISCHARGE SUMMARY
[facility])  [facility]  Discharge Summary      Patient Name: Korin Vivas  MRN: [id number]  [facility]: [id number]  Patient Class: OP- Observation  Admission Date: 11/14/2022  Hospital Length of Stay: 0 days  Discharge Date and Time:  11/15/2022 1:31 PM  Attending Physician: Mike Bearden, *   Discharging Provider: Heide Hampton NP  Primary Care Provider: [facility] Dept    Primary Care Team: Networked reference to record PCT     HPI:   76 y.o. male patient with a PMHx of HTN, CAD, GERD, DM, non-alcoholic cirrhosis of the liver, who presents to the Emergency Department for evaluation of bright red rectal bleeding which onset last night. Endorses diarrhea daily, multiple BMs daily for the past 10 days. Pt is not on blood thinners. Symptoms are constant and moderate in severity. No mitigating or exacerbating factors reported. Associated sxs include diarrhea. Patient denies any fever, chills, nausea, vomiting, CP, SOB, lightheadedness, numbness, and all other sxs at this time. No prior Tx reported. Patient has frequent paracentesis, most recent was 11/10/22. In the ED, H/H: 8.6/27.0, INR: 1.5, Na: 128, K+: 5.6, BUN/Cr: 42/1.7, Alk Phos: 176. Patient is a full code, surrogate decision maker is his spouse, Jessica Aguilar. Placed in observation under the care of hospital medicine.       * No surgery found *      Hospital Course:   77 year old male, Hx: Liver Cirrhosis, AVM's, Ascites, HTN, presented to the ED with c/o diarrhea for 10 days and new onset rectal bleeding. Stool cultures collected: negative for C. Diff, occult blood. Hgb stable during stay, did not require blood transfusion. Patient did not have any further episodes of blood in stool since admission. Evaluated by Gastroenterology, no plans for further intervention at this time, patient is planned for EGD next month, GI plans to add a colonoscopy. Patient is to continue to follow-up with Hepatologist at [facility],and keep  appointment for paracentesis on Friday.          Goals of Care Treatment Preferences:  Code Status: Full Code      Consults:   Consults (From admission, onward)        Status Ordering Provider     Inpatient consult to Gastroenterology  Once        Provider:  Nicolasa Daniel MD    Completed LUIS, APRIL J.          * Rectal bleeding  Reports began overnight, diarrhea for the past 10 days    --improved, no further episodes      Diarrhea  Reports frequent diarrhea over the past 10 days    --Stool culture: negative  --Improved      Hyperkalemia  K+: 5.6 on admission, likely secondary to dehydration    --Improved    Hypo-osmolality and hyponatremia  Chronic, baseline 133-134, Na: 128 on admission, likely worsened due to dehydration from diarrhea    --Improved, at baseline    Acute blood loss anemia  Secondary to rectal bleeding    --Stable, no indication for transfusion    Hepatic cirrhosis  Seen by [facility] Hepatology, seeking living donor, has a donor. Has regular paracentesis, most recent 11/10/22, planned for next one 11/18/22.     MELD-Na score: 17 at 11/15/2022  5:51 AM  MELD score: 14 at 11/15/2022  5:51 AM  Calculated from:  Serum Creatinine: 1.4 mg/dL at 11/15/2022  5:51 AM  Serum Sodium: 133 mmol/L at 11/15/2022  5:51 AM  Total Bilirubin: 0.6 mg/dL (Using min of 1 mg/dL) at 11/15/2022  5:51 AM  INR(ratio): 1.5 at 11/14/2022 11:15 AM  Age: 77 years    --Follow up with hepatologist      AVM (arteriovenous malformation) of colon  Per last colonoscopy, 10/6/20    --GI plans to add colonoscopy to already planned EGD next month    Secondary esophageal varices without bleeding  Continue famotidine        Final Active Diagnoses:    Diagnosis Date Noted POA    PRINCIPAL PROBLEM:  Rectal bleeding [K62.5] 11/14/2022 Yes    Hepatic cirrhosis [K74.60] 11/14/2022 Yes    Acute blood loss anemia [D62] 11/14/2022 Yes    Hypo-osmolality and hyponatremia [E87.1] 11/14/2022 Yes    Hyperkalemia [E87.5] 11/14/2022 Yes     Diarrhea [R19.7] 11/14/2022 Yes    AVM (arteriovenous malformation) of colon [K55.20] 10/06/2020 Yes    Secondary esophageal varices without bleeding [I85.10] 10/06/2020 Yes      Problems Resolved During this Admission:       Discharged Condition: stable    Disposition: Home or Self Care    Follow Up:   Follow-up Information     [facility] Dept Follow up in 3 day(s).    Specialties: Behavioral Health, Psychiatry, Psychology  Contact information:  [address]  [place] [address]                       Patient Instructions:      Diet Cardiac     Notify your health care provider if you experience any of the following:  temperature >100.4     Notify your health care provider if you experience any of the following:  persistent nausea and vomiting or diarrhea     Notify your health care provider if you experience any of the following:  severe uncontrolled pain     No dressing needed     Activity as tolerated       Significant Diagnostic Studies: Labs: All labs within the past 24 hours have been reviewed  Radiology: All imaging studies reviewed    Pending Diagnostic Studies:     Procedure Component Value Units Date/Time    Stool Exam-Ova,Cysts,Parasites [[id number]] Collected: 11/15/22 0806    Order Status: Sent Lab Status: In process Updated: 11/15/22 0827    Specimen: Stool     WBC, Stool [[id number]] Collected: 11/15/22 0806    Order Status: Sent Lab Status: In process Updated: 11/15/22 0827    Specimen: Stool          Medications:  Reconciled Home Medications:      Medication List      CONTINUE taking these medications    busPIRone 15 MG tablet  Commonly known as: BUSPAR  Take 15 mg by mouth 2 (two) times daily as needed (anxiety).     carvediloL 6.25 MG tablet  Commonly known as: COREG  Take 6.25 mg by mouth 2 (two) times daily.     empagliflozin 25 mg tablet  Commonly known as: JARDIANCE  Take 1 tablet by mouth every morning.     ezetimibe 10 mg tablet  Commonly known as: ZETIA  Take 10 mg by  mouth once daily.     famotidine 40 MG tablet  Commonly known as: PEPCID  Take 40 mg by mouth once daily.     furosemide 40 MG tablet  Commonly known as: LASIX  Take 1 tablet (40 mg total) by mouth once daily at 6am.     hydrOXYzine HCL 25 MG tablet  Commonly known as: ATARAX  Take 1 tablet (25 mg total) by mouth 3 (three) times daily as needed for Itching.     lactulose 10 gram/15 mL solution  Commonly known as: CHRONULAC  Take 30 mLs (20 g total) by mouth 2 (two) times daily.     lisinopriL 40 MG tablet  Commonly known as: PRINIVIL,ZESTRIL  Take 40 mg by mouth once daily.     lovastatin 40 MG tablet  Commonly known as: MEVACOR  Take 40 mg by mouth every evening.     metFORMIN 1000 MG tablet  Commonly known as: GLUCOPHAGE  Take 1,000 mg by mouth 2 (two) times daily with meals.     metoprolol tartrate 50 MG tablet  Commonly known as: LOPRESSOR  Take 50 mg by mouth 2 (two) times daily.     multivitamin capsule  Take 1 capsule by mouth once daily.     spironolactone 100 MG tablet  Commonly known as: ALDACTONE  Take 1 tablet (100 mg total) by mouth 2 (two) times daily.     traZODone 100 MG tablet  Commonly known as: DESYREL  Take 100 mg by mouth every evening.            Indwelling Lines/Drains at time of discharge:   Lines/Drains/Airways     None                 Time spent on the discharge of patient: 60 minutes         Heide Hampton NP  Department of Hospital Medicine  '[place] - Telemetry ([facility])

## 2022-11-15 NOTE — PROGRESS NOTES
[facility] ([facility])  Gastroenterology  Progress Note    Patient Name: Korin Vivas  MRN: [id number]  Admission Date: 11/14/2022  Hospital Length of Stay: 0 days  Code Status: Full Code   Attending Provider: Mike Bearden, *  Consulting Provider: Tod Mondragon PA-C  Primary Care Physician: [facility] Dept  Principal Problem: Rectal bleeding      Subjective:     Interval History: The patient is feeling ok. He reports having a small loose BM this morning. Denies blood. No nausea, vomiting or abdominal pain. Hgb down a little but possibly related to hydration.     Review of Systems   Constitutional:         See Interval History for daily ROS.    Objective:     Vital Signs (Most Recent):  Temp: 98.5 °F (36.9 °C) (11/15/22 1141)  Pulse: 64 (11/15/22 1141)  Resp: 18 (11/15/22 1141)  BP: (!) 96/50 (11/15/22 1141)  SpO2: 99 % (11/15/22 1141)   Vital Signs (24h Range):  Temp:  [97.8 °F (36.6 °C)-99.1 °F (37.3 °C)] 98.5 °F (36.9 °C)  Pulse:  [59-91] 64  Resp:  [18-20] 18  SpO2:  [99 %-100 %] 99 %  BP: ()/(50-56) 96/50     Weight: 77.1 kg (169 lb 15.6 oz) (11/14/22 2358)  Body mass index is 23.05 kg/m².      Intake/Output Summary (Last 24 hours) at 11/15/2022 1357  Last data filed at 11/15/2022 1115  Gross per 24 hour   Intake 2193.12 ml   Output 301 ml   Net 1892.12 ml       Lines/Drains/Airways       None                   Physical Exam  Constitutional:       General: He is not in acute distress.     Appearance: He is well-developed. He is not diaphoretic.   HENT:      Head: Normocephalic and atraumatic.   Eyes:      Extraocular Movements: Extraocular movements intact.   Cardiovascular:      Rate and Rhythm: Normal rate and regular rhythm.   Pulmonary:      Effort: Pulmonary effort is normal. No respiratory distress.      Breath sounds: Normal breath sounds. No wheezing.   Abdominal:      General: Bowel sounds are normal. There is no distension.      Palpations: Abdomen is soft.       Tenderness: There is no abdominal tenderness.   Neurological:      Mental Status: He is alert and oriented to person, place, and time.      Cranial Nerves: No cranial nerve deficit.   Psychiatric:         Behavior: Behavior normal.       Significant Labs:  CBC:   Recent Labs   Lab 11/14/22  1115 11/14/22  1538 11/15/22  0551   WBC 8.21  --  5.10   HGB 8.6* 8.6* 7.7*   HCT 27.0* 26.3* 23.9*     --  139*     CMP:   Recent Labs   Lab 11/15/22  0551   GLU 97   CALCIUM 8.0*   ALBUMIN 2.5*   PROT 5.7*   *   K 5.0   CO2 14*   *   BUN 36*   CREATININE 1.4   ALKPHOS 153*   ALT 21   AST 29   BILITOT 0.6     Coagulation:   Recent Labs   Lab 11/14/22  1115   INR 1.5*   APTT 31.9         Significant Imaging:  Imaging results within the past 24 hours have been reviewed.    Assessment/Plan:     * Rectal bleeding  - Associated with main complaint of diarrhea, small amount of blood noticed yesterday. ED rectal exam revealed this to be separate from stool, stool was brown. Suspect hemorrhoidal bleeding exacerbated from diarrhea  - Patient without GI symptoms or bleeding today. Hgb down a little, likely related to hydration. Patient desires to go home. He is currently stable so GI ok with D/c. He will keep his appt on Monday as scheduled. Counseled about returning to ER if he develops worsening bleeding or symptoms. His wife reported understanding.   - Consideration should be given to outpatient colonoscopy at the time of his repeat EGD.    Diarrhea  - Patient was started on lactulose yesterday evening. Discussed goal of 3 BMs per day and role of titration.     Hepatic cirrhosis  MELD decreased from 23 - 17 after hydration.   Followed by [facility] transplant team. He has an appt on Monday.           Thank you for your consult. I will sign off. Please contact us if you have any additional questions.    Tod Mondragon PA-C  Gastroenterology  [facility] Tele[facility] ([facility])

## 2022-11-15 NOTE — ASSESSMENT & PLAN NOTE
Seen by [facility] Hepatology, seeking living donor, has a donor. Has regular paracentesis, most recent 11/10/22, planned for next one 11/18/22.     MELD-Na score: 17 at 11/15/2022  5:51 AM  MELD score: 14 at 11/15/2022  5:51 AM  Calculated from:  Serum Creatinine: 1.4 mg/dL at 11/15/2022  5:51 AM  Serum Sodium: 133 mmol/L at 11/15/2022  5:51 AM  Total Bilirubin: 0.6 mg/dL (Using min of 1 mg/dL) at 11/15/2022  5:51 AM  INR(ratio): 1.5 at 11/14/2022 11:15 AM  Age: 77 years    --Follow up with hepatologist

## 2022-11-15 NOTE — PLAN OF CARE
Discharge education and instruction reviewed with patient. Questions answered as of now. LDA's and telemetry box removed per provider order. Patient remained free from falls during shift. Transport requested, patient discharged with personal belongings.

## 2022-11-17 LAB — O+P STL MICRO: NORMAL

## 2022-11-18 ENCOUNTER — HOSPITAL ENCOUNTER (OUTPATIENT)
Dept: RADIOLOGY | Facility: HOSPITAL | Age: 77
Discharge: HOME | End: 2022-11-18
Attending: INTERNAL MEDICINE
Payer: OTHER GOVERNMENT

## 2022-11-18 VITALS
BODY MASS INDEX: 22.89 KG/M2 | WEIGHT: 169 LBS | HEIGHT: 72 IN | OXYGEN SATURATION: 100 % | HEART RATE: 71 BPM | RESPIRATION RATE: 16 BRPM | DIASTOLIC BLOOD PRESSURE: 55 MMHG | SYSTOLIC BLOOD PRESSURE: 110 MMHG

## 2022-11-18 DIAGNOSIS — K75.81 LIVER CIRRHOSIS SECONDARY TO NASH: ICD-10-CM

## 2022-11-18 DIAGNOSIS — K74.60 LIVER CIRRHOSIS SECONDARY TO NASH: ICD-10-CM

## 2022-11-18 PROCEDURE — 49083 ABD PARACENTESIS W/IMAGING: CPT

## 2022-11-18 PROCEDURE — 63600175 PHARM REV CODE 636 W HCPCS: Mod: JG | Performed by: INTERNAL MEDICINE

## 2022-11-18 PROCEDURE — P9047 ALBUMIN (HUMAN), 25%, 50ML: HCPCS | Mod: JG | Performed by: INTERNAL MEDICINE

## 2022-11-18 RX ORDER — ALBUMIN HUMAN 250 G/1000ML
50 SOLUTION INTRAVENOUS ONCE
Status: COMPLETED | OUTPATIENT
Start: 2022-11-18 | End: 2022-11-18

## 2022-11-18 RX ADMIN — ALBUMIN (HUMAN) 50 G: 25 SOLUTION INTRAVENOUS at 02:11

## 2022-11-18 NOTE — PLAN OF CARE
6.75 liters of straw colored fluid removed during paracentesis. VSS, NADN, and pt tolerated procedure well.

## 2022-11-18 NOTE — DISCHARGE SUMMARY
[facility] Lab [facility] (Hospital)  Discharge Note  Short Stay    US Paracentesis inc Imaging  US Paracentesis inc Imaging  US Paracentesis inc Imaging  US Paracentesis inc Imaging  US Paracentesis inc Imaging  US Paracentesis inc Imaging  [facility] Paracentesis inc Imaging  US Paracentesis inc Imaging      OUTCOME: Patient tolerated treatment/procedure well without complication and is now ready for discharge.    DISPOSITION: Home or Self Care    FINAL DIAGNOSIS:  <principal problem not specified>    FOLLOWUP: In clinic    DISCHARGE INSTRUCTIONS:  No discharge procedures on file.      Clinical Reference Documents Added to Patient Instructions         Document    ABDOMINAL PARACENTESIS DISCHARGE INSTRUCTIONS (ENGLISH)            TIME SPENT ON DISCHARGE: 15 minutes    Pre Op Diagnosis: ascites     Post Op Diagnosis: same    Procedure:  para     Procedure performed by: Andria PUCKETT, Kishor LOFTON     Written Informed Consent Obtained: Yes     Specimen Removed:  yes     Estimated Blood Loss:  minimal     Findings: Local anesthesia and moderate sedation were used.     The patient tolerated the procedure well and there were no complications.      Disposition:  F/U in clinic    Discharge instructions:  Light activity for 24 hours.  Remove band aid in 24 hours.  No baths (showers are appropriate).    F/U with ordering physician    Sterile technique was performed in the lower abdomen, lidocaine was used as a local anesthetic.   6.75 liters of straw fluid removed for therapeutic purposes.  Pt tolerated the procedure well without immediate complications.  Please see radiologist report for details. F/u with PCP and/or ordering physician.

## 2022-11-18 NOTE — PLAN OF CARE
Band aid to right flank puncture site C/D/I with no bleeding/redness/swelling noted. VSS, NADN, and pt meets criteria for discharge. Discharge instructions given to and reviewed with pt, and pt verbalized understanding of all. Pt discharged to home, taken out via wheelchair and driven home by wife.

## 2022-12-01 ENCOUNTER — LAB VISIT (OUTPATIENT)
Dept: LAB | Facility: HOSPITAL | Age: 77
End: 2022-12-01
Attending: INTERNAL MEDICINE
Payer: MEDICARE

## 2022-12-01 DIAGNOSIS — K75.81 LIVER CIRRHOSIS SECONDARY TO NASH: ICD-10-CM

## 2022-12-01 DIAGNOSIS — K74.60 LIVER CIRRHOSIS SECONDARY TO NASH: ICD-10-CM

## 2022-12-01 LAB
ALBUMIN SERPL BCP-MCNC: 2.9 G/DL (ref 3.5–5.2)
ALP SERPL-CCNC: 193 U/L (ref 55–135)
ALT SERPL W/O P-5'-P-CCNC: 30 U/L (ref 10–44)
ANION GAP SERPL CALC-SCNC: 9 MMOL/L (ref 8–16)
AST SERPL-CCNC: 47 U/L (ref 10–40)
BILIRUB SERPL-MCNC: 0.7 MG/DL (ref 0.1–1)
BUN SERPL-MCNC: 40 MG/DL (ref 8–23)
CALCIUM SERPL-MCNC: 8.9 MG/DL (ref 8.7–10.5)
CHLORIDE SERPL-SCNC: 102 MMOL/L (ref 95–110)
CO2 SERPL-SCNC: 18 MMOL/L (ref 23–29)
CREAT SERPL-MCNC: 1.7 MG/DL (ref 0.5–1.4)
ERYTHROCYTE [DISTWIDTH] IN BLOOD BY AUTOMATED COUNT: 16 % (ref 11.5–14.5)
EST. GFR  (NO RACE VARIABLE): 41.5 ML/MIN/1.73 M^2
GLUCOSE SERPL-MCNC: 187 MG/DL (ref 70–110)
HCT VFR BLD AUTO: 32.8 % (ref 40–54)
HGB BLD-MCNC: 10.4 G/DL (ref 14–18)
INR PPP: 1.3 (ref 0.8–1.2)
MCH RBC QN AUTO: 31.7 PG (ref 27–31)
MCHC RBC AUTO-ENTMCNC: 31.7 G/DL (ref 32–36)
MCV RBC AUTO: 100 FL (ref 82–98)
PLATELET # BLD AUTO: 258 K/UL (ref 150–450)
PMV BLD AUTO: 10.2 FL (ref 9.2–12.9)
POTASSIUM SERPL-SCNC: 5.4 MMOL/L (ref 3.5–5.1)
PROT SERPL-MCNC: 7.6 G/DL (ref 6–8.4)
PROTHROMBIN TIME: 14.2 SEC (ref 9–12.5)
RBC # BLD AUTO: 3.28 M/UL (ref 4.6–6.2)
SODIUM SERPL-SCNC: 129 MMOL/L (ref 136–145)
WBC # BLD AUTO: 6.91 K/UL (ref 3.9–12.7)

## 2022-12-01 PROCEDURE — 80053 COMPREHEN METABOLIC PANEL: CPT | Performed by: INTERNAL MEDICINE

## 2022-12-01 PROCEDURE — 85610 PROTHROMBIN TIME: CPT | Performed by: INTERNAL MEDICINE

## 2022-12-01 PROCEDURE — 36415 COLL VENOUS BLD VENIPUNCTURE: CPT | Performed by: INTERNAL MEDICINE

## 2022-12-01 PROCEDURE — 85027 COMPLETE CBC AUTOMATED: CPT | Performed by: INTERNAL MEDICINE

## 2022-12-02 ENCOUNTER — HOSPITAL ENCOUNTER (OUTPATIENT)
Dept: RADIOLOGY | Facility: HOSPITAL | Age: 77
Discharge: HOME | End: 2022-12-02
Attending: INTERNAL MEDICINE
Payer: OTHER GOVERNMENT

## 2022-12-02 VITALS
BODY MASS INDEX: 22.9 KG/M2 | RESPIRATION RATE: 17 BRPM | SYSTOLIC BLOOD PRESSURE: 106 MMHG | HEART RATE: 77 BPM | HEIGHT: 72 IN | OXYGEN SATURATION: 100 % | DIASTOLIC BLOOD PRESSURE: 52 MMHG | WEIGHT: 169.06 LBS | TEMPERATURE: 97 F

## 2022-12-02 DIAGNOSIS — K75.81 LIVER CIRRHOSIS SECONDARY TO NASH: ICD-10-CM

## 2022-12-02 DIAGNOSIS — K74.60 LIVER CIRRHOSIS SECONDARY TO NASH: ICD-10-CM

## 2022-12-02 PROCEDURE — 49083 ABD PARACENTESIS W/IMAGING: CPT

## 2022-12-02 PROCEDURE — P9047 ALBUMIN (HUMAN), 25%, 50ML: HCPCS | Mod: JG | Performed by: RADIOLOGY

## 2022-12-02 PROCEDURE — 63600175 PHARM REV CODE 636 W HCPCS: Mod: JG | Performed by: RADIOLOGY

## 2022-12-02 RX ORDER — ALBUMIN HUMAN 250 G/1000ML
50 SOLUTION INTRAVENOUS ONCE
Status: COMPLETED | OUTPATIENT
Start: 2022-12-02 | End: 2022-12-02

## 2022-12-02 RX ADMIN — ALBUMIN (HUMAN) 50 G: 12.5 SOLUTION INTRAVENOUS at 02:12

## 2022-12-02 NOTE — NURSING
6L of straw colored fluid removed. Paracentesis Catheter removed, bandaid applied. No signs of distress noted.

## 2022-12-02 NOTE — PLAN OF CARE
Verbal discharge instructions given to patient including when to seek medical attention and site care. Pt verbalized understanding. PIV safely discontinued. Bandaid to procedure site remains CDI. Pt denies pain and NADN. VSS. Pt ambulated to main entrance with all belongings. Pt was stable on discharge.

## 2022-12-02 NOTE — INTERVAL H&P NOTE
The patient has been examined and the H&P has been reviewed:    I concur with the findings and no changes have occurred since H&P was written.    Plan for US paracentesis    There are no hospital problems to display for this patient.     Normal for race

## 2022-12-02 NOTE — NURSING
ROLLY David in room for consent and to initiate paracentesis. Patient was preped and para catheter placed with continuous suction initiated.

## 2022-12-02 NOTE — DISCHARGE SUMMARY
[facility] (Hospital)  Discharge Note  Short Stay    US Paracentesis inc Imaging  US Paracentesis inc Imaging  US Paracentesis inc Imaging  US Paracentesis inc Imaging  US Paracentesis inc Imaging  US Paracentesis inc Imaging  US Paracentesis inc Imaging  US Paracentesis inc Imaging  US Paracentesis inc Imaging      OUTCOME: Patient tolerated treatment/procedure well without complication and is now ready for discharge.    DISPOSITION: Home or Self Care    FINAL DIAGNOSIS:  <principal problem not specified>    FOLLOWUP: In clinic    DISCHARGE INSTRUCTIONS:  No discharge procedures on file.      Clinical Reference Documents Added to Patient Instructions         Document    ABDOMINAL PARACENTESIS DISCHARGE INSTRUCTIONS (ENGLISH)            TIME SPENT ON DISCHARGE: 15 minutes    Date:  12/02/2022    Pre Op Diagnosis: ascites     Post Op Diagnosis: same     Procedure:  paracentesis     Procedure performed by: Maryjane PUCKETT, Paradise LOFTON     Written Informed Consent Obtained: Yes     Specimen Removed:  yes     Estimated Blood Loss:  minimal     Findings: Local anesthesia.     The patient tolerated the procedure well and there were no complications.      Disposition:  F/U in clinic or with ordering physician    Discharge instructions:  Light activity for 24 hours.  No driving for 24 hours.  Remove bandage in 24 hours.  No baths for 24 hours; showers are appropriate    Sterile technique was performed in the RLQ, lidocaine was used as a local anesthetic.   6 liters of straw fluid removed for therapeutic purposes.  Pt tolerated the procedure well without immediate complications.  Please see radiologist report for details. F/u with PCP and/or ordering physician.     Time spent on discharge:  15 minutes

## 2022-12-08 ENCOUNTER — TELEPHONE (OUTPATIENT)
Dept: HEPATOLOGY | Facility: CLINIC | Age: 77
End: 2022-12-08

## 2022-12-08 ENCOUNTER — TELEPHONE (OUTPATIENT)
Dept: CARDIOLOGY | Facility: CLINIC | Age: 77
End: 2022-12-08
Payer: MEDICARE

## 2022-12-08 ENCOUNTER — OFFICE VISIT (OUTPATIENT)
Dept: HEPATOLOGY | Facility: CLINIC | Age: 77
End: 2022-12-08
Payer: OTHER GOVERNMENT

## 2022-12-08 VITALS
HEART RATE: 85 BPM | SYSTOLIC BLOOD PRESSURE: 118 MMHG | WEIGHT: 181 LBS | DIASTOLIC BLOOD PRESSURE: 60 MMHG | BODY MASS INDEX: 24.52 KG/M2 | HEIGHT: 72 IN

## 2022-12-08 DIAGNOSIS — K74.60 LIVER CIRRHOSIS SECONDARY TO NASH: Primary | ICD-10-CM

## 2022-12-08 DIAGNOSIS — R18.8 ASCITES OF LIVER: ICD-10-CM

## 2022-12-08 DIAGNOSIS — K75.81 LIVER CIRRHOSIS SECONDARY TO NASH: Primary | ICD-10-CM

## 2022-12-08 DIAGNOSIS — K76.6 PORTAL HYPERTENSION: ICD-10-CM

## 2022-12-08 PROCEDURE — 99215 OFFICE O/P EST HI 40 MIN: CPT | Mod: S$PBB,,, | Performed by: INTERNAL MEDICINE

## 2022-12-08 PROCEDURE — 99215 PR OFFICE/OUTPT VISIT, EST, LEVL V, 40-54 MIN: ICD-10-PCS | Mod: S$PBB,,, | Performed by: INTERNAL MEDICINE

## 2022-12-08 PROCEDURE — 99999 PR PBB SHADOW E&M-EST. PATIENT-LVL IV: ICD-10-PCS | Mod: PBBFAC,,, | Performed by: INTERNAL MEDICINE

## 2022-12-08 PROCEDURE — 99999 PR PBB SHADOW E&M-EST. PATIENT-LVL IV: CPT | Mod: PBBFAC,,, | Performed by: INTERNAL MEDICINE

## 2022-12-08 PROCEDURE — 99214 OFFICE O/P EST MOD 30 MIN: CPT | Mod: PBBFAC | Performed by: INTERNAL MEDICINE

## 2022-12-08 RX ORDER — ZINC SULFATE 50(220)MG
220 CAPSULE ORAL DAILY
COMMUNITY
Start: 2022-11-21

## 2022-12-08 NOTE — H&P (VIEW-ONLY)
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  He was hospitalized on May 31st for ascites, underwent paracentesis, reportedly also had episodes of hepatic encephalopathy.  Then he had incarcerated hernia repaired at [facility] and discharged from the hospital on June 18th.  Previously he had episodes of GI bleed, underwent an upper endoscopy with banding of esophageal varices, taken off of Plavix in 2019, since then no episodes of bleed per patient.  He was referred to us not liver Clinic for management of cirrhosis of liver          Interval history: 8/18/22  He was asked to come to clinic ASAP as he has been getting  paracentesis every week, says he gets breathless, stays on the bed for 2-3 days, feels fatigue. Has been removing 5-7 L every time.  He was asked to come 2 weeks after last appointment, however says he didn't have an appointment and didn't know about it. He had SBP in the past with encephalopathy that was improved with lactulose, now he is taking  lactulose almost daily.     9/1/22  Last paracentesis was on 08/18/2022.  He returned to the clinic with his wife to discuss the possibility of liver transplantation with a living donor as boyfriend of his daughter is willing to donate     12/8/2022    Requiring paracentesis every week, complains of extreme fatigue.  He is not following salt restriction and fluid restriction, also has been noncompliant with lactulose    Review of Systems   Constitutional:  Positive for fatigue. Negative for fever and unexpected weight change.   Respiratory:  Positive for shortness of breath.    Gastrointestinal:  Positive for abdominal distention. Negative for abdominal pain, blood in stool, nausea and vomiting.   Musculoskeletal:  Positive for arthralgias. Negative for gait problem.   Skin:  Negative for pallor and rash.    Neurological:  Negative for dizziness.   Hematological:  Does not bruise/bleed easily.   Psychiatric/Behavioral:  Negative for confusion, hallucinations and sleep disturbance.      Objective:     Physical Exam  Vitals and nursing note reviewed.   Constitutional:       Appearance: Normal appearance.   Eyes:      General: No scleral icterus.  Cardiovascular:      Heart sounds: No murmur heard.  Pulmonary:      Effort: Pulmonary effort is normal.      Breath sounds: No wheezing, rhonchi or rales.   Abdominal:      General: Bowel sounds are normal. There is distension.      Palpations: There is no mass.      Tenderness: There is no abdominal tenderness.   Musculoskeletal:         General: No tenderness.      Right lower leg: No edema.      Left lower leg: No edema.      Comments: LE edema improved. He is easily able to get up and walk.     Lymphadenopathy:      Cervical: No cervical adenopathy.   Skin:     Coloration: Skin is not jaundiced.      Findings: No bruising or rash.   Neurological:      Mental Status: He is alert and oriented to person, place, and time.      Coordination: Coordination normal.   Psychiatric:         Behavior: Behavior normal.         Thought Content: Thought content normal.       MELD-Na score: 21 at 12/1/2022 10:24 AM  MELD score: 14 at 12/1/2022 10:24 AM  Calculated from:  Serum Creatinine: 1.7 mg/dL at 12/1/2022 10:24 AM  Serum Sodium: 129 mmol/L at 12/1/2022 10:24 AM  Total Bilirubin: 0.7 mg/dL (Using min of 1 mg/dL) at 12/1/2022 10:24 AM  INR(ratio): 1.3 at 12/1/2022 10:24 AM  Age: 77 years    WBC   Date Value Ref Range Status   12/01/2022 6.91 3.90 - 12.70 K/uL Final     Hemoglobin   Date Value Ref Range Status   12/01/2022 10.4 (L) 14.0 - 18.0 g/dL Final     Hematocrit   Date Value Ref Range Status   12/01/2022 32.8 (L) 40.0 - 54.0 % Final     Platelets   Date Value Ref Range Status   12/01/2022 258 150 - 450 K/uL Final     BUN   Date Value Ref Range Status   12/01/2022 40 (H) 8 - 23  mg/dL Final     Creatinine   Date Value Ref Range Status   12/01/2022 1.7 (H) 0.5 - 1.4 mg/dL Final     Glucose   Date Value Ref Range Status   12/01/2022 187 (H) 70 - 110 mg/dL Final     Calcium   Date Value Ref Range Status   12/01/2022 8.9 8.7 - 10.5 mg/dL Final     Sodium   Date Value Ref Range Status   12/01/2022 129 (L) 136 - 145 mmol/L Final     Potassium   Date Value Ref Range Status   12/01/2022 5.4 (H) 3.5 - 5.1 mmol/L Final     Comment:     *No Visible Hemolysis     Chloride   Date Value Ref Range Status   12/01/2022 102 95 - 110 mmol/L Final     AST   Date Value Ref Range Status   12/01/2022 47 (H) 10 - 40 U/L Final     ALT   Date Value Ref Range Status   12/01/2022 30 10 - 44 U/L Final     Alkaline Phosphatase   Date Value Ref Range Status   12/01/2022 193 (H) 55 - 135 U/L Final     Total Bilirubin   Date Value Ref Range Status   12/01/2022 0.7 0.1 - 1.0 mg/dL Final     Comment:     For infants and newborns, interpretation of results should be based  on gestational age, weight and in agreement with clinical  observations.    Premature Infant recommended reference ranges:  Up to 24 hours.............<8.0 mg/dL  Up to 48 hours............<12.0 mg/dL  3-5 days..................<15.0 mg/dL  6-29 days.................<15.0 mg/dL       Albumin   Date Value Ref Range Status   12/01/2022 2.9 (L) 3.5 - 5.2 g/dL Final     INR   Date Value Ref Range Status   12/01/2022 1.3 (H) 0.8 - 1.2 Final     Comment:     Coumadin Therapy:  2.0 - 3.0 for INR for all indicators except mechanical heart valves  and antiphospholipid syndromes which should use 2.5 - 3.5.           Assessment/Plan:     1.Cirrhosis:  Decompensated by ascites, hepatic encephalopathy  Last MELD - 21  Creatinine  stable around 1.8, with furosemide 40 mg QD  and spironolactone to100 mg BID.  Therapeutic paracentesis Q2 weeks as needed, however he is receiving every 1 week  Continue with HCC surveillance: US 4/9/2022: shows no liver lesions, schedule  repeat in a month  Continue lactulose 15 mL twice a day, adjust for 2-3 soft bowel movements per day  Esophageal varices surveillance:  Last EGD 12/2021 with 3 columns of G grade 1 esophagea lV varices, questionable gastric polyp versus GV, next  EGD due 12/2022     His comorbidities include hypertension, coronary artery disease with stent in place 15 yrs ago, long history of diabetes mellitus, hyperlipidemia, fraility with falls says balance issues happen when his ascites is worse otherwise he ambulates well.    Discussed at transplant committee, decision was due to his age, history of coronary artery disease, frailty, other comorbidities he is not a candidate for liver transplantation  With previous episode of encephalopathy he is at high risk for recurrence with TIPS, discussed option of hospice and an abdominal drain for comfort, he would like to proceed with shunt placement even with possible risk of encephalopathy.  Will consult IR  Stressed the importance of compliance with a low-salt diet, fluid restriction, weight-bearing exercises     RTC - post TIPS    I have reviewed existing labs, imaging. Educated patient and family about disease process, prognosis. Ordered required labs, images, procedures and discussed treatment plan.       Litzy Goodman MD  Transplant Hepatologist  Dept of Hepatology, [place] [facility] [place]

## 2022-12-08 NOTE — H&P (VIEW-ONLY)
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  He was hospitalized on May 31st for ascites, underwent paracentesis, reportedly also had episodes of hepatic encephalopathy.  Then he had incarcerated hernia repaired at [facility] and discharged from the hospital on June 18th.  Previously he had episodes of GI bleed, underwent an upper endoscopy with banding of esophageal varices, taken off of Plavix in 2019, since then no episodes of bleed per patient.  He was referred to us not liver Clinic for management of cirrhosis of liver          Interval history: 8/18/22  He was asked to come to clinic ASAP as he has been getting  paracentesis every week, says he gets breathless, stays on the bed for 2-3 days, feels fatigue. Has been removing 5-7 L every time.  He was asked to come 2 weeks after last appointment, however says he didn't have an appointment and didn't know about it. He had SBP in the past with encephalopathy that was improved with lactulose, now he is taking  lactulose almost daily.     9/1/22  Last paracentesis was on 08/18/2022.  He returned to the clinic with his wife to discuss the possibility of liver transplantation with a living donor as boyfriend of his daughter is willing to donate     12/8/2022    Requiring paracentesis every week, complains of extreme fatigue.  He is not following salt restriction and fluid restriction, also has been noncompliant with lactulose    Review of Systems   Constitutional:  Positive for fatigue. Negative for fever and unexpected weight change.   Respiratory:  Positive for shortness of breath.    Gastrointestinal:  Positive for abdominal distention. Negative for abdominal pain, blood in stool, nausea and vomiting.   Musculoskeletal:  Positive for arthralgias. Negative for gait problem.   Skin:  Negative for pallor and rash.    Neurological:  Negative for dizziness.   Hematological:  Does not bruise/bleed easily.   Psychiatric/Behavioral:  Negative for confusion, hallucinations and sleep disturbance.      Objective:     Physical Exam  Vitals and nursing note reviewed.   Constitutional:       Appearance: Normal appearance.   Eyes:      General: No scleral icterus.  Cardiovascular:      Heart sounds: No murmur heard.  Pulmonary:      Effort: Pulmonary effort is normal.      Breath sounds: No wheezing, rhonchi or rales.   Abdominal:      General: Bowel sounds are normal. There is distension.      Palpations: There is no mass.      Tenderness: There is no abdominal tenderness.   Musculoskeletal:         General: No tenderness.      Right lower leg: No edema.      Left lower leg: No edema.      Comments: LE edema improved. He is easily able to get up and walk.     Lymphadenopathy:      Cervical: No cervical adenopathy.   Skin:     Coloration: Skin is not jaundiced.      Findings: No bruising or rash.   Neurological:      Mental Status: He is alert and oriented to person, place, and time.      Coordination: Coordination normal.   Psychiatric:         Behavior: Behavior normal.         Thought Content: Thought content normal.       MELD-Na score: 21 at 12/1/2022 10:24 AM  MELD score: 14 at 12/1/2022 10:24 AM  Calculated from:  Serum Creatinine: 1.7 mg/dL at 12/1/2022 10:24 AM  Serum Sodium: 129 mmol/L at 12/1/2022 10:24 AM  Total Bilirubin: 0.7 mg/dL (Using min of 1 mg/dL) at 12/1/2022 10:24 AM  INR(ratio): 1.3 at 12/1/2022 10:24 AM  Age: 77 years    WBC   Date Value Ref Range Status   12/01/2022 6.91 3.90 - 12.70 K/uL Final     Hemoglobin   Date Value Ref Range Status   12/01/2022 10.4 (L) 14.0 - 18.0 g/dL Final     Hematocrit   Date Value Ref Range Status   12/01/2022 32.8 (L) 40.0 - 54.0 % Final     Platelets   Date Value Ref Range Status   12/01/2022 258 150 - 450 K/uL Final     BUN   Date Value Ref Range Status   12/01/2022 40 (H) 8 - 23  mg/dL Final     Creatinine   Date Value Ref Range Status   12/01/2022 1.7 (H) 0.5 - 1.4 mg/dL Final     Glucose   Date Value Ref Range Status   12/01/2022 187 (H) 70 - 110 mg/dL Final     Calcium   Date Value Ref Range Status   12/01/2022 8.9 8.7 - 10.5 mg/dL Final     Sodium   Date Value Ref Range Status   12/01/2022 129 (L) 136 - 145 mmol/L Final     Potassium   Date Value Ref Range Status   12/01/2022 5.4 (H) 3.5 - 5.1 mmol/L Final     Comment:     *No Visible Hemolysis     Chloride   Date Value Ref Range Status   12/01/2022 102 95 - 110 mmol/L Final     AST   Date Value Ref Range Status   12/01/2022 47 (H) 10 - 40 U/L Final     ALT   Date Value Ref Range Status   12/01/2022 30 10 - 44 U/L Final     Alkaline Phosphatase   Date Value Ref Range Status   12/01/2022 193 (H) 55 - 135 U/L Final     Total Bilirubin   Date Value Ref Range Status   12/01/2022 0.7 0.1 - 1.0 mg/dL Final     Comment:     For infants and newborns, interpretation of results should be based  on gestational age, weight and in agreement with clinical  observations.    Premature Infant recommended reference ranges:  Up to 24 hours.............<8.0 mg/dL  Up to 48 hours............<12.0 mg/dL  3-5 days..................<15.0 mg/dL  6-29 days.................<15.0 mg/dL       Albumin   Date Value Ref Range Status   12/01/2022 2.9 (L) 3.5 - 5.2 g/dL Final     INR   Date Value Ref Range Status   12/01/2022 1.3 (H) 0.8 - 1.2 Final     Comment:     Coumadin Therapy:  2.0 - 3.0 for INR for all indicators except mechanical heart valves  and antiphospholipid syndromes which should use 2.5 - 3.5.           Assessment/Plan:     1.Cirrhosis:  Decompensated by ascites, hepatic encephalopathy  Last MELD - 21  Creatinine  stable around 1.8, with furosemide 40 mg QD  and spironolactone to100 mg BID.  Therapeutic paracentesis Q2 weeks as needed, however he is receiving every 1 week  Continue with HCC surveillance: US 4/9/2022: shows no liver lesions, schedule  repeat in a month  Continue lactulose 15 mL twice a day, adjust for 2-3 soft bowel movements per day  Esophageal varices surveillance:  Last EGD 12/2021 with 3 columns of G grade 1 esophagea lV varices, questionable gastric polyp versus GV, next  EGD due 12/2022     His comorbidities include hypertension, coronary artery disease with stent in place 15 yrs ago, long history of diabetes mellitus, hyperlipidemia, fraility with falls says balance issues happen when his ascites is worse otherwise he ambulates well.    Discussed at transplant committee, decision was due to his age, history of coronary artery disease, frailty, other comorbidities he is not a candidate for liver transplantation  With previous episode of encephalopathy he is at high risk for recurrence with TIPS, discussed option of hospice and an abdominal drain for comfort, he would like to proceed with shunt placement even with possible risk of encephalopathy.  Will consult IR  Stressed the importance of compliance with a low-salt diet, fluid restriction, weight-bearing exercises     RTC - post TIPS    I have reviewed existing labs, imaging. Educated patient and family about disease process, prognosis. Ordered required labs, images, procedures and discussed treatment plan.       Litzy Goodman MD  Transplant Hepatologist  Dept of Hepatology, [place] [facility] [place]

## 2022-12-08 NOTE — H&P (VIEW-ONLY)
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  He was hospitalized on May 31st for ascites, underwent paracentesis, reportedly also had episodes of hepatic encephalopathy.  Then he had incarcerated hernia repaired at [facility] and discharged from the hospital on June 18th.  Previously he had episodes of GI bleed, underwent an upper endoscopy with banding of esophageal varices, taken off of Plavix in 2019, since then no episodes of bleed per patient.  He was referred to us not liver Clinic for management of cirrhosis of liver          Interval history: 8/18/22  He was asked to come to clinic ASAP as he has been getting  paracentesis every week, says he gets breathless, stays on the bed for 2-3 days, feels fatigue. Has been removing 5-7 L every time.  He was asked to come 2 weeks after last appointment, however says he didn't have an appointment and didn't know about it. He had SBP in the past with encephalopathy that was improved with lactulose, now he is taking  lactulose almost daily.     9/1/22  Last paracentesis was on 08/18/2022.  He returned to the clinic with his wife to discuss the possibility of liver transplantation with a living donor as boyfriend of his daughter is willing to donate     12/8/2022    Requiring paracentesis every week, complains of extreme fatigue.  He is not following salt restriction and fluid restriction, also has been noncompliant with lactulose    Review of Systems   Constitutional:  Positive for fatigue. Negative for fever and unexpected weight change.   Respiratory:  Positive for shortness of breath.    Gastrointestinal:  Positive for abdominal distention. Negative for abdominal pain, blood in stool, nausea and vomiting.   Musculoskeletal:  Positive for arthralgias. Negative for gait problem.   Skin:  Negative for pallor and rash.    Neurological:  Negative for dizziness.   Hematological:  Does not bruise/bleed easily.   Psychiatric/Behavioral:  Negative for confusion, hallucinations and sleep disturbance.      Objective:     Physical Exam  Vitals and nursing note reviewed.   Constitutional:       Appearance: Normal appearance.   Eyes:      General: No scleral icterus.  Cardiovascular:      Heart sounds: No murmur heard.  Pulmonary:      Effort: Pulmonary effort is normal.      Breath sounds: No wheezing, rhonchi or rales.   Abdominal:      General: Bowel sounds are normal. There is distension.      Palpations: There is no mass.      Tenderness: There is no abdominal tenderness.   Musculoskeletal:         General: No tenderness.      Right lower leg: No edema.      Left lower leg: No edema.      Comments: LE edema improved. He is easily able to get up and walk.     Lymphadenopathy:      Cervical: No cervical adenopathy.   Skin:     Coloration: Skin is not jaundiced.      Findings: No bruising or rash.   Neurological:      Mental Status: He is alert and oriented to person, place, and time.      Coordination: Coordination normal.   Psychiatric:         Behavior: Behavior normal.         Thought Content: Thought content normal.       MELD-Na score: 21 at 12/1/2022 10:24 AM  MELD score: 14 at 12/1/2022 10:24 AM  Calculated from:  Serum Creatinine: 1.7 mg/dL at 12/1/2022 10:24 AM  Serum Sodium: 129 mmol/L at 12/1/2022 10:24 AM  Total Bilirubin: 0.7 mg/dL (Using min of 1 mg/dL) at 12/1/2022 10:24 AM  INR(ratio): 1.3 at 12/1/2022 10:24 AM  Age: 77 years    WBC   Date Value Ref Range Status   12/01/2022 6.91 3.90 - 12.70 K/uL Final     Hemoglobin   Date Value Ref Range Status   12/01/2022 10.4 (L) 14.0 - 18.0 g/dL Final     Hematocrit   Date Value Ref Range Status   12/01/2022 32.8 (L) 40.0 - 54.0 % Final     Platelets   Date Value Ref Range Status   12/01/2022 258 150 - 450 K/uL Final     BUN   Date Value Ref Range Status   12/01/2022 40 (H) 8 - 23  mg/dL Final     Creatinine   Date Value Ref Range Status   12/01/2022 1.7 (H) 0.5 - 1.4 mg/dL Final     Glucose   Date Value Ref Range Status   12/01/2022 187 (H) 70 - 110 mg/dL Final     Calcium   Date Value Ref Range Status   12/01/2022 8.9 8.7 - 10.5 mg/dL Final     Sodium   Date Value Ref Range Status   12/01/2022 129 (L) 136 - 145 mmol/L Final     Potassium   Date Value Ref Range Status   12/01/2022 5.4 (H) 3.5 - 5.1 mmol/L Final     Comment:     *No Visible Hemolysis     Chloride   Date Value Ref Range Status   12/01/2022 102 95 - 110 mmol/L Final     AST   Date Value Ref Range Status   12/01/2022 47 (H) 10 - 40 U/L Final     ALT   Date Value Ref Range Status   12/01/2022 30 10 - 44 U/L Final     Alkaline Phosphatase   Date Value Ref Range Status   12/01/2022 193 (H) 55 - 135 U/L Final     Total Bilirubin   Date Value Ref Range Status   12/01/2022 0.7 0.1 - 1.0 mg/dL Final     Comment:     For infants and newborns, interpretation of results should be based  on gestational age, weight and in agreement with clinical  observations.    Premature Infant recommended reference ranges:  Up to 24 hours.............<8.0 mg/dL  Up to 48 hours............<12.0 mg/dL  3-5 days..................<15.0 mg/dL  6-29 days.................<15.0 mg/dL       Albumin   Date Value Ref Range Status   12/01/2022 2.9 (L) 3.5 - 5.2 g/dL Final     INR   Date Value Ref Range Status   12/01/2022 1.3 (H) 0.8 - 1.2 Final     Comment:     Coumadin Therapy:  2.0 - 3.0 for INR for all indicators except mechanical heart valves  and antiphospholipid syndromes which should use 2.5 - 3.5.           Assessment/Plan:     1.Cirrhosis:  Decompensated by ascites, hepatic encephalopathy  Last MELD - 21  Creatinine  stable around 1.8, with furosemide 40 mg QD  and spironolactone to100 mg BID.  Therapeutic paracentesis Q2 weeks as needed, however he is receiving every 1 week  Continue with HCC surveillance: US 4/9/2022: shows no liver lesions, schedule  repeat in a month  Continue lactulose 15 mL twice a day, adjust for 2-3 soft bowel movements per day  Esophageal varices surveillance:  Last EGD 12/2021 with 3 columns of G grade 1 esophagea lV varices, questionable gastric polyp versus GV, next  EGD due 12/2022     His comorbidities include hypertension, coronary artery disease with stent in place 15 yrs ago, long history of diabetes mellitus, hyperlipidemia, fraility with falls says balance issues happen when his ascites is worse otherwise he ambulates well.    Discussed at transplant committee, decision was due to his age, history of coronary artery disease, frailty, other comorbidities he is not a candidate for liver transplantation  With previous episode of encephalopathy he is at high risk for recurrence with TIPS, discussed option of hospice and an abdominal drain for comfort, he would like to proceed with shunt placement even with possible risk of encephalopathy.  Will consult IR  Stressed the importance of compliance with a low-salt diet, fluid restriction, weight-bearing exercises     RTC - post TIPS    I have reviewed existing labs, imaging. Educated patient and family about disease process, prognosis. Ordered required labs, images, procedures and discussed treatment plan.       Litzy Goodman MD  Transplant Hepatologist  Dept of Hepatology, [place] [facility] [place]

## 2022-12-08 NOTE — TELEPHONE ENCOUNTER
Can someone please scheduled pt for a echo he has an appt 12/9 at [facility] and wanted to try and get it done the same day. Please help me schedule thanks

## 2022-12-08 NOTE — PROGRESS NOTES
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  He was hospitalized on May 31st for ascites, underwent paracentesis, reportedly also had episodes of hepatic encephalopathy.  Then he had incarcerated hernia repaired at [facility] and discharged from the hospital on June 18th.  Previously he had episodes of GI bleed, underwent an upper endoscopy with banding of esophageal varices, taken off of Plavix in 2019, since then no episodes of bleed per patient.  He was referred to us not liver Clinic for management of cirrhosis of liver          Interval history: 8/18/22  He was asked to come to clinic ASAP as he has been getting  paracentesis every week, says he gets breathless, stays on the bed for 2-3 days, feels fatigue. Has been removing 5-7 L every time.  He was asked to come 2 weeks after last appointment, however says he didn't have an appointment and didn't know about it. He had SBP in the past with encephalopathy that was improved with lactulose, now he is taking  lactulose almost daily.     9/1/22  Last paracentesis was on 08/18/2022.  He returned to the clinic with his wife to discuss the possibility of liver transplantation with a living donor as boyfriend of his daughter is willing to donate     12/8/2022    Requiring paracentesis every week, complains of extreme fatigue.  He is not following salt restriction and fluid restriction, also has been noncompliant with lactulose    Review of Systems   Constitutional:  Positive for fatigue. Negative for fever and unexpected weight change.   Respiratory:  Positive for shortness of breath.    Gastrointestinal:  Positive for abdominal distention. Negative for abdominal pain, blood in stool, nausea and vomiting.   Musculoskeletal:  Positive for arthralgias. Negative for gait problem.   Skin:  Negative for pallor and rash.    Neurological:  Negative for dizziness.   Hematological:  Does not bruise/bleed easily.   Psychiatric/Behavioral:  Negative for confusion, hallucinations and sleep disturbance.      Objective:     Physical Exam  Vitals and nursing note reviewed.   Constitutional:       Appearance: Normal appearance.   Eyes:      General: No scleral icterus.  Cardiovascular:      Heart sounds: No murmur heard.  Pulmonary:      Effort: Pulmonary effort is normal.      Breath sounds: No wheezing, rhonchi or rales.   Abdominal:      General: Bowel sounds are normal. There is distension.      Palpations: There is no mass.      Tenderness: There is no abdominal tenderness.   Musculoskeletal:         General: No tenderness.      Right lower leg: No edema.      Left lower leg: No edema.      Comments: LE edema improved. He is easily able to get up and walk.     Lymphadenopathy:      Cervical: No cervical adenopathy.   Skin:     Coloration: Skin is not jaundiced.      Findings: No bruising or rash.   Neurological:      Mental Status: He is alert and oriented to person, place, and time.      Coordination: Coordination normal.   Psychiatric:         Behavior: Behavior normal.         Thought Content: Thought content normal.       MELD-Na score: 21 at 12/1/2022 10:24 AM  MELD score: 14 at 12/1/2022 10:24 AM  Calculated from:  Serum Creatinine: 1.7 mg/dL at 12/1/2022 10:24 AM  Serum Sodium: 129 mmol/L at 12/1/2022 10:24 AM  Total Bilirubin: 0.7 mg/dL (Using min of 1 mg/dL) at 12/1/2022 10:24 AM  INR(ratio): 1.3 at 12/1/2022 10:24 AM  Age: 77 years    WBC   Date Value Ref Range Status   12/01/2022 6.91 3.90 - 12.70 K/uL Final     Hemoglobin   Date Value Ref Range Status   12/01/2022 10.4 (L) 14.0 - 18.0 g/dL Final     Hematocrit   Date Value Ref Range Status   12/01/2022 32.8 (L) 40.0 - 54.0 % Final     Platelets   Date Value Ref Range Status   12/01/2022 258 150 - 450 K/uL Final     BUN   Date Value Ref Range Status   12/01/2022 40 (H) 8 - 23  mg/dL Final     Creatinine   Date Value Ref Range Status   12/01/2022 1.7 (H) 0.5 - 1.4 mg/dL Final     Glucose   Date Value Ref Range Status   12/01/2022 187 (H) 70 - 110 mg/dL Final     Calcium   Date Value Ref Range Status   12/01/2022 8.9 8.7 - 10.5 mg/dL Final     Sodium   Date Value Ref Range Status   12/01/2022 129 (L) 136 - 145 mmol/L Final     Potassium   Date Value Ref Range Status   12/01/2022 5.4 (H) 3.5 - 5.1 mmol/L Final     Comment:     *No Visible Hemolysis     Chloride   Date Value Ref Range Status   12/01/2022 102 95 - 110 mmol/L Final     AST   Date Value Ref Range Status   12/01/2022 47 (H) 10 - 40 U/L Final     ALT   Date Value Ref Range Status   12/01/2022 30 10 - 44 U/L Final     Alkaline Phosphatase   Date Value Ref Range Status   12/01/2022 193 (H) 55 - 135 U/L Final     Total Bilirubin   Date Value Ref Range Status   12/01/2022 0.7 0.1 - 1.0 mg/dL Final     Comment:     For infants and newborns, interpretation of results should be based  on gestational age, weight and in agreement with clinical  observations.    Premature Infant recommended reference ranges:  Up to 24 hours.............<8.0 mg/dL  Up to 48 hours............<12.0 mg/dL  3-5 days..................<15.0 mg/dL  6-29 days.................<15.0 mg/dL       Albumin   Date Value Ref Range Status   12/01/2022 2.9 (L) 3.5 - 5.2 g/dL Final     INR   Date Value Ref Range Status   12/01/2022 1.3 (H) 0.8 - 1.2 Final     Comment:     Coumadin Therapy:  2.0 - 3.0 for INR for all indicators except mechanical heart valves  and antiphospholipid syndromes which should use 2.5 - 3.5.           Assessment/Plan:     1.Cirrhosis:  Decompensated by ascites, hepatic encephalopathy  Last MELD - 21  Creatinine  stable around 1.8, with furosemide 40 mg QD  and spironolactone to100 mg BID.  Therapeutic paracentesis Q2 weeks as needed, however he is receiving every 1 week  Continue with HCC surveillance: US 4/9/2022: shows no liver lesions, schedule  repeat in a month  Continue lactulose 15 mL twice a day, adjust for 2-3 soft bowel movements per day  Esophageal varices surveillance:  Last EGD 12/2021 with 3 columns of G grade 1 esophagea lV varices, questionable gastric polyp versus GV, next  EGD due 12/2022     His comorbidities include hypertension, coronary artery disease with stent in place 15 yrs ago, long history of diabetes mellitus, hyperlipidemia, fraility with falls says balance issues happen when his ascites is worse otherwise he ambulates well.    Discussed at transplant committee, decision was due to his age, history of coronary artery disease, frailty, other comorbidities he is not a candidate for liver transplantation  With previous episode of encephalopathy he is at high risk for recurrence with TIPS, discussed option of hospice and an abdominal drain for comfort, he would like to proceed with shunt placement even with possible risk of encephalopathy.  Will consult IR  Stressed the importance of compliance with a low-salt diet, fluid restriction, weight-bearing exercises     RTC - post TIPS    I have reviewed existing labs, imaging. Educated patient and family about disease process, prognosis. Ordered required labs, images, procedures and discussed treatment plan.       Litzy Goodman MD  Transplant Hepatologist  Dept of Hepatology, [place] [facility] [place]

## 2022-12-08 NOTE — H&P (VIEW-ONLY)
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  He was hospitalized on May 31st for ascites, underwent paracentesis, reportedly also had episodes of hepatic encephalopathy.  Then he had incarcerated hernia repaired at [facility] and discharged from the hospital on June 18th.  Previously he had episodes of GI bleed, underwent an upper endoscopy with banding of esophageal varices, taken off of Plavix in 2019, since then no episodes of bleed per patient.  He was referred to us not liver Clinic for management of cirrhosis of liver          Interval history: 8/18/22  He was asked to come to clinic ASAP as he has been getting  paracentesis every week, says he gets breathless, stays on the bed for 2-3 days, feels fatigue. Has been removing 5-7 L every time.  He was asked to come 2 weeks after last appointment, however says he didn't have an appointment and didn't know about it. He had SBP in the past with encephalopathy that was improved with lactulose, now he is taking  lactulose almost daily.     9/1/22  Last paracentesis was on 08/18/2022.  He returned to the clinic with his wife to discuss the possibility of liver transplantation with a living donor as boyfriend of his daughter is willing to donate     12/8/2022    Requiring paracentesis every week, complains of extreme fatigue.  He is not following salt restriction and fluid restriction, also has been noncompliant with lactulose    Review of Systems   Constitutional:  Positive for fatigue. Negative for fever and unexpected weight change.   Respiratory:  Positive for shortness of breath.    Gastrointestinal:  Positive for abdominal distention. Negative for abdominal pain, blood in stool, nausea and vomiting.   Musculoskeletal:  Positive for arthralgias. Negative for gait problem.   Skin:  Negative for pallor and rash.    Neurological:  Negative for dizziness.   Hematological:  Does not bruise/bleed easily.   Psychiatric/Behavioral:  Negative for confusion, hallucinations and sleep disturbance.      Objective:     Physical Exam  Vitals and nursing note reviewed.   Constitutional:       Appearance: Normal appearance.   Eyes:      General: No scleral icterus.  Cardiovascular:      Heart sounds: No murmur heard.  Pulmonary:      Effort: Pulmonary effort is normal.      Breath sounds: No wheezing, rhonchi or rales.   Abdominal:      General: Bowel sounds are normal. There is distension.      Palpations: There is no mass.      Tenderness: There is no abdominal tenderness.   Musculoskeletal:         General: No tenderness.      Right lower leg: No edema.      Left lower leg: No edema.      Comments: LE edema improved. He is easily able to get up and walk.     Lymphadenopathy:      Cervical: No cervical adenopathy.   Skin:     Coloration: Skin is not jaundiced.      Findings: No bruising or rash.   Neurological:      Mental Status: He is alert and oriented to person, place, and time.      Coordination: Coordination normal.   Psychiatric:         Behavior: Behavior normal.         Thought Content: Thought content normal.       MELD-Na score: 21 at 12/1/2022 10:24 AM  MELD score: 14 at 12/1/2022 10:24 AM  Calculated from:  Serum Creatinine: 1.7 mg/dL at 12/1/2022 10:24 AM  Serum Sodium: 129 mmol/L at 12/1/2022 10:24 AM  Total Bilirubin: 0.7 mg/dL (Using min of 1 mg/dL) at 12/1/2022 10:24 AM  INR(ratio): 1.3 at 12/1/2022 10:24 AM  Age: 77 years    WBC   Date Value Ref Range Status   12/01/2022 6.91 3.90 - 12.70 K/uL Final     Hemoglobin   Date Value Ref Range Status   12/01/2022 10.4 (L) 14.0 - 18.0 g/dL Final     Hematocrit   Date Value Ref Range Status   12/01/2022 32.8 (L) 40.0 - 54.0 % Final     Platelets   Date Value Ref Range Status   12/01/2022 258 150 - 450 K/uL Final     BUN   Date Value Ref Range Status   12/01/2022 40 (H) 8 - 23  mg/dL Final     Creatinine   Date Value Ref Range Status   12/01/2022 1.7 (H) 0.5 - 1.4 mg/dL Final     Glucose   Date Value Ref Range Status   12/01/2022 187 (H) 70 - 110 mg/dL Final     Calcium   Date Value Ref Range Status   12/01/2022 8.9 8.7 - 10.5 mg/dL Final     Sodium   Date Value Ref Range Status   12/01/2022 129 (L) 136 - 145 mmol/L Final     Potassium   Date Value Ref Range Status   12/01/2022 5.4 (H) 3.5 - 5.1 mmol/L Final     Comment:     *No Visible Hemolysis     Chloride   Date Value Ref Range Status   12/01/2022 102 95 - 110 mmol/L Final     AST   Date Value Ref Range Status   12/01/2022 47 (H) 10 - 40 U/L Final     ALT   Date Value Ref Range Status   12/01/2022 30 10 - 44 U/L Final     Alkaline Phosphatase   Date Value Ref Range Status   12/01/2022 193 (H) 55 - 135 U/L Final     Total Bilirubin   Date Value Ref Range Status   12/01/2022 0.7 0.1 - 1.0 mg/dL Final     Comment:     For infants and newborns, interpretation of results should be based  on gestational age, weight and in agreement with clinical  observations.    Premature Infant recommended reference ranges:  Up to 24 hours.............<8.0 mg/dL  Up to 48 hours............<12.0 mg/dL  3-5 days..................<15.0 mg/dL  6-29 days.................<15.0 mg/dL       Albumin   Date Value Ref Range Status   12/01/2022 2.9 (L) 3.5 - 5.2 g/dL Final     INR   Date Value Ref Range Status   12/01/2022 1.3 (H) 0.8 - 1.2 Final     Comment:     Coumadin Therapy:  2.0 - 3.0 for INR for all indicators except mechanical heart valves  and antiphospholipid syndromes which should use 2.5 - 3.5.           Assessment/Plan:     1.Cirrhosis:  Decompensated by ascites, hepatic encephalopathy  Last MELD - 21  Creatinine  stable around 1.8, with furosemide 40 mg QD  and spironolactone to100 mg BID.  Therapeutic paracentesis Q2 weeks as needed, however he is receiving every 1 week  Continue with HCC surveillance: US 4/9/2022: shows no liver lesions, schedule  repeat in a month  Continue lactulose 15 mL twice a day, adjust for 2-3 soft bowel movements per day  Esophageal varices surveillance:  Last EGD 12/2021 with 3 columns of G grade 1 esophagea lV varices, questionable gastric polyp versus GV, next  EGD due 12/2022     His comorbidities include hypertension, coronary artery disease with stent in place 15 yrs ago, long history of diabetes mellitus, hyperlipidemia, fraility with falls says balance issues happen when his ascites is worse otherwise he ambulates well.    Discussed at transplant committee, decision was due to his age, history of coronary artery disease, frailty, other comorbidities he is not a candidate for liver transplantation  With previous episode of encephalopathy he is at high risk for recurrence with TIPS, discussed option of hospice and an abdominal drain for comfort, he would like to proceed with shunt placement even with possible risk of encephalopathy.  Will consult IR  Stressed the importance of compliance with a low-salt diet, fluid restriction, weight-bearing exercises     RTC - post TIPS    I have reviewed existing labs, imaging. Educated patient and family about disease process, prognosis. Ordered required labs, images, procedures and discussed treatment plan.       Litzy Goodman MD  Transplant Hepatologist  Dept of Hepatology, [place] [facility] [place]

## 2022-12-09 ENCOUNTER — HOSPITAL ENCOUNTER (OUTPATIENT)
Dept: RADIOLOGY | Facility: HOSPITAL | Age: 77
Discharge: HOME | End: 2022-12-09
Attending: INTERNAL MEDICINE
Payer: OTHER GOVERNMENT

## 2022-12-09 VITALS
OXYGEN SATURATION: 100 % | SYSTOLIC BLOOD PRESSURE: 119 MMHG | RESPIRATION RATE: 16 BRPM | WEIGHT: 181 LBS | DIASTOLIC BLOOD PRESSURE: 59 MMHG | HEIGHT: 72 IN | HEART RATE: 69 BPM | BODY MASS INDEX: 24.52 KG/M2

## 2022-12-09 DIAGNOSIS — K75.81 LIVER CIRRHOSIS SECONDARY TO NASH: Primary | ICD-10-CM

## 2022-12-09 DIAGNOSIS — K75.81 LIVER CIRRHOSIS SECONDARY TO NASH: ICD-10-CM

## 2022-12-09 DIAGNOSIS — K74.60 LIVER CIRRHOSIS SECONDARY TO NASH: Primary | ICD-10-CM

## 2022-12-09 DIAGNOSIS — K74.60 LIVER CIRRHOSIS SECONDARY TO NASH: ICD-10-CM

## 2022-12-09 LAB
APPEARANCE FLD: NORMAL
BODY FLD TYPE: NORMAL
COLOR FLD: YELLOW
LYMPHOCYTES NFR FLD MANUAL: 23 %
MESOTHL CELL NFR FLD MANUAL: 2 %
MONOS+MACROS NFR FLD MANUAL: 68 %
NEUTROPHILS NFR FLD MANUAL: 7 %
WBC # FLD: 94 /CU MM

## 2022-12-09 PROCEDURE — 87070 CULTURE OTHR SPECIMN AEROBIC: CPT | Performed by: INTERNAL MEDICINE

## 2022-12-09 PROCEDURE — 63600175 PHARM REV CODE 636 W HCPCS: Mod: JG | Performed by: RADIOLOGY

## 2022-12-09 PROCEDURE — P9047 ALBUMIN (HUMAN), 25%, 50ML: HCPCS | Mod: JG | Performed by: RADIOLOGY

## 2022-12-09 PROCEDURE — 49083 ABD PARACENTESIS W/IMAGING: CPT

## 2022-12-09 PROCEDURE — 89051 BODY FLUID CELL COUNT: CPT | Performed by: INTERNAL MEDICINE

## 2022-12-09 PROCEDURE — 87075 CULTR BACTERIA EXCEPT BLOOD: CPT | Performed by: INTERNAL MEDICINE

## 2022-12-09 PROCEDURE — 87205 SMEAR GRAM STAIN: CPT | Performed by: INTERNAL MEDICINE

## 2022-12-09 RX ORDER — ALBUMIN HUMAN 250 G/1000ML
50 SOLUTION INTRAVENOUS ONCE
Status: COMPLETED | OUTPATIENT
Start: 2022-12-09 | End: 2022-12-09

## 2022-12-09 RX ADMIN — ALBUMIN (HUMAN) 50 G: 25 SOLUTION INTRAVENOUS at 03:12

## 2022-12-09 NOTE — DISCHARGE SUMMARY
[facility])  Discharge Note  Short Stay    US Paracentesis inc Imaging  US Paracentesis inc Imaging  US Paracentesis inc Imaging  [facility] Par[facility]esis inc Imaging  [facility] Par[facility]  [facility]  [facility]  [facility]  [facility]  [facility] Paracentesis inc [facility]      OUTCOME: Patient tolerated treatment/procedure well without complication and is now ready for discharge.    DISPOSITION: Home or Self Care    FINAL DIAGNOSIS:  <principal problem not specified>    FOLLOWUP: In clinic    DISCHARGE INSTRUCTIONS:  No discharge procedures on file.      Clinical Reference Documents Added to Patient Instructions         Document    ABDOMINAL PARACENTESIS DISCHARGE INSTRUCTIONS (ENGLISH)            TIME SPENT ON DISCHARGE: 15 minutes    Pre Op Diagnosis: ascites     Post Op Diagnosis: same    Procedure:  para     Procedure performed by: Andria PUCKETT, Kishor LOFTON     Written Informed Consent Obtained: Yes     Specimen Removed:  yes     Estimated Blood Loss:  minimal     Findings: Local anesthesia and moderate sedation were used.     The patient tolerated the procedure well and there were no complications.      Disposition:  F/U in clinic    Discharge instructions:  Light activity for 24 hours.  Remove band aid in 24 hours.  No baths (showers are appropriate).    F/U with ordering physician    Sterile technique was performed in the lower abdomen, lidocaine was used as a local anesthetic.   8 liters of suellen fluid removed for therapeutic purposes.  Pt tolerated the procedure well without immediate complications.  Please see radiologist report for details. F/u with PCP and/or ordering physician.

## 2022-12-09 NOTE — NURSING
8 L light suellen fluid removed from pt's abdomen on right side. Paracentesis safely discontinued by Lizbeth LYN, with tip intact. Pressure held to site and bandaid applied. Pt tolerated well. VSS. Albumin given per protocol

## 2022-12-09 NOTE — PLAN OF CARE
Verbal discharge instructions given to patient including when to seek medical attention and site care. Pt verbalized understanding. Pt declined paper AVS. PIV safely discontinued. Dressing to procedure site remains CDI. Pt denies pain and NADN. VSS. Pt ambulated to waiting room with all belongings, where met by family member. Pt was stable on discharge.

## 2022-12-11 DIAGNOSIS — K74.60 LIVER CIRRHOSIS SECONDARY TO NASH: Primary | ICD-10-CM

## 2022-12-11 DIAGNOSIS — K75.81 LIVER CIRRHOSIS SECONDARY TO NASH: Primary | ICD-10-CM

## 2022-12-12 LAB — PATH INTERP FLD-IMP: NORMAL

## 2022-12-13 ENCOUNTER — HOSPITAL ENCOUNTER (OUTPATIENT)
Dept: CARDIOLOGY | Facility: HOSPITAL | Age: 77
Discharge: HOME | End: 2022-12-13
Attending: INTERNAL MEDICINE
Payer: OTHER GOVERNMENT

## 2022-12-13 VITALS
WEIGHT: 181 LBS | HEART RATE: 77 BPM | DIASTOLIC BLOOD PRESSURE: 59 MMHG | HEIGHT: 72 IN | BODY MASS INDEX: 24.52 KG/M2 | SYSTOLIC BLOOD PRESSURE: 119 MMHG

## 2022-12-13 DIAGNOSIS — R18.8 ASCITES OF LIVER: ICD-10-CM

## 2022-12-13 DIAGNOSIS — K75.81 LIVER CIRRHOSIS SECONDARY TO NASH: ICD-10-CM

## 2022-12-13 DIAGNOSIS — K74.60 LIVER CIRRHOSIS SECONDARY TO NASH: ICD-10-CM

## 2022-12-13 PROCEDURE — 93306 TTE W/DOPPLER COMPLETE: CPT | Mod: 26,,, | Performed by: STUDENT IN AN ORGANIZED HEALTH CARE EDUCATION/TRAINING PROGRAM

## 2022-12-13 PROCEDURE — 93306 ECHO (CUPID ONLY): ICD-10-PCS | Mod: 26,,, | Performed by: STUDENT IN AN ORGANIZED HEALTH CARE EDUCATION/TRAINING PROGRAM

## 2022-12-13 PROCEDURE — 93306 TTE W/DOPPLER COMPLETE: CPT

## 2022-12-14 LAB
AORTIC ROOT ANNULUS: 3.43 CM
ASCENDING AORTA: 2.98 CM
AV INDEX (PROSTH): 0.55
AV MEAN GRADIENT: 6 MMHG
AV PEAK GRADIENT: 9 MMHG
AV VALVE AREA: 1.74 CM2
AV VELOCITY RATIO: 0.6
BACTERIA FLD AEROBE CULT: NO GROWTH
BSA FOR ECHO PROCEDURE: 2.04 M2
CV ECHO LV RWT: 0.5 CM
DOP CALC AO PEAK VEL: 1.53 M/S
DOP CALC AO VTI: 36 CM
DOP CALC LVOT AREA: 3.2 CM2
DOP CALC LVOT DIAMETER: 2.01 CM
DOP CALC LVOT PEAK VEL: 0.92 M/S
DOP CALC LVOT STROKE VOLUME: 62.48 CM3
DOP CALCLVOT PEAK VEL VTI: 19.7 CM
E WAVE DECELERATION TIME: 281.43 MSEC
E/A RATIO: 0.69
E/E' RATIO: 7.25 M/S
ECHO LV POSTERIOR WALL: 1.06 CM (ref 0.6–1.1)
EJECTION FRACTION: 60 %
FRACTIONAL SHORTENING: 32 % (ref 28–44)
GRAM STN SPEC: NORMAL
GRAM STN SPEC: NORMAL
INTERVENTRICULAR SEPTUM: 1.01 CM (ref 0.6–1.1)
IVRT: 79.92 MSEC
LA MAJOR: 4.36 CM
LA MINOR: 4.08 CM
LA WIDTH: 3.5 CM
LEFT ATRIUM SIZE: 3.75 CM
LEFT ATRIUM VOLUME INDEX MOD: 17.7 ML/M2
LEFT ATRIUM VOLUME INDEX: 23.1 ML/M2
LEFT ATRIUM VOLUME MOD: 36.01 CM3
LEFT ATRIUM VOLUME: 47.03 CM3
LEFT INTERNAL DIMENSION IN SYSTOLE: 2.85 CM (ref 2.1–4)
LEFT VENTRICLE DIASTOLIC VOLUME INDEX: 38.82 ML/M2
LEFT VENTRICLE DIASTOLIC VOLUME: 79.19 ML
LEFT VENTRICLE MASS INDEX: 71 G/M2
LEFT VENTRICLE SYSTOLIC VOLUME INDEX: 15.1 ML/M2
LEFT VENTRICLE SYSTOLIC VOLUME: 30.74 ML
LEFT VENTRICULAR INTERNAL DIMENSION IN DIASTOLE: 4.21 CM (ref 3.5–6)
LEFT VENTRICULAR MASS: 144.58 G
LV LATERAL E/E' RATIO: 6.44 M/S
LV SEPTAL E/E' RATIO: 8.29 M/S
LVOT MG: 1.82 MMHG
LVOT MV: 0.63 CM/S
MV PEAK A VEL: 0.84 M/S
MV PEAK E VEL: 0.58 M/S
MV STENOSIS PRESSURE HALF TIME: 81.62 MS
MV VALVE AREA P 1/2 METHOD: 2.7 CM2
PISA TR MAX VEL: 2.08 M/S
PULM VEIN S/D RATIO: 2.08
PV PEAK D VEL: 0.39 M/S
PV PEAK S VEL: 0.81 M/S
PV PEAK VELOCITY: 0.93 CM/S
RA MAJOR: 4.18 CM
RA PRESSURE: 3 MMHG
RA WIDTH: 3.01 CM
RIGHT VENTRICULAR END-DIASTOLIC DIMENSION: 3.04 CM
SINUS: 2.61 CM
STJ: 2.23 CM
TDI LATERAL: 0.09 M/S
TDI SEPTAL: 0.07 M/S
TDI: 0.08 M/S
TR MAX PG: 17 MMHG
TV REST PULMONARY ARTERY PRESSURE: 20 MMHG

## 2022-12-16 ENCOUNTER — HOSPITAL ENCOUNTER (OUTPATIENT)
Dept: RADIOLOGY | Facility: HOSPITAL | Age: 77
Discharge: HOME | End: 2022-12-16
Attending: INTERNAL MEDICINE
Payer: OTHER GOVERNMENT

## 2022-12-16 VITALS
OXYGEN SATURATION: 100 % | SYSTOLIC BLOOD PRESSURE: 102 MMHG | TEMPERATURE: 98 F | DIASTOLIC BLOOD PRESSURE: 59 MMHG | BODY MASS INDEX: 24.38 KG/M2 | RESPIRATION RATE: 16 BRPM | WEIGHT: 180 LBS | HEART RATE: 64 BPM | HEIGHT: 72 IN

## 2022-12-16 DIAGNOSIS — K75.81 LIVER CIRRHOSIS SECONDARY TO NASH: ICD-10-CM

## 2022-12-16 DIAGNOSIS — K74.60 LIVER CIRRHOSIS SECONDARY TO NASH: ICD-10-CM

## 2022-12-16 PROCEDURE — P9047 ALBUMIN (HUMAN), 25%, 50ML: HCPCS | Mod: JG | Performed by: RADIOLOGY

## 2022-12-16 PROCEDURE — 49083 ABD PARACENTESIS W/IMAGING: CPT

## 2022-12-16 PROCEDURE — 63600175 PHARM REV CODE 636 W HCPCS: Mod: JG | Performed by: RADIOLOGY

## 2022-12-16 RX ORDER — ALBUMIN HUMAN 250 G/1000ML
50 SOLUTION INTRAVENOUS ONCE
Status: COMPLETED | OUTPATIENT
Start: 2022-12-16 | End: 2022-12-16

## 2022-12-16 RX ADMIN — ALBUMIN (HUMAN) 50 G: 25 SOLUTION INTRAVENOUS at 01:12

## 2022-12-16 NOTE — PLAN OF CARE
6.25 L suellen fluid drained from patient's abdomen on right side. Paracentesis catheter safely discontinued with tip intact. Pressure held to site and bandaid applied. No bleeding noted. VSS. Albumin given per protocol.

## 2022-12-16 NOTE — DISCHARGE SUMMARY
[facility])  Discharge Note  Short Stay    US Paracentesis inc Imaging      OUTCOME: Patient tolerated treatment/procedure well without complication and is now ready for discharge.    DISPOSITION: Home or Self Care    FINAL DIAGNOSIS:  <principal problem not specified>    FOLLOWUP: In clinic    DISCHARGE INSTRUCTIONS:  No discharge procedures on file.      Clinical Reference Documents Added to Patient Instructions         Document    ABDOMINAL PARACENTESIS DISCHARGE INSTRUCTIONS (ENGLISH)            TIME SPENT ON DISCHARGE: 15 minutes    Pre Op Diagnosis: ascites     Post Op Diagnosis: same    Procedure:  para     Procedure performed by: Maryjane PUCKETT, Kishor LOFTON     Written Informed Consent Obtained: Yes     Specimen Removed:  yes     Estimated Blood Loss:  minimal     Findings: Local anesthesia and moderate sedation were used.     The patient tolerated the procedure well and there were no complications.      Disposition:  F/U in clinic    Discharge instructions:  Light activity for 24 hours.  Remove band aid in 24 hours.  No baths (showers are appropriate).    F/U with ordering physician    Sterile technique was performed in the lower abdomen, lidocaine was used as a local anesthetic.   6.25 liters of suellen fluid removed for therapeutic purposes.  Pt tolerated the procedure well without immediate complications.  Please see radiologist report for details. F/u with PCP and/or ordering physician.

## 2022-12-16 NOTE — PLAN OF CARE
Verbal discharge instructions given to patient including when to seek medical attention and site care. Pt verbalized understanding. Pt declined paper AVS but available on [facility] portal. PIV safely discontinued. Bandaid to procedure site remains CDI. Pt denies pain and NADN. VSS. Pt ambulated independently to meet wife in waiting room. Pt was stable on discharge.

## 2022-12-19 ENCOUNTER — DOCUMENTATION ONLY (OUTPATIENT)
Dept: REHABILITATION | Facility: HOSPITAL | Age: 77
End: 2022-12-19
Payer: MEDICARE

## 2022-12-19 LAB — BACTERIA SPEC ANAEROBE CULT: NORMAL

## 2022-12-19 NOTE — PROGRESS NOTES
[facility]  Occupational therapy  Discharge Note    Name: Korin Vivas  [facility] Number: [id number]  Therapy Diagnosis:        Encounter Diagnoses   Name Primary?    Decreased range of motion Yes    Pain      Self-care deficit      Decreased strength      Swelling        Physician: Gabo Roger        Physician Orders: Evaluation and treatment   Right distal humerus fracture  Begin working on elbow ROM  for 3 weeks then progress, continue with hand/wrist/finger ROM  eval and treat with modalities as needed     Medical Diagnosis: S42.411D (ICD-10-CM) - Closed displaced simple supracondylar fracture of right humerus without intercondylar fracture with routine healing,   Surgical Procedure and Date: NA,   Evaluation Date: 6/29/2022      Date of Last visit: 8/17/2022  Total Visits Received: 7    ASSESSMENT      Unable to reassess as patient has not returned for therapy.    Discharge reason: Patient has not attended therapy since 8/17/2022.    Discharge FOTO Score: NA    GOALS:     Short Term Goals:  4 weeks                                             updated  8/1/2022   Pain: Pt will demonstrate improved pain by reports of less than or equal to 5/10 worst pain on the verbal rating scale in order to progress toward maximal functional ability and improve QOL.  Met 7/6/2022   Mobility: Patient will improve AROM to 50% of stated goals, listed in objective measures above, in order to progress towards independence with functional activities.   progressing forearm and shoulder   Strength: Patient will improve strength to 50% of stated goals, listed in objective measures above, in order to progress towards independence with functional activities.   ongoing   Self Care: Patient will demonstrate improved self care skills listed in objective measure above,in order to progress towards independence with functional activities.   8/1/2022 met   HEP: Patient will demonstrate independence with  HEP in order to progress toward functional independence.  ongoing      Long Term Goals:  8 weeks                                                updated  8/1/2022   Pain: Pt will demonstrate improved pain by reports of less than or equal to 1/10 worst pain on the verbal rating scale in order to progress toward maximal functional ability and improve QOL.       Function: Patient will demonstrate improved function as indicated by a functional limitation score of less than or equal to 37 out of 100 on FOTO.     Mobility: Patient will improve AROM to stated goals, listed in objective measures above, in order to return to maximal functional potential and improve quality of life.     Strength: Patient will improve strength to stated goals, listed in objective measures above, in order to improve functional independence and quality of life.     Self Care: Patient will demonstrate increased self care skills to an independent level or modified independent level with adaptive equipment as needed.  progressing   Patient will return to normal ADL's, IADL's, community involvement, recreational activities, and work-related activities with less than or equal to 0/10 pain and maximal function.             PLAN   This patient is discharged from Occupational Therapy      MARIIA Hayes

## 2022-12-19 NOTE — H&P (VIEW-ONLY)
[facility]  Occupational therapy  Discharge Note    Name: Korin Vivas  [facility] Number: [id number]  Therapy Diagnosis:        Encounter Diagnoses   Name Primary?    Decreased range of motion Yes    Pain      Self-care deficit      Decreased strength      Swelling        Physician: Gabo Roger        Physician Orders: Evaluation and treatment   Right distal humerus fracture  Begin working on elbow ROM  for 3 weeks then progress, continue with hand/wrist/finger ROM  eval and treat with modalities as needed     Medical Diagnosis: S42.411D (ICD-10-CM) - Closed displaced simple supracondylar fracture of right humerus without intercondylar fracture with routine healing,   Surgical Procedure and Date: NA,   Evaluation Date: 6/29/2022      Date of Last visit: 8/17/2022  Total Visits Received: 7    ASSESSMENT      Unable to reassess as patient has not returned for therapy.    Discharge reason: Patient has not attended therapy since 8/17/2022.    Discharge FOTO Score: NA    GOALS:     Short Term Goals:  4 weeks                                             updated  8/1/2022   Pain: Pt will demonstrate improved pain by reports of less than or equal to 5/10 worst pain on the verbal rating scale in order to progress toward maximal functional ability and improve QOL.  Met 7/6/2022   Mobility: Patient will improve AROM to 50% of stated goals, listed in objective measures above, in order to progress towards independence with functional activities.   progressing forearm and shoulder   Strength: Patient will improve strength to 50% of stated goals, listed in objective measures above, in order to progress towards independence with functional activities.   ongoing   Self Care: Patient will demonstrate improved self care skills listed in objective measure above,in order to progress towards independence with functional activities.   8/1/2022 met   HEP: Patient will demonstrate independence with  HEP in order to progress toward functional independence.  ongoing      Long Term Goals:  8 weeks                                                updated  8/1/2022   Pain: Pt will demonstrate improved pain by reports of less than or equal to 1/10 worst pain on the verbal rating scale in order to progress toward maximal functional ability and improve QOL.       Function: Patient will demonstrate improved function as indicated by a functional limitation score of less than or equal to 37 out of 100 on FOTO.     Mobility: Patient will improve AROM to stated goals, listed in objective measures above, in order to return to maximal functional potential and improve quality of life.     Strength: Patient will improve strength to stated goals, listed in objective measures above, in order to improve functional independence and quality of life.     Self Care: Patient will demonstrate increased self care skills to an independent level or modified independent level with adaptive equipment as needed.  progressing   Patient will return to normal ADL's, IADL's, community involvement, recreational activities, and work-related activities with less than or equal to 0/10 pain and maximal function.             PLAN   This patient is discharged from Occupational Therapy      MARIIA Hayes

## 2022-12-28 ENCOUNTER — HOSPITAL ENCOUNTER (OUTPATIENT)
Dept: RADIOLOGY | Facility: HOSPITAL | Age: 77
Discharge: HOME | End: 2022-12-28
Attending: INTERNAL MEDICINE
Payer: OTHER GOVERNMENT

## 2022-12-28 VITALS
DIASTOLIC BLOOD PRESSURE: 61 MMHG | SYSTOLIC BLOOD PRESSURE: 133 MMHG | OXYGEN SATURATION: 100 % | HEART RATE: 107 BPM | RESPIRATION RATE: 17 BRPM

## 2022-12-28 DIAGNOSIS — K74.60 LIVER CIRRHOSIS SECONDARY TO NASH: ICD-10-CM

## 2022-12-28 DIAGNOSIS — K75.81 LIVER CIRRHOSIS SECONDARY TO NASH: ICD-10-CM

## 2022-12-28 PROCEDURE — 49083 ABD PARACENTESIS W/IMAGING: CPT

## 2022-12-28 PROCEDURE — P9047 ALBUMIN (HUMAN), 25%, 50ML: HCPCS | Mod: JG | Performed by: INTERNAL MEDICINE

## 2022-12-28 PROCEDURE — 63600175 PHARM REV CODE 636 W HCPCS: Mod: JG | Performed by: INTERNAL MEDICINE

## 2022-12-28 RX ORDER — ALBUMIN HUMAN 250 G/1000ML
50 SOLUTION INTRAVENOUS ONCE
Status: COMPLETED | OUTPATIENT
Start: 2022-12-28 | End: 2022-12-28

## 2022-12-28 RX ADMIN — ALBUMIN (HUMAN) 50 G: 12.5 SOLUTION INTRAVENOUS at 02:12

## 2022-12-28 NOTE — PLAN OF CARE
Pt being discharged Home in stable condition. IV removed, catheter intact, pt tolerated well.  Paracentesis catheter removed, catheter intact. Pressure held x1 minute, bandaid applied. R abdominal dressing to puncture site CDI at time of discharge. PT able to ambulate without complication at time of discharge. VSS. Discharge instructions given to pt, pt verbalized understanding.    6.25L suellen fluid drained from abdomen.

## 2022-12-28 NOTE — DISCHARGE SUMMARY
[facility]  Discharge Note  Short Stay    [facility]  [facility]      OUTCOME: Patient tolerated treatment/procedure well without complication and is now ready for discharge.    DISPOSITION: Home or Self Care    FINAL DIAGNOSIS:  <principal problem not specified>    FOLLOWUP: In clinic    DISCHARGE INSTRUCTIONS:  No discharge procedures on file.      Clinical Reference Documents Added to Patient Instructions         Document    ABDOMINAL PARACENTESIS DISCHARGE INSTRUCTIONS (ENGLISH)            TIME SPENT ON DISCHARGE: 15 minutes

## 2022-12-28 NOTE — PLAN OF CARE
Ambulated to room.  A & O x 3   talkative, communicative.   Has   been coming here for last 4 mos per patient.  Has no questions

## 2023-01-04 ENCOUNTER — HOSPITAL ENCOUNTER (OUTPATIENT)
Dept: RADIOLOGY | Facility: HOSPITAL | Age: 78
Discharge: HOME | End: 2023-01-04
Attending: INTERNAL MEDICINE
Payer: OTHER GOVERNMENT

## 2023-01-04 VITALS
SYSTOLIC BLOOD PRESSURE: 112 MMHG | OXYGEN SATURATION: 100 % | RESPIRATION RATE: 16 BRPM | HEART RATE: 76 BPM | DIASTOLIC BLOOD PRESSURE: 57 MMHG | HEIGHT: 72 IN | WEIGHT: 179.88 LBS | BODY MASS INDEX: 24.36 KG/M2

## 2023-01-04 DIAGNOSIS — K74.60 LIVER CIRRHOSIS SECONDARY TO NASH: ICD-10-CM

## 2023-01-04 DIAGNOSIS — D68.9 COAGULATION DEFECT, UNSPECIFIED: Primary | ICD-10-CM

## 2023-01-04 DIAGNOSIS — K75.81 LIVER CIRRHOSIS SECONDARY TO NASH: ICD-10-CM

## 2023-01-04 PROCEDURE — 49083 ABD PARACENTESIS W/IMAGING: CPT

## 2023-01-04 PROCEDURE — 49083 US GUIDED PARACENTESIS INC IMAGING: ICD-10-PCS | Mod: ,,, | Performed by: RADIOLOGY

## 2023-01-04 PROCEDURE — 49083 ABD PARACENTESIS W/IMAGING: CPT | Mod: ,,, | Performed by: RADIOLOGY

## 2023-01-04 PROCEDURE — 63600175 PHARM REV CODE 636 W HCPCS: Mod: JG | Performed by: RADIOLOGY

## 2023-01-04 PROCEDURE — P9047 ALBUMIN (HUMAN), 25%, 50ML: HCPCS | Mod: JG | Performed by: RADIOLOGY

## 2023-01-04 RX ORDER — ALBUMIN HUMAN 250 G/1000ML
75 SOLUTION INTRAVENOUS ONCE
Status: COMPLETED | OUTPATIENT
Start: 2023-01-04 | End: 2023-01-04

## 2023-01-04 RX ADMIN — ALBUMIN (HUMAN) 75 G: 25 SOLUTION INTRAVENOUS at 02:01

## 2023-01-04 NOTE — NURSING
9.75 L light suellen fluid drained from patient's abdomen on left side. Paracentesis catheter safely discontinued with tip intact. Pressure held to site and bandaid applied. No bleeding or drainage noted. Pt tolerated well. VSS and NADN. Albumin given per protocol.

## 2023-01-04 NOTE — NURSING
Verbal discharge instructions given to patient including when to seek medical attention and site care. Pt verbalized understanding. Pt declined paper AVS but available on [employer]. PIV safely discontinued. Bandaid to procedure site remains CDI. Pt denies pain and NADN. VSS. Pt escorted to waiting area where met by spouse.  Pt was stable on discharge.

## 2023-01-04 NOTE — DISCHARGE SUMMARY
[facility]  Discharge Note  Short Stay    [facility]  [facility]  [facility]      OUTCOME: Patient tolerated treatment/procedure well without complication and is now ready for discharge.    DISPOSITION: Home or Self Care    FINAL DIAGNOSIS:  <principal problem not specified>    FOLLOWUP: In clinic    DISCHARGE INSTRUCTIONS:  No discharge procedures on file.      Clinical Reference Documents Added to Patient Instructions         Document    ABDOMINAL PARACENTESIS DISCHARGE INSTRUCTIONS (ENGLISH)            TIME SPENT ON DISCHARGE: 15 minutes    Pre Op Diagnosis: ascites     Post Op Diagnosis: same    Procedure:  para     Procedure performed by: Maryjane PUCKETT, Kishor LOFTON     Written Informed Consent Obtained: Yes     Specimen Removed:  yes     Estimated Blood Loss:  minimal     Findings: Local anesthesia and moderate sedation were used.     The patient tolerated the procedure well and there were no complications.      Disposition:  F/U in clinic    Discharge instructions:  Light activity for 24 hours.  Remove band aid in 24 hours.  No baths (showers are appropriate).    F/U with ordering physician    Sterile technique was performed in the lower abdomen, lidocaine was used as a local anesthetic.   9.75 liters of light suellen fluid removed for therapeutic purposes.  Pt tolerated the procedure well without immediate complications.  Please see radiologist report for details. F/u with PCP and/or ordering physician.

## 2023-01-12 ENCOUNTER — HOSPITAL ENCOUNTER (OUTPATIENT)
Dept: RADIOLOGY | Facility: HOSPITAL | Age: 78
Discharge: HOME | End: 2023-01-12
Attending: INTERNAL MEDICINE
Payer: OTHER GOVERNMENT

## 2023-01-12 VITALS
HEART RATE: 68 BPM | DIASTOLIC BLOOD PRESSURE: 57 MMHG | WEIGHT: 174 LBS | OXYGEN SATURATION: 100 % | SYSTOLIC BLOOD PRESSURE: 114 MMHG | HEIGHT: 72 IN | RESPIRATION RATE: 18 BRPM | BODY MASS INDEX: 23.57 KG/M2

## 2023-01-12 DIAGNOSIS — K74.60 LIVER CIRRHOSIS SECONDARY TO NASH: ICD-10-CM

## 2023-01-12 DIAGNOSIS — K75.81 LIVER CIRRHOSIS SECONDARY TO NASH: ICD-10-CM

## 2023-01-12 LAB
APPEARANCE FLD: CLEAR
BODY FLD TYPE: NORMAL
COLOR FLD: YELLOW
LYMPHOCYTES NFR FLD MANUAL: 19 %
MONOS+MACROS NFR FLD MANUAL: 77 %
NEUTROPHILS NFR FLD MANUAL: 4 %
WBC # FLD: 100 /CU MM

## 2023-01-12 PROCEDURE — P9047 ALBUMIN (HUMAN), 25%, 50ML: HCPCS | Mod: JG | Performed by: INTERNAL MEDICINE

## 2023-01-12 PROCEDURE — 49083 ABD PARACENTESIS W/IMAGING: CPT | Mod: ,,, | Performed by: RADIOLOGY

## 2023-01-12 PROCEDURE — 87075 CULTR BACTERIA EXCEPT BLOOD: CPT | Performed by: INTERNAL MEDICINE

## 2023-01-12 PROCEDURE — 49083 US GUIDED PARACENTESIS INC IMAGING: ICD-10-PCS | Mod: ,,, | Performed by: RADIOLOGY

## 2023-01-12 PROCEDURE — 63600175 PHARM REV CODE 636 W HCPCS: Mod: JG | Performed by: INTERNAL MEDICINE

## 2023-01-12 PROCEDURE — 87205 SMEAR GRAM STAIN: CPT | Performed by: INTERNAL MEDICINE

## 2023-01-12 PROCEDURE — 49083 ABD PARACENTESIS W/IMAGING: CPT

## 2023-01-12 PROCEDURE — 87070 CULTURE OTHR SPECIMN AEROBIC: CPT | Performed by: INTERNAL MEDICINE

## 2023-01-12 PROCEDURE — 89051 BODY FLUID CELL COUNT: CPT | Performed by: INTERNAL MEDICINE

## 2023-01-12 RX ORDER — ALBUMIN HUMAN 250 G/1000ML
50 SOLUTION INTRAVENOUS ONCE
Status: COMPLETED | OUTPATIENT
Start: 2023-01-12 | End: 2023-01-12

## 2023-01-12 RX ADMIN — ALBUMIN (HUMAN) 50 G: 25 SOLUTION INTRAVENOUS at 02:01

## 2023-01-12 NOTE — PLAN OF CARE
PT AMBULATED TO ROOM WITHOUT ASSISTANCE. NADN.  HISTORY PER PT.  PLACED IN SUPINE POSITION ON STRETCHER.

## 2023-01-12 NOTE — DISCHARGE SUMMARY
[facility]  Discharge Note  Short Stay    [facility]      OUTCOME: Patient tolerated treatment/procedure well without complication and is now ready for discharge.    DISPOSITION: Home or Self Care    FINAL DIAGNOSIS:  <principal problem not specified>    FOLLOWUP: In clinic    DISCHARGE INSTRUCTIONS:  No discharge procedures on file.      Clinical Reference Documents Added to Patient Instructions         Document    ABDOMINAL PARACENTESIS DISCHARGE INSTRUCTIONS (ENGLISH)            TIME SPENT ON DISCHARGE: 15 minutes    Pre Op Diagnosis: ascites     Post Op Diagnosis: same    Procedure:  para     Procedure performed by: Maryjane PUCKETT, Kishor LOFTON     Written Informed Consent Obtained: Yes     Specimen Removed:  yes     Estimated Blood Loss:  minimal     Findings: Local anesthesia and moderate sedation were used.     The patient tolerated the procedure well and there were no complications.      Disposition:  F/U in clinic    Discharge instructions:  Light activity for 24 hours.  Remove band aid in 24 hours.  No baths (showers are appropriate).    F/U with ordering physician    Sterile technique was performed in the lower abdomen, lidocaine was used as a local anesthetic.   7.25 liters of suellen fluid removed for therapeutic purposes.  Pt tolerated the procedure well without immediate complications.  Please see radiologist report for details. F/u with PCP and/or ordering physician.

## 2023-01-12 NOTE — DISCHARGE INSTRUCTIONS
Please return to ER if any of these symptoms occur:  Fever over 101 degrees,  Bleeding from the puncture site not controlled,  Pain not controlled with Aleve or Tylenol,    No driving for 24 hours after procedure due to sedation given during procedure.     Do not submerge in standing water for 2 days but you may shower.    Resume home medications and diet

## 2023-01-12 NOTE — PLAN OF CARE
Band aid to right abdomen C/D/I with no bleeding/redness/swelling noted. VSS, NADN, and pt meets criteria for discharge. Discharge instructions given to and reviewed with pt, and pt verbalized understanding of all. Pt discharged to home, taken out via wheelchair and driven home by wife.

## 2023-01-12 NOTE — PLAN OF CARE
PARACENTESIS CATHETER REMOVED FROM RIGHT ABDOMEN WITH CATHETER INTACT.  7.25L CLEAR MARIANO FLUID REMOVED.  NADN. VSS.

## 2023-01-13 LAB — PATH INTERP FLD-IMP: NORMAL

## 2023-01-17 LAB
BACTERIA FLD AEROBE CULT: NO GROWTH
GRAM STN SPEC: NORMAL
GRAM STN SPEC: NORMAL

## 2023-01-19 ENCOUNTER — HOSPITAL ENCOUNTER (OUTPATIENT)
Dept: RADIOLOGY | Facility: HOSPITAL | Age: 78
Discharge: HOME | End: 2023-01-19
Attending: INTERNAL MEDICINE
Payer: OTHER GOVERNMENT

## 2023-01-19 VITALS
WEIGHT: 174 LBS | RESPIRATION RATE: 18 BRPM | HEART RATE: 65 BPM | DIASTOLIC BLOOD PRESSURE: 60 MMHG | SYSTOLIC BLOOD PRESSURE: 122 MMHG | OXYGEN SATURATION: 100 % | HEIGHT: 72 IN | BODY MASS INDEX: 23.57 KG/M2

## 2023-01-19 DIAGNOSIS — K75.81 LIVER CIRRHOSIS SECONDARY TO NASH: ICD-10-CM

## 2023-01-19 DIAGNOSIS — K74.60 LIVER CIRRHOSIS SECONDARY TO NASH: ICD-10-CM

## 2023-01-19 PROCEDURE — 63600175 PHARM REV CODE 636 W HCPCS: Mod: JG | Performed by: INTERNAL MEDICINE

## 2023-01-19 PROCEDURE — P9047 ALBUMIN (HUMAN), 25%, 50ML: HCPCS | Mod: JG | Performed by: INTERNAL MEDICINE

## 2023-01-19 PROCEDURE — 74183 MRI ABD W/O CNTR FLWD CNTR: CPT | Mod: TC

## 2023-01-19 PROCEDURE — 74183 MRI ABDOMEN W WO CONTRAST: ICD-10-PCS | Mod: 26,,, | Performed by: RADIOLOGY

## 2023-01-19 PROCEDURE — 49083 ABD PARACENTESIS W/IMAGING: CPT

## 2023-01-19 PROCEDURE — 49083 ABD PARACENTESIS W/IMAGING: CPT | Mod: ,,, | Performed by: RADIOLOGY

## 2023-01-19 PROCEDURE — A9585 GADOBUTROL INJECTION: HCPCS | Performed by: INTERNAL MEDICINE

## 2023-01-19 PROCEDURE — 49083 US GUIDED PARACENTESIS INC IMAGING: ICD-10-PCS | Mod: ,,, | Performed by: RADIOLOGY

## 2023-01-19 PROCEDURE — 74183 MRI ABD W/O CNTR FLWD CNTR: CPT | Mod: 26,,, | Performed by: RADIOLOGY

## 2023-01-19 PROCEDURE — 25500020 PHARM REV CODE 255: Performed by: INTERNAL MEDICINE

## 2023-01-19 RX ORDER — GADOBUTROL 604.72 MG/ML
10 INJECTION INTRAVENOUS
Status: COMPLETED | OUTPATIENT
Start: 2023-01-19 | End: 2023-01-19

## 2023-01-19 RX ORDER — ALBUMIN HUMAN 250 G/1000ML
50 SOLUTION INTRAVENOUS ONCE
Status: COMPLETED | OUTPATIENT
Start: 2023-01-19 | End: 2023-01-19

## 2023-01-19 RX ADMIN — GADOBUTROL 7 ML: 604.72 INJECTION INTRAVENOUS at 03:01

## 2023-01-19 RX ADMIN — ALBUMIN (HUMAN) 50 G: 25 SOLUTION INTRAVENOUS at 01:01

## 2023-01-19 NOTE — NURSING
PARACENTESIS CATHETER REMOVED FROM RIGHT ABDOMEN WITH TIP IN TACT.  NADN.  VSS.  8.25L CLEAR MARIANO FLUID REMOVED.

## 2023-01-19 NOTE — H&P
[facility]  History & Physical    Subjective:      Chief Complaint/Reason for Admission: ascites    Korin Vivas is a 77 y.o. male presents today for US paracentesis.  He denies abd pain, n/v; cp, sob, fever.      Past Medical History:   Diagnosis Date    Arm fracture, right 2022, 2010    CAD (coronary artery disease)     Diabetes     GERD (gastroesophageal reflux disease)     HTN (hypertension)     Hyperlipidemia      Past Surgical History:   Procedure Laterality Date    CATARACT EXTRACTION      COLONOSCOPY      COLONOSCOPY N/A 7/18/2016    Procedure: COLONOSCOPY;  Surgeon: Bryon Hickey MD;  Location: [facility] END[facility];  Service: Endoscopy;  Laterality: N/A;    COLONOSCOPY N/A 10/6/2020    Procedure: COLONOSCOPY;  Surgeon: Kait Isaacs MD;  Location: [facility];  Service: Endoscopy;  Laterality: N/A;    CORONARY STENT PLACEMENT      ELBOW SURGERY      ESOPHAGOGASTRODUODENOSCOPY N/A 10/6/2020    Procedure: ESOPHAGOGASTRODUODENOSCOPY (EGD);  Surgeon: Kait Isaacs MD;  Location: [facility];  Service: Endoscopy;  Laterality: N/A;    HERNIA REPAIR      2022    TONSILLECTOMY      VASECTOMY       Family History   Problem Relation Age of Onset    Colon cancer Brother     No Known Problems Mother     Heart disease Father      Social History     Tobacco Use    Smoking status: Former     Packs/day: 1.00     Years: 40.00     Pack years: 40.00     Types: Cigarettes    Smokeless tobacco: Never    Tobacco comments:     quit 3 years ago   Substance Use Topics    Alcohol use: No    Drug use: No       (Not in a hospital admission)    Review of patient's allergies indicates:   Allergen Reactions    Lipitor [atorvastatin] Other (See Comments)     Left arm/shooulder pain    Statins-hmg-coa reductase inhibitors Other (See Comments)     muscle aches, joint pain  Joint pain  Joint pain          Review of Systems   Constitutional:  Negative for fever.   HENT:  Negative for sore throat.    Eyes:  Negative for  blurred vision.   Respiratory:  Negative for shortness of breath.    Cardiovascular:  Negative for chest pain.   Gastrointestinal:  Negative for nausea and vomiting.   Genitourinary:  Negative for dysuria.   Musculoskeletal:  Negative for myalgias.   Skin:  Negative for itching.   Neurological:  Negative for dizziness and headaches.     Objective:      Vital Signs (Most Recent)  Pulse: 62 (01/19/23 1250)  Resp: (!) 22 (01/19/23 1250)  BP: 132/65 (01/19/23 1250)  SpO2: 100 % (01/19/23 1250)    Vital Signs Range (Last 24H):  Pulse:  [62]   Resp:  [22]   BP: (132)/(65)   SpO2:  [100 %]     Physical Exam  Constitutional:       General: He is not in acute distress.  HENT:      Head: Normocephalic and atraumatic.   Eyes:      Extraocular Movements: Extraocular movements intact.      Pupils: Pupils are equal, round, and reactive to light.   Cardiovascular:      Rate and Rhythm: Normal rate.   Pulmonary:      Effort: Pulmonary effort is normal. No respiratory distress.   Abdominal:      General: There is distension.   Musculoskeletal:         General: No deformity. Normal range of motion.      Cervical back: Normal range of motion.   Skin:     General: Skin is warm and dry.      Capillary Refill: Capillary refill takes less than 2 seconds.      Coloration: Skin is not jaundiced.   Neurological:      General: No focal deficit present.      Mental Status: He is alert and oriented to person, place, and time.   Psychiatric:         Mood and Affect: Mood normal.         Thought Content: Thought content normal.         Judgment: Judgment normal.            Assessment:      ascites      Plan:    Plan for US paracentesis

## 2023-01-19 NOTE — DISCHARGE SUMMARY
[facility]  Discharge Note  Short Stay    [facility]  [facility]      OUTCOME: Patient tolerated treatment/procedure well without complication and is now ready for discharge.    DISPOSITION: Home or Self Care    FINAL DIAGNOSIS:  <principal problem not specified>    FOLLOWUP: In clinic    DISCHARGE INSTRUCTIONS:  No discharge procedures on file.      Clinical Reference Documents Added to Patient Instructions         Document    ABDOMINAL PARACENTESIS DISCHARGE INSTRUCTIONS (ENGLISH)            TIME SPENT ON DISCHARGE: 15 minutes    Date:  01/19/2023    Pre Op Diagnosis: ascites     Post Op Diagnosis: same     Procedure:  paracentesis     Procedure performed by: Nirali PUCKETT, Paradise LOFTON     Written Informed Consent Obtained: Yes     Specimen Removed:  yes     Estimated Blood Loss:  minimal     Findings: Local anesthesia.     The patient tolerated the procedure well and there were no complications.      Disposition:  F/U in clinic or with ordering physician    Discharge instructions:  Light activity for 24 hours.  No driving for 24 hours.  Remove bandage in 24 hours.  No baths for 24 hours; showers are appropriate    Sterile technique was performed in the RLQ, lidocaine was used as a local anesthetic.   8.25 liters of clear, suellen ascitic fluid removed for therapeutic purposes.  Pt tolerated the procedure well without immediate complications.  Please see radiologist report for details. F/u with PCP and/or ordering physician.     Time spent on discharge:  15 minutes

## 2023-01-19 NOTE — DISCHARGE INSTRUCTIONS
Please return to ER if any of these symptoms occur:  Fever over 101 degrees,  Any purulent drainage from site (pus, yellow or has foul odor), or any redness or swelling to site  Bleeding from the puncture site not controlled, If bleeding occurs at site hold pressure for 5 mins.  If bleeding continues go to ER  Pain not controlled with Aleve or Tylenol,    Do not submerge in standing water for 2 days but you may shower.    May change bandage if it becomes soiled and bandage may be removed in 2 days.    Resume home medications and diet

## 2023-01-19 NOTE — H&P (VIEW-ONLY)
[facility]  History & Physical    Subjective:      Chief Complaint/Reason for Admission: ascites    Korin Vivas is a 77 y.o. male presents today for US paracentesis.  He denies abd pain, n/v; cp, sob, fever.      Past Medical History:   Diagnosis Date    Arm fracture, right 2022, 2010    CAD (coronary artery disease)     Diabetes     GERD (gastroesophageal reflux disease)     HTN (hypertension)     Hyperlipidemia      Past Surgical History:   Procedure Laterality Date    CATARACT EXTRACTION      COLONOSCOPY      COLONOSCOPY N/A 7/18/2016    Procedure: COLONOSCOPY;  Surgeon: Bryon Hickey MD;  Location: [facility] ENDO;  Service: Endoscopy;  Laterality: N/A;    COLONOSCOPY N/A 10/6/2020    Procedure: COLONOSCOPY;  Surgeon: Kait Isaacs MD;  Location: [facility];  Service: Endoscopy;  Laterality: N/A;    CORONARY STENT PLACEMENT      ELBOW SURGERY      ESOPHAGOGASTRODUODENOSCOPY N/A 10/6/2020    Procedure: ESOPHAGOGASTRODUODENOSCOPY (EGD);  Surgeon: Kait Isaacs MD;  Location: [facility];  Service: Endoscopy;  Laterality: N/A;    HERNIA REPAIR      2022    TONSILLECTOMY      VASECTOMY       Family History   Problem Relation Age of Onset    Colon cancer Brother     No Known Problems Mother     Heart disease Father      Social History     Tobacco Use    Smoking status: Former     Packs/day: 1.00     Years: 40.00     Pack years: 40.00     Types: Cigarettes    Smokeless tobacco: Never    Tobacco comments:     quit 3 years ago   Substance Use Topics    Alcohol use: No    Drug use: No       (Not in a hospital admission)    Review of patient's allergies indicates:   Allergen Reactions    Lipitor [atorvastatin] Other (See Comments)     Left arm/shooulder pain    Statins-hmg-coa reductase inhibitors Other (See Comments)     muscle aches, joint pain  Joint pain  Joint pain          Review of Systems   Constitutional:  Negative for fever.   HENT:  Negative for sore throat.    Eyes:  Negative for  blurred vision.   Respiratory:  Negative for shortness of breath.    Cardiovascular:  Negative for chest pain.   Gastrointestinal:  Negative for nausea and vomiting.   Genitourinary:  Negative for dysuria.   Musculoskeletal:  Negative for myalgias.   Skin:  Negative for itching.   Neurological:  Negative for dizziness and headaches.     Objective:      Vital Signs (Most Recent)  Pulse: 62 (01/19/23 1250)  Resp: (!) 22 (01/19/23 1250)  BP: 132/65 (01/19/23 1250)  SpO2: 100 % (01/19/23 1250)    Vital Signs Range (Last 24H):  Pulse:  [62]   Resp:  [22]   BP: (132)/(65)   SpO2:  [100 %]     Physical Exam  Constitutional:       General: He is not in acute distress.  HENT:      Head: Normocephalic and atraumatic.   Eyes:      Extraocular Movements: Extraocular movements intact.      Pupils: Pupils are equal, round, and reactive to light.   Cardiovascular:      Rate and Rhythm: Normal rate.   Pulmonary:      Effort: Pulmonary effort is normal. No respiratory distress.   Abdominal:      General: There is distension.   Musculoskeletal:         General: No deformity. Normal range of motion.      Cervical back: Normal range of motion.   Skin:     General: Skin is warm and dry.      Capillary Refill: Capillary refill takes less than 2 seconds.      Coloration: Skin is not jaundiced.   Neurological:      General: No focal deficit present.      Mental Status: He is alert and oriented to person, place, and time.   Psychiatric:         Mood and Affect: Mood normal.         Thought Content: Thought content normal.         Judgment: Judgment normal.            Assessment:      ascites      Plan:    Plan for US paracentesis

## 2023-01-19 NOTE — H&P (VIEW-ONLY)
[facility]  History & Physical    Subjective:      Chief Complaint/Reason for Admission: ascites    Korin Vivas is a 77 y.o. male presents today for US paracentesis.  He denies abd pain, n/v; cp, sob, fever.      Past Medical History:   Diagnosis Date    Arm fracture, right 2022, 2010    CAD (coronary artery disease)     Diabetes     GERD (gastroesophageal reflux disease)     HTN (hypertension)     Hyperlipidemia      Past Surgical History:   Procedure Laterality Date    CATARACT EXTRACTION      COLONOSCOPY      COLONOSCOPY N/A 7/18/2016    Procedure: COLONOSCOPY;  Surgeon: Bryon Hickey MD;  Location: [facility] ENDO;  Service: Endoscopy;  Laterality: N/A;    COLONOSCOPY N/A 10/6/2020    Procedure: COLONOSCOPY;  Surgeon: Kait Isaacs MD;  Location: [facility];  Service: Endoscopy;  Laterality: N/A;    CORONARY STENT PLACEMENT      ELBOW SURGERY      ESOPHAGOGASTRODUODENOSCOPY N/A 10/6/2020    Procedure: ESOPHAGOGASTRODUODENOSCOPY (EGD);  Surgeon: Kait Isaacs MD;  Location: [facility];  Service: Endoscopy;  Laterality: N/A;    HERNIA REPAIR      2022    TONSILLECTOMY      VASECTOMY       Family History   Problem Relation Age of Onset    Colon cancer Brother     No Known Problems Mother     Heart disease Father      Social History     Tobacco Use    Smoking status: Former     Packs/day: 1.00     Years: 40.00     Pack years: 40.00     Types: Cigarettes    Smokeless tobacco: Never    Tobacco comments:     quit 3 years ago   Substance Use Topics    Alcohol use: No    Drug use: No       (Not in a hospital admission)    Review of patient's allergies indicates:   Allergen Reactions    Lipitor [atorvastatin] Other (See Comments)     Left arm/shooulder pain    Statins-hmg-coa reductase inhibitors Other (See Comments)     muscle aches, joint pain  Joint pain  Joint pain          Review of Systems   Constitutional:  Negative for fever.   HENT:  Negative for sore throat.    Eyes:  Negative for  blurred vision.   Respiratory:  Negative for shortness of breath.    Cardiovascular:  Negative for chest pain.   Gastrointestinal:  Negative for nausea and vomiting.   Genitourinary:  Negative for dysuria.   Musculoskeletal:  Negative for myalgias.   Skin:  Negative for itching.   Neurological:  Negative for dizziness and headaches.     Objective:      Vital Signs (Most Recent)  Pulse: 62 (01/19/23 1250)  Resp: (!) 22 (01/19/23 1250)  BP: 132/65 (01/19/23 1250)  SpO2: 100 % (01/19/23 1250)    Vital Signs Range (Last 24H):  Pulse:  [62]   Resp:  [22]   BP: (132)/(65)   SpO2:  [100 %]     Physical Exam  Constitutional:       General: He is not in acute distress.  HENT:      Head: Normocephalic and atraumatic.   Eyes:      Extraocular Movements: Extraocular movements intact.      Pupils: Pupils are equal, round, and reactive to light.   Cardiovascular:      Rate and Rhythm: Normal rate.   Pulmonary:      Effort: Pulmonary effort is normal. No respiratory distress.   Abdominal:      General: There is distension.   Musculoskeletal:         General: No deformity. Normal range of motion.      Cervical back: Normal range of motion.   Skin:     General: Skin is warm and dry.      Capillary Refill: Capillary refill takes less than 2 seconds.      Coloration: Skin is not jaundiced.   Neurological:      General: No focal deficit present.      Mental Status: He is alert and oriented to person, place, and time.   Psychiatric:         Mood and Affect: Mood normal.         Thought Content: Thought content normal.         Judgment: Judgment normal.            Assessment:      ascites      Plan:    Plan for US paracentesis

## 2023-01-19 NOTE — NURSING
Verbal discharge instructions given to patient including when to seek medical attention and site care. Pt verbalized understanding. Pt declined paper AVS but it is available on [facility] portal. PIV flushed with NS and left in place for MRI. Dressing to procedure site remains CDI. Pt denies pain and NADN. VSS. Pt transported to MRI via w/c by staff, with all belongings. Pt was stable at time of transfer

## 2023-01-19 NOTE — PLAN OF CARE
PT AMBULATED TO ROOM WITHOUT ASSISTANCE.  NADN.  HISTORY PER PT.  PLACED ON STRETCHER IN SUPINE POSITION.

## 2023-01-20 LAB — BACTERIA SPEC ANAEROBE CULT: NORMAL

## 2023-01-23 ENCOUNTER — LAB VISIT (OUTPATIENT)
Dept: LAB | Facility: HOSPITAL | Age: 78
End: 2023-01-23
Attending: INTERNAL MEDICINE
Payer: MEDICARE

## 2023-01-23 DIAGNOSIS — K74.60 LIVER CIRRHOSIS SECONDARY TO NASH: ICD-10-CM

## 2023-01-23 DIAGNOSIS — K75.81 LIVER CIRRHOSIS SECONDARY TO NASH: ICD-10-CM

## 2023-01-23 LAB
ALBUMIN SERPL BCP-MCNC: 3 G/DL (ref 3.5–5.2)
ALP SERPL-CCNC: 174 U/L (ref 55–135)
ALT SERPL W/O P-5'-P-CCNC: 18 U/L (ref 10–44)
ANION GAP SERPL CALC-SCNC: 5 MMOL/L (ref 8–16)
AST SERPL-CCNC: 34 U/L (ref 10–40)
BILIRUB SERPL-MCNC: 0.5 MG/DL (ref 0.1–1)
BUN SERPL-MCNC: 37 MG/DL (ref 8–23)
CALCIUM SERPL-MCNC: 8.7 MG/DL (ref 8.7–10.5)
CHLORIDE SERPL-SCNC: 105 MMOL/L (ref 95–110)
CO2 SERPL-SCNC: 18 MMOL/L (ref 23–29)
CREAT SERPL-MCNC: 1.5 MG/DL (ref 0.5–1.4)
EST. GFR  (NO RACE VARIABLE): 48.2 ML/MIN/1.73 M^2
GLUCOSE SERPL-MCNC: 184 MG/DL (ref 70–110)
INR PPP: 1.4 (ref 0.8–1.2)
POTASSIUM SERPL-SCNC: 5.5 MMOL/L (ref 3.5–5.1)
PROT SERPL-MCNC: 6.8 G/DL (ref 6–8.4)
PROTHROMBIN TIME: 15.2 SEC (ref 9–12.5)
SODIUM SERPL-SCNC: 128 MMOL/L (ref 136–145)

## 2023-01-23 PROCEDURE — 36415 COLL VENOUS BLD VENIPUNCTURE: CPT | Performed by: INTERNAL MEDICINE

## 2023-01-23 PROCEDURE — 80053 COMPREHEN METABOLIC PANEL: CPT | Performed by: INTERNAL MEDICINE

## 2023-01-23 PROCEDURE — 85027 COMPLETE CBC AUTOMATED: CPT | Performed by: INTERNAL MEDICINE

## 2023-01-23 PROCEDURE — 85610 PROTHROMBIN TIME: CPT | Performed by: INTERNAL MEDICINE

## 2023-01-24 LAB
ERYTHROCYTE [DISTWIDTH] IN BLOOD BY AUTOMATED COUNT: 15.9 % (ref 11.5–14.5)
HCT VFR BLD AUTO: 31.6 % (ref 40–54)
HGB BLD-MCNC: 9.6 G/DL (ref 14–18)
MCH RBC QN AUTO: 29.9 PG (ref 27–31)
MCHC RBC AUTO-ENTMCNC: 30.4 G/DL (ref 32–36)
MCV RBC AUTO: 98 FL (ref 82–98)
PLATELET # BLD AUTO: 206 K/UL (ref 150–450)
PMV BLD AUTO: 10.8 FL (ref 9.2–12.9)
RBC # BLD AUTO: 3.21 M/UL (ref 4.6–6.2)
WBC # BLD AUTO: 7.27 K/UL (ref 3.9–12.7)

## 2023-01-26 ENCOUNTER — HOSPITAL ENCOUNTER (OUTPATIENT)
Dept: RADIOLOGY | Facility: HOSPITAL | Age: 78
Discharge: HOME | End: 2023-01-26
Attending: INTERNAL MEDICINE
Payer: OTHER GOVERNMENT

## 2023-01-26 VITALS
RESPIRATION RATE: 16 BRPM | HEIGHT: 72 IN | SYSTOLIC BLOOD PRESSURE: 115 MMHG | WEIGHT: 174 LBS | OXYGEN SATURATION: 100 % | DIASTOLIC BLOOD PRESSURE: 56 MMHG | HEART RATE: 73 BPM | BODY MASS INDEX: 23.57 KG/M2

## 2023-01-26 DIAGNOSIS — K75.81 LIVER CIRRHOSIS SECONDARY TO NASH: ICD-10-CM

## 2023-01-26 DIAGNOSIS — K74.60 LIVER CIRRHOSIS SECONDARY TO NASH: ICD-10-CM

## 2023-01-26 PROCEDURE — 49083 US GUIDED PARACENTESIS INC IMAGING: ICD-10-PCS | Mod: ,,, | Performed by: RADIOLOGY

## 2023-01-26 PROCEDURE — 49083 ABD PARACENTESIS W/IMAGING: CPT | Mod: ,,, | Performed by: RADIOLOGY

## 2023-01-26 PROCEDURE — 63600175 PHARM REV CODE 636 W HCPCS: Mod: JG | Performed by: INTERNAL MEDICINE

## 2023-01-26 PROCEDURE — 49083 ABD PARACENTESIS W/IMAGING: CPT

## 2023-01-26 PROCEDURE — P9047 ALBUMIN (HUMAN), 25%, 50ML: HCPCS | Mod: JG | Performed by: INTERNAL MEDICINE

## 2023-01-26 RX ORDER — ALBUMIN HUMAN 250 G/1000ML
50 SOLUTION INTRAVENOUS ONCE
Status: COMPLETED | OUTPATIENT
Start: 2023-01-26 | End: 2023-01-26

## 2023-01-26 RX ADMIN — ALBUMIN (HUMAN) 50 G: 25 SOLUTION INTRAVENOUS at 02:01

## 2023-01-26 NOTE — DISCHARGE SUMMARY
[facility] (Hospital)  Discharge Note  Short Stay    [facility]  [facility]  [facility]      OUTCOME: Patient tolerated treatment/procedure well without complication and is now ready for discharge.    DISPOSITION: Home or Self Care    FINAL DIAGNOSIS:  <principal problem not specified>    FOLLOWUP: In clinic    DISCHARGE INSTRUCTIONS:  No discharge procedures on file.      Clinical Reference Documents Added to Patient Instructions         Document    ABDOMINAL PARACENTESIS DISCHARGE INSTRUCTIONS (ENGLISH)            TIME SPENT ON DISCHARGE: 15 minutes    Pre Op Diagnosis: ascites     Post Op Diagnosis: same    Procedure:  para     Procedure performed by: Andria PUCKETT, Kishor LOFTON     Written Informed Consent Obtained: Yes     Specimen Removed:  yes     Estimated Blood Loss:  minimal     Findings: Local anesthesia and moderate sedation were used.     The patient tolerated the procedure well and there were no complications.      Disposition:  F/U in clinic    Discharge instructions:  Light activity for 24 hours.  Remove band aid in 24 hours.  No baths (showers are appropriate).    F/U with ordering physician    Sterile technique was performed in the lower abdomen, lidocaine was used as a local anesthetic.   7.75 liters of suellen fluid removed for therapeutic purposes.  Pt tolerated the procedure well without immediate complications.  Please see radiologist report for details. F/u with PCP and/or ordering physician.

## 2023-01-26 NOTE — INTERVAL H&P NOTE
The patient has been examined and the H&P has been reviewed:    I concur with the findings and no changes have occurred since H&P was written.        There are no hospital problems to display for this patient.     (0) independent

## 2023-01-30 ENCOUNTER — ANESTHESIA (OUTPATIENT)
Dept: CARDIOLOGY | Facility: HOSPITAL | Age: 78
DRG: 406 | End: 2023-01-30
Payer: OTHER GOVERNMENT

## 2023-01-30 ENCOUNTER — HOSPITAL ENCOUNTER (INPATIENT)
Facility: HOSPITAL | Age: 78
LOS: 1 days | Discharge: HOME | DRG: 406 | End: 2023-01-31
Attending: RADIOLOGY | Admitting: RADIOLOGY
Payer: OTHER GOVERNMENT

## 2023-01-30 ENCOUNTER — ANESTHESIA EVENT (OUTPATIENT)
Dept: CARDIOLOGY | Facility: HOSPITAL | Age: 78
DRG: 406 | End: 2023-01-30
Payer: OTHER GOVERNMENT

## 2023-01-30 DIAGNOSIS — K76.6 PORTAL HYPERTENSION: ICD-10-CM

## 2023-01-30 DIAGNOSIS — K74.60 LIVER CIRRHOSIS SECONDARY TO NASH: ICD-10-CM

## 2023-01-30 DIAGNOSIS — R18.8 OTHER ASCITES: ICD-10-CM

## 2023-01-30 DIAGNOSIS — K75.81 LIVER CIRRHOSIS SECONDARY TO NASH: ICD-10-CM

## 2023-01-30 DIAGNOSIS — R07.9 CHEST PAIN: ICD-10-CM

## 2023-01-30 DIAGNOSIS — R18.8 ASCITES OF LIVER: ICD-10-CM

## 2023-01-30 PROBLEM — K72.90 DECOMPENSATED HEPATIC CIRRHOSIS: Chronic | Status: ACTIVE | Noted: 2023-01-30

## 2023-01-30 LAB
ABO + RH BLD: NORMAL
ALBUMIN SERPL BCP-MCNC: 3.2 G/DL (ref 3.5–5.2)
ALP SERPL-CCNC: 171 U/L (ref 55–135)
ALT SERPL W/O P-5'-P-CCNC: 19 U/L (ref 10–44)
ANION GAP SERPL CALC-SCNC: 8 MMOL/L (ref 8–16)
ANION GAP SERPL CALC-SCNC: 8 MMOL/L (ref 8–16)
AST SERPL-CCNC: 26 U/L (ref 10–40)
BASOPHILS # BLD AUTO: 0.07 K/UL (ref 0–0.2)
BASOPHILS NFR BLD: 1 % (ref 0–1.9)
BILIRUB SERPL-MCNC: 0.8 MG/DL (ref 0.1–1)
BLD GP AB SCN CELLS X3 SERPL QL: NORMAL
BUN SERPL-MCNC: 32 MG/DL (ref 8–23)
BUN SERPL-MCNC: 32 MG/DL (ref 8–23)
CALCIUM SERPL-MCNC: 8.8 MG/DL (ref 8.7–10.5)
CALCIUM SERPL-MCNC: 8.8 MG/DL (ref 8.7–10.5)
CHLORIDE SERPL-SCNC: 109 MMOL/L (ref 95–110)
CHLORIDE SERPL-SCNC: 109 MMOL/L (ref 95–110)
CO2 SERPL-SCNC: 15 MMOL/L (ref 23–29)
CO2 SERPL-SCNC: 15 MMOL/L (ref 23–29)
CREAT SERPL-MCNC: 1.6 MG/DL (ref 0.5–1.4)
CREAT SERPL-MCNC: 1.6 MG/DL (ref 0.5–1.4)
DIFFERENTIAL METHOD: ABNORMAL
EOSINOPHIL # BLD AUTO: 0.3 K/UL (ref 0–0.5)
EOSINOPHIL NFR BLD: 3.8 % (ref 0–8)
ERYTHROCYTE [DISTWIDTH] IN BLOOD BY AUTOMATED COUNT: 16 % (ref 11.5–14.5)
EST. GFR  (NO RACE VARIABLE): 45 ML/MIN/1.73 M^2
EST. GFR  (NO RACE VARIABLE): 45 ML/MIN/1.73 M^2
GLUCOSE SERPL-MCNC: 146 MG/DL (ref 70–110)
GLUCOSE SERPL-MCNC: 146 MG/DL (ref 70–110)
HCT VFR BLD AUTO: 32.5 % (ref 40–54)
HGB BLD-MCNC: 10.6 G/DL (ref 14–18)
IMM GRANULOCYTES # BLD AUTO: 0.09 K/UL (ref 0–0.04)
IMM GRANULOCYTES NFR BLD AUTO: 1.3 % (ref 0–0.5)
INR PPP: 1.4 (ref 0.8–1.2)
LYMPHOCYTES # BLD AUTO: 0.6 K/UL (ref 1–4.8)
LYMPHOCYTES NFR BLD: 8.2 % (ref 18–48)
MCH RBC QN AUTO: 30.1 PG (ref 27–31)
MCHC RBC AUTO-ENTMCNC: 32.6 G/DL (ref 32–36)
MCV RBC AUTO: 92 FL (ref 82–98)
MONOCYTES # BLD AUTO: 1.1 K/UL (ref 0.3–1)
MONOCYTES NFR BLD: 14.9 % (ref 4–15)
NEUTROPHILS # BLD AUTO: 5 K/UL (ref 1.8–7.7)
NEUTROPHILS NFR BLD: 70.8 % (ref 38–73)
NRBC BLD-RTO: 0 /100 WBC
PLATELET # BLD AUTO: 214 K/UL (ref 150–450)
PMV BLD AUTO: 10 FL (ref 9.2–12.9)
POCT GLUCOSE: 144 MG/DL (ref 70–110)
POTASSIUM SERPL-SCNC: 5 MMOL/L (ref 3.5–5.1)
POTASSIUM SERPL-SCNC: 5 MMOL/L (ref 3.5–5.1)
PROT SERPL-MCNC: 7.5 G/DL (ref 6–8.4)
PROTHROMBIN TIME: 14.8 SEC (ref 9–12.5)
RBC # BLD AUTO: 3.52 M/UL (ref 4.6–6.2)
SODIUM SERPL-SCNC: 132 MMOL/L (ref 136–145)
SODIUM SERPL-SCNC: 132 MMOL/L (ref 136–145)
WBC # BLD AUTO: 7.11 K/UL (ref 3.9–12.7)

## 2023-01-30 PROCEDURE — 85025 COMPLETE CBC W/AUTO DIFF WBC: CPT | Performed by: RADIOLOGY

## 2023-01-30 PROCEDURE — 80053 COMPREHEN METABOLIC PANEL: CPT | Performed by: RADIOLOGY

## 2023-01-30 PROCEDURE — 51798 US URINE CAPACITY MEASURE: CPT

## 2023-01-30 PROCEDURE — C1887 CATHETER, GUIDING: HCPCS | Performed by: RADIOLOGY

## 2023-01-30 PROCEDURE — 51701 INSERT BLADDER CATHETER: CPT

## 2023-01-30 PROCEDURE — C1769 GUIDE WIRE: HCPCS | Performed by: RADIOLOGY

## 2023-01-30 PROCEDURE — 37000008 HC ANESTHESIA 1ST 15 MINUTES: Performed by: RADIOLOGY

## 2023-01-30 PROCEDURE — 85610 PROTHROMBIN TIME: CPT | Performed by: RADIOLOGY

## 2023-01-30 PROCEDURE — 25000003 PHARM REV CODE 250: Performed by: FAMILY MEDICINE

## 2023-01-30 PROCEDURE — C1894 INTRO/SHEATH, NON-LASER: HCPCS | Performed by: RADIOLOGY

## 2023-01-30 PROCEDURE — 99900035 HC TECH TIME PER 15 MIN (STAT)

## 2023-01-30 PROCEDURE — C1874 STENT, COATED/COV W/DEL SYS: HCPCS | Performed by: RADIOLOGY

## 2023-01-30 PROCEDURE — 63600175 PHARM REV CODE 636 W HCPCS: Performed by: FAMILY MEDICINE

## 2023-01-30 PROCEDURE — 63600175 PHARM REV CODE 636 W HCPCS: Performed by: PHYSICIAN ASSISTANT

## 2023-01-30 PROCEDURE — 86900 BLOOD TYPING SEROLOGIC ABO: CPT | Performed by: RADIOLOGY

## 2023-01-30 PROCEDURE — 11000001 HC ACUTE MED/SURG PRIVATE ROOM

## 2023-01-30 PROCEDURE — 37000009 HC ANESTHESIA EA ADD 15 MINS: Performed by: RADIOLOGY

## 2023-01-30 PROCEDURE — 25500020 PHARM REV CODE 255: Performed by: RADIOLOGY

## 2023-01-30 PROCEDURE — 36415 COLL VENOUS BLD VENIPUNCTURE: CPT | Performed by: RADIOLOGY

## 2023-01-30 DEVICE — VIATORR TIPS ENDO CX 8-10MMX6CM/2CM 10FR
Type: IMPLANTABLE DEVICE | Site: LIVER | Status: FUNCTIONAL
Brand: GORE VIATORR TIPS ENDOPROSTHESIS

## 2023-01-30 RX ORDER — SODIUM CHLORIDE 0.9 % (FLUSH) 0.9 %
10 SYRINGE (ML) INJECTION EVERY 12 HOURS PRN
Status: DISCONTINUED | OUTPATIENT
Start: 2023-01-30 | End: 2023-01-31 | Stop reason: HOSPADM

## 2023-01-30 RX ORDER — PROPOFOL 10 MG/ML
VIAL (ML) INTRAVENOUS
Status: DISCONTINUED | OUTPATIENT
Start: 2023-01-30 | End: 2023-01-30

## 2023-01-30 RX ORDER — LACTULOSE 10 G/15ML
20 SOLUTION ORAL 2 TIMES DAILY
Status: DISCONTINUED | OUTPATIENT
Start: 2023-01-30 | End: 2023-01-31 | Stop reason: HOSPADM

## 2023-01-30 RX ORDER — FAMOTIDINE 20 MG/1
20 TABLET, FILM COATED ORAL DAILY
Status: DISCONTINUED | OUTPATIENT
Start: 2023-01-31 | End: 2023-01-31 | Stop reason: HOSPADM

## 2023-01-30 RX ORDER — GLUCAGON 1 MG
1 KIT INJECTION
Status: DISCONTINUED | OUTPATIENT
Start: 2023-01-30 | End: 2023-01-31 | Stop reason: HOSPADM

## 2023-01-30 RX ORDER — EZETIMIBE 10 MG/1
10 TABLET ORAL DAILY
Status: DISCONTINUED | OUTPATIENT
Start: 2023-01-31 | End: 2023-01-31 | Stop reason: HOSPADM

## 2023-01-30 RX ORDER — ACETAMINOPHEN 325 MG/1
650 TABLET ORAL EVERY 6 HOURS PRN
Status: DISCONTINUED | OUTPATIENT
Start: 2023-01-30 | End: 2023-01-31 | Stop reason: HOSPADM

## 2023-01-30 RX ORDER — SPIRONOLACTONE 100 MG/1
100 TABLET, FILM COATED ORAL 2 TIMES DAILY
Status: DISCONTINUED | OUTPATIENT
Start: 2023-01-31 | End: 2023-01-31 | Stop reason: HOSPADM

## 2023-01-30 RX ORDER — SODIUM CHLORIDE 9 MG/ML
INJECTION, SOLUTION INTRAVENOUS CONTINUOUS
Status: DISCONTINUED | OUTPATIENT
Start: 2023-01-30 | End: 2023-01-30

## 2023-01-30 RX ORDER — IBUPROFEN 200 MG
24 TABLET ORAL
Status: DISCONTINUED | OUTPATIENT
Start: 2023-01-30 | End: 2023-01-31 | Stop reason: HOSPADM

## 2023-01-30 RX ORDER — HYDROMORPHONE HYDROCHLORIDE 2 MG/ML
1 INJECTION, SOLUTION INTRAMUSCULAR; INTRAVENOUS; SUBCUTANEOUS ONCE
Status: COMPLETED | OUTPATIENT
Start: 2023-01-30 | End: 2023-01-30

## 2023-01-30 RX ORDER — ROCURONIUM BROMIDE 10 MG/ML
INJECTION, SOLUTION INTRAVENOUS
Status: DISCONTINUED | OUTPATIENT
Start: 2023-01-30 | End: 2023-01-30

## 2023-01-30 RX ORDER — IBUPROFEN 200 MG
16 TABLET ORAL
Status: DISCONTINUED | OUTPATIENT
Start: 2023-01-30 | End: 2023-01-31 | Stop reason: HOSPADM

## 2023-01-30 RX ORDER — INSULIN ASPART 100 [IU]/ML
0-5 INJECTION, SOLUTION INTRAVENOUS; SUBCUTANEOUS
Status: DISCONTINUED | OUTPATIENT
Start: 2023-01-30 | End: 2023-01-31 | Stop reason: HOSPADM

## 2023-01-30 RX ORDER — ONDANSETRON 2 MG/ML
4 INJECTION INTRAMUSCULAR; INTRAVENOUS EVERY 8 HOURS PRN
Status: DISCONTINUED | OUTPATIENT
Start: 2023-01-30 | End: 2023-01-31 | Stop reason: HOSPADM

## 2023-01-30 RX ORDER — FUROSEMIDE 40 MG/1
40 TABLET ORAL DAILY
Status: DISCONTINUED | OUTPATIENT
Start: 2023-01-31 | End: 2023-01-31 | Stop reason: HOSPADM

## 2023-01-30 RX ORDER — HYDROCODONE BITARTRATE AND ACETAMINOPHEN 5; 325 MG/1; MG/1
1 TABLET ORAL EVERY 4 HOURS PRN
Status: DISCONTINUED | OUTPATIENT
Start: 2023-01-30 | End: 2023-01-31 | Stop reason: HOSPADM

## 2023-01-30 RX ORDER — FAMOTIDINE 20 MG/1
40 TABLET, FILM COATED ORAL DAILY
Status: DISCONTINUED | OUTPATIENT
Start: 2023-01-31 | End: 2023-01-30 | Stop reason: DRUGHIGH

## 2023-01-30 RX ORDER — LIDOCAINE HYDROCHLORIDE 20 MG/ML
INJECTION, SOLUTION EPIDURAL; INFILTRATION; INTRACAUDAL; PERINEURAL
Status: DISCONTINUED | OUTPATIENT
Start: 2023-01-30 | End: 2023-01-30

## 2023-01-30 RX ORDER — ACETAMINOPHEN 325 MG/1
650 TABLET ORAL EVERY 8 HOURS PRN
Status: DISCONTINUED | OUTPATIENT
Start: 2023-01-30 | End: 2023-01-30 | Stop reason: SDUPTHER

## 2023-01-30 RX ORDER — NALOXONE HCL 0.4 MG/ML
0.02 VIAL (ML) INJECTION
Status: DISCONTINUED | OUTPATIENT
Start: 2023-01-30 | End: 2023-01-31 | Stop reason: HOSPADM

## 2023-01-30 RX ADMIN — ROCURONIUM BROMIDE 50 MG: 10 INJECTION, SOLUTION INTRAVENOUS at 01:01

## 2023-01-30 RX ADMIN — DEXTROSE 2 G: 50 INJECTION, SOLUTION INTRAVENOUS at 02:01

## 2023-01-30 RX ADMIN — PROPOFOL 100 MG: 10 INJECTION, EMULSION INTRAVENOUS at 01:01

## 2023-01-30 RX ADMIN — SUGAMMADEX 200 MG: 100 INJECTION, SOLUTION INTRAVENOUS at 03:01

## 2023-01-30 RX ADMIN — HYDROMORPHONE HYDROCHLORIDE 1 MG: 2 INJECTION INTRAMUSCULAR; INTRAVENOUS; SUBCUTANEOUS at 04:01

## 2023-01-30 RX ADMIN — LIDOCAINE HYDROCHLORIDE 50 MG: 20 INJECTION, SOLUTION EPIDURAL; INFILTRATION; INTRACAUDAL; PERINEURAL at 01:01

## 2023-01-30 RX ADMIN — SODIUM CHLORIDE: 9 INJECTION, SOLUTION INTRAVENOUS at 01:01

## 2023-01-30 NOTE — PROCEDURES
Radiology Post-Procedure Note    Pre Op Diagnosis:     Post Op Diagnosis:     Procedure: Transjugular intrahepatic portosystemic shunt (TIPS)    Performed by: Dr Pedro Luis Wesley    Written Informed Consent Obtained: Yes    Specimen Removed: NO    Estimated Blood Loss: less than 50     Findings: Under general anesthesia, via right internal jugular approach using ultrasound and fluoroscopic surveillance, a transhepatic portosystemic shunt was created via a right hepatic and right portal vein.  Postprocedural angiography indicates hepato-pedal flow in the right portal vein and a shunt pressure gradient reduced from  22 to 7 mL H2O.  The catheter was removed and hemostasis achieved without hematoma.     There were no complications.  Please see Imaging report for further details.    Pedro Luis Wesley MD  Staff Radiologist  Department of Radiology  Pager: [phone]

## 2023-01-30 NOTE — Clinical Note
The right chest, abdomen and right neck was prepped. The site was prepped with ChloraPrep. The site was clipped. The patient was draped. The patient was positioned supine. present

## 2023-01-30 NOTE — INTERVAL H&P NOTE
The patient has been examined and the H&P has been reviewed:    I concur with the findings and no changes have occurred since H&P was written.  MELD 19 today with normal bili and the patient requests proceeding with procedure today.          There are no hospital problems to display for this patient.

## 2023-01-30 NOTE — ASSESSMENT & PLAN NOTE
- not on antiplatelet as outpatient due to hx of bleeding/coagulopathy , has statin intolerance   - Per review of home meds, pt appears to be on both Coreg and Metoprolol, will need to clarify   - continue Coreg   - monitor BP

## 2023-01-30 NOTE — ASSESSMENT & PLAN NOTE
Patient's FSGs are controlled on current medication regimen.  Last A1c reviewed- Last A1c 7.4 in 08/2022 per chart review   Most recent fingerstick glucose reviewed- No results for input(s): POCTGLUCOSE in the last 24 hours.  Current correctional scale  Low  Maintain anti-hyperglycemic dose as follows-   Antihyperglycemics (From admission, onward)    Start     Stop Route Frequency Ordered    01/30/23 1651  insulin aspart U-100 pen 0-5 Units         -- SubQ Before meals & nightly PRN 01/30/23 1552        Hold Oral hypoglycemics while patient is in the hospital.  Start low dose SSI   Diabetic diet   Hypoglycemia protocol

## 2023-01-30 NOTE — H&P
[facility] ([facility]  History & Physical    Patient Name: Korin Vivas  MRN: [id number]  Patient Class: IP- Inpatient  Admission Date: 1/30/2023  Attending Physician: Alia Gonzalez MD   Primary Care Provider: [facility]         Patient information was obtained from patient, past medical records and primary team.     Subjective:     Principal Problem:<principal problem not specified>    Chief Complaint:   Chief Complaint   Patient presents with    Ascites        HPI: Patient is a 77  past history notable for decompensated MENDEZ cirrhosis, recurrent ascites, CAD, essential hypertension, hyperlipidemia type 2 diabetes mellitus who presents today for TIPS procedure.  Patient underwent therapeutic paracentesis with removal of 5.5 L of suellen fluid and TIPS with IR on 01/30/2023.  Hospital Medicine consulted for admission postprocedure monitoring.  Patient seen in postop recovery area.  He is awake, alert but moaning in pain, reports generalized abdominal pain unable specify which area.  Denies chest pain, shortness the breath, nausea, vomiting.  Has intermittent dry cough.  Vital signs stable, sats 96% on room air.      Past Medical History:   Diagnosis Date    Arm fracture, right 2022, 2010    CAD (coronary artery disease)     Diabetes     GERD (gastroesophageal reflux disease)     HTN (hypertension)     Hyperlipidemia        Past Surgical History:   Procedure Laterality Date    CATARACT EXTRACTION      COLONOSCOPY      COLONOSCOPY N/A 7/18/2016    Procedure: COLONOSCOPY;  Surgeon: Bryon Hickey MD;  Location: [facility];  Service: Endoscopy;  Laterality: N/A;    COLONOSCOPY N/A 10/6/2020    Procedure: COLONOSCOPY;  Surgeon: Kait Isaacs MD;  Location: [facility];  Service: Endoscopy;  Laterality: N/A;    CORONARY STENT PLACEMENT      ELBOW SURGERY      ESOPHAGOGASTRODUODENOSCOPY N/A 10/6/2020    Procedure: ESOPHAGOGASTRODUODENOSCOPY (EGD);   Surgeon: Kait Isaacs MD;  Location: [facility];  Service: Endoscopy;  Laterality: N/A;    HERNIA REPAIR      2022    TONSILLECTOMY      VASECTOMY         Review of patient's allergies indicates:   Allergen Reactions    Lipitor [atorvastatin] Other (See Comments)     Left arm/shooulder pain    Statins-hmg-coa reductase inhibitors Other (See Comments)     muscle aches, joint pain  Joint pain  Joint pain         No current facility-administered medications on file prior to encounter.     Current Outpatient Medications on File Prior to Encounter   Medication Sig    busPIRone (BUSPAR) 15 MG tablet Take 15 mg by mouth 2 (two) times daily as needed (anxiety).    carvediloL (COREG) 6.25 MG tablet Take 6.25 mg by mouth 2 (two) times daily.    empagliflozin (JARDIANCE) 25 mg tablet Take 1 tablet by mouth every morning.    ezetimibe (ZETIA) 10 mg tablet Take 10 mg by mouth once daily.    famotidine (PEPCID) 40 MG tablet Take 40 mg by mouth once daily.    furosemide (LASIX) 40 MG tablet Take 1 tablet (40 mg total) by mouth once daily at 6am.    lovastatin (MEVACOR) 40 MG tablet Take 40 mg by mouth every evening.    metformin (GLUCOPHAGE) 1000 MG tablet Take 1,000 mg by mouth 2 (two) times daily with meals.    metoprolol tartrate (LOPRESSOR) 50 MG tablet Take 50 mg by mouth 2 (two) times daily.    multivitamin capsule Take 1 capsule by mouth once daily.    rifAXIMin (XIFAXAN) 550 mg Tab Take 1 tablet (550 mg total) by mouth 2 (two) times daily.    spironolactone (ALDACTONE) 100 MG tablet Take 1 tablet (100 mg total) by mouth 2 (two) times daily.    trazodone (DESYREL) 100 MG tablet Take 100 mg by mouth every evening.    zinc sulfate (ZINCATE) 50 mg zinc (220 mg) capsule Take 220 mg by mouth.    hydrOXYzine HCL (ATARAX) 25 MG tablet Take 1 tablet (25 mg total) by mouth 3 (three) times daily as needed for Itching.    lactulose (CHRONULAC) 10 gram/15 mL solution Take 30 mLs (20 g total) by mouth 2 (two)  times daily.     Family History       Problem Relation (Age of Onset)    Colon cancer Brother    Heart disease Father    No Known Problems Mother          Tobacco Use    Smoking status: Former     Packs/day: 1.00     Years: 40.00     Pack years: 40.00     Types: Cigarettes    Smokeless tobacco: Never    Tobacco comments:     quit 3 years ago   Substance and Sexual Activity    Alcohol use: No    Drug use: No    Sexual activity: Not on file     Review of Systems   Constitutional:  Positive for appetite change and fatigue. Negative for activity change, chills and fever.   HENT:  Negative for congestion, rhinorrhea and sore throat.    Respiratory:  Negative for cough, chest tightness, shortness of breath and wheezing.    Cardiovascular:  Negative for chest pain, palpitations and leg swelling.   Gastrointestinal:  Positive for abdominal pain. Negative for blood in stool, constipation, diarrhea, nausea and vomiting.   Genitourinary:  Negative for difficulty urinating and dysuria.   Musculoskeletal: Negative.    Skin: Negative.    Neurological:  Negative for dizziness, light-headedness and headaches.   Objective:     Vital Signs (Most Recent):  Temp: 97.9 °F (36.6 °C) (01/30/23 1158)  Pulse: 63 (01/30/23 1158)  Resp: 18 (01/30/23 1158)  BP: (!) 140/58 (01/30/23 1158)  SpO2: 100 % (01/30/23 1158)   Vital Signs (24h Range):  Temp:  [97.9 °F (36.6 °C)] 97.9 °F (36.6 °C)  Pulse:  [63] 63  Resp:  [18] 18  SpO2:  [100 %] 100 %  BP: (140)/(58) 140/58     Weight: 81.6 kg (180 lb)  Body mass index is 24.41 kg/m².    Physical Exam  Vitals and nursing note reviewed. Exam conducted with a chaperone present.   Constitutional:       Comments: Elderly  male, chronically ill appearing, in mild distress due to pain    HENT:      Head: Normocephalic and atraumatic.      Mouth/Throat:      Mouth: Mucous membranes are dry.      Pharynx: Oropharynx is clear.   Eyes:      General: No scleral icterus.     Extraocular Movements:  Extraocular movements intact.      Conjunctiva/sclera: Conjunctivae normal.   Cardiovascular:      Rate and Rhythm: Normal rate and regular rhythm.      Heart sounds: No murmur heard.    No gallop.   Pulmonary:      Effort: Pulmonary effort is normal.      Breath sounds: Normal breath sounds. No wheezing, rhonchi or rales.      Comments: On room air   Abdominal:      Comments: Soft, mild distension, mild diffuse TTP, no rebound/guarding, bowel sounds hypoactive    Musculoskeletal:      Right lower leg: No edema.      Left lower leg: No edema.   Skin:     General: Skin is warm and dry.      Findings: No rash.      Comments: R neck dressing CDI    Neurological:      Comments: Oriented to self, place, month/year, follows all commands             Significant Labs: All pertinent labs within the past 24 hours have been reviewed.    Significant Imaging: I have reviewed all pertinent imaging results/findings within the past 24 hours.    Assessment/Plan:     Decompensated hepatic cirrhosis  - decompensated MENDEZ cirrhosis with ascites  - s/p therapeutic paracentesis (-5.5L) and TIPS on 01/30/23 with IR Dr. Wesley   - will monitor volume status closely given risk for volume overload post procedure, stop IVF  - Continue home Lasix + Spironolactone   - continue home Rifaximin + lactulose  - monitor for S/S of hepatic encephalopathy   - monitor CBC, CMP       Type 2 diabetes mellitus without retinopathy  Patient's FSGs are controlled on current medication regimen.  Last A1c reviewed- Last A1c 7.4 in 08/2022 per chart review   Most recent fingerstick glucose reviewed- No results for input(s): POCTGLUCOSE in the last 24 hours.  Current correctional scale  Low  Maintain anti-hyperglycemic dose as follows-   Antihyperglycemics (From admission, onward)    Start     Stop Route Frequency Ordered    01/30/23 1651  insulin aspart U-100 pen 0-5 Units         -- SubQ Before meals & nightly PRN 01/30/23 1552        Hold Oral hypoglycemics  while patient is in the hospital.  Start low dose SSI   Diabetic diet   Hypoglycemia protocol     Hypertension  - Currently normotensive  - resume home diuretics, Coreg  - monitor BP       Coronary artery disease  - not on antiplatelet as outpatient due to hx of bleeding/coagulopathy , has statin intolerance   - Per review of home meds, pt appears to be on both Coreg and Metoprolol, will need to clarify   - continue Coreg   - monitor BP       VTE Risk Mitigation (From admission, onward)         Ordered     Reason for No Pharmacological VTE Prophylaxis  Once        Question:  Reasons:  Answer:  Risk of Bleeding    01/30/23 1552     IP VTE HIGH RISK PATIENT  Once         01/30/23 1552     Place sequential compression device  Until discontinued         01/30/23 1552                   Alia Gonzalez MD  Department of Hospital Medicine   [facility] ([facility])

## 2023-01-30 NOTE — SUBJECTIVE & OBJECTIVE
Past Medical History:   Diagnosis Date    Arm fracture, right 2022, 2010    CAD (coronary artery disease)     Diabetes     GERD (gastroesophageal reflux disease)     HTN (hypertension)     Hyperlipidemia        Past Surgical History:   Procedure Laterality Date    CATARACT EXTRACTION      COLONOSCOPY      COLONOSCOPY N/A 7/18/2016    Procedure: COLONOSCOPY;  Surgeon: Bryon Hickey MD;  Location: [facility] ENDO;  Service: Endoscopy;  Laterality: N/A;    COLONOSCOPY N/A 10/6/2020    Procedure: COLONOSCOPY;  Surgeon: Kait Isaacs MD;  Location: [facility] ENDO;  Service: Endoscopy;  Laterality: N/A;    CORONARY STENT PLACEMENT      ELBOW SURGERY      ESOPHAGOGASTRODUODENOSCOPY N/A 10/6/2020    Procedure: ESOPHAGOGASTRODUODENOSCOPY (EGD);  Surgeon: Kait Isaacs MD;  Location: [facility] ENDO;  Service: Endoscopy;  Laterality: N/A;    HERNIA REPAIR      2022    TONSILLECTOMY      VASECTOMY         Review of patient's allergies indicates:   Allergen Reactions    Lipitor [atorvastatin] Other (See Comments)     Left arm/shooulder pain    Statins-hmg-coa reductase inhibitors Other (See Comments)     muscle aches, joint pain  Joint pain  Joint pain         No current facility-administered medications on file prior to encounter.     Current Outpatient Medications on File Prior to Encounter   Medication Sig    busPIRone (BUSPAR) 15 MG tablet Take 15 mg by mouth 2 (two) times daily as needed (anxiety).    carvediloL (COREG) 6.25 MG tablet Take 6.25 mg by mouth 2 (two) times daily.    empagliflozin (JARDIANCE) 25 mg tablet Take 1 tablet by mouth every morning.    ezetimibe (ZETIA) 10 mg tablet Take 10 mg by mouth once daily.    famotidine (PEPCID) 40 MG tablet Take 40 mg by mouth once daily.    furosemide (LASIX) 40 MG tablet Take 1 tablet (40 mg total) by mouth once daily at 6am.    lovastatin (MEVACOR) 40 MG tablet Take 40 mg by mouth every evening.    metformin (GLUCOPHAGE) 1000 MG tablet Take 1,000 mg by mouth 2 (two) times daily  with meals.    metoprolol tartrate (LOPRESSOR) 50 MG tablet Take 50 mg by mouth 2 (two) times daily.    multivitamin capsule Take 1 capsule by mouth once daily.    rifAXIMin (XIFAXAN) 550 mg Tab Take 1 tablet (550 mg total) by mouth 2 (two) times daily.    spironolactone (ALDACTONE) 100 MG tablet Take 1 tablet (100 mg total) by mouth 2 (two) times daily.    trazodone (DESYREL) 100 MG tablet Take 100 mg by mouth every evening.    zinc sulfate (ZINCATE) 50 mg zinc (220 mg) capsule Take 220 mg by mouth.    hydrOXYzine HCL (ATARAX) 25 MG tablet Take 1 tablet (25 mg total) by mouth 3 (three) times daily as needed for Itching.    lactulose (CHRONULAC) 10 gram/15 mL solution Take 30 mLs (20 g total) by mouth 2 (two) times daily.     Family History       Problem Relation (Age of Onset)    Colon cancer Brother    Heart disease Father    No Known Problems Mother          Tobacco Use    Smoking status: Former     Packs/day: 1.00     Years: 40.00     Pack years: 40.00     Types: Cigarettes    Smokeless tobacco: Never    Tobacco comments:     quit 3 years ago   Substance and Sexual Activity    Alcohol use: No    Drug use: No    Sexual activity: Not on file     Review of Systems   Constitutional:  Positive for appetite change and fatigue. Negative for activity change, chills and fever.   HENT:  Negative for congestion, rhinorrhea and sore throat.    Respiratory:  Negative for cough, chest tightness, shortness of breath and wheezing.    Cardiovascular:  Negative for chest pain, palpitations and leg swelling.   Gastrointestinal:  Positive for abdominal pain. Negative for blood in stool, constipation, diarrhea, nausea and vomiting.   Genitourinary:  Negative for difficulty urinating and dysuria.   Musculoskeletal: Negative.    Skin: Negative.    Neurological:  Negative for dizziness, light-headedness and headaches.   Objective:     Vital Signs (Most Recent):  Temp: 97.9 °F (36.6 °C) (01/30/23 1158)  Pulse: 63 (01/30/23 1158)  Resp:  18 (01/30/23 1158)  BP: (!) 140/58 (01/30/23 1158)  SpO2: 100 % (01/30/23 1158)   Vital Signs (24h Range):  Temp:  [97.9 °F (36.6 °C)] 97.9 °F (36.6 °C)  Pulse:  [63] 63  Resp:  [18] 18  SpO2:  [100 %] 100 %  BP: (140)/(58) 140/58     Weight: 81.6 kg (180 lb)  Body mass index is 24.41 kg/m².    Physical Exam  Vitals and nursing note reviewed. Exam conducted with a chaperone present.   Constitutional:       Comments: Elderly  male, chronically ill appearing, in mild distress due to pain    HENT:      Head: Normocephalic and atraumatic.      Mouth/Throat:      Mouth: Mucous membranes are dry.      Pharynx: Oropharynx is clear.   Eyes:      General: No scleral icterus.     Extraocular Movements: Extraocular movements intact.      Conjunctiva/sclera: Conjunctivae normal.   Cardiovascular:      Rate and Rhythm: Normal rate and regular rhythm.      Heart sounds: No murmur heard.    No gallop.   Pulmonary:      Effort: Pulmonary effort is normal.      Breath sounds: Normal breath sounds. No wheezing, rhonchi or rales.      Comments: On room air   Abdominal:      Comments: Soft, mild distension, mild diffuse TTP, no rebound/guarding, bowel sounds hypoactive    Musculoskeletal:      Right lower leg: No edema.      Left lower leg: No edema.   Skin:     General: Skin is warm and dry.      Findings: No rash.      Comments: R neck dressing CDI    Neurological:      Comments: Oriented to self, place, month/year, follows all commands             Significant Labs: All pertinent labs within the past 24 hours have been reviewed.    Significant Imaging: I have reviewed all pertinent imaging results/findings within the past 24 hours.

## 2023-01-30 NOTE — HPI
Patient is a 77  past history notable for decompensated MENDEZ cirrhosis, recurrent ascites, CAD, essential hypertension, hyperlipidemia type 2 diabetes mellitus who presents today for TIPS procedure.  Patient underwent therapeutic paracentesis with removal of 5.5 L of suellen fluid and TIPS with IR on 01/30/2023.  Hospital Medicine consulted for admission postprocedure monitoring.  Patient seen in postop recovery area.  He is awake, alert but moaning in pain, reports generalized abdominal pain unable specify which area.  Denies chest pain, shortness the breath, nausea, vomiting.  Has intermittent dry cough.  Vital signs stable, sats 96% on room air.

## 2023-01-30 NOTE — ASSESSMENT & PLAN NOTE
- decompensated MENDEZ cirrhosis with ascites  - s/p therapeutic paracentesis (-5.5L) and TIPS on 01/30/23 with IR Dr. Wesley   - will monitor volume status closely given risk for volume overload post procedure, stop IVF  - Continue home Lasix + Spironolactone   - continue home Rifaximin + lactulose  - monitor for S/S of hepatic encephalopathy   - monitor CBC, CMP

## 2023-01-30 NOTE — NURSING
VSS. Patient moaning and complaining of generalized pain 10/10 to abdomen. Dr. Wesley notified. GRICELDA MELGAR at bedside. Pain medicine given as ordered.

## 2023-01-30 NOTE — TRANSFER OF CARE
Anesthesia Transfer of Care Note    Patient: Korin Vivas    Procedure(s) Performed: Procedure(s) (LRB):  FLUOROSCOPY/TIPS (N/A)    Patient location: PACU    Anesthesia Type: general    Transport from OR: Transported from OR on room air with adequate spontaneous ventilation    Post pain: adequate analgesia    Post assessment: no apparent anesthetic complications    Post vital signs: stable    Level of consciousness: awake and responds to stimulation    Nausea/Vomiting: no nausea/vomiting    Complications: none    Transfer of care protocol was followed      Last vitals:   Visit Vitals  BP (!) 140/58 (Patient Position: Lying)   Pulse 63   Temp 36.6 °C (97.9 °F) (Temporal)   Resp 18   Ht 6' (1.829 m)   Wt 81.6 kg (180 lb)   SpO2 100%   BMI 24.41 kg/m²

## 2023-01-30 NOTE — OP NOTE
Pre Op Diagnosis: ascites     Post Op Diagnosis: same    Procedure:  para     Procedure performed by: Dr. Maryjane PUCKETT, Kishor LOFTON     Written Informed Consent Obtained: Yes     Specimen Removed:  yes     Estimated Blood Loss:  minimal     Findings: Local anesthesia and moderate sedation were used.     The patient tolerated the procedure well and there were no complications.      Disposition:  F/U in clinic    Discharge instructions:  Light activity for 24 hours.  Remove band aid in 24 hours.  No baths (showers are appropriate).    F/U with ordering physician    Sterile technique was performed in the lower abdomen, lidocaine was used as a local anesthetic.   5.5 liters of suellen fluid removed for therapeutic purposes.  Pt tolerated the procedure well without immediate complications.  Please see radiologist report for details. F/u with PCP and/or ordering physician.         -

## 2023-01-30 NOTE — ANESTHESIA PREPROCEDURE EVALUATION
01/30/2023  Korin Vivas is a 77 y.o., male.      Pre-op Assessment    I have reviewed the Patient Summary Reports.     I have reviewed the Nursing Notes. I have reviewed the NPO Status.   I have reviewed the Medications.     Review of Systems  Anesthesia Hx:  No problems with previous Anesthesia  History of prior surgery of interest to airway management or planning: Denies Family Hx of Anesthesia complications.   Denies Personal Hx of Anesthesia complications.   Social:  Former Smoker, No Alcohol Use    Hematology/Oncology:  Hematology Normal   Oncology Normal     EENT/Dental:EENT/Dental Normal   Cardiovascular:   Hypertension CAD  CABG/stent  hyperlipidemia    Pulmonary:  Pulmonary Normal    Renal/:  Renal/ Normal     Hepatic/GI:   GERD Liver Disease,    Musculoskeletal:  Musculoskeletal Normal    Neurological:  Neurology Normal    Endocrine:   Diabetes, type 2    Dermatological:  Skin Normal    Psych:  Psychiatric Normal           Physical Exam  General: Well nourished, Cooperative, Alert and Oriented    Airway:  Mallampati: II   Mouth Opening: Normal  TM Distance: Normal  Tongue: Normal  Neck ROM: Normal ROM    Dental:  Intact    Chest/Lungs:  Clear to auscultation    Heart:  Rhythm: Regular Rhythm  Sounds: Normal    Abdomen:  Normal        Anesthesia Plan  Type of Anesthesia, risks & benefits discussed:    Anesthesia Type: Gen ETT  Intra-op Monitoring Plan: Standard ASA Monitors  Induction:  IV  Informed Consent: Informed consent signed with the Patient and all parties understand the risks and agree with anesthesia plan.  All questions answered.   ASA Score: 3  Day of Surgery Review of History & Physical: I have interviewed and examined the patient. I have reviewed the patient's H&P dated:     Ready For Surgery From Anesthesia Perspective.     .

## 2023-01-30 NOTE — ANESTHESIA POSTPROCEDURE EVALUATION
Anesthesia Post Evaluation    Patient: Korin Vivas    Procedure(s) Performed: Procedure(s) (LRB):  FLUOROSCOPY/TIPS (N/A)    Final Anesthesia Type: general      Patient location during evaluation: Cath Lab  Patient participation: Yes- Able to Participate  Level of consciousness: awake and alert  Post-procedure vital signs: reviewed and stable  Pain management: adequate  Airway patency: patent    PONV status at discharge: No PONV  Anesthetic complications: no      Cardiovascular status: stable  Respiratory status: unassisted and spontaneous ventilation  Hydration status: euvolemic  Follow-up not needed.          Vitals Value Taken Time   /58 01/30/23 1158   Temp 36.6 °C (97.9 °F) 01/30/23 1158   Pulse 63 01/30/23 1158   Resp 18 01/30/23 1158   SpO2 100 % 01/30/23 1158         No case tracking events are documented in the log.      Pain/Sp Score: No data recorded

## 2023-01-30 NOTE — Clinical Note
109 ml of contrast were injected throughout the case. 0 mL of contrast was the total wasted during the case. 109 mL was the total amount used during the case.

## 2023-01-30 NOTE — ANESTHESIA PROCEDURE NOTES
Intubation    Date/Time: 1/30/2023 1:55 PM  Performed by: Matteo Lincoln CRNA  Authorized by: Barrett Powell MD     Intubation:     Induction:  Intravenous    Intubated:  Postinduction    Mask Ventilation:  Easy mask    Attempts:  1    Attempted By:  CRNA    Method of Intubation:  Direct    Blade:  Gusman 2    Laryngeal View Grade: Grade I - full view of cords      Difficult Airway Encountered?: No      Complications:  None    Airway Device:  Oral endotracheal tube    Airway Device Size:  7.5    Style/Cuff Inflation:  Cuffed (inflated to minimal occlusive pressure)    Placement Verified By:  Capnometry    Complicating Factors:  None    Findings Post-Intubation:  BS equal bilateral

## 2023-01-30 NOTE — PROGRESS NOTES
Pharmacist Renal Dose Adjustment Note    Korin Vivas is a 77 y.o. male being treated with the medication famotidine    Patient Data:    Vital Signs (Most Recent):  Temp: 97.9 °F (36.6 °C) (01/30/23 1158)  Pulse: 63 (01/30/23 1158)  Resp: 18 (01/30/23 1158)  BP: (!) 140/58 (01/30/23 1158)  SpO2: 100 % (01/30/23 1158)   Vital Signs (72h Range):  Temp:  [97.9 °F (36.6 °C)]   Pulse:  [63]   Resp:  [18]   BP: (140)/(58)   SpO2:  [100 %]      Recent Labs   Lab 01/30/23  1205   CREATININE 1.6*  1.6*     Serum creatinine: 1.6 mg/dL (H) 01/30/23 1205  Estimated creatinine clearance: 43.8 mL/min (A)    Medication:famotidine 40mg daily will be changed to famotidine 20mg daily for crcl <50 ml/min    Pharmacist's Name: Lizbeth Mittal  Pharmacist's Extension: [phone]

## 2023-01-31 VITALS
WEIGHT: 159.19 LBS | TEMPERATURE: 98 F | DIASTOLIC BLOOD PRESSURE: 58 MMHG | OXYGEN SATURATION: 98 % | RESPIRATION RATE: 16 BRPM | HEIGHT: 72 IN | SYSTOLIC BLOOD PRESSURE: 118 MMHG | BODY MASS INDEX: 21.56 KG/M2 | HEART RATE: 82 BPM

## 2023-01-31 DIAGNOSIS — K75.81 LIVER CIRRHOSIS SECONDARY TO NASH: Primary | ICD-10-CM

## 2023-01-31 DIAGNOSIS — K74.60 LIVER CIRRHOSIS SECONDARY TO NASH: Primary | ICD-10-CM

## 2023-01-31 DIAGNOSIS — Z95.828 S/P TIPS (TRANSJUGULAR INTRAHEPATIC PORTOSYSTEMIC SHUNT): ICD-10-CM

## 2023-01-31 LAB
ACANTHOCYTES BLD QL SMEAR: PRESENT
ALBUMIN SERPL BCP-MCNC: 2.7 G/DL (ref 3.5–5.2)
ALP SERPL-CCNC: 159 U/L (ref 55–135)
ALT SERPL W/O P-5'-P-CCNC: 77 U/L (ref 10–44)
ANION GAP SERPL CALC-SCNC: 8 MMOL/L (ref 8–16)
ANISOCYTOSIS BLD QL SMEAR: SLIGHT
AST SERPL-CCNC: 110 U/L (ref 10–40)
BASOPHILS # BLD AUTO: 0.12 K/UL (ref 0–0.2)
BASOPHILS NFR BLD: 0.7 % (ref 0–1.9)
BILIRUB SERPL-MCNC: 1.3 MG/DL (ref 0.1–1)
BUN SERPL-MCNC: 29 MG/DL (ref 8–23)
CALCIUM SERPL-MCNC: 8.8 MG/DL (ref 8.7–10.5)
CHLORIDE SERPL-SCNC: 107 MMOL/L (ref 95–110)
CO2 SERPL-SCNC: 17 MMOL/L (ref 23–29)
CREAT SERPL-MCNC: 1.3 MG/DL (ref 0.5–1.4)
DACRYOCYTES BLD QL SMEAR: ABNORMAL
DIFFERENTIAL METHOD: ABNORMAL
EOSINOPHIL # BLD AUTO: 0.3 K/UL (ref 0–0.5)
EOSINOPHIL NFR BLD: 1.9 % (ref 0–8)
ERYTHROCYTE [DISTWIDTH] IN BLOOD BY AUTOMATED COUNT: 15.9 % (ref 11.5–14.5)
EST. GFR  (NO RACE VARIABLE): 57 ML/MIN/1.73 M^2
GLUCOSE SERPL-MCNC: 145 MG/DL (ref 70–110)
HCT VFR BLD AUTO: 33.4 % (ref 40–54)
HGB BLD-MCNC: 11.1 G/DL (ref 14–18)
IMM GRANULOCYTES # BLD AUTO: 0.11 K/UL (ref 0–0.04)
IMM GRANULOCYTES NFR BLD AUTO: 0.7 % (ref 0–0.5)
LYMPHOCYTES # BLD AUTO: 0.7 K/UL (ref 1–4.8)
LYMPHOCYTES NFR BLD: 4.2 % (ref 18–48)
MCH RBC QN AUTO: 30.2 PG (ref 27–31)
MCHC RBC AUTO-ENTMCNC: 33.2 G/DL (ref 32–36)
MCV RBC AUTO: 91 FL (ref 82–98)
MONOCYTES # BLD AUTO: 2.2 K/UL (ref 0.3–1)
MONOCYTES NFR BLD: 13 % (ref 4–15)
NEUTROPHILS # BLD AUTO: 13.4 K/UL (ref 1.8–7.7)
NEUTROPHILS NFR BLD: 79.5 % (ref 38–73)
NRBC BLD-RTO: 0 /100 WBC
PLATELET # BLD AUTO: 297 K/UL (ref 150–450)
PLATELET BLD QL SMEAR: ABNORMAL
PMV BLD AUTO: 9.7 FL (ref 9.2–12.9)
POCT GLUCOSE: 146 MG/DL (ref 70–110)
POIKILOCYTOSIS BLD QL SMEAR: SLIGHT
POTASSIUM SERPL-SCNC: 5.2 MMOL/L (ref 3.5–5.1)
PROT SERPL-MCNC: 6.6 G/DL (ref 6–8.4)
RBC # BLD AUTO: 3.67 M/UL (ref 4.6–6.2)
SCHISTOCYTES BLD QL SMEAR: PRESENT
SODIUM SERPL-SCNC: 132 MMOL/L (ref 136–145)
TARGETS BLD QL SMEAR: ABNORMAL
WBC # BLD AUTO: 16.83 K/UL (ref 3.9–12.7)

## 2023-01-31 PROCEDURE — 36415 COLL VENOUS BLD VENIPUNCTURE: CPT | Performed by: INTERNAL MEDICINE

## 2023-01-31 PROCEDURE — 25000003 PHARM REV CODE 250: Performed by: INTERNAL MEDICINE

## 2023-01-31 PROCEDURE — 80053 COMPREHEN METABOLIC PANEL: CPT | Performed by: INTERNAL MEDICINE

## 2023-01-31 PROCEDURE — 99900035 HC TECH TIME PER 15 MIN (STAT)

## 2023-01-31 PROCEDURE — 85025 COMPLETE CBC W/AUTO DIFF WBC: CPT | Performed by: INTERNAL MEDICINE

## 2023-01-31 RX ORDER — CIPROFLOXACIN 500 MG/1
500 TABLET ORAL 2 TIMES DAILY
Qty: 20 TABLET | Refills: 0 | Status: SHIPPED | OUTPATIENT
Start: 2023-01-31 | End: 2023-02-10

## 2023-01-31 RX ADMIN — FUROSEMIDE 40 MG: 40 TABLET ORAL at 09:01

## 2023-01-31 RX ADMIN — EZETIMIBE 10 MG: 10 TABLET ORAL at 09:01

## 2023-01-31 RX ADMIN — THERA TABS 1 TABLET: TAB at 09:01

## 2023-01-31 RX ADMIN — RIFAXIMIN 550 MG: 550 TABLET ORAL at 09:01

## 2023-01-31 RX ADMIN — SPIRONOLACTONE 100 MG: 100 TABLET ORAL at 09:01

## 2023-01-31 RX ADMIN — FAMOTIDINE 20 MG: 20 TABLET ORAL at 09:01

## 2023-01-31 RX ADMIN — LACTULOSE 20 G: 20 SOLUTION ORAL at 09:01

## 2023-01-31 NOTE — NURSING
Discharge instructions reviewed with patient and spouse, new order noted for Cipro 500mg PO BID X 10 days, delivered to bedside per Pharmacy. IV D/Cd , tolerated well. Denies any questions, Transport set up for d/c via W/C.

## 2023-01-31 NOTE — DISCHARGE SUMMARY
[facility] [place] Medicine  Discharge Summary      Patient Name: Korin Vivas  MRN: [id number]  JAME: [id number]  Patient Class: IP- Inpatient  Admission Date: 1/30/2023  Hospital Length of Stay: 1 days  Discharge Date and Time: 1/31/2023 10:06 AM  Attending Physician: Dr. Hernadez  Discharging Provider: Nicki Martinez NP  Primary Care Provider: [facility]    Primary Care Team: Networked reference to record PCT     HPI:   Patient is a 77  past history notable for decompensated MENDEZ cirrhosis, recurrent ascites, CAD, essential hypertension, hyperlipidemia type 2 diabetes mellitus who presents today for TIPS procedure.  Patient underwent therapeutic paracentesis with removal of 5.5 L of suellen fluid and TIPS with IR on 01/30/2023.  Hospital Medicine consulted for admission postprocedure monitoring.  Patient seen in postop recovery area.  He is awake, alert but moaning in pain, reports generalized abdominal pain unable specify which area.  Denies chest pain, shortness the breath, nausea, vomiting.  Has intermittent dry cough.  Vital signs stable, sats 96% on room air.      Procedure(s) (LRB):  FLUOROSCOPY/TIPS (N/A)          Hospital Course:   The patient is a 78 yo male with decompensated MENDEZ cirrhosis, recurrent ascites, CAD, HTN, DM, HLD who was admitted s/p therapeutic paracentesis with removal of 5.5 L of suellen fluid and TIPS procedure per IR on 01/30/2023. Pt tolerated the procedure well. Volume status stable. Pain controlled. Mild elevated in WBC and LFTs- Discussed with IR, Dr. Wesley, who states this is expected response s/p TIPS. He recommended ok to discharge with Cipro po x 10 days. He will arrange follow up for liver doppler ultrasound in 10 days. Pt should also f/u with Hepatology as scheduled. Pt had a better than expected outcome after procedure and was able to be discharged. The patient was seen and examined today and determined to be stable for discharge.         Goals  of Care Treatment Preferences:  Code Status: Full Code        Final Active Diagnoses:    Diagnosis Date Noted POA    PRINCIPAL PROBLEM:  Decompensated hepatic cirrhosis [K72.90, K74.60] 01/30/2023 Yes     Chronic    Coronary artery disease [I25.10] 11/14/2022 Yes    Hypertension [I10] 11/14/2022 Yes    Type 2 diabetes mellitus without retinopathy [E11.9] 11/14/2022 Yes      Problems Resolved During this Admission:       Discharged Condition: stable    Disposition: Home or Self Care    Follow Up:   Follow-up Information       [facility] Follow up in 3 day(s).    Specialties: Behavioral Health, Psychiatry, Psychology  Contact information:  [address]  [facility] [address]               [facility] HEPATOLOGY Follow up in 1 week(s).    Specialty: Hepatology  Contact information:  [address]  [facility] [address] [phone]                         Patient Instructions:      Diet diabetic     Diet Cardiac     Activity as tolerated       Significant Diagnostic Studies:       Pending Diagnostic Studies:       None           Medications:  Reconciled Home Medications:      Medication List        START taking these medications      ciprofloxacin HCl 500 MG tablet  Commonly known as: CIPRO  Take 1 tablet (500 mg total) by mouth 2 (two) times daily. for 10 days            CONTINUE taking these medications      busPIRone 15 MG tablet  Commonly known as: BUSPAR  Take 15 mg by mouth 2 (two) times daily as needed (anxiety).     carvediloL 6.25 MG tablet  Commonly known as: COREG  Take 6.25 mg by mouth 2 (two) times daily.     empagliflozin 25 mg tablet  Commonly known as: JARDIANCE  Take 1 tablet by mouth every morning.     ezetimibe 10 mg tablet  Commonly known as: ZETIA  Take 10 mg by mouth once daily.     famotidine 40 MG tablet  Commonly known as: PEPCID  Take 40 mg by mouth once daily.     furosemide 40 MG tablet  Commonly known as: LASIX  Take 1 tablet (40 mg total) by mouth  once daily at 6am.     hydrOXYzine HCL 25 MG tablet  Commonly known as: ATARAX  Take 1 tablet (25 mg total) by mouth 3 (three) times daily as needed for Itching.     lactulose 10 gram/15 mL solution  Commonly known as: CHRONULAC  Take 30 mLs (20 g total) by mouth 2 (two) times daily.     lovastatin 40 MG tablet  Commonly known as: MEVACOR  Take 40 mg by mouth every evening.     metFORMIN 1000 MG tablet  Commonly known as: GLUCOPHAGE  Take 1,000 mg by mouth 2 (two) times daily with meals.     metoprolol tartrate 50 MG tablet  Commonly known as: LOPRESSOR  Take 50 mg by mouth 2 (two) times daily.     multivitamin capsule  Take 1 capsule by mouth once daily.     rifAXIMin 550 mg Tab  Commonly known as: XIFAXAN  Take 1 tablet (550 mg total) by mouth 2 (two) times daily.     spironolactone 100 MG tablet  Commonly known as: ALDACTONE  Take 1 tablet (100 mg total) by mouth 2 (two) times daily.     traZODone 100 MG tablet  Commonly known as: DESYREL  Take 100 mg by mouth every evening.     zinc sulfate 50 mg zinc (220 mg) capsule  Commonly known as: ZINCATE  Take 220 mg by mouth.              Indwelling Lines/Drains at time of discharge:   Lines/Drains/Airways       None                   Time spent on the discharge of patient: 45 minutes         Nicki Martinez NP  Department of Hospital Medicine  [facility] - Med Surg

## 2023-01-31 NOTE — NURSING TRANSFER
Nursing Transfer Note      1/30/2023     Reason patient is being transferred: observation    Transfer to room 573 from [facility]    Transfer via BED    Transfer with PERSONAL BELONGINGS    Transported by [facility]    Medicines sent: NA    Any special needs or follow-up needed: NO    Chart send with patient: YES    Notified:  At bedside during transport    Patient reassessed at: 1743 01/30/2023    Upon arrival to floor: TRANSFERRED TO ROOM. TRANSFERRED TO BED.    IV FLUIDS ON PUMP AT PRESCRIBED RATE.  PROCEDURAL ACCESS SITE TO RIGHT NECK WITHOUT BLEEDING OR HEMATOMA.  DRESSING DRY AND INTACT.  CARE HANDED OFF TO DIXON RN.

## 2023-01-31 NOTE — PLAN OF CARE
[facility] Med [facility]  Discharge Final Note    Primary Care Provider: [facility]    Expected Discharge Date: 1/31/2023    Final Discharge Note (most recent)       Final Note - 01/31/23 0924          Final Note    Assessment Type Final Discharge Note     Anticipated Discharge Disposition Home or Self Care        Post-Acute Status    Coverage Medicare A & B     Discharge Delays None known at this time                     Important Message from Medicare                 DC Disposition: home with family   Family Notified: Patient and spouse  Transportation: family    Patient has no placement or equipment needs from .

## 2023-01-31 NOTE — PLAN OF CARE
Patient remains free from falls and injuries. Bladder scan at 1900 revealed 970ml of urine. Patient was straight cathed, Emptied 1500ml out of bladder. Patient denies pain. No s/s of acute distress noted. Will continue to monitor patient. Chart check completed.

## 2023-01-31 NOTE — PLAN OF CARE
[facility]  Initial Discharge Assessment       [facility] Provider[facility]    Admission Diagnosis: Liver cirrhosis secondary to MENDEZ [K75.81, K74.60]  Other ascites [R18.8]  Portal hypertension [K76.6]    Admission Date: 1/30/2023  Expected Discharge Date: 1/31/2023    Discharge Barriers Identified: None    Payor: MEDICARE / Plan: MEDICARE PART A & B / Product Type: Government /     Extended Emergency Contact Information  Primary Emergency Contact: AguilarJessica  Address: [address]           [place] [address] [place]  Mobile Phone: [phone]  Relation: Spouse    Discharge Plan A: Home with family         [id number] - [place] - [address] AT [facility] [address]  [address]  [place] [address]  Phone: [phone] Fax: [phone]      Initial Assessment (most recent)       Adult Discharge Assessment - 01/31/23 0921          Discharge Assessment    Assessment Type Discharge Planning Assessment     Confirmed/corrected address, phone number and insurance Yes     Confirmed Demographics Correct on Facesheet     Source of Information patient;family     Communicated JEREMIAH with patient/caregiver Date not available/Unable to determine     Reason For Admission Decompensated hepatic cirrhosis (Chronic)     People in Home spouse     Facility Arrived From: home     Do you expect to return to your current living situation? Yes     Do you have help at home or someone to help you manage your care at home? Yes     Who are your caregiver(s) and their phone number(s)? spouse     Prior to hospitilization cognitive status: Alert/Oriented     Current cognitive status: Alert/Oriented     Home Accessibility wheelchair accessible     Equipment Currently Used at Home none     Readmission within 30 days? No     Patient currently being followed by outpatient case management? No     Do you currently have service(s) that help  you manage your care at home? No     Do you take prescription medications? Yes     Do you have prescription coverage? Yes     Coverage Medicare A & B     Do you have any problems affording any of your prescribed medications? No     Is the patient taking medications as prescribed? yes     Who is going to help you get home at discharge? spouse     How do you get to doctors appointments? car, drives self     Are you on dialysis? No     Do you take coumadin? No     Discharge Plan A Home with family     DME Needed Upon Discharge  none     Discharge Plan discussed with: Patient     Discharge Barriers Identified None                   Anticipated DC dispo: home with family  Prior Level of Function: independent with ADLs  [facility]  [facility]    Comments:  Swer met with patient and spouse at bedside to introduce role and discuss discharge planning. Patient lives with spouse who will also be help at home and can provide transport at time of discharge. Swer discharge needs depends on hospital progress. Swer will continue following to assist with other needs.

## 2023-01-31 NOTE — HOSPITAL COURSE
The patient is a 76 yo male with decompensated MENDEZ cirrhosis, recurrent ascites, CAD, HTN, DM, HLD who was admitted s/p therapeutic paracentesis with removal of 5.5 L of suellen fluid and TIPS procedure per IR on 01/30/2023. Pt tolerated the procedure well. Volume status stable. Pain controlled. Mild elevated in WBC and LFTs- Discussed with IR, Dr. Wesley, who states this is expected response s/p TIPS. He recommended ok to discharge with Cipro po x 10 days. He will arrange follow up for liver doppler ultrasound in 10 days. Pt should also f/u with Hepatology as scheduled. Pt had a better than expected outcome after procedure and was able to be discharged. The patient was seen and examined today and determined to be stable for discharge.

## 2023-02-01 ENCOUNTER — PATIENT MESSAGE (OUTPATIENT)
Dept: ADMINISTRATIVE | Facility: CLINIC | Age: 78
End: 2023-02-01
Payer: MEDICARE

## 2023-02-01 ENCOUNTER — PATIENT OUTREACH (OUTPATIENT)
Dept: ADMINISTRATIVE | Facility: CLINIC | Age: 78
End: 2023-02-01
Payer: MEDICARE

## 2023-02-01 DIAGNOSIS — Z95.828 S/P TIPS (TRANSJUGULAR INTRAHEPATIC PORTOSYSTEMIC SHUNT): ICD-10-CM

## 2023-02-01 DIAGNOSIS — K74.60 DECOMPENSATED HEPATIC CIRRHOSIS: Primary | ICD-10-CM

## 2023-02-01 DIAGNOSIS — K72.90 DECOMPENSATED HEPATIC CIRRHOSIS: Primary | ICD-10-CM

## 2023-02-01 NOTE — PROGRESS NOTES
C3 nurse attempted to contact Korin Vivas for a TCC post hospital discharge follow up call. The patient is unable to conduct the call @ this time. The patient's wife requested a callback.    The patient does not have a scheduled HOSFU appointment within 5-7 days post hospital discharge date 01/31/2023.    Non-[facility] PCP.

## 2023-02-02 ENCOUNTER — PES CALL (OUTPATIENT)
Dept: ADMINISTRATIVE | Facility: CLINIC | Age: 78
End: 2023-02-02
Payer: MEDICARE

## 2023-02-02 NOTE — PROGRESS NOTES
C3 nurse spoke with Korin Vivas for a TCC post hospital discharge follow up call. The patient reports does not have a scheduled HOSFU appointment. C3 nurse was unable to schedule HOSFU appointment for [facility]. Patient advised to contact their PCP to schedule a HOSPFU within 5-7 days.      NP home referral placed for HOSFU.

## 2023-02-05 ENCOUNTER — HOSPITAL ENCOUNTER (INPATIENT)
Facility: HOSPITAL | Age: 78
LOS: 4 days | Discharge: HOME | DRG: 442 | End: 2023-02-10
Attending: EMERGENCY MEDICINE | Admitting: FAMILY MEDICINE
Payer: MEDICARE

## 2023-02-05 DIAGNOSIS — R41.82 ALTERED MENTAL STATUS: ICD-10-CM

## 2023-02-05 DIAGNOSIS — K74.60 DECOMPENSATED HEPATIC CIRRHOSIS: Chronic | ICD-10-CM

## 2023-02-05 DIAGNOSIS — K72.90 DECOMPENSATED HEPATIC CIRRHOSIS: Chronic | ICD-10-CM

## 2023-02-05 DIAGNOSIS — K76.82 HEPATIC ENCEPHALOPATHY: Primary | ICD-10-CM

## 2023-02-05 DIAGNOSIS — E11.9 TYPE 2 DIABETES MELLITUS WITHOUT RETINOPATHY: ICD-10-CM

## 2023-02-05 DIAGNOSIS — K74.60 LIVER CIRRHOSIS SECONDARY TO NASH: ICD-10-CM

## 2023-02-05 DIAGNOSIS — K75.81 LIVER CIRRHOSIS SECONDARY TO NASH: ICD-10-CM

## 2023-02-05 LAB
ALBUMIN SERPL BCP-MCNC: 2.6 G/DL (ref 3.5–5.2)
ALP SERPL-CCNC: 180 U/L (ref 55–135)
ALT SERPL W/O P-5'-P-CCNC: 141 U/L (ref 10–44)
AMMONIA PLAS-SCNC: 80 UMOL/L (ref 10–50)
AMPHET+METHAMPHET UR QL: NEGATIVE
ANION GAP SERPL CALC-SCNC: 10 MMOL/L (ref 8–16)
AST SERPL-CCNC: 96 U/L (ref 10–40)
BACTERIA #/AREA URNS HPF: NORMAL /HPF
BARBITURATES UR QL SCN>200 NG/ML: NEGATIVE
BASOPHILS # BLD AUTO: 0.09 K/UL (ref 0–0.2)
BASOPHILS NFR BLD: 1.2 % (ref 0–1.9)
BENZODIAZ UR QL SCN>200 NG/ML: NEGATIVE
BILIRUB SERPL-MCNC: 1.4 MG/DL (ref 0.1–1)
BILIRUB UR QL STRIP: NEGATIVE
BUN SERPL-MCNC: 27 MG/DL (ref 8–23)
BZE UR QL SCN: NEGATIVE
CALCIUM SERPL-MCNC: 8.9 MG/DL (ref 8.7–10.5)
CANNABINOIDS UR QL SCN: NEGATIVE
CHLORIDE SERPL-SCNC: 109 MMOL/L (ref 95–110)
CLARITY UR: CLEAR
CO2 SERPL-SCNC: 15 MMOL/L (ref 23–29)
COLOR UR: YELLOW
CREAT SERPL-MCNC: 1.4 MG/DL (ref 0.5–1.4)
CREAT UR-MCNC: 88.1 MG/DL (ref 23–375)
DIFFERENTIAL METHOD: ABNORMAL
EOSINOPHIL # BLD AUTO: 0.4 K/UL (ref 0–0.5)
EOSINOPHIL NFR BLD: 5.5 % (ref 0–8)
ERYTHROCYTE [DISTWIDTH] IN BLOOD BY AUTOMATED COUNT: 16.8 % (ref 11.5–14.5)
EST. GFR  (NO RACE VARIABLE): 52 ML/MIN/1.73 M^2
GLUCOSE SERPL-MCNC: 145 MG/DL (ref 70–110)
GLUCOSE UR QL STRIP: ABNORMAL
HCT VFR BLD AUTO: 34.1 % (ref 40–54)
HGB BLD-MCNC: 11 G/DL (ref 14–18)
HGB UR QL STRIP: NEGATIVE
IMM GRANULOCYTES # BLD AUTO: 0.08 K/UL (ref 0–0.04)
IMM GRANULOCYTES NFR BLD AUTO: 1.1 % (ref 0–0.5)
KETONES UR QL STRIP: NEGATIVE
LEUKOCYTE ESTERASE UR QL STRIP: NEGATIVE
LIPASE SERPL-CCNC: 131 U/L (ref 4–60)
LYMPHOCYTES # BLD AUTO: 0.6 K/UL (ref 1–4.8)
LYMPHOCYTES NFR BLD: 8.5 % (ref 18–48)
MCH RBC QN AUTO: 29.9 PG (ref 27–31)
MCHC RBC AUTO-ENTMCNC: 32.3 G/DL (ref 32–36)
MCV RBC AUTO: 93 FL (ref 82–98)
METHADONE UR QL SCN>300 NG/ML: NEGATIVE
MICROSCOPIC COMMENT: NORMAL
MONOCYTES # BLD AUTO: 1.2 K/UL (ref 0.3–1)
MONOCYTES NFR BLD: 16.9 % (ref 4–15)
NEUTROPHILS # BLD AUTO: 4.9 K/UL (ref 1.8–7.7)
NEUTROPHILS NFR BLD: 66.8 % (ref 38–73)
NITRITE UR QL STRIP: NEGATIVE
NRBC BLD-RTO: 0 /100 WBC
OPIATES UR QL SCN: NEGATIVE
PCP UR QL SCN>25 NG/ML: NEGATIVE
PH UR STRIP: 6 [PH] (ref 5–8)
PLATELET # BLD AUTO: 147 K/UL (ref 150–450)
PMV BLD AUTO: 9.9 FL (ref 9.2–12.9)
POCT GLUCOSE: 101 MG/DL (ref 70–110)
POCT GLUCOSE: 128 MG/DL (ref 70–110)
POTASSIUM SERPL-SCNC: 5.2 MMOL/L (ref 3.5–5.1)
PROT SERPL-MCNC: 6.2 G/DL (ref 6–8.4)
PROT UR QL STRIP: NEGATIVE
RBC # BLD AUTO: 3.68 M/UL (ref 4.6–6.2)
SODIUM SERPL-SCNC: 134 MMOL/L (ref 136–145)
SP GR UR STRIP: 1.01 (ref 1–1.03)
SQUAMOUS #/AREA URNS HPF: 1 /HPF
TOXICOLOGY INFORMATION: NORMAL
URN SPEC COLLECT METH UR: ABNORMAL
UROBILINOGEN UR STRIP-ACNC: NEGATIVE EU/DL
WBC # BLD AUTO: 7.33 K/UL (ref 3.9–12.7)
WBC #/AREA URNS HPF: 2 /HPF (ref 0–5)
YEAST URNS QL MICRO: NORMAL

## 2023-02-05 PROCEDURE — 81000 URINALYSIS NONAUTO W/SCOPE: CPT | Performed by: NURSE PRACTITIONER

## 2023-02-05 PROCEDURE — 80053 COMPREHEN METABOLIC PANEL: CPT | Performed by: NURSE PRACTITIONER

## 2023-02-05 PROCEDURE — 83036 HEMOGLOBIN GLYCOSYLATED A1C: CPT | Performed by: FAMILY MEDICINE

## 2023-02-05 PROCEDURE — 82962 GLUCOSE BLOOD TEST: CPT

## 2023-02-05 PROCEDURE — 83690 ASSAY OF LIPASE: CPT | Performed by: NURSE PRACTITIONER

## 2023-02-05 PROCEDURE — 99285 EMERGENCY DEPT VISIT HI MDM: CPT | Mod: 25

## 2023-02-05 PROCEDURE — 82140 ASSAY OF AMMONIA: CPT | Performed by: EMERGENCY MEDICINE

## 2023-02-05 PROCEDURE — 25000003 PHARM REV CODE 250: Performed by: FAMILY MEDICINE

## 2023-02-05 PROCEDURE — 80307 DRUG TEST PRSMV CHEM ANLYZR: CPT | Performed by: EMERGENCY MEDICINE

## 2023-02-05 PROCEDURE — 25000003 PHARM REV CODE 250: Performed by: EMERGENCY MEDICINE

## 2023-02-05 PROCEDURE — 93010 EKG 12-LEAD: ICD-10-PCS | Mod: ,,, | Performed by: STUDENT IN AN ORGANIZED HEALTH CARE EDUCATION/TRAINING PROGRAM

## 2023-02-05 PROCEDURE — G0378 HOSPITAL OBSERVATION PER HR: HCPCS

## 2023-02-05 PROCEDURE — 63600175 PHARM REV CODE 636 W HCPCS: Performed by: EMERGENCY MEDICINE

## 2023-02-05 PROCEDURE — 93005 ELECTROCARDIOGRAM TRACING: CPT

## 2023-02-05 PROCEDURE — 96374 THER/PROPH/DIAG INJ IV PUSH: CPT

## 2023-02-05 PROCEDURE — 93010 ELECTROCARDIOGRAM REPORT: CPT | Mod: ,,, | Performed by: STUDENT IN AN ORGANIZED HEALTH CARE EDUCATION/TRAINING PROGRAM

## 2023-02-05 PROCEDURE — 85025 COMPLETE CBC W/AUTO DIFF WBC: CPT | Performed by: NURSE PRACTITIONER

## 2023-02-05 RX ORDER — SODIUM CHLORIDE 0.9 % (FLUSH) 0.9 %
10 SYRINGE (ML) INJECTION
Status: DISCONTINUED | OUTPATIENT
Start: 2023-02-05 | End: 2023-02-10 | Stop reason: HOSPADM

## 2023-02-05 RX ORDER — LORAZEPAM 2 MG/ML
1 INJECTION INTRAMUSCULAR
Status: COMPLETED | OUTPATIENT
Start: 2023-02-05 | End: 2023-02-05

## 2023-02-05 RX ORDER — IBUPROFEN 200 MG
24 TABLET ORAL
Status: DISCONTINUED | OUTPATIENT
Start: 2023-02-05 | End: 2023-02-10 | Stop reason: HOSPADM

## 2023-02-05 RX ORDER — LACTULOSE 10 G/15ML
20 SOLUTION ORAL 3 TIMES DAILY
Status: DISCONTINUED | OUTPATIENT
Start: 2023-02-05 | End: 2023-02-06

## 2023-02-05 RX ORDER — TRAZODONE HYDROCHLORIDE 100 MG/1
100 TABLET ORAL NIGHTLY
Status: DISCONTINUED | OUTPATIENT
Start: 2023-02-05 | End: 2023-02-10 | Stop reason: HOSPADM

## 2023-02-05 RX ORDER — IBUPROFEN 200 MG
16 TABLET ORAL
Status: DISCONTINUED | OUTPATIENT
Start: 2023-02-05 | End: 2023-02-10 | Stop reason: HOSPADM

## 2023-02-05 RX ORDER — OXYCODONE HYDROCHLORIDE 5 MG/1
5 TABLET ORAL EVERY 6 HOURS PRN
Status: DISCONTINUED | OUTPATIENT
Start: 2023-02-05 | End: 2023-02-10 | Stop reason: HOSPADM

## 2023-02-05 RX ORDER — LACTULOSE 10 G/15ML
10 SOLUTION ORAL
Status: COMPLETED | OUTPATIENT
Start: 2023-02-05 | End: 2023-02-05

## 2023-02-05 RX ORDER — ACETAMINOPHEN 325 MG/1
650 TABLET ORAL EVERY 4 HOURS PRN
Status: DISCONTINUED | OUTPATIENT
Start: 2023-02-05 | End: 2023-02-05

## 2023-02-05 RX ORDER — INSULIN ASPART 100 [IU]/ML
0-5 INJECTION, SOLUTION INTRAVENOUS; SUBCUTANEOUS
Status: DISCONTINUED | OUTPATIENT
Start: 2023-02-05 | End: 2023-02-10 | Stop reason: HOSPADM

## 2023-02-05 RX ORDER — GLUCAGON 1 MG
1 KIT INJECTION
Status: DISCONTINUED | OUTPATIENT
Start: 2023-02-05 | End: 2023-02-10 | Stop reason: HOSPADM

## 2023-02-05 RX ORDER — MORPHINE SULFATE 4 MG/ML
2 INJECTION, SOLUTION INTRAMUSCULAR; INTRAVENOUS EVERY 4 HOURS PRN
Status: DISCONTINUED | OUTPATIENT
Start: 2023-02-05 | End: 2023-02-10 | Stop reason: HOSPADM

## 2023-02-05 RX ORDER — CIPROFLOXACIN 500 MG/1
500 TABLET ORAL 2 TIMES DAILY
Status: COMPLETED | OUTPATIENT
Start: 2023-02-05 | End: 2023-02-10

## 2023-02-05 RX ORDER — FAMOTIDINE 20 MG/1
40 TABLET, FILM COATED ORAL DAILY
Status: DISCONTINUED | OUTPATIENT
Start: 2023-02-06 | End: 2023-02-10 | Stop reason: HOSPADM

## 2023-02-05 RX ORDER — EZETIMIBE 10 MG/1
10 TABLET ORAL DAILY
Status: DISCONTINUED | OUTPATIENT
Start: 2023-02-06 | End: 2023-02-10 | Stop reason: HOSPADM

## 2023-02-05 RX ADMIN — LACTULOSE 20 G: 20 SOLUTION ORAL at 09:02

## 2023-02-05 RX ADMIN — CIPROFLOXACIN 500 MG: 500 TABLET, FILM COATED ORAL at 09:02

## 2023-02-05 RX ADMIN — RIFAXIMIN 550 MG: 550 TABLET ORAL at 09:02

## 2023-02-05 RX ADMIN — TRAZODONE HYDROCHLORIDE 100 MG: 100 TABLET ORAL at 09:02

## 2023-02-05 RX ADMIN — LORAZEPAM 1 MG: 2 INJECTION INTRAMUSCULAR; INTRAVENOUS at 02:02

## 2023-02-05 RX ADMIN — LACTULOSE 10 G: 20 SOLUTION ORAL at 03:02

## 2023-02-05 RX ADMIN — SODIUM ZIRCONIUM CYCLOSILICATE 5 G: 5 POWDER, FOR SUSPENSION ORAL at 05:02

## 2023-02-05 NOTE — ASSESSMENT & PLAN NOTE
- No CT evidence of acute intracranial abnormality.   - Lactulose TID  - Repeat ammonia in AM, currently 80  - Consult Dr. Ariza as patient's wife states he has 2-3 BM daily and presented with hepatic encephalopathy.  - Document accurate I&O (specifically BM)

## 2023-02-05 NOTE — FIRST PROVIDER EVALUATION
Medical screening examination initiated.  I have conducted a focused provider triage encounter, findings are as follows:    Brief history of present illness:  hx of liver cirrhosis due to coyne. Family reports increased confusion since yest     There were no vitals filed for this visit.    Pertinent physical exam:  nad    Brief workup plan:  labs, imaging and meds as needed     Preliminary workup initiated; this workup will be continued and followed by the physician or advanced practice provider that is assigned to the patient when roomed.

## 2023-02-05 NOTE — SUBJECTIVE & OBJECTIVE
Past Medical History:   Diagnosis Date    Arm fracture, right 2022, 2010    CAD (coronary artery disease)     Diabetes     GERD (gastroesophageal reflux disease)     HTN (hypertension)     Hyperlipidemia        Past Surgical History:   Procedure Laterality Date    CATARACT EXTRACTION      COLONOSCOPY      COLONOSCOPY N/A 7/18/2016    Procedure: COLONOSCOPY;  Surgeon: Bryon Hickey MD;  Location: [facility] ENDO;  Service: Endoscopy;  Laterality: N/A;    COLONOSCOPY N/A 10/6/2020    Procedure: COLONOSCOPY;  Surgeon: Kait Isaacs MD;  Location: [facility] ENDO;  Service: Endoscopy;  Laterality: N/A;    CORONARY STENT PLACEMENT      ELBOW SURGERY      ESOPHAGOGASTRODUODENOSCOPY N/A 10/6/2020    Procedure: ESOPHAGOGASTRODUODENOSCOPY (EGD);  Surgeon: Kait Isaacs MD;  Location: [facility] ENDO;  Service: Endoscopy;  Laterality: N/A;    FLUOROSCOPY N/A 1/30/2023    Procedure: FLUOROSCOPY/TIPS;  Surgeon: Pedro Luis Wesley MD;  Location: [facility] CATH LAB;  Service: General;  Laterality: N/A;    HERNIA REPAIR      2022    TONSILLECTOMY      VASECTOMY         Review of patient's allergies indicates:   Allergen Reactions    Lipitor [atorvastatin] Other (See Comments)     Left arm/shooulder pain    Statins-hmg-coa reductase inhibitors Other (See Comments)     muscle aches, joint pain  Joint pain  Joint pain         No current facility-administered medications on file prior to encounter.     Current Outpatient Medications on File Prior to Encounter   Medication Sig    busPIRone (BUSPAR) 15 MG tablet Take 15 mg by mouth 2 (two) times daily as needed (anxiety).    famotidine (PEPCID) 40 MG tablet Take 40 mg by mouth once daily.    furosemide (LASIX) 40 MG tablet Take 1 tablet (40 mg total) by mouth once daily at 6am.    hydrOXYzine HCL (ATARAX) 25 MG tablet Take 1 tablet (25 mg total) by mouth 3 (three) times daily as needed for Itching.    lactulose (CHRONULAC) 10 gram/15 mL solution Take 30 mLs (20 g total) by mouth 2 (two) times daily.     metformin (GLUCOPHAGE) 1000 MG tablet Take 1,000 mg by mouth 2 (two) times daily with meals.    metoprolol tartrate (LOPRESSOR) 50 MG tablet Take 50 mg by mouth 2 (two) times daily.    multivitamin capsule Take 1 capsule by mouth once daily.    trazodone (DESYREL) 100 MG tablet Take 100 mg by mouth every evening.    zinc sulfate (ZINCATE) 50 mg zinc (220 mg) capsule Take 220 mg by mouth.    carvediloL (COREG) 6.25 MG tablet Take 6.25 mg by mouth 2 (two) times daily.    ciprofloxacin HCl (CIPRO) 500 MG tablet Take 1 tablet (500 mg total) by mouth 2 (two) times daily. for 10 days    empagliflozin (JARDIANCE) 25 mg tablet Take 1 tablet by mouth every morning.    ezetimibe (ZETIA) 10 mg tablet Take 10 mg by mouth once daily.    lovastatin (MEVACOR) 40 MG tablet Take 40 mg by mouth every evening.    rifAXIMin (XIFAXAN) 550 mg Tab Take 1 tablet (550 mg total) by mouth 2 (two) times daily.    spironolactone (ALDACTONE) 100 MG tablet Take 1 tablet (100 mg total) by mouth 2 (two) times daily.     Family History       Problem Relation (Age of Onset)    Colon cancer Brother    Heart disease Father    No Known Problems Mother          Tobacco Use    Smoking status: Former     Packs/day: 1.00     Years: 40.00     Pack years: 40.00     Types: Cigarettes    Smokeless tobacco: Never    Tobacco comments:     quit 3 years ago   Substance and Sexual Activity    Alcohol use: No    Drug use: No    Sexual activity: Not on file     Review of Systems   Constitutional:  Positive for fatigue.   Psychiatric/Behavioral:  Positive for confusion.    Objective:     Vital Signs (Most Recent):  Temp: 97.6 °F (36.4 °C) (02/05/23 1205)  Pulse: 90 (02/05/23 1622)  Resp: 15 (02/05/23 1622)  BP: (!) 116/57 (02/05/23 1622)  SpO2: 100 % (02/05/23 1622)   Vital Signs (24h Range):  Temp:  [97.6 °F (36.4 °C)] 97.6 °F (36.4 °C)  Pulse:  [57-90] 90  Resp:  [15-22] 15  SpO2:  [97 %-100 %] 100 %  BP: ()/(24-59) 116/57     Weight: 79.7 kg (175 lb 9.6  oz)  Body mass index is 23.82 kg/m².    Physical Exam  Vitals and nursing note reviewed.   Constitutional:       Comments: Chronically ill appearing    Cardiovascular:      Rate and Rhythm: Normal rate and regular rhythm.      Pulses: Normal pulses.      Heart sounds: Normal heart sounds.   Pulmonary:      Effort: Pulmonary effort is normal.      Breath sounds: Normal breath sounds. No wheezing.   Abdominal:      General: Bowel sounds are normal. There is no distension.      Palpations: Abdomen is soft.   Musculoskeletal:         General: No swelling. Normal range of motion.   Neurological:      Mental Status: He is disoriented.      Comments: Follows commands, confused, does not answer questions appropriately, moves all extremities spontaneously            Significant Labs: All pertinent labs within the past 24 hours have been reviewed.    Significant Imaging: I have reviewed all pertinent imaging results/findings within the past 24 hours.

## 2023-02-05 NOTE — ED PROVIDER NOTES
SCRIBE #1 NOTE: I, Aggie Robertson, am scribing for, and in the presence of, Johny Angeles Jr., MD. I have scribed the entire note.       History     Chief Complaint   Patient presents with    Altered Mental Status     Pt had surgical procedure Monday, was told to come to ER if pt presented with confusion or worsening symptoms. Symptoms onset 8pm last night. Confusion, AMS, low speech, not communicating well, unsteady gait/ weakness     Review of patient's allergies indicates:   Allergen Reactions    Lipitor [atorvastatin] Other (See Comments)     Left arm/shooulder pain    Statins-hmg-coa reductase inhibitors Other (See Comments)     muscle aches, joint pain  Joint pain  Joint pain           History of Present Illness     HPI    2/5/2023, 12:28 PM  History obtained from the patient      History of Present Illness: Korin Vivas is a 77 y.o. male patient with a PMHx of CAD, HTN, HLD, DM , GERD, and MENDEZ who presents to the Emergency Department for evaluation of AMS which onset around 8PM last night with worsened fatigue and weakness. Pt had TIPS procedure on Monday (01/30/2023) and was told to gp to ER if any confusion and worsening sxs arose.  Symptoms are constant and moderate in severity. No mitigating or exacerbating factors reported. Patient denies any fever, chills, N/V, syncope, SOB, CP, and all other sxs at this time. No further complaints or concerns at this time.       Arrival mode: Personal vehicle     PCP: [facility]        Past Medical History:  Past Medical History:   Diagnosis Date    Arm fracture, right 2022, 2010    CAD (coronary artery disease)     Diabetes     GERD (gastroesophageal reflux disease)     HTN (hypertension)     Hyperlipidemia        Past Surgical History:  Past Surgical History:   Procedure Laterality Date    CATARACT EXTRACTION      COLONOSCOPY      COLONOSCOPY N/A 7/18/2016    Procedure: COLONOSCOPY;  Surgeon: Bryon Hickey MD;  Location: [facility];  Service:  Endoscopy;  Laterality: N/A;    COLONOSCOPY N/A 10/6/2020    Procedure: COLONOSCOPY;  Surgeon: Kait Isaacs MD;  Location: [facility] ENDO;  Service: Endoscopy;  Laterality: N/A;    CORONARY STENT PLACEMENT      ELBOW SURGERY      ESOPHAGOGASTRODUODENOSCOPY N/A 10/6/2020    Procedure: ESOPHAGOGASTRODUODENOSCOPY (EGD);  Surgeon: Kait Isaacs MD;  Location: [facility] ENDO;  Service: Endoscopy;  Laterality: N/A;    FLUOROSCOPY N/A 1/30/2023    Procedure: FLUOROSCOPY/TIPS;  Surgeon: Pedro Luis Wesley MD;  Location: [facility] CATH LAB;  Service: General;  Laterality: N/A;    HERNIA REPAIR      2022    TONSILLECTOMY      VASECTOMY           Family History:  Family History   Problem Relation Age of Onset    Colon cancer Brother     No Known Problems Mother     Heart disease Father        Social History:  Social History     Tobacco Use    Smoking status: Former     Packs/day: 1.00     Years: 40.00     Pack years: 40.00     Types: Cigarettes    Smokeless tobacco: Never    Tobacco comments:     quit 3 years ago   Substance and Sexual Activity    Alcohol use: No    Drug use: No    Sexual activity: Not on file        Review of Systems     Review of Systems   Constitutional:  Positive for fatigue. Negative for chills and fever.   HENT:  Negative for sore throat.    Respiratory:  Negative for shortness of breath.    Cardiovascular:  Negative for chest pain.   Gastrointestinal:  Negative for nausea and vomiting.   Genitourinary:  Negative for dysuria.   Musculoskeletal:  Negative for back pain.   Skin:  Negative for rash.   Neurological:  Positive for weakness.   Hematological:  Does not bruise/bleed easily.   Psychiatric/Behavioral:  Positive for confusion.    All other systems reviewed and are negative.   Physical Exam     Initial Vitals [02/05/23 1205]   BP Pulse Resp Temp SpO2   (!) 115/59 61 16 97.6 °F (36.4 °C) 100 %      MAP       --          Physical Exam  Nursing Notes and Vital Signs Reviewed.  Constitutional: Patient is in  no acute distress. Well-developed and well-nourished.  Head: Atraumatic. Normocephalic.  Eyes:  EOM intact.  No scleral icterus.  ENT: Mucous membranes are moist.  Nares clear   Neck:  Full ROM. No JVD.  Cardiovascular: Regular rate. Regular rhythm No murmurs, rubs, or gallops. Distal pulses are 2+ and symmetric  Pulmonary/Chest: No respiratory distress. Clear to auscultation bilaterally. No wheezing or rales.  Equal chest wall rise bilaterally  Abdominal: Soft and non-distended.  There is no tenderness.  No rebound, guarding, or rigidity. Good bowel sounds.  Genitourinary: No CVA tenderness.  No suprapubic tenderness  Musculoskeletal: Moves all extremities. No obvious deformities.  5 x 5 strength in all extremities   Skin: Warm and dry.  Neurological:  Alert, awake, and appropriate.  Normal speech.  No acute focal neurological deficits are appreciated.  Two through 12 intact bilaterally.  Psychiatric: Normal affect. Good eye contact. Appropriate in content.       ED Course   Procedures  ED Vital Signs:  Vitals:    02/05/23 1205 02/05/23 1235 02/05/23 1316 02/05/23 1335   BP: (!) 115/59 (!) 107/54  (!) 136/59   Pulse: 61 (!) 57  90   Resp: 16      Temp: 97.6 °F (36.4 °C)      TempSrc: Oral      SpO2: 100% 99%     Weight:   79.7 kg (175 lb 9.6 oz)    Height: 6' (1.829 m)       02/05/23 1400 02/05/23 1435 02/05/23 1450 02/05/23 1520   BP: (!) 140/45 (!) 139/59 (!) 107/52 (!) 115/55   Pulse:  62 85 63   Resp:       Temp:       TempSrc:       SpO2:  100% 100% 100%   Weight:       Height:        02/05/23 1535 02/05/23 1604 02/05/23 1606 02/05/23 1608   BP: (!) 120/58 (!) 66/32 (!) 59/24 (!) 113/58   Pulse: 73 69 66 64   Resp: 16 20 20 20   Temp:       TempSrc:       SpO2: 100% 98% 97% 100%   Weight:       Height:        02/05/23 1615 02/05/23 1622   BP: (!) 116/55 (!) 116/57   Pulse: 64 90   Resp: (!) 22 15   Temp:     TempSrc:     SpO2: 100% 100%   Weight:     Height:         Abnormal Lab Results:  Labs Reviewed   CBC  W/ AUTO DIFFERENTIAL - Abnormal; Notable for the following components:       Result Value    RBC 3.68 (*)     Hemoglobin 11.0 (*)     Hematocrit 34.1 (*)     RDW 16.8 (*)     Platelets 147 (*)     Immature Granulocytes 1.1 (*)     Immature Grans (Abs) 0.08 (*)     Lymph # 0.6 (*)     Mono # 1.2 (*)     Lymph % 8.5 (*)     Mono % 16.9 (*)     All other components within normal limits   COMPREHENSIVE METABOLIC PANEL - Abnormal; Notable for the following components:    Sodium 134 (*)     Potassium 5.2 (*)     CO2 15 (*)     Glucose 145 (*)     BUN 27 (*)     Albumin 2.6 (*)     Total Bilirubin 1.4 (*)     Alkaline Phosphatase 180 (*)     AST 96 (*)      (*)     eGFR 52 (*)     All other components within normal limits   URINALYSIS, REFLEX TO URINE CULTURE - Abnormal; Notable for the following components:    Glucose, UA 4+ (*)     All other components within normal limits    Narrative:     Specimen Source->Urine   LIPASE - Abnormal; Notable for the following components:    Lipase 131 (*)     All other components within normal limits   AMMONIA - Abnormal; Notable for the following components:    Ammonia 80 (*)     All other components within normal limits   DRUG SCREEN PANEL, URINE EMERGENCY    Narrative:     Specimen Source->Urine   URINALYSIS MICROSCOPIC    Narrative:     Specimen Source->Urine   HEMOGLOBIN A1C   POCT GLUCOSE MONITORING CONTINUOUS        All Lab Results:  Results for orders placed or performed during the hospital encounter of 02/05/23   CBC auto differential   Result Value Ref Range    WBC 7.33 3.90 - 12.70 K/uL    RBC 3.68 (L) 4.60 - 6.20 M/uL    Hemoglobin 11.0 (L) 14.0 - 18.0 g/dL    Hematocrit 34.1 (L) 40.0 - 54.0 %    MCV 93 82 - 98 fL    MCH 29.9 27.0 - 31.0 pg    MCHC 32.3 32.0 - 36.0 g/dL    RDW 16.8 (H) 11.5 - 14.5 %    Platelets 147 (L) 150 - 450 K/uL    MPV 9.9 9.2 - 12.9 fL    Immature Granulocytes 1.1 (H) 0.0 - 0.5 %    Gran # (ANC) 4.9 1.8 - 7.7 K/uL    Immature Grans (Abs) 0.08  (H) 0.00 - 0.04 K/uL    Lymph # 0.6 (L) 1.0 - 4.8 K/uL    Mono # 1.2 (H) 0.3 - 1.0 K/uL    Eos # 0.4 0.0 - 0.5 K/uL    Baso # 0.09 0.00 - 0.20 K/uL    nRBC 0 0 /100 WBC    Gran % 66.8 38.0 - 73.0 %    Lymph % 8.5 (L) 18.0 - 48.0 %    Mono % 16.9 (H) 4.0 - 15.0 %    Eosinophil % 5.5 0.0 - 8.0 %    Basophil % 1.2 0.0 - 1.9 %    Differential Method Automated    Comprehensive metabolic panel   Result Value Ref Range    Sodium 134 (L) 136 - 145 mmol/L    Potassium 5.2 (H) 3.5 - 5.1 mmol/L    Chloride 109 95 - 110 mmol/L    CO2 15 (L) 23 - 29 mmol/L    Glucose 145 (H) 70 - 110 mg/dL    BUN 27 (H) 8 - 23 mg/dL    Creatinine 1.4 0.5 - 1.4 mg/dL    Calcium 8.9 8.7 - 10.5 mg/dL    Total Protein 6.2 6.0 - 8.4 g/dL    Albumin 2.6 (L) 3.5 - 5.2 g/dL    Total Bilirubin 1.4 (H) 0.1 - 1.0 mg/dL    Alkaline Phosphatase 180 (H) 55 - 135 U/L    AST 96 (H) 10 - 40 U/L     (H) 10 - 44 U/L    Anion Gap 10 8 - 16 mmol/L    eGFR 52 (A) >60 mL/min/1.73 m^2   Urinalysis, Reflex to Urine Culture Urine, Catheterized    Specimen: Urine   Result Value Ref Range    Specimen UA Urine, Catheterized     Color, UA Yellow Yellow, Straw, Steff    Appearance, UA Clear Clear    pH, UA 6.0 5.0 - 8.0    Specific Gravity, UA 1.015 1.005 - 1.030    Protein, UA Negative Negative    Glucose, UA 4+ (A) Negative    Ketones, UA Negative Negative    Bilirubin (UA) Negative Negative    Occult Blood UA Negative Negative    Nitrite, UA Negative Negative    Urobilinogen, UA Negative <2.0 EU/dL    Leukocytes, UA Negative Negative   Lipase   Result Value Ref Range    Lipase 131 (H) 4 - 60 U/L   Drug screen panel, emergency   Result Value Ref Range    Benzodiazepines Negative Negative    Methadone metabolites Negative Negative    Cocaine (Metab.) Negative Negative    Opiate Scrn, Ur Negative Negative    Barbiturate Screen, Ur Negative Negative    Amphetamine Screen, Ur Negative Negative    THC Negative Negative    Phencyclidine Negative Negative    Creatinine,  Urine 88.1 23.0 - 375.0 mg/dL    Toxicology Information SEE COMMENT    Ammonia   Result Value Ref Range    Ammonia 80 (H) 10 - 50 umol/L   Urinalysis Microscopic   Result Value Ref Range    WBC, UA 2 0 - 5 /hpf    Bacteria None None-Occ /hpf    Yeast, UA None None    Squam Epithel, UA 1 /hpf    Microscopic Comment SEE COMMENT          Imaging Results:  Imaging Results              X-Ray Chest AP Portable (Final result)  Result time 02/05/23 13:47:40      Final result by Milad Eli MD (02/05/23 13:47:40)                   Impression:      Faint opacity at the left lung base may reflect atelectasis or infiltrate.      Electronically signed by: Milad Eli  Date:    02/05/2023  Time:    13:47               Narrative:    EXAMINATION:  XR CHEST AP PORTABLE    CLINICAL HISTORY:  altered mental status;    TECHNIQUE:  Single frontal view of the chest was performed.    COMPARISON:  Chest radiograph 05/10/2006    FINDINGS:  Cardiac leads project over the chest.  Cardiomediastinal silhouette is within normal limits.  Trachea is midline.  Lungs appear symmetrically expanded.  Faint opacity at the left lung base.  No large pleural effusion.  No pneumothorax.  Degenerative changes of the spine.  The visualized osseous structures appear intact.                                       CT Head Without Contrast (Final result)  Result time 02/05/23 13:25:01      Final result by Milad Eli MD (02/05/23 13:25:01)                   Impression:      No CT evidence of acute intracranial abnormality.    Appearance of chronic left maxillary sinus disease.    All CT scans at this facility use dose modulation, iterative reconstruction, and/or weight base dosing when appropriate to reduce radiation dose to as low as reasonably achievable.      Electronically signed by: Milad Eli  Date:    02/05/2023  Time:    13:25               Narrative:    EXAMINATION:  CT HEAD WITHOUT CONTRAST    CLINICAL HISTORY:  Mental status change,  unknown cause;    TECHNIQUE:  Contiguous axial images were obtained from the skull base through the vertex without intravenous contrast.    COMPARISON:  None    FINDINGS:  No intracranial hemorrhage. No mass effect or midline shift. Normal parenchymal attenuation. The ventricles and sulci are normal in size and configuration. Paranasal sinuses are predominantly clear.  Thickening of the left maxillary sinus wall to suggest chronic sinus disease.  Mastoid air cells are clear.  No concerning osseous findings.                                       The EKG was ordered, reviewed, and independently interpreted by the ED provider.  Interpretation time: 12:41  Rate: 67 BPM  Rhythm:  Sinus rhythm with premature supraventricular complexes and with occasional premature ventricular complexes  Interpretation: No acute ST or T wave elevations. No STEMI.           The Emergency Provider reviewed the vital signs and test results, which are outlined above.     ED Discussion       1:06 PM: Patient became a bit combative in his confusion is attempting to get out of the bed.  I discussed this with the wife all options.  I have discussed restraints versus Ativan.  She is requested we attempt treatment with Ativan understanding that this may confuse the exam a bit going forward.     3:13 PM: Discussed case with Dr. Salmeron ([facility]) who agrees with current care and management of pt and accepts admission.   Admitting Service: [facility] medicine   Admitting Physician: Dr. Salmeron  Admit to: Obs    3:14 PM: Re-evaluated pt. I have discussed test results, shared treatment plan, and the need for admission with patient and family at bedside. Pt and family express understanding at this time and agree with all information. All questions answered. Pt and family have no further questions or concerns at this time. Pt is ready for admit.    Medical Decision Making:   History:   I obtained history from: someone other than patient.       <>  "Summary of History: All history obtained from the wife.  Patient has chronic neck pain.  She provides his medication but seems to be a bit confused as to how he is supposed to get it etc..  Patient had recent tips procedure for MENDEZ.  Old Medical Records: I decided to obtain old medical records.  Old Records Summarized: records from previous admission(s).       <> Summary of Records: I reviewed the patient's recent records to summarizes recent history.  Clinical Tests:   Lab Tests: Ordered and Reviewed  Radiological Study: Ordered and Reviewed  Medical Tests: Ordered and Reviewed  Patient arrives confused it appears encephalopathic on physical exam.  I initiated workup on the patient.  He does not meet SIRS criteria.  I ordered an EKG which I have individually interpreted as sinus rhythm with PVCs.  I ordered a CT of his head indicated for his altered mental status which I have viewed and agree with the radiologist of no acute findings.  I visualized his chest x-ray as well.  I do not appreciate an opacity that is visualized by the radiologist base of left described as "faint".  Clinically this is not pneumonia.  I ordered lab studies and have reviewed them.  The patient has an elevated ammonia level at 80.  His UDS is negative which is a bit concerning due to his history of opioid use at home.  His white count is normal.  His lipase is 131 consistent with his history.  He has an elevated bilirubin and LFTs consistent with his history as well.  Patient was diagnosed with hepatic encephalopathy.  We will dose with lactulose.  I have discussed the case with Dr. Salmeron with Hospital Medicine was seen the patient has agreed to admit the patient.  She is aware of all findings.  The patient's family are aware of his condition and the plan of care.         ED Medication(s):  Medications   busPIRone tablet 15 mg (has no administration in time range)   ciprofloxacin HCl tablet 500 mg (has no administration in time range) "   ezetimibe tablet 10 mg (has no administration in time range)   famotidine tablet 40 mg (has no administration in time range)   lactulose 20 gram/30 mL solution Soln 20 g (has no administration in time range)   rifAXIMin tablet 550 mg (has no administration in time range)   traZODone tablet 100 mg (has no administration in time range)   insulin aspart U-100 pen 0-5 Units (has no administration in time range)   glucose chewable tablet 16 g (has no administration in time range)   glucose chewable tablet 24 g (has no administration in time range)   glucagon (human recombinant) injection 1 mg (has no administration in time range)   sodium chloride 0.9% flush 10 mL (has no administration in time range)   dextrose 10% bolus 125 mL 125 mL (has no administration in time range)   dextrose 10% bolus 250 mL 250 mL (has no administration in time range)   oxyCODONE immediate release tablet 5 mg (has no administration in time range)   morphine injection 2 mg (has no administration in time range)   LORazepam injection 1 mg (1 mg Intravenous Given 2/5/23 1400)   lactulose 20 gram/30 mL solution Soln 10 g (10 g Oral Given 2/5/23 1537)       New Prescriptions    No medications on file               Scribe Attestation:   Scribe #1: I performed the above scribed service and the documentation accurately describes the services I performed. I attest to the accuracy of the note.     Attending:   Physician Attestation Statement for Scribe #1: I, Johny Angeles Jr., MD, personally performed the services described in this documentation, as scribed by Aggie Robertson, in my presence, and it is both accurate and complete.           Clinical Impression       ICD-10-CM ICD-9-CM   1. Hepatic encephalopathy  K76.82 572.2   2. Altered mental status  R41.82 780.97       Disposition:   Disposition: Placed in Observation  Condition: Serious       Johny Angeles Jr., MD  02/05/23 6343

## 2023-02-05 NOTE — ASSESSMENT & PLAN NOTE
Patient's FSGs are controlled on current medication regimen.  Last A1c reviewed- No results found for: LABA1C, HGBA1C  Most recent fingerstick glucose reviewed- Recent Labs   Lab 02/05/23  1719   POCTGLUCOSE 128*     Current correctional scale  Low  Maintain anti-hyperglycemic dose as follows-   Antihyperglycemics (From admission, onward)    Start     Stop Route Frequency Ordered    02/05/23 1733  insulin aspart U-100 pen 0-5 Units         -- SubQ Before meals & nightly PRN 02/05/23 1637        Hold Oral hypoglycemics while patient is in the hospital.    - SSI and Accuchecks

## 2023-02-05 NOTE — ASSESSMENT & PLAN NOTE
- LFT reviewed in comparison to 5 days ago.  Alk phos, T bili and ALT has worsened.  - Dr. Wesley recommended a repeat Liver US AFTER completion of the cipro. Given his worsen LFTs would recommend discussing having the US done sooner with Dr. Wesley tomorrow.  - Repeat CMP in AM

## 2023-02-05 NOTE — PHARMACY MED REC
"Admission Medication History     The home medication history was taken by Diony Duff.    You may go to "Admission" then "Reconcile Home Medications" tabs to review and/or act upon these items.     The home medication list has been updated by the Pharmacy department.   Please read ALL comments highlighted in yellow.   Please address this information as you see fit.    Feel free to contact us if you have any questions or require assistance.        Medications listed below were obtained from: Patient/family and Analytic software- [employer]  (Not in a hospital admission)      Diony Duff  [id number]      Current Outpatient Medications on File Prior to Encounter   Medication Sig Dispense Refill Last Dose    busPIRone (BUSPAR) 15 MG tablet Take 15 mg by mouth 2 (two) times daily as needed (anxiety).   2/5/2023    famotidine (PEPCID) 40 MG tablet Take 40 mg by mouth once daily.   2/5/2023    furosemide (LASIX) 40 MG tablet Take 1 tablet (40 mg total) by mouth once daily at 6am. 30 tablet 11 2/5/2023    hydrOXYzine HCL (ATARAX) 25 MG tablet Take 1 tablet (25 mg total) by mouth 3 (three) times daily as needed for Itching. 30 tablet 1 2/5/2023    lactulose (CHRONULAC) 10 gram/15 mL solution Take 30 mLs (20 g total) by mouth 2 (two) times daily. 250 mL 11 2/5/2023    metformin (GLUCOPHAGE) 1000 MG tablet Take 1,000 mg by mouth 2 (two) times daily with meals.   2/5/2023    metoprolol tartrate (LOPRESSOR) 50 MG tablet Take 50 mg by mouth 2 (two) times daily.   2/5/2023    multivitamin capsule Take 1 capsule by mouth once daily.   2/5/2023    trazodone (DESYREL) 100 MG tablet Take 100 mg by mouth every evening.   2/5/2023    zinc sulfate (ZINCATE) 50 mg zinc (220 mg) capsule Take 220 mg by mouth.   2/5/2023    carvediloL (COREG) 6.25 MG tablet Take 6.25 mg by mouth 2 (two) times daily.       ciprofloxacin HCl (CIPRO) 500 MG tablet Take 1 tablet (500 mg total) by mouth 2 (two) times daily. for 10 days 20 tablet 0     " empagliflozin (JARDIANCE) 25 mg tablet Take 1 tablet by mouth every morning.       ezetimibe (ZETIA) 10 mg tablet Take 10 mg by mouth once daily.       lovastatin (MEVACOR) 40 MG tablet Take 40 mg by mouth every evening.       rifAXIMin (XIFAXAN) 550 mg Tab Take 1 tablet (550 mg total) by mouth 2 (two) times daily. 60 tablet 6     spironolactone (ALDACTONE) 100 MG tablet Take 1 tablet (100 mg total) by mouth 2 (two) times daily. 60 tablet 6                            .

## 2023-02-05 NOTE — H&P
[facility] - Emergency Dept.  [place] [facility]  History & Physical    Patient Name: Korin Vivas  MRN: [id number]  Patient Class: OP- Observation  Admission Date: 2/5/2023  Attending Physician: Lucía Salmeron MD   Primary Care Provider: [facility]         Patient information was obtained from spouse/SO and ER records.     Subjective:     Principal Problem:Hepatic encephalopathy    Chief Complaint:   Chief Complaint   Patient presents with    Altered Mental Status     Pt had surgical procedure Monday, was told to come to ER if pt presented with confusion or worsening symptoms. Symptoms onset 8pm last night. Confusion, AMS, low speech, not communicating well, unsteady gait/ weakness        HPI: Mr. Vivas is a 78 yo male with decompensated MENDEZ cirrhosis, recurrent ascites, CAD, HTN, DM, HLD and recent paracentesis and TIPS procedure per IF on 1/30.  Patient was discharged on 2/1 with instructions to take cipro for 10 days, as well as follow-up in 10 days with Dr. Wesley, follow up for liver doppler ultrasound in 10 days.  Since this time patient was doing well, until yesterday when wife noticed him being more confused prior to bed.  when he awoke this am, patient took his am meds (wife unsure if he took lactuluse) but he remained confused so wife brought him to ED.  Symptoms are constant and moderate in severity, no relieving or exacerbating factors, associated s/sx fatigue and lethargy.  In the ED, lab work notable for creatinine 1.4, K 5.2, bili 1.4, alt 141, ast 96; UDS negative, US with no signs of infection.  Chest xray with atelectasis, CT head negative for acute process; ammonia 80.  Patient given lactulose and will be admitted to observation for further treatment of hepatic encephalopathy.  Hepatology also consulted.      Code Status, Full  Surrogate Decision maker wife Jessica      Past Medical History:   Diagnosis Date    Arm fracture, right 2022, 2010    CAD (coronary artery disease)      Diabetes     GERD (gastroesophageal reflux disease)     HTN (hypertension)     Hyperlipidemia        Past Surgical History:   Procedure Laterality Date    CATARACT EXTRACTION      COLONOSCOPY      COLONOSCOPY N/A 7/18/2016    Procedure: COLONOSCOPY;  Surgeon: Bryon Hickey MD;  Location: [facility] ENDO;  Service: Endoscopy;  Laterality: N/A;    COLONOSCOPY N/A 10/6/2020    Procedure: COLONOSCOPY;  Surgeon: Kait Isaacs MD;  Location: [facility] ENDO;  Service: Endoscopy;  Laterality: N/A;    CORONARY STENT PLACEMENT      ELBOW SURGERY      ESOPHAGOGASTRODUODENOSCOPY N/A 10/6/2020    Procedure: ESOPHAGOGASTRODUODENOSCOPY (EGD);  Surgeon: Kait Isaacs MD;  Location: [facility] ENDO;  Service: Endoscopy;  Laterality: N/A;    FLUOROSCOPY N/A 1/30/2023    Procedure: FLUOROSCOPY/TIPS;  Surgeon: Pedro Luis Wesley MD;  Location: [facility] CATH LAB;  Service: General;  Laterality: N/A;    HERNIA REPAIR      2022    TONSILLECTOMY      VASECTOMY         Review of patient's allergies indicates:   Allergen Reactions    Lipitor [atorvastatin] Other (See Comments)     Left arm/shooulder pain    Statins-hmg-coa reductase inhibitors Other (See Comments)     muscle aches, joint pain  Joint pain  Joint pain         No current facility-administered medications on file prior to encounter.     Current Outpatient Medications on File Prior to Encounter   Medication Sig    busPIRone (BUSPAR) 15 MG tablet Take 15 mg by mouth 2 (two) times daily as needed (anxiety).    famotidine (PEPCID) 40 MG tablet Take 40 mg by mouth once daily.    furosemide (LASIX) 40 MG tablet Take 1 tablet (40 mg total) by mouth once daily at 6am.    hydrOXYzine HCL (ATARAX) 25 MG tablet Take 1 tablet (25 mg total) by mouth 3 (three) times daily as needed for Itching.    lactulose (CHRONULAC) 10 gram/15 mL solution Take 30 mLs (20 g total) by mouth 2 (two) times daily.    metformin (GLUCOPHAGE) 1000 MG tablet Take 1,000 mg by mouth 2 (two) times daily with  meals.    metoprolol tartrate (LOPRESSOR) 50 MG tablet Take 50 mg by mouth 2 (two) times daily.    multivitamin capsule Take 1 capsule by mouth once daily.    trazodone (DESYREL) 100 MG tablet Take 100 mg by mouth every evening.    zinc sulfate (ZINCATE) 50 mg zinc (220 mg) capsule Take 220 mg by mouth.    carvediloL (COREG) 6.25 MG tablet Take 6.25 mg by mouth 2 (two) times daily.    ciprofloxacin HCl (CIPRO) 500 MG tablet Take 1 tablet (500 mg total) by mouth 2 (two) times daily. for 10 days    empagliflozin (JARDIANCE) 25 mg tablet Take 1 tablet by mouth every morning.    ezetimibe (ZETIA) 10 mg tablet Take 10 mg by mouth once daily.    lovastatin (MEVACOR) 40 MG tablet Take 40 mg by mouth every evening.    rifAXIMin (XIFAXAN) 550 mg Tab Take 1 tablet (550 mg total) by mouth 2 (two) times daily.    spironolactone (ALDACTONE) 100 MG tablet Take 1 tablet (100 mg total) by mouth 2 (two) times daily.     Family History       Problem Relation (Age of Onset)    Colon cancer Brother    Heart disease Father    No Known Problems Mother          Tobacco Use    Smoking status: Former     Packs/day: 1.00     Years: 40.00     Pack years: 40.00     Types: Cigarettes    Smokeless tobacco: Never    Tobacco comments:     quit 3 years ago   Substance and Sexual Activity    Alcohol use: No    Drug use: No    Sexual activity: Not on file     Review of Systems   Constitutional:  Positive for fatigue.   Psychiatric/Behavioral:  Positive for confusion.    Objective:     Vital Signs (Most Recent):  Temp: 97.6 °F (36.4 °C) (02/05/23 1205)  Pulse: 90 (02/05/23 1622)  Resp: 15 (02/05/23 1622)  BP: (!) 116/57 (02/05/23 1622)  SpO2: 100 % (02/05/23 1622)   Vital Signs (24h Range):  Temp:  [97.6 °F (36.4 °C)] 97.6 °F (36.4 °C)  Pulse:  [57-90] 90  Resp:  [15-22] 15  SpO2:  [97 %-100 %] 100 %  BP: ()/(24-59) 116/57     Weight: 79.7 kg (175 lb 9.6 oz)  Body mass index is 23.82 kg/m².    Physical Exam  Vitals and nursing  note reviewed.   Constitutional:       Comments: Chronically ill appearing    Cardiovascular:      Rate and Rhythm: Normal rate and regular rhythm.      Pulses: Normal pulses.      Heart sounds: Normal heart sounds.   Pulmonary:      Effort: Pulmonary effort is normal.      Breath sounds: Normal breath sounds. No wheezing.   Abdominal:      General: Bowel sounds are normal. There is no distension.      Palpations: Abdomen is soft.   Musculoskeletal:         General: No swelling. Normal range of motion.   Neurological:      Mental Status: He is disoriented.      Comments: Follows commands, confused, does not answer questions appropriately, moves all extremities spontaneously            Significant Labs: All pertinent labs within the past 24 hours have been reviewed.    Significant Imaging: I have reviewed all pertinent imaging results/findings within the past 24 hours.    Assessment/Plan:     * Hepatic encephalopathy  - No CT evidence of acute intracranial abnormality.   - Lactulose TID  - Repeat ammonia in AM, currently 80  - Consult Dr. Ariza as patient's wife states he has 2-3 BM daily and presented with hepatic encephalopathy.  - Document accurate I&O (specifically BM)        Hepatic cirrhosis  - LFT reviewed in comparison to 5 days ago.  Alk phos, T bili and ALT has worsened.  - Dr. Wesley recommended a repeat Liver US AFTER completion of the cipro. Given his worsen LFTs would recommend discussing having the US done sooner with Dr. Wesley tomorrow.  - Repeat CMP in AM    Hyperkalemia  - stable from 5 days ago  - Will order 1 dose of lokelma      Type 2 diabetes mellitus without retinopathy  Patient's FSGs are controlled on current medication regimen.  Last A1c reviewed- No results found for: LABA1C, HGBA1C  Most recent fingerstick glucose reviewed- Recent Labs   Lab 02/05/23  1719   POCTGLUCOSE 128*     Current correctional scale  Low  Maintain anti-hyperglycemic dose as follows-   Antihyperglycemics (From  admission, onward)    Start     Stop Route Frequency Ordered    02/05/23 1733  insulin aspart U-100 pen 0-5 Units         -- SubQ Before meals & nightly PRN 02/05/23 1637        Hold Oral hypoglycemics while patient is in the hospital.    - SSI and Accuchecks    Mixed hyperlipidemia  - continue home med      Hypertension  - Hold home medications due to hypotension  - No IV fluids at this time.  Monitor for now.          VTE Risk Mitigation (From admission, onward)         Ordered     IP VTE HIGH RISK PATIENT  Once         02/05/23 1637     Place sequential compression device  Until discontinued         02/05/23 1637                   Lucía Salmeron MD  Department of Hospital Medicine   [facility] hospital - Emergency Dept.

## 2023-02-06 LAB
ALBUMIN SERPL BCP-MCNC: 2.4 G/DL (ref 3.5–5.2)
ALP SERPL-CCNC: 165 U/L (ref 55–135)
ALT SERPL W/O P-5'-P-CCNC: 118 U/L (ref 10–44)
AMMONIA PLAS-SCNC: 61 UMOL/L (ref 10–50)
ANION GAP SERPL CALC-SCNC: 7 MMOL/L (ref 8–16)
AST SERPL-CCNC: 75 U/L (ref 10–40)
BASOPHILS # BLD AUTO: 0.16 K/UL (ref 0–0.2)
BASOPHILS NFR BLD: 1.3 % (ref 0–1.9)
BILIRUB SERPL-MCNC: 1.5 MG/DL (ref 0.1–1)
BNP SERPL-MCNC: 956 PG/ML (ref 0–99)
BUN SERPL-MCNC: 28 MG/DL (ref 8–23)
BURR CELLS BLD QL SMEAR: ABNORMAL
CALCIUM SERPL-MCNC: 8.8 MG/DL (ref 8.7–10.5)
CHLORIDE SERPL-SCNC: 109 MMOL/L (ref 95–110)
CO2 SERPL-SCNC: 19 MMOL/L (ref 23–29)
CREAT SERPL-MCNC: 1.3 MG/DL (ref 0.5–1.4)
DIFFERENTIAL METHOD: ABNORMAL
EOSINOPHIL # BLD AUTO: 0.6 K/UL (ref 0–0.5)
EOSINOPHIL NFR BLD: 5.1 % (ref 0–8)
ERYTHROCYTE [DISTWIDTH] IN BLOOD BY AUTOMATED COUNT: 16.7 % (ref 11.5–14.5)
EST. GFR  (NO RACE VARIABLE): 57 ML/MIN/1.73 M^2
ESTIMATED AVG GLUCOSE: 146 MG/DL (ref 68–131)
GLUCOSE SERPL-MCNC: 107 MG/DL (ref 70–110)
HBA1C MFR BLD: 6.7 % (ref 4–5.6)
HCT VFR BLD AUTO: 34.2 % (ref 40–54)
HGB BLD-MCNC: 10.9 G/DL (ref 14–18)
IMM GRANULOCYTES # BLD AUTO: 0.12 K/UL (ref 0–0.04)
IMM GRANULOCYTES NFR BLD AUTO: 1 % (ref 0–0.5)
LYMPHOCYTES # BLD AUTO: 0.8 K/UL (ref 1–4.8)
LYMPHOCYTES NFR BLD: 7 % (ref 18–48)
MCH RBC QN AUTO: 29.9 PG (ref 27–31)
MCHC RBC AUTO-ENTMCNC: 31.9 G/DL (ref 32–36)
MCV RBC AUTO: 94 FL (ref 82–98)
MONOCYTES # BLD AUTO: 1.8 K/UL (ref 0.3–1)
MONOCYTES NFR BLD: 15.1 % (ref 4–15)
NEUTROPHILS # BLD AUTO: 8.4 K/UL (ref 1.8–7.7)
NEUTROPHILS NFR BLD: 70.5 % (ref 38–73)
NRBC BLD-RTO: 0 /100 WBC
PLATELET # BLD AUTO: 203 K/UL (ref 150–450)
PLATELET BLD QL SMEAR: ABNORMAL
PMV BLD AUTO: 9.4 FL (ref 9.2–12.9)
POCT GLUCOSE: 101 MG/DL (ref 70–110)
POCT GLUCOSE: 133 MG/DL (ref 70–110)
POCT GLUCOSE: 167 MG/DL (ref 70–110)
POTASSIUM SERPL-SCNC: 5.1 MMOL/L (ref 3.5–5.1)
PROT SERPL-MCNC: 6 G/DL (ref 6–8.4)
RBC # BLD AUTO: 3.65 M/UL (ref 4.6–6.2)
SODIUM SERPL-SCNC: 135 MMOL/L (ref 136–145)
WBC # BLD AUTO: 11.89 K/UL (ref 3.9–12.7)

## 2023-02-06 PROCEDURE — 25000003 PHARM REV CODE 250: Performed by: FAMILY MEDICINE

## 2023-02-06 PROCEDURE — 36415 COLL VENOUS BLD VENIPUNCTURE: CPT | Performed by: INTERNAL MEDICINE

## 2023-02-06 PROCEDURE — 25000003 PHARM REV CODE 250: Performed by: NURSE PRACTITIONER

## 2023-02-06 PROCEDURE — G0426 PR INPT TELEHEALTH CONSULT 50M: ICD-10-PCS | Mod: 95,,, | Performed by: INTERNAL MEDICINE

## 2023-02-06 PROCEDURE — 21400001 HC TELEMETRY ROOM

## 2023-02-06 PROCEDURE — 83880 ASSAY OF NATRIURETIC PEPTIDE: CPT | Performed by: INTERNAL MEDICINE

## 2023-02-06 PROCEDURE — G0426 INPT/ED TELECONSULT50: HCPCS | Mod: 95,,, | Performed by: INTERNAL MEDICINE

## 2023-02-06 PROCEDURE — 80053 COMPREHEN METABOLIC PANEL: CPT | Performed by: FAMILY MEDICINE

## 2023-02-06 PROCEDURE — 36415 COLL VENOUS BLD VENIPUNCTURE: CPT | Performed by: FAMILY MEDICINE

## 2023-02-06 PROCEDURE — 82140 ASSAY OF AMMONIA: CPT | Performed by: FAMILY MEDICINE

## 2023-02-06 PROCEDURE — 11000001 HC ACUTE MED/SURG PRIVATE ROOM

## 2023-02-06 PROCEDURE — 85025 COMPLETE CBC W/AUTO DIFF WBC: CPT | Performed by: FAMILY MEDICINE

## 2023-02-06 PROCEDURE — 25000003 PHARM REV CODE 250: Performed by: INTERNAL MEDICINE

## 2023-02-06 RX ORDER — LACTULOSE 10 G/15ML
30 SOLUTION ORAL 4 TIMES DAILY
Status: DISCONTINUED | OUTPATIENT
Start: 2023-02-06 | End: 2023-02-08

## 2023-02-06 RX ORDER — ZINC SULFATE 50(220)MG
220 CAPSULE ORAL
OUTPATIENT
Start: 2023-02-06

## 2023-02-06 RX ORDER — LACTULOSE 10 G/15ML
10 SOLUTION ORAL ONCE
Status: COMPLETED | OUTPATIENT
Start: 2023-02-06 | End: 2023-02-06

## 2023-02-06 RX ADMIN — RIFAXIMIN 550 MG: 550 TABLET ORAL at 09:02

## 2023-02-06 RX ADMIN — TRAZODONE HYDROCHLORIDE 100 MG: 100 TABLET ORAL at 08:02

## 2023-02-06 RX ADMIN — LACTULOSE 30 G: 20 SOLUTION ORAL at 05:02

## 2023-02-06 RX ADMIN — LACTULOSE 20 G: 20 SOLUTION ORAL at 09:02

## 2023-02-06 RX ADMIN — LACTULOSE 30 G: 20 SOLUTION ORAL at 08:02

## 2023-02-06 RX ADMIN — CIPROFLOXACIN 500 MG: 500 TABLET, FILM COATED ORAL at 09:02

## 2023-02-06 RX ADMIN — LACTULOSE 10 G: 20 SOLUTION ORAL at 03:02

## 2023-02-06 RX ADMIN — RIFAXIMIN 550 MG: 550 TABLET ORAL at 08:02

## 2023-02-06 RX ADMIN — CIPROFLOXACIN 500 MG: 500 TABLET, FILM COATED ORAL at 08:02

## 2023-02-06 RX ADMIN — EZETIMIBE 10 MG: 10 TABLET ORAL at 09:02

## 2023-02-06 RX ADMIN — LACTULOSE 30 G: 20 SOLUTION ORAL at 01:02

## 2023-02-06 RX ADMIN — FAMOTIDINE 40 MG: 20 TABLET ORAL at 09:02

## 2023-02-06 NOTE — ASSESSMENT & PLAN NOTE
- No CT evidence of acute intracranial abnormality.   - Lactulose TID  - Repeat ammonia in AM, currently 80  - Consult Dr. Ariza as patient's wife states he has 2-3 BM daily and presented with hepatic encephalopathy.  - Document accurate I&O (specifically BM)    2/6/23  Ammonia level 61. Lactulose increased by hepatologist. Continue rifaximin

## 2023-02-06 NOTE — PLAN OF CARE
[facility] - Med Surg  Initial Discharge Assessment       Primary Care Provider: [facility]    Admission Diagnosis: Hepatic encephalopathy [K76.82]  Altered mental status [R41.82]    Admission Date: 2/5/2023  Expected Discharge Date: Per Attending     Discharge Barriers Identified: None    Payor: MEDICARE / Plan: MEDICARE PART A & B / Product Type: Government /     Extended Emergency Contact Information  Primary Emergency Contact: Aguilar,Jessica  Address: [address] [place] [address]           STEFFI ANGUIANO [address] [place]  Mobile Phone: [phone]  Relation: Spouse    Discharge Plan A: Home Health  Discharge Plan B: Home with family      [id number]  [place] - [address] AT [facility] [address] [facility]  [address]  [place] [address]  Phone: [phone] Fax: [phone]      Initial Assessment (most recent)       Adult Discharge Assessment - 02/06/23 1015          Discharge Assessment    Assessment Type Discharge Planning Assessment     Confirmed/corrected address, phone number and insurance Yes     Confirmed Demographics Correct on Facesheet     Source of Information family     Communicated JEREMIAH with patient/caregiver Date not available/Unable to determine     Reason For Admission Hepatic encephalopathy     People in Home spouse     Do you expect to return to your current living situation? Yes     Prior to hospitilization cognitive status: Alert/Oriented     Current cognitive status: Not Oriented to Person;Not Oriented to Place;Not Oriented to Time     Equipment Currently Used at Home none     Readmission within 30 days? Yes     Do you take prescription medications? Yes     Do you have prescription coverage? Yes     Do you have any problems affording any of your prescribed medications? No     Is the patient taking medications as prescribed? yes     Who is going to help you get home at discharge? Pt's spouseJessica     How do you get to  doctors appointments? family or friend will provide     Are you on dialysis? No     Do you take coumadin? No     Discharge Plan A Home Health     Discharge Plan B Home with family     DME Needed Upon Discharge  none     Discharge Plan discussed with: Spouse/sig other     Discharge Barriers Identified None                   No d/c needs at this time

## 2023-02-06 NOTE — ASSESSMENT & PLAN NOTE
- LFT reviewed in comparison to 5 days ago.  Alk phos, T bili and ALT has worsened.  - Dr. Wesley recommended a repeat Liver US AFTER completion of the cipro. Given his worsen LFTs would recommend discussing having the US done sooner with Dr. Wesley tomorrow.  - Repeat CMP in AM    2/6/23  Decompensated. Lactulose increased by hepatologist. LFT stable. Slowly improving. Ultrasound limited, but no gross evidence to suggest TIPS dysfunction

## 2023-02-06 NOTE — PLAN OF CARE
Pt remains free of injury throughout the shift, safety measures remain in place.   Poc reviwed with pt. Pt NSR on the cardiac monitor. Vss, no acute s/s of distress.   Hourly rounding done.  Chart reviwed, will cont with poc.       Problem: Diabetes Comorbidity  Goal: Blood Glucose Level Within Targeted Range  Outcome: Ongoing, Progressing     Problem: Adult Inpatient Plan of Care  Goal: Optimal Comfort and Wellbeing  Outcome: Ongoing, Progressing

## 2023-02-06 NOTE — SUBJECTIVE & OBJECTIVE
Interval History: confusion, but easily aroused. Can follow simple commands and answer simple questions.    Review of Systems   Unable to perform ROS: Mental status change       Objective:     Vital Signs (Most Recent):  Temp: 97 °F (36.1 °C) (02/06/23 1514)  Pulse: 80 (02/06/23 1514)  Resp: 16 (02/06/23 1514)  BP: (!) 112/59 (02/06/23 1514)  SpO2: 96 % (02/06/23 1514)   Vital Signs (24h Range):  Temp:  [96.6 °F (35.9 °C)-98.7 °F (37.1 °C)] 97 °F (36.1 °C)  Pulse:  [] 80  Resp:  [14-20] 16  SpO2:  [96 %-100 %] 96 %  BP: (109-149)/(53-80) 112/59     Weight: 79.7 kg (175 lb 9.6 oz)  Body mass index is 23.82 kg/m².    Intake/Output Summary (Last 24 hours) at 2/6/2023 1630  Last data filed at 2/6/2023 1130  Gross per 24 hour   Intake 120 ml   Output --   Net 120 ml      Physical Exam  Vitals and nursing note reviewed.   Constitutional:       Comments: Chronically ill appearing    Cardiovascular:      Rate and Rhythm: Normal rate and regular rhythm.      Pulses: Normal pulses.      Heart sounds: Normal heart sounds.   Pulmonary:      Effort: Pulmonary effort is normal.      Breath sounds: Normal breath sounds. No wheezing.   Abdominal:      General: Bowel sounds are normal. There is no distension.      Palpations: Abdomen is soft.   Musculoskeletal:         General: No swelling. Normal range of motion.   Neurological:      Mental Status: He is disoriented.      Comments: Follows commands, confused, does not answer questions appropriately, moves all extremities spontaneously          Significant Labs: All pertinent labs within the past 24 hours have been reviewed.  CBC:   Recent Labs   Lab 02/05/23  1354 02/06/23  0725   WBC 7.33 11.89   HGB 11.0* 10.9*   HCT 34.1* 34.2*   * 203     CMP:   Recent Labs   Lab 02/05/23  1354 02/06/23  0725   * 135*   K 5.2* 5.1    109   CO2 15* 19*   * 107   BUN 27* 28*   CREATININE 1.4 1.3   CALCIUM 8.9 8.8   PROT 6.2 6.0   ALBUMIN 2.6* 2.4*   BILITOT 1.4*  1.5*   ALKPHOS 180* 165*   AST 96* 75*   * 118*   ANIONGAP 10 7*       Significant Imaging:   US ABDOMEN LIMITED WITH DOPPLER (XPD)     CLINICAL HISTORY:  Hepatic encephalopathypost TIPs wit encephalopathy;     COMPARISON:  01/30/2023     FINDINGS:  Limited right upper quadrant ultrasound performed.  Cirrhotic morphology of the liver without focal abnormality.  Common bile duct is not well seen due to limitations of the study and motion.  Suspect sludge within the gallbladder.  Mild wall thickening which can be seen with cirrhosis.  Pancreas and IVC are not well seen.  Spleen is not evaluated.     Moderate ascites.     Tips shunt is poorly evaluated due to motion and inability to cooperate with exam.  Velocity at the portal vein end of the shunt is 91 centimeters/second.  Mid and distal shunt at the level the hepatic veins not well seen.  Ultrasound is still early in acquisition post tips.  Like initial ultrasound to be 10 days out and current study.  If there is concern for tips dysfunction recommend triple phase CT of the abdomen.     Impression:     Severely limited study. If there is concern for tips dysfunction recommend triple phase CT of the abdomen.

## 2023-02-06 NOTE — ASSESSMENT & PLAN NOTE
- Hold home medications due to hypotension  - No IV fluids at this time.  Monitor for now.    2/6/23  Stable.

## 2023-02-06 NOTE — PLAN OF CARE
Pt remains free from falls/injuries this shift. Safety precautions maintained. Pain managed with relaxation and rest. Pt confused still, Passing gas no Bms. Lactulose increased. Family at bedside. No s/s of acute distress noted. Will continue to monitor. Chart check completed.

## 2023-02-06 NOTE — HOSPITAL COURSE
Korin Vivas is a 77 year old male who was admitted for hepatic encephalopathy s/p TIPS procedure. Seen by hepatologist who increased lactulose for decompensated liver failure. Liver ultrasound limited, but did not show gross evidence of tips dysfunction. Interventional radiology consulted hepatology request.    2/7/23  Patient remains confused today.  Lactulose increased yesterday which resulted in multiple bowel movements overnight.  He is tachycardic with worsening metabolic acidosis.  Case discussed with hepatology recommended holding lactulose today, albumin infusions x2, and infectious workup given leukocytosis.  He will have CT scan of the abdomen upon completion of prophylactic ciprofloxacin post tips procedure which will be Thursday.    2/8/23  Mentation improving. Hypotension low normal-will give albumin again today with appropriate response. Resume Lactulose today.    2/9/23  Mentation back to baseline. Renal impairment and hyponatremia noted. Started on gentle hydration and midodrine by hepatology. If patient condition improves can discharge in AM.     2/10/23  Doing well today. Kidney function improved. Sodium unchanged and stable. He will continue latulose 20 TID. The wife was told to give additional dose if on BM in 24 hours. He was asked to follow up with hepatology in 2 weeks. He has an abdominal ultrasound next week for follow up TIPS. Patient is stable for dischareg.

## 2023-02-06 NOTE — CONSULTS
Subjective:     Korin Vivas is here for initial visit for cirrhosis    History of Present Illness:  Korin Vivas is a  77-year-old male with known history of cirrhosis secondary to MENDEZ, decompensated in the past with hepatic encephalopathy, ascites requiring frequent paracentesis, underwent tips insertion on 01/30/2023, discharged home after post up observation on 01/31.  He was brought to the ED on 02/05/2023 with complaints of confusion.  Wife says he did well up until Friday, he started showing signs of fatigue on Friday, was not getting out of the bed, then noted increasing confusion.    Labs reviewed, relatively stable no significant abdominal distention, no signs of heart failure          Review of Systems Unable to do due to confusion    Objective:     Physical Exam  Constitutional:       Appearance: He is ill-appearing.   Abdominal:      General: There is distension.      Comments: mild   Musculoskeletal:      Right lower leg: No edema.      Left lower leg: No edema.   Neurological:      Mental Status: He is alert. He is disoriented.       MELD-Na score: 18 at 1/31/2023  5:56 AM  MELD score: 14 at 1/31/2023  5:56 AM  Calculated from:  Serum Creatinine: 1.3 mg/dL at 1/31/2023  5:56 AM  Serum Sodium: 132 mmol/L at 1/31/2023  5:56 AM  Total Bilirubin: 1.3 mg/dL at 1/31/2023  5:56 AM  INR(ratio): 1.4 at 1/30/2023 12:30 PM  Age: 77 years    WBC   Date Value Ref Range Status   02/06/2023 11.89 3.90 - 12.70 K/uL Final     Hemoglobin   Date Value Ref Range Status   02/06/2023 10.9 (L) 14.0 - 18.0 g/dL Final     Hematocrit   Date Value Ref Range Status   02/06/2023 34.2 (L) 40.0 - 54.0 % Final     Platelets   Date Value Ref Range Status   02/06/2023 203 150 - 450 K/uL Final     Comment:     Results confirmed, test repeated     BUN   Date Value Ref Range Status   02/06/2023 28 (H) 8 - 23 mg/dL Final     Creatinine   Date Value Ref Range Status   02/06/2023 1.3 0.5 - 1.4 mg/dL Final     Glucose   Date Value Ref  Range Status   02/06/2023 107 70 - 110 mg/dL Final     Calcium   Date Value Ref Range Status   02/06/2023 8.8 8.7 - 10.5 mg/dL Final     Sodium   Date Value Ref Range Status   02/06/2023 135 (L) 136 - 145 mmol/L Final     Potassium   Date Value Ref Range Status   02/06/2023 5.1 3.5 - 5.1 mmol/L Final     Chloride   Date Value Ref Range Status   02/06/2023 109 95 - 110 mmol/L Final     AST   Date Value Ref Range Status   02/06/2023 75 (H) 10 - 40 U/L Final     ALT   Date Value Ref Range Status   02/06/2023 118 (H) 10 - 44 U/L Final     Alkaline Phosphatase   Date Value Ref Range Status   02/06/2023 165 (H) 55 - 135 U/L Final     Total Bilirubin   Date Value Ref Range Status   02/06/2023 1.5 (H) 0.1 - 1.0 mg/dL Final     Comment:     For infants and newborns, interpretation of results should be based  on gestational age, weight and in agreement with clinical  observations.    Premature Infant recommended reference ranges:  Up to 24 hours.............<8.0 mg/dL  Up to 48 hours............<12.0 mg/dL  3-5 days..................<15.0 mg/dL  6-29 days.................<15.0 mg/dL       Albumin   Date Value Ref Range Status   02/06/2023 2.4 (L) 3.5 - 5.2 g/dL Final     INR   Date Value Ref Range Status   01/30/2023 1.4 (H) 0.8 - 1.2 Final     Comment:     Coumadin Therapy:  2.0 - 3.0 for INR for all indicators except mechanical heart valves  and antiphospholipid syndromes which should use 2.5 - 3.5.           Assessment/Plan:     Post tips hepatic encephalopathy  Decompensated cirrhosis of liver  Mild ascites  Sarcopenia  Hypotension    Encephalopathy post TIPs secondary to shunting of blood from splanchnic  circulation to systemic circulation, expected, continue rifaximin, will increase lactulose, obtain a Doppler ultrasound  of vessels to confirm patency.  Please also inform Dr. Wesley. No signs of pulm edema, heart failure.       Results for orders placed or performed during the hospital encounter of 02/05/23 (from the  past 2160 hour(s))   CT Head Without Contrast    Impression    No CT evidence of acute intracranial abnormality.    Appearance of chronic left maxillary sinus disease.    All CT scans at this facility use dose modulation, iterative reconstruction, and/or weight base dosing when appropriate to reduce radiation dose to as low as reasonably achievable.      Electronically signed by: Milad Eli  Date:    02/05/2023  Time:    13:25         RTC in 1-2 weeks with preclinic labs and imaging    I have reviewed existing labs, imaging and discussed treatment plan.       Litzy Goodman MD  Transplant Hepatologist  Dept of Hepatology, [place] [facility]    The patient location is: inpatient  The chief complaint leading to consultation is:confusion    Visit type: audiovisual    Face to Face time with patient: 15  35 minutes of total time spent on the encounter, which includes face to face time and non-face to face time preparing to see the patient (eg, review of tests), Obtaining and/or reviewing separately obtained history, Documenting clinical information in the electronic or other health record, Independently interpreting results (not separately reported) and communicating results to the patient/family/caregiver, or Care coordination (not separately reported).         Each patient to whom he or she provides medical services by telemedicine is:  (1) informed of the relationship between the physician and patient and the respective role of any other health care provider with respect to management of the patient; and (2) notified that he or she may decline to receive medical services by telemedicine and may withdraw from such care at any time.    Notes:

## 2023-02-06 NOTE — ASSESSMENT & PLAN NOTE
Patient's FSGs are controlled on current medication regimen.  Last A1c reviewed-   Lab Results   Component Value Date    HGBA1C 6.7 (H) 02/05/2023     Most recent fingerstick glucose reviewed-   Recent Labs   Lab 02/05/23  1719 02/05/23  2042 02/06/23  0532 02/06/23  1112   POCTGLUCOSE 128* 101 101 167*     Current correctional scale  Low  Maintain anti-hyperglycemic dose as follows-   Antihyperglycemics (From admission, onward)    Start     Stop Route Frequency Ordered    02/05/23 1733  insulin aspart U-100 pen 0-5 Units         -- SubQ Before meals & nightly PRN 02/05/23 1637        Hold Oral hypoglycemics while patient is in the hospital.    - SSI and Accuchecks

## 2023-02-06 NOTE — PROGRESS NOTES
[facility]  Progress Note    Patient Name: Korin Vivas  MRN: [id number]  Patient Class: IP- Inpatient   Admission Date: 2/5/2023  Length of Stay: 0 days  Attending Physician: Gertrudis Salmeron MD  Primary Care Provider: [facility]    Subjective:     Principal Problem:Hepatic encephalopathy        HPI:  Mr. Vivas is a 76 yo male with decompensated MENDEZ cirrhosis, recurrent ascites, CAD, HTN, DM, HLD and recent paracentesis and TIPS procedure per IF on 1/30.  Patient was discharged on 2/1 with instructions to take cipro for 10 days, as well as follow-up in 10 days with Dr. Wesley, follow up for liver doppler ultrasound in 10 days.  Since this time patient was doing well, until yesterday when wife noticed him being more confused prior to bed.  when he awoke this am, patient took his am meds (wife unsure if he took lactuluse) but he remained confused so wife brought him to ED.  Symptoms are constant and moderate in severity, no relieving or exacerbating factors, associated s/sx fatigue and lethargy.  In the ED, lab work notable for creatinine 1.4, K 5.2, bili 1.4, alt 141, ast 96; UDS negative, US with no signs of infection.  Chest xray with atelectasis, CT head negative for acute process; ammonia 80.  Patient given lactulose and will be admitted to observation for further treatment of hepatic encephalopathy.  Hepatology also consulted.      Code Status, Full  Surrogate Decision maker wife Jessica      Overview/Hospital Course:  Korin Vivas is a 77 year old male who was admitted for hepatic encephalopathy s/p TIPS procedure. Seen by hepatologist who increased lactulose for decompensated liver failure. Liver ultrasound limited, but did not show gross evidence of tips dysfunction. Interventional radiology consulted hepatology request.      Interval History: confusion, but easily aroused. Can follow simple commands and answer simple questions.    Review of Systems   Unable to  perform ROS: Mental status change       Objective:     Vital Signs (Most Recent):  Temp: 97 °F (36.1 °C) (02/06/23 1514)  Pulse: 80 (02/06/23 1514)  Resp: 16 (02/06/23 1514)  BP: (!) 112/59 (02/06/23 1514)  SpO2: 96 % (02/06/23 1514)   Vital Signs (24h Range):  Temp:  [96.6 °F (35.9 °C)-98.7 °F (37.1 °C)] 97 °F (36.1 °C)  Pulse:  [] 80  Resp:  [14-20] 16  SpO2:  [96 %-100 %] 96 %  BP: (109-149)/(53-80) 112/59     Weight: 79.7 kg (175 lb 9.6 oz)  Body mass index is 23.82 kg/m².    Intake/Output Summary (Last 24 hours) at 2/6/2023 1630  Last data filed at 2/6/2023 1130  Gross per 24 hour   Intake 120 ml   Output --   Net 120 ml      Physical Exam  Vitals and nursing note reviewed.   Constitutional:       Comments: Chronically ill appearing    Cardiovascular:      Rate and Rhythm: Normal rate and regular rhythm.      Pulses: Normal pulses.      Heart sounds: Normal heart sounds.   Pulmonary:      Effort: Pulmonary effort is normal.      Breath sounds: Normal breath sounds. No wheezing.   Abdominal:      General: Bowel sounds are normal. There is no distension.      Palpations: Abdomen is soft.   Musculoskeletal:         General: No swelling. Normal range of motion.   Neurological:      Mental Status: He is disoriented.      Comments: Follows commands, confused, does not answer questions appropriately, moves all extremities spontaneously          Significant Labs: All pertinent labs within the past 24 hours have been reviewed.  CBC:   Recent Labs   Lab 02/05/23  1354 02/06/23  0725   WBC 7.33 11.89   HGB 11.0* 10.9*   HCT 34.1* 34.2*   * 203     CMP:   Recent Labs   Lab 02/05/23  1354 02/06/23  0725   * 135*   K 5.2* 5.1    109   CO2 15* 19*   * 107   BUN 27* 28*   CREATININE 1.4 1.3   CALCIUM 8.9 8.8   PROT 6.2 6.0   ALBUMIN 2.6* 2.4*   BILITOT 1.4* 1.5*   ALKPHOS 180* 165*   AST 96* 75*   * 118*   ANIONGAP 10 7*       Significant Imaging:   US ABDOMEN LIMITED WITH DOPPLER (XPD)      CLINICAL HISTORY:  Hepatic encephalopathypost TIPs wit encephalopathy;     COMPARISON:  01/30/2023     FINDINGS:  Limited right upper quadrant ultrasound performed.  Cirrhotic morphology of the liver without focal abnormality.  Common bile duct is not well seen due to limitations of the study and motion.  Suspect sludge within the gallbladder.  Mild wall thickening which can be seen with cirrhosis.  Pancreas and IVC are not well seen.  Spleen is not evaluated.     Moderate ascites.     Tips shunt is poorly evaluated due to motion and inability to cooperate with exam.  Velocity at the portal vein end of the shunt is 91 centimeters/second.  Mid and distal shunt at the level the hepatic veins not well seen.  Ultrasound is still early in acquisition post tips.  Like initial ultrasound to be 10 days out and current study.  If there is concern for tips dysfunction recommend triple phase CT of the abdomen.     Impression:     Severely limited study. If there is concern for tips dysfunction recommend triple phase CT of the abdomen.            Assessment/Plan:      * Hepatic encephalopathy  - No CT evidence of acute intracranial abnormality.   - Lactulose TID  - Repeat ammonia in AM, currently 80  - Consult Dr. Ariza as patient's wife states he has 2-3 BM daily and presented with hepatic encephalopathy.  - Document accurate I&O (specifically BM)    2/6/23  Ammonia level 61. Lactulose increased by hepatologist. Continue rifaximin      Hyperkalemia  - stable from 5 days ago  - Will order 1 dose of lokelma    2/6/23  stable    Type 2 diabetes mellitus without retinopathy  Patient's FSGs are controlled on current medication regimen.  Last A1c reviewed-   Lab Results   Component Value Date    HGBA1C 6.7 (H) 02/05/2023     Most recent fingerstick glucose reviewed-   Recent Labs   Lab 02/05/23  1719 02/05/23  2042 02/06/23  0532 02/06/23  1112   POCTGLUCOSE 128* 101 101 167*     Current correctional scale  Low  Maintain  anti-hyperglycemic dose as follows-   Antihyperglycemics (From admission, onward)    Start     Stop Route Frequency Ordered    02/05/23 1733  insulin aspart U-100 pen 0-5 Units         -- SubQ Before meals & nightly PRN 02/05/23 1637        Hold Oral hypoglycemics while patient is in the hospital.    - SSI and Accuchecks    Mixed hyperlipidemia  - continue home med      Hypertension  - Hold home medications due to hypotension  - No IV fluids at this time.  Monitor for now.    2/6/23  Stable.      Hepatic cirrhosis  - LFT reviewed in comparison to 5 days ago.  Alk phos, T bili and ALT has worsened.  - Dr. Wesley recommended a repeat Liver US AFTER completion of the cipro. Given his worsen LFTs would recommend discussing having the US done sooner with Dr. Wesley tomorrow.  - Repeat CMP in AM    2/6/23  Decompensated. Lactulose increased by hepatologist. LFT stable. Slowly improving. Ultrasound limited, but no gross evidence to suggest TIPS dysfunction      VTE Risk Mitigation (From admission, onward)         Ordered     IP VTE HIGH RISK PATIENT  Once         02/05/23 1637     Place sequential compression device  Until discontinued         02/05/23 1637                Discharge Planning   JEREMIAH:      Code Status: Full Code   Is the patient medically ready for discharge?:     Reason for patient still in hospital (select all that apply): Laboratory test  Discharge Plan A: Home Health            Bucky Holbrook NP  Department of Hospital Medicine   [facility]

## 2023-02-07 PROBLEM — E87.20 METABOLIC ACIDOSIS: Status: ACTIVE | Noted: 2023-02-07

## 2023-02-07 LAB
ALBUMIN SERPL BCP-MCNC: 2.5 G/DL (ref 3.5–5.2)
ALP SERPL-CCNC: 158 U/L (ref 55–135)
ALT SERPL W/O P-5'-P-CCNC: 97 U/L (ref 10–44)
ANION GAP SERPL CALC-SCNC: 11 MMOL/L (ref 8–16)
AST SERPL-CCNC: 63 U/L (ref 10–40)
BASOPHILS # BLD AUTO: 0.13 K/UL (ref 0–0.2)
BASOPHILS NFR BLD: 0.9 % (ref 0–1.9)
BILIRUB SERPL-MCNC: 1.6 MG/DL (ref 0.1–1)
BUN SERPL-MCNC: 27 MG/DL (ref 8–23)
CALCIUM SERPL-MCNC: 8.8 MG/DL (ref 8.7–10.5)
CHLORIDE SERPL-SCNC: 111 MMOL/L (ref 95–110)
CO2 SERPL-SCNC: 14 MMOL/L (ref 23–29)
CREAT SERPL-MCNC: 1.4 MG/DL (ref 0.5–1.4)
DIFFERENTIAL METHOD: ABNORMAL
EOSINOPHIL # BLD AUTO: 0.5 K/UL (ref 0–0.5)
EOSINOPHIL NFR BLD: 3 % (ref 0–8)
ERYTHROCYTE [DISTWIDTH] IN BLOOD BY AUTOMATED COUNT: 16.5 % (ref 11.5–14.5)
EST. GFR  (NO RACE VARIABLE): 52 ML/MIN/1.73 M^2
GLUCOSE SERPL-MCNC: 126 MG/DL (ref 70–110)
HCT VFR BLD AUTO: 32.2 % (ref 40–54)
HGB BLD-MCNC: 10.9 G/DL (ref 14–18)
IMM GRANULOCYTES # BLD AUTO: 0.07 K/UL (ref 0–0.04)
IMM GRANULOCYTES NFR BLD AUTO: 0.5 % (ref 0–0.5)
LYMPHOCYTES # BLD AUTO: 0.7 K/UL (ref 1–4.8)
LYMPHOCYTES NFR BLD: 4.8 % (ref 18–48)
MCH RBC QN AUTO: 30.4 PG (ref 27–31)
MCHC RBC AUTO-ENTMCNC: 33.9 G/DL (ref 32–36)
MCV RBC AUTO: 90 FL (ref 82–98)
MONOCYTES # BLD AUTO: 1.9 K/UL (ref 0.3–1)
MONOCYTES NFR BLD: 12.7 % (ref 4–15)
NEUTROPHILS # BLD AUTO: 11.9 K/UL (ref 1.8–7.7)
NEUTROPHILS NFR BLD: 78.1 % (ref 38–73)
NRBC BLD-RTO: 0 /100 WBC
PLATELET # BLD AUTO: 89 K/UL (ref 150–450)
PLATELET BLD QL SMEAR: ABNORMAL
PMV BLD AUTO: 11 FL (ref 9.2–12.9)
POCT GLUCOSE: 127 MG/DL (ref 70–110)
POCT GLUCOSE: 141 MG/DL (ref 70–110)
POCT GLUCOSE: 176 MG/DL (ref 70–110)
POCT GLUCOSE: 181 MG/DL (ref 70–110)
POTASSIUM SERPL-SCNC: 5.4 MMOL/L (ref 3.5–5.1)
PROCALCITONIN SERPL IA-MCNC: 0.18 NG/ML
PROT SERPL-MCNC: 6.2 G/DL (ref 6–8.4)
RBC # BLD AUTO: 3.58 M/UL (ref 4.6–6.2)
SODIUM SERPL-SCNC: 136 MMOL/L (ref 136–145)
WBC # BLD AUTO: 15.17 K/UL (ref 3.9–12.7)

## 2023-02-07 PROCEDURE — 36415 COLL VENOUS BLD VENIPUNCTURE: CPT | Performed by: NURSE PRACTITIONER

## 2023-02-07 PROCEDURE — 25000003 PHARM REV CODE 250: Performed by: NURSE PRACTITIONER

## 2023-02-07 PROCEDURE — P9045 ALBUMIN (HUMAN), 5%, 250 ML: HCPCS | Mod: JG | Performed by: NURSE PRACTITIONER

## 2023-02-07 PROCEDURE — 63600175 PHARM REV CODE 636 W HCPCS: Mod: JG | Performed by: NURSE PRACTITIONER

## 2023-02-07 PROCEDURE — 84145 PROCALCITONIN (PCT): CPT | Performed by: NURSE PRACTITIONER

## 2023-02-07 PROCEDURE — 25000003 PHARM REV CODE 250: Performed by: FAMILY MEDICINE

## 2023-02-07 PROCEDURE — 21400001 HC TELEMETRY ROOM

## 2023-02-07 PROCEDURE — 25000003 PHARM REV CODE 250: Performed by: INTERNAL MEDICINE

## 2023-02-07 PROCEDURE — 85025 COMPLETE CBC W/AUTO DIFF WBC: CPT | Performed by: NURSE PRACTITIONER

## 2023-02-07 PROCEDURE — 80053 COMPREHEN METABOLIC PANEL: CPT | Performed by: NURSE PRACTITIONER

## 2023-02-07 RX ORDER — SODIUM BICARBONATE 650 MG/1
650 TABLET ORAL 3 TIMES DAILY
Status: COMPLETED | OUTPATIENT
Start: 2023-02-07 | End: 2023-02-08

## 2023-02-07 RX ORDER — ALBUMIN HUMAN 50 G/1000ML
25 SOLUTION INTRAVENOUS ONCE
Status: COMPLETED | OUTPATIENT
Start: 2023-02-07 | End: 2023-02-07

## 2023-02-07 RX ORDER — ALBUMIN HUMAN 50 G/1000ML
25 SOLUTION INTRAVENOUS ONCE
Status: DISCONTINUED | OUTPATIENT
Start: 2023-02-08 | End: 2023-02-07

## 2023-02-07 RX ADMIN — SODIUM BICARBONATE 650 MG: 650 TABLET ORAL at 09:02

## 2023-02-07 RX ADMIN — CIPROFLOXACIN 500 MG: 500 TABLET, FILM COATED ORAL at 09:02

## 2023-02-07 RX ADMIN — TRAZODONE HYDROCHLORIDE 100 MG: 100 TABLET ORAL at 09:02

## 2023-02-07 RX ADMIN — RIFAXIMIN 550 MG: 550 TABLET ORAL at 09:02

## 2023-02-07 RX ADMIN — SODIUM BICARBONATE 650 MG: 650 TABLET ORAL at 03:02

## 2023-02-07 RX ADMIN — LACTULOSE 30 G: 20 SOLUTION ORAL at 09:02

## 2023-02-07 RX ADMIN — LACTULOSE 30 G: 20 SOLUTION ORAL at 05:02

## 2023-02-07 RX ADMIN — FAMOTIDINE 40 MG: 20 TABLET ORAL at 09:02

## 2023-02-07 RX ADMIN — EZETIMIBE 10 MG: 10 TABLET ORAL at 09:02

## 2023-02-07 RX ADMIN — SODIUM ZIRCONIUM CYCLOSILICATE 10 G: 5 POWDER, FOR SUSPENSION ORAL at 05:02

## 2023-02-07 RX ADMIN — ALBUMIN (HUMAN) 25 G: 2.5 SOLUTION INTRAVENOUS at 01:02

## 2023-02-07 RX ADMIN — SODIUM CHLORIDE 250 ML: 9 INJECTION, SOLUTION INTRAVENOUS at 03:02

## 2023-02-07 NOTE — SUBJECTIVE & OBJECTIVE
Interval History: confused, but will follow simple commands. Ate well today. Had multiple BMs overnight. More acidotic today.     Review of Systems   Unable to perform ROS: Mental status change       Objective:     Vital Signs (Most Recent):  Temp: 98.8 °F (37.1 °C) (02/07/23 1130)  Pulse: (!) 118 (02/07/23 1130)  Resp: 18 (02/07/23 1130)  BP: 123/63 (02/07/23 1211)  SpO2: 97 % (02/07/23 1130)   Vital Signs (24h Range):  Temp:  [97.9 °F (36.6 °C)-98.8 °F (37.1 °C)] 98.8 °F (37.1 °C)  Pulse:  [] 118  Resp:  [18] 18  SpO2:  [95 %-97 %] 97 %  BP: ()/(53-87) 123/63     Weight: 79.7 kg (175 lb 9.6 oz)  Body mass index is 23.82 kg/m².  No intake or output data in the 24 hours ending 02/07/23 1533   Physical Exam  Vitals and nursing note reviewed.   Constitutional:       Comments: Chronically ill appearing    Cardiovascular:      Rate and Rhythm: Regular rhythm. Tachycardia present.      Pulses: Normal pulses.      Heart sounds: Normal heart sounds.      Comments: Refuses cardiac monitor  Pulmonary:      Effort: Pulmonary effort is normal.      Breath sounds: Normal breath sounds. No wheezing.   Abdominal:      General: Bowel sounds are normal. There is no distension.      Palpations: Abdomen is soft.   Musculoskeletal:         General: No swelling. Normal range of motion.   Neurological:      Mental Status: He is disoriented.      Comments: Follows commands, confused, does not answer questions appropriately, moves all extremities spontaneously      Significant Labs: All pertinent labs within the past 24 hours have been reviewed.  CBC:   Recent Labs   Lab 02/06/23 0725 02/07/23 0715   WBC 11.89 15.17*   HGB 10.9* 10.9*   HCT 34.2* 32.2*    89*     CMP:   Recent Labs   Lab 02/06/23 0725 02/07/23 0715   * 136   K 5.1 5.4*    111*   CO2 19* 14*    126*   BUN 28* 27*   CREATININE 1.3 1.4   CALCIUM 8.8 8.8   PROT 6.0 6.2   ALBUMIN 2.4* 2.5*   BILITOT 1.5* 1.6*   ALKPHOS 165* 158*   AST  75* 63*   * 97*   ANIONGAP 7* 11       Significant Imaging: I have reviewed all pertinent imaging results/findings within the past 24 hours.

## 2023-02-07 NOTE — PLAN OF CARE
Pt remains free of injury throughout the shift, safety measures remain in place. Lactulose given as scheduled, pt had 5 BM's on nightshift, still remains confused, wife at bedside. Poc reviwed with pt & spouse. Vss, no acute s/s of distress. Hourly rounding done. Chart reviwed, will cont with poc.      Problem: Diabetes Comorbidity  Goal: Blood Glucose Level Within Targeted Range  Outcome: Ongoing, Progressing     Problem: Adult Inpatient Plan of Care  Goal: Optimal Comfort and Wellbeing  Outcome: Ongoing, Progressing

## 2023-02-07 NOTE — PROGRESS NOTES
Subjective:     Korin Vivas is here for initial visit for cirrhosis    History of Present Illness:  Korin Vivas is a  77-year-old male with known history of cirrhosis secondary to MENDEZ, decompensated in the past with hepatic encephalopathy, ascites requiring frequent paracentesis, underwent tips insertion on 01/30/2023, discharged home after post up observation on 01/31.  He was brought to the ED on 02/05/2023 with complaints of confusion.  Wife says he did well up until Friday, he started showing signs of fatigue on Friday, was not getting out of the bed, then noted increasing confusion.    Labs reviewed, relatively stable no significant abdominal distention, no signs of heart failure    2/7/2023  He was seen physically in the room, alert, oriented to his name.  Wife says he had 7 bowel movements yesterday, only 1 this morning, he ate meals and took medication without any difficulty.  While I was there he was coughing while sipping water with HOB not elevated      Review of Systems Unable to do due to confusion    Objective:     Physical Exam  Constitutional:       Appearance: He is ill-appearing.   Abdominal:      General: There is distension.      Comments: mild   Musculoskeletal:      Right lower leg: No edema.      Left lower leg: No edema.   Neurological:      Mental Status: He is alert. He is disoriented.       MELD-Na score: 18 at 1/31/2023  5:56 AM  MELD score: 14 at 1/31/2023  5:56 AM  Calculated from:  Serum Creatinine: 1.3 mg/dL at 1/31/2023  5:56 AM  Serum Sodium: 132 mmol/L at 1/31/2023  5:56 AM  Total Bilirubin: 1.3 mg/dL at 1/31/2023  5:56 AM  INR(ratio): 1.4 at 1/30/2023 12:30 PM  Age: 77 years    WBC   Date Value Ref Range Status   02/07/2023 15.17 (H) 3.90 - 12.70 K/uL Final     Hemoglobin   Date Value Ref Range Status   02/07/2023 10.9 (L) 14.0 - 18.0 g/dL Final     Hematocrit   Date Value Ref Range Status   02/07/2023 32.2 (L) 40.0 - 54.0 % Final     Platelets   Date Value Ref Range  Status   02/07/2023 89 (L) 150 - 450 K/uL Final     BUN   Date Value Ref Range Status   02/07/2023 27 (H) 8 - 23 mg/dL Final     Creatinine   Date Value Ref Range Status   02/07/2023 1.4 0.5 - 1.4 mg/dL Final     Glucose   Date Value Ref Range Status   02/07/2023 126 (H) 70 - 110 mg/dL Final     Calcium   Date Value Ref Range Status   02/07/2023 8.8 8.7 - 10.5 mg/dL Final     Sodium   Date Value Ref Range Status   02/07/2023 136 136 - 145 mmol/L Final     Potassium   Date Value Ref Range Status   02/07/2023 5.4 (H) 3.5 - 5.1 mmol/L Final     Chloride   Date Value Ref Range Status   02/07/2023 111 (H) 95 - 110 mmol/L Final     AST   Date Value Ref Range Status   02/07/2023 63 (H) 10 - 40 U/L Final     ALT   Date Value Ref Range Status   02/07/2023 97 (H) 10 - 44 U/L Final     Alkaline Phosphatase   Date Value Ref Range Status   02/07/2023 158 (H) 55 - 135 U/L Final     Total Bilirubin   Date Value Ref Range Status   02/07/2023 1.6 (H) 0.1 - 1.0 mg/dL Final     Comment:     For infants and newborns, interpretation of results should be based  on gestational age, weight and in agreement with clinical  observations.    Premature Infant recommended reference ranges:  Up to 24 hours.............<8.0 mg/dL  Up to 48 hours............<12.0 mg/dL  3-5 days..................<15.0 mg/dL  6-29 days.................<15.0 mg/dL       Albumin   Date Value Ref Range Status   02/07/2023 2.5 (L) 3.5 - 5.2 g/dL Final     INR   Date Value Ref Range Status   01/30/2023 1.4 (H) 0.8 - 1.2 Final     Comment:     Coumadin Therapy:  2.0 - 3.0 for INR for all indicators except mechanical heart valves  and antiphospholipid syndromes which should use 2.5 - 3.5.           Assessment/Plan:     Post tips hepatic encephalopathy  Decompensated cirrhosis of liver  Mild ascites  Sarcopenia  Hypotension    Encephalopathy post TIPs secondary to shunting of blood from splanchnic  circulation to systemic circulation, expected, continue rifaximin, will  increase lactulose, obtain a Doppler ultrasound  of vessels to confirm patency.  Please also inform Dr. Wesley. No signs of pulm edema, heart failure.     2/7/2023  Slightly tachycardic with normotension, will hold lactulose today, gentle hydration with normal saline and albumin, continue rifaximin.  Leukocytosis with mild elevation of neutrophils, procalcitonin  is negative, recommend workup to rule out infection, on oral ciprofloxacin.    Low suspicion for shunt occlusion at this time, will defer CT abdomen with contrast at this time especially with possible underlying dehydration and marginal renal function. Discussed with Dr. Wesley. Plan for now is standard treatment for HE, hydration, rule out infection, nutrition, lactulose as tolerated and rifaximin.    Results for orders placed or performed during the hospital encounter of 02/05/23 (from the past 2160 hour(s))   CT Head Without Contrast    Impression    No CT evidence of acute intracranial abnormality.    Appearance of chronic left maxillary sinus disease.    All CT scans at this facility use dose modulation, iterative reconstruction, and/or weight base dosing when appropriate to reduce radiation dose to as low as reasonably achievable.      Electronically signed by: Milad Eli  Date:    02/05/2023  Time:    13:25       Litzy Goodman MD  Transplant Hepatologist  Dept of Hepatology, [place] [facility] [place]

## 2023-02-07 NOTE — ASSESSMENT & PLAN NOTE
- LFT reviewed in comparison to 5 days ago.  Alk phos, T bili and ALT has worsened.  - Dr. Wesley recommended a repeat Liver US AFTER completion of the cipro. Given his worsen LFTs would recommend discussing having the US done sooner with Dr. Wesley tomorrow.  - Repeat CMP in AM    2/6/23  Decompensated. Lactulose increased by hepatologist. LFT stable. Slowly improving. Ultrasound limited, but no gross evidence to suggest TIPS dysfunction    2/7/23  Input from hepatology appreciated. Will plan to complete  CT abdomen pelvis after completion of empiric abx. Hold lactulose as patient shows clinical signs of IVVD. Will resume once he is able to tolerate. Albumin ordered. Will monitor response.

## 2023-02-07 NOTE — AI DETERIORATION ALERT
Responding to deterioration alert. Chart reviewed. Hypotension and tachycardia noted. Blood pressure repeated and systolic 120's. 's at rest. He remains confused, but will respond appropriately to simple commands. Worsening metabolic acidosis noted. Discussed case with Dr. Goodman who recommending holding lactulose and giving albumin IV x 2 doses. Further recs after she sees patient. Does not need transfer to ICU at th is time.

## 2023-02-07 NOTE — ASSESSMENT & PLAN NOTE
R/t lactulose induced diarrhea; hold for now  Renal function stable.   Albumin ordered  Sodium bicarbonate x 3 doses.

## 2023-02-07 NOTE — PROGRESS NOTES
[facility] Medicine  Progress Note    Patient Name: Korin Vivas  MRN: [id number]  Patient Class: IP- Inpatient   Admission Date: 2/5/2023  Length of Stay: 1 days  Attending Physician: Gertrudis Salmeron MD  Primary Care Provider: [facility]    Subjective:     Principal Problem:Hepatic encephalopathy        HPI:  Mr. Vivas is a 76 yo male with decompensated MENDEZ cirrhosis, recurrent ascites, CAD, HTN, DM, HLD and recent paracentesis and TIPS procedure per IF on 1/30.  Patient was discharged on 2/1 with instructions to take cipro for 10 days, as well as follow-up in 10 days with Dr. Wesley, follow up for liver doppler ultrasound in 10 days.  Since this time patient was doing well, until yesterday when wife noticed him being more confused prior to bed.  when he awoke this am, patient took his am meds (wife unsure if he took lactuluse) but he remained confused so wife brought him to ED.  Symptoms are constant and moderate in severity, no relieving or exacerbating factors, associated s/sx fatigue and lethargy.  In the ED, lab work notable for creatinine 1.4, K 5.2, bili 1.4, alt 141, ast 96; UDS negative, US with no signs of infection.  Chest xray with atelectasis, CT head negative for acute process; ammonia 80.  Patient given lactulose and will be admitted to observation for further treatment of hepatic encephalopathy.  Hepatology also consulted.      Code Status, Full  Surrogate Decision maker wife Jessica      Overview/Hospital Course:  Korin Vivas is a 77 year old male who was admitted for hepatic encephalopathy s/p TIPS procedure. Seen by hepatologist who increased lactulose for decompensated liver failure. Liver ultrasound limited, but did not show gross evidence of tips dysfunction. Interventional radiology consulted hepatology request.    2/7/23  Patient remains confused today.  Lactulose increased yesterday which resulted in multiple bowel movements overnight.  He is tachycardic  with worsening metabolic acidosis.  Case discussed with hepatology recommended holding lactulose today, albumin infusions x2, and infectious workup given leukocytosis.  He will have CT scan of the abdomen upon completion of prophylactic ciprofloxacin post tips procedure which will be Thursday.      Interval History: confused, but will follow simple commands. Ate well today. Had multiple BMs overnight. More acidotic today.     Review of Systems   Unable to perform ROS: Mental status change       Objective:     Vital Signs (Most Recent):  Temp: 98.8 °F (37.1 °C) (02/07/23 1130)  Pulse: (!) 118 (02/07/23 1130)  Resp: 18 (02/07/23 1130)  BP: 123/63 (02/07/23 1211)  SpO2: 97 % (02/07/23 1130)   Vital Signs (24h Range):  Temp:  [97.9 °F (36.6 °C)-98.8 °F (37.1 °C)] 98.8 °F (37.1 °C)  Pulse:  [] 118  Resp:  [18] 18  SpO2:  [95 %-97 %] 97 %  BP: ()/(53-87) 123/63     Weight: 79.7 kg (175 lb 9.6 oz)  Body mass index is 23.82 kg/m².  No intake or output data in the 24 hours ending 02/07/23 1533   Physical Exam  Vitals and nursing note reviewed.   Constitutional:       Comments: Chronically ill appearing    Cardiovascular:      Rate and Rhythm: Regular rhythm. Tachycardia present.      Pulses: Normal pulses.      Heart sounds: Normal heart sounds.      Comments: Refuses cardiac monitor  Pulmonary:      Effort: Pulmonary effort is normal.      Breath sounds: Normal breath sounds. No wheezing.   Abdominal:      General: Bowel sounds are normal. There is no distension.      Palpations: Abdomen is soft.   Musculoskeletal:         General: No swelling. Normal range of motion.   Neurological:      Mental Status: He is disoriented.      Comments: Follows commands, confused, does not answer questions appropriately, moves all extremities spontaneously      Significant Labs: All pertinent labs within the past 24 hours have been reviewed.  CBC:   Recent Labs   Lab 02/06/23  0725 02/07/23  0715   WBC 11.89 15.17*   HGB 10.9*  10.9*   HCT 34.2* 32.2*    89*     CMP:   Recent Labs   Lab 02/06/23  0725 02/07/23  0715   * 136   K 5.1 5.4*    111*   CO2 19* 14*    126*   BUN 28* 27*   CREATININE 1.3 1.4   CALCIUM 8.8 8.8   PROT 6.0 6.2   ALBUMIN 2.4* 2.5*   BILITOT 1.5* 1.6*   ALKPHOS 165* 158*   AST 75* 63*   * 97*   ANIONGAP 7* 11       Significant Imaging: I have reviewed all pertinent imaging results/findings within the past 24 hours.      Assessment/Plan:      * Hepatic encephalopathy  - No CT evidence of acute intracranial abnormality.   - Lactulose TID  - Repeat ammonia in AM, currently 80  - Consult Dr. Ariza as patient's wife states he has 2-3 BM daily and presented with hepatic encephalopathy.  - Document accurate I&O (specifically BM)    2/6/23  Ammonia level 61. Lactulose increased by hepatologist. Continue rifaximin    2/7/23  Multiple BM overnight. Lactulose held today given IVVD and metabolic acidosis  Hepatology folowing      Metabolic acidosis  R/t lactulose induced diarrhea; hold for now  Renal function stable.   Albumin ordered  Sodium bicarbonate x 3 doses.      Hyperkalemia  - stable from 5 days ago  - Will order 1 dose of lokelma    2/6/23  Stable    2/7/23  lokelma today    Type 2 diabetes mellitus without retinopathy  Patient's FSGs are controlled on current medication regimen.  Last A1c reviewed-   Lab Results   Component Value Date    HGBA1C 6.7 (H) 02/05/2023     Most recent fingerstick glucose reviewed-   Recent Labs   Lab 02/05/23  1719 02/05/23  2042 02/06/23  0532 02/06/23  1112   POCTGLUCOSE 128* 101 101 167*     Current correctional scale  Low  Maintain anti-hyperglycemic dose as follows-   Antihyperglycemics (From admission, onward)      Start     Stop Route Frequency Ordered    02/05/23 1733  insulin aspart U-100 pen 0-5 Units         -- SubQ Before meals & nightly PRN 02/05/23 1637          Hold Oral hypoglycemics while patient is in the hospital.    - SSI and  Accuchecks    Mixed hyperlipidemia  - continue home med      Hypertension  - Hold home medications due to hypotension  - No IV fluids at this time.  Monitor for now.    2/6/23  Stable.      Hepatic cirrhosis  - LFT reviewed in comparison to 5 days ago.  Alk phos, T bili and ALT has worsened.  - Dr. Wesley recommended a repeat Liver US AFTER completion of the cipro. Given his worsen LFTs would recommend discussing having the US done sooner with Dr. Wesley tomorrow.  - Repeat CMP in AM    2/6/23  Decompensated. Lactulose increased by hepatologist. LFT stable. Slowly improving. Ultrasound limited, but no gross evidence to suggest TIPS dysfunction    2/7/23  Input from hepatology appreciated. Will plan to complete  CT abdomen pelvis after completion of empiric abx. Hold lactulose as patient shows clinical signs of IVVD. Will resume once he is able to tolerate. Albumin ordered. Will monitor response.    Leukocytosis  Procalciton wnl  ?reactive  Afebrile  Check CXR  now  Currenly on ciprofloxacin post TIPS procedure  monitor  VTE Risk Mitigation (From admission, onward)           Ordered     IP VTE HIGH RISK PATIENT  Once         02/05/23 1637     Place sequential compression device  Until discontinued         02/05/23 1637                    Discharge Planning   JEREMIAH:      Code Status: Full Code   Is the patient medically ready for discharge?:     Reason for patient still in hospital (select all that apply): Treatment  Discharge Plan A: Home Health          Bucky Holbrook NP  Department of Hospital Medicine   [facility]

## 2023-02-07 NOTE — CONSULTS
Chart reviewed by Dr. Wesley.       ASSESSMENT/PLAN:    Hepatic encephalopathy and unable to perform diagnostic ultrasound due to agitation    Radiologist recommends CT abdomen with contrast on Thursday to further evaluate TIPS if unable to perform u/s.          Thank you for the consult.

## 2023-02-07 NOTE — PLAN OF CARE
Pt remains free from falls/injuries this shift. Safety precautions maintained. Pain managed with relaxation and rest. BM today, Family at bedside. No s/s of acute distress noted. Will continue to monitor. Chart check completed.

## 2023-02-07 NOTE — ASSESSMENT & PLAN NOTE
- stable from 5 days ago  - Will order 1 dose of lokelma    2/6/23  Stable    2/7/23  lokelma today

## 2023-02-07 NOTE — ASSESSMENT & PLAN NOTE
- No CT evidence of acute intracranial abnormality.   - Lactulose TID  - Repeat ammonia in AM, currently 80  - Consult Dr. Ariza as patient's wife states he has 2-3 BM daily and presented with hepatic encephalopathy.  - Document accurate I&O (specifically BM)    2/6/23  Ammonia level 61. Lactulose increased by hepatologist. Continue rifaximin    2/7/23  Multiple BM overnight. Lactulose held today given IVVD and metabolic acidosis  Hepatology folowing

## 2023-02-08 LAB
ALBUMIN SERPL BCP-MCNC: 2.5 G/DL (ref 3.5–5.2)
ALP SERPL-CCNC: 119 U/L (ref 55–135)
ALT SERPL W/O P-5'-P-CCNC: 71 U/L (ref 10–44)
AMMONIA PLAS-SCNC: 50 UMOL/L (ref 10–50)
ANION GAP SERPL CALC-SCNC: 8 MMOL/L (ref 8–16)
AST SERPL-CCNC: 54 U/L (ref 10–40)
BASOPHILS # BLD AUTO: 0.07 K/UL (ref 0–0.2)
BASOPHILS NFR BLD: 0.7 % (ref 0–1.9)
BILIRUB SERPL-MCNC: 1.8 MG/DL (ref 0.1–1)
BUN SERPL-MCNC: 27 MG/DL (ref 8–23)
CALCIUM SERPL-MCNC: 8.3 MG/DL (ref 8.7–10.5)
CHLORIDE SERPL-SCNC: 107 MMOL/L (ref 95–110)
CO2 SERPL-SCNC: 18 MMOL/L (ref 23–29)
CREAT SERPL-MCNC: 1.4 MG/DL (ref 0.5–1.4)
DIFFERENTIAL METHOD: ABNORMAL
EOSINOPHIL # BLD AUTO: 0.3 K/UL (ref 0–0.5)
EOSINOPHIL NFR BLD: 3.3 % (ref 0–8)
ERYTHROCYTE [DISTWIDTH] IN BLOOD BY AUTOMATED COUNT: 16.9 % (ref 11.5–14.5)
EST. GFR  (NO RACE VARIABLE): 52 ML/MIN/1.73 M^2
GLUCOSE SERPL-MCNC: 128 MG/DL (ref 70–110)
HCT VFR BLD AUTO: 28 % (ref 40–54)
HGB BLD-MCNC: 9.2 G/DL (ref 14–18)
IMM GRANULOCYTES # BLD AUTO: 0.03 K/UL (ref 0–0.04)
IMM GRANULOCYTES NFR BLD AUTO: 0.3 % (ref 0–0.5)
LYMPHOCYTES # BLD AUTO: 0.5 K/UL (ref 1–4.8)
LYMPHOCYTES NFR BLD: 5.3 % (ref 18–48)
MCH RBC QN AUTO: 30.2 PG (ref 27–31)
MCHC RBC AUTO-ENTMCNC: 32.9 G/DL (ref 32–36)
MCV RBC AUTO: 92 FL (ref 82–98)
MONOCYTES # BLD AUTO: 1.6 K/UL (ref 0.3–1)
MONOCYTES NFR BLD: 16.1 % (ref 4–15)
NEUTROPHILS # BLD AUTO: 7.3 K/UL (ref 1.8–7.7)
NEUTROPHILS NFR BLD: 74.3 % (ref 38–73)
NRBC BLD-RTO: 0 /100 WBC
PLATELET # BLD AUTO: 113 K/UL (ref 150–450)
PMV BLD AUTO: 10.3 FL (ref 9.2–12.9)
POCT GLUCOSE: 128 MG/DL (ref 70–110)
POCT GLUCOSE: 152 MG/DL (ref 70–110)
POCT GLUCOSE: 186 MG/DL (ref 70–110)
POTASSIUM SERPL-SCNC: 4.3 MMOL/L (ref 3.5–5.1)
PROT SERPL-MCNC: 5.5 G/DL (ref 6–8.4)
RBC # BLD AUTO: 3.05 M/UL (ref 4.6–6.2)
SODIUM SERPL-SCNC: 133 MMOL/L (ref 136–145)
WBC # BLD AUTO: 9.88 K/UL (ref 3.9–12.7)

## 2023-02-08 PROCEDURE — P9045 ALBUMIN (HUMAN), 5%, 250 ML: HCPCS | Mod: JG | Performed by: NURSE PRACTITIONER

## 2023-02-08 PROCEDURE — 82140 ASSAY OF AMMONIA: CPT | Performed by: NURSE PRACTITIONER

## 2023-02-08 PROCEDURE — 21400001 HC TELEMETRY ROOM

## 2023-02-08 PROCEDURE — 36415 COLL VENOUS BLD VENIPUNCTURE: CPT | Performed by: NURSE PRACTITIONER

## 2023-02-08 PROCEDURE — 25000003 PHARM REV CODE 250: Performed by: NURSE PRACTITIONER

## 2023-02-08 PROCEDURE — 25000003 PHARM REV CODE 250: Performed by: FAMILY MEDICINE

## 2023-02-08 PROCEDURE — 63600175 PHARM REV CODE 636 W HCPCS: Mod: JG | Performed by: NURSE PRACTITIONER

## 2023-02-08 PROCEDURE — 85025 COMPLETE CBC W/AUTO DIFF WBC: CPT | Performed by: NURSE PRACTITIONER

## 2023-02-08 PROCEDURE — 80053 COMPREHEN METABOLIC PANEL: CPT | Performed by: NURSE PRACTITIONER

## 2023-02-08 RX ORDER — ALBUMIN HUMAN 50 G/1000ML
25 SOLUTION INTRAVENOUS ONCE
Status: COMPLETED | OUTPATIENT
Start: 2023-02-08 | End: 2023-02-08

## 2023-02-08 RX ORDER — LACTULOSE 10 G/15ML
15 SOLUTION ORAL 3 TIMES DAILY
Status: DISCONTINUED | OUTPATIENT
Start: 2023-02-08 | End: 2023-02-08

## 2023-02-08 RX ORDER — LACTULOSE 10 G/15ML
15 SOLUTION ORAL 3 TIMES DAILY
Status: COMPLETED | OUTPATIENT
Start: 2023-02-08 | End: 2023-02-08

## 2023-02-08 RX ADMIN — EZETIMIBE 10 MG: 10 TABLET ORAL at 09:02

## 2023-02-08 RX ADMIN — ALBUMIN (HUMAN) 25 G: 12.5 SOLUTION INTRAVENOUS at 09:02

## 2023-02-08 RX ADMIN — LACTULOSE 15 G: 20 SOLUTION ORAL at 09:02

## 2023-02-08 RX ADMIN — LACTULOSE 15 G: 20 SOLUTION ORAL at 03:02

## 2023-02-08 RX ADMIN — LACTULOSE 15 G: 20 SOLUTION ORAL at 10:02

## 2023-02-08 RX ADMIN — CIPROFLOXACIN 500 MG: 500 TABLET, FILM COATED ORAL at 09:02

## 2023-02-08 RX ADMIN — RIFAXIMIN 550 MG: 550 TABLET ORAL at 09:02

## 2023-02-08 RX ADMIN — FAMOTIDINE 40 MG: 20 TABLET ORAL at 09:02

## 2023-02-08 RX ADMIN — TRAZODONE HYDROCHLORIDE 100 MG: 100 TABLET ORAL at 09:02

## 2023-02-08 RX ADMIN — SODIUM BICARBONATE 650 MG: 650 TABLET ORAL at 09:02

## 2023-02-08 NOTE — PLAN OF CARE
Pt remains free of injury throughout the shift, safety measures remain in place. Pt remains confused. BM tonight. Poc reviewed with spouse. Vss, no acute s/s of distress. Nightly lactulose dose held per hepatology recs. Hourly rounding done. Chart reviwed, will cont with poc.     Problem: Diabetes Comorbidity  Goal: Blood Glucose Level Within Targeted Range  Outcome: Ongoing, Progressing     Problem: Adult Inpatient Plan of Care  Goal: Optimal Comfort and Wellbeing  Outcome: Ongoing, Progressing

## 2023-02-08 NOTE — PLAN OF CARE
Pt remains free from falls/injuries this shift. Safety precautions maintained. Pain managed with relaxation and rest. 1 BM today. Mentation is improving. No s/s of acute distress noted. Will continue to monitor. Chart check completed.

## 2023-02-08 NOTE — ASSESSMENT & PLAN NOTE
- LFT reviewed in comparison to 5 days ago.  Alk phos, T bili and ALT has worsened.  - Dr. Wesley recommended a repeat Liver US AFTER completion of the cipro. Given his worsen LFTs would recommend discussing having the US done sooner with Dr. Wesley tomorrow.  - Repeat CMP in AM    2/6/23  Decompensated. Lactulose increased by hepatologist. LFT stable. Slowly improving. Ultrasound limited, but no gross evidence to suggest TIPS dysfunction    2/7/23  Input from hepatology appreciated. Will plan to complete  CT abdomen pelvis after completion of empiric abx. Hold lactulose as patient shows clinical signs of IVVD. Will resume once he is able to tolerate. Albumin ordered. Will monitor response.    2/8/23  Gradual improvement in mentation. Resume lactulose today. Marginally hypotension this morning. Give another dose of Albumin.

## 2023-02-08 NOTE — PROGRESS NOTES
[facility] Medicine  Progress Note    Patient Name: Korin Vivas  MRN: [id number]  Patient Class: IP- Inpatient   Admission Date: 2/5/2023  Length of Stay: 2 days  Attending Physician: Gertrudis Salmeron MD  Primary Care Provider: [facility]      Subjective:     Principal Problem:Hepatic encephalopathy        HPI:  Mr. Vivas is a 76 yo male with decompensated MENDEZ cirrhosis, recurrent ascites, CAD, HTN, DM, HLD and recent paracentesis and TIPS procedure per IF on 1/30.  Patient was discharged on 2/1 with instructions to take cipro for 10 days, as well as follow-up in 10 days with Dr. Wesley, follow up for liver doppler ultrasound in 10 days.  Since this time patient was doing well, until yesterday when wife noticed him being more confused prior to bed.  when he awoke this am, patient took his am meds (wife unsure if he took lactuluse) but he remained confused so wife brought him to ED.  Symptoms are constant and moderate in severity, no relieving or exacerbating factors, associated s/sx fatigue and lethargy.  In the ED, lab work notable for creatinine 1.4, K 5.2, bili 1.4, alt 141, ast 96; UDS negative, US with no signs of infection.  Chest xray with atelectasis, CT head negative for acute process; ammonia 80.  Patient given lactulose and will be admitted to observation for further treatment of hepatic encephalopathy.  Hepatology also consulted.      Code Status, Full  Surrogate Decision maker wife Jessica      Overview/Hospital Course:  Korin Vivas is a 77 year old male who was admitted for hepatic encephalopathy s/p TIPS procedure. Seen by hepatologist who increased lactulose for decompensated liver failure. Liver ultrasound limited, but did not show gross evidence of tips dysfunction. Interventional radiology consulted hepatology request.    2/7/23  Patient remains confused today.  Lactulose increased yesterday which resulted in multiple bowel movements overnight.  He is tachycardic  with worsening metabolic acidosis.  Case discussed with hepatology recommended holding lactulose today, albumin infusions x2, and infectious workup given leukocytosis.  He will have CT scan of the abdomen upon completion of prophylactic ciprofloxacin post tips procedure which will be Thursday.    2/8/23  Mentation improving. Hypotension low normal-will give albumin again today with appropriate response. Resume Lactulose today.      Interval History: more responsive today. Acidosis and tachycardia have improved.    Review of Systems   Unable to perform ROS: Mental status change       Objective:     Vital Signs (Most Recent):  Temp: 98.5 °F (36.9 °C) (02/08/23 1517)  Pulse: 68 (02/08/23 1517)  Resp: 16 (02/08/23 1517)  BP: (!) 111/53 (02/08/23 1517)  SpO2: 98 % (02/08/23 1517)   Vital Signs (24h Range):  Temp:  [98.1 °F (36.7 °C)-99.6 °F (37.6 °C)] 98.5 °F (36.9 °C)  Pulse:  [] 68  Resp:  [16-19] 16  SpO2:  [92 %-98 %] 98 %  BP: ()/(41-56) 111/53     Weight: 73.3 kg (161 lb 9.6 oz)  Body mass index is 21.92 kg/m².    Intake/Output Summary (Last 24 hours) at 2/8/2023 1602  Last data filed at 2/7/2023 1802  Gross per 24 hour   Intake 229.2 ml   Output --   Net 229.2 ml      Physical Exam  Vitals and nursing note reviewed.   Constitutional:       Comments: Chronically ill appearing    Cardiovascular:      Rate and Rhythm: Normal rate and regular rhythm.      Pulses: Normal pulses.      Heart sounds: Normal heart sounds.      Comments: Refuses cardiac monitor  Pulmonary:      Effort: Pulmonary effort is normal.      Breath sounds: Normal breath sounds. No wheezing.   Abdominal:      General: Bowel sounds are normal. There is no distension.      Palpations: Abdomen is soft.   Musculoskeletal:         General: No swelling. Normal range of motion.   Neurological:      Mental Status: He is disoriented.      Comments: Can follow simple commands. Cooperative today     Significant Labs: All pertinent labs within the  past 24 hours have been reviewed.  CBC:   Recent Labs   Lab 02/07/23  0715 02/08/23  0653   WBC 15.17* 9.88   HGB 10.9* 9.2*   HCT 32.2* 28.0*   PLT 89* 113*     CMP:   Recent Labs   Lab 02/07/23  0715 02/08/23  0653    133*   K 5.4* 4.3   * 107   CO2 14* 18*   * 128*   BUN 27* 27*   CREATININE 1.4 1.4   CALCIUM 8.8 8.3*   PROT 6.2 5.5*   ALBUMIN 2.5* 2.5*   BILITOT 1.6* 1.8*   ALKPHOS 158* 119   AST 63* 54*   ALT 97* 71*   ANIONGAP 11 8       Significant Imaging: I have reviewed all pertinent imaging results/findings within the past 24 hours.      Assessment/Plan:      * Hepatic encephalopathy  - No CT evidence of acute intracranial abnormality.   - Lactulose TID  - Repeat ammonia in AM, currently 80  - Consult Dr. Ariza as patient's wife states he has 2-3 BM daily and presented with hepatic encephalopathy.  - Document accurate I&O (specifically BM)    2/6/23  Ammonia level 61. Lactulose increased by hepatologist. Continue rifaximin    2/7/23  Multiple BM overnight. Lactulose held today given IVVD and metabolic acidosis  Hepatology folowing    2/8/23  Gradually improving. Resume lactulose today.     Metabolic acidosis  R/t lactulose induced diarrhea; hold for now  Renal function stable.   Albumin ordered  Sodium bicarbonate x 3 doses.    2/8/23  Improved today. monitor        Hyperkalemia  - stable from 5 days ago  - Will order 1 dose of lokelma    2/6/23  Stable    2/7/23  lokelma today    2/8/23  Stable.    Type 2 diabetes mellitus without retinopathy  Patient's FSGs are controlled on current medication regimen.  Last A1c reviewed-   Lab Results   Component Value Date    HGBA1C 6.7 (H) 02/05/2023     Most recent fingerstick glucose reviewed-   Recent Labs   Lab 02/05/23  1719 02/05/23  2042 02/06/23  0532 02/06/23  1112   POCTGLUCOSE 128* 101 101 167*     Current correctional scale  Low  Maintain anti-hyperglycemic dose as follows-   Antihyperglycemics (From admission, onward)    Start      Stop Route Frequency Ordered    02/05/23 1733  insulin aspart U-100 pen 0-5 Units         -- SubQ Before meals & nightly PRN 02/05/23 1637        Hold Oral hypoglycemics while patient is in the hospital.    - SSI and Accuchecks    Mixed hyperlipidemia  - continue home med      Hypertension  - Hold home medications due to hypotension  - No IV fluids at this time.  Monitor for now.    2/6/23  Stable.      Hepatic cirrhosis  - LFT reviewed in comparison to 5 days ago.  Alk phos, T bili and ALT has worsened.  - Dr. Wesley recommended a repeat Liver US AFTER completion of the cipro. Given his worsen LFTs would recommend discussing having the US done sooner with Dr. Wesley tomorrow.  - Repeat CMP in AM    2/6/23  Decompensated. Lactulose increased by hepatologist. LFT stable. Slowly improving. Ultrasound limited, but no gross evidence to suggest TIPS dysfunction    2/7/23  Input from hepatology appreciated. Will plan to complete  CT abdomen pelvis after completion of empiric abx. Hold lactulose as patient shows clinical signs of IVVD. Will resume once he is able to tolerate. Albumin ordered. Will monitor response.    2/8/23  Gradual improvement in mentation. Resume lactulose today. Marginally hypotension this morning. Give another dose of Albumin.       VTE Risk Mitigation (From admission, onward)         Ordered     IP VTE HIGH RISK PATIENT  Once         02/05/23 1637     Place sequential compression device  Until discontinued         02/05/23 1637                Discharge Planning   JEREMIAH:      Code Status: Full Code   Is the patient medically ready for discharge?:     Reason for patient still in hospital (select all that apply): Treatment  Discharge Plan A: Home Health                  Bucky Holbrook NP  Department of Hospital Medicine   [facility]

## 2023-02-08 NOTE — PROGRESS NOTES
Subjective:     Korin Vivas is here for initial visit for cirrhosis    History of Present Illness:  Korin Vivas is a  77-year-old male with known history of cirrhosis secondary to MENDEZ, decompensated in the past with hepatic encephalopathy, ascites requiring frequent paracentesis, underwent tips insertion on 01/30/2023, discharged home after post up observation on 01/31.  He was brought to the ED on 02/05/2023 with complaints of confusion.  Wife says he did well up until Friday, he started showing signs of fatigue on Friday, was not getting out of the bed, then noted increasing confusion.    Labs reviewed, relatively stable no significant abdominal distention, no signs of heart failure    2/7/2023  He was seen physically in the room, alert, oriented to his name.  Wife says he had 7 bowel movements yesterday, only 1 this morning, he ate meals and took medication without any difficulty.  While I was there he was coughing while sipping water with HOB not elevated    2/8/2023  Seen physically.  He is alert and better oriented today than yesterday.  He was able to tell that he is in the hospital, was able to name precedent, was able to read my fingers, was able to recognize me.  He had 1 bowel movement so far, did take lactulose.  Blood pressure better, pulse decreased    Review of Systems Unable to do due to confusion    Objective:     Physical Exam  Constitutional:       Appearance: He is ill-appearing.   Abdominal:      General: There is distension.      Comments: mild   Musculoskeletal:      Right lower leg: No edema.      Left lower leg: No edema.   Neurological:      Mental Status: He is alert. He is disoriented.       MELD-Na score: 18 at 1/31/2023  5:56 AM  MELD score: 14 at 1/31/2023  5:56 AM  Calculated from:  Serum Creatinine: 1.3 mg/dL at 1/31/2023  5:56 AM  Serum Sodium: 132 mmol/L at 1/31/2023  5:56 AM  Total Bilirubin: 1.3 mg/dL at 1/31/2023  5:56 AM  INR(ratio): 1.4 at 1/30/2023 12:30 PM  Age: 77  years    WBC   Date Value Ref Range Status   02/08/2023 9.88 3.90 - 12.70 K/uL Final     Hemoglobin   Date Value Ref Range Status   02/08/2023 9.2 (L) 14.0 - 18.0 g/dL Final     Hematocrit   Date Value Ref Range Status   02/08/2023 28.0 (L) 40.0 - 54.0 % Final     Platelets   Date Value Ref Range Status   02/08/2023 113 (L) 150 - 450 K/uL Final     BUN   Date Value Ref Range Status   02/08/2023 27 (H) 8 - 23 mg/dL Final     Creatinine   Date Value Ref Range Status   02/08/2023 1.4 0.5 - 1.4 mg/dL Final     Glucose   Date Value Ref Range Status   02/08/2023 128 (H) 70 - 110 mg/dL Final     Calcium   Date Value Ref Range Status   02/08/2023 8.3 (L) 8.7 - 10.5 mg/dL Final     Sodium   Date Value Ref Range Status   02/08/2023 133 (L) 136 - 145 mmol/L Final     Potassium   Date Value Ref Range Status   02/08/2023 4.3 3.5 - 5.1 mmol/L Final     Chloride   Date Value Ref Range Status   02/08/2023 107 95 - 110 mmol/L Final     AST   Date Value Ref Range Status   02/08/2023 54 (H) 10 - 40 U/L Final     ALT   Date Value Ref Range Status   02/08/2023 71 (H) 10 - 44 U/L Final     Alkaline Phosphatase   Date Value Ref Range Status   02/08/2023 119 55 - 135 U/L Final     Total Bilirubin   Date Value Ref Range Status   02/08/2023 1.8 (H) 0.1 - 1.0 mg/dL Final     Comment:     For infants and newborns, interpretation of results should be based  on gestational age, weight and in agreement with clinical  observations.    Premature Infant recommended reference ranges:  Up to 24 hours.............<8.0 mg/dL  Up to 48 hours............<12.0 mg/dL  3-5 days..................<15.0 mg/dL  6-29 days.................<15.0 mg/dL       Albumin   Date Value Ref Range Status   02/08/2023 2.5 (L) 3.5 - 5.2 g/dL Final     INR   Date Value Ref Range Status   01/30/2023 1.4 (H) 0.8 - 1.2 Final     Comment:     Coumadin Therapy:  2.0 - 3.0 for INR for all indicators except mechanical heart valves  and antiphospholipid syndromes which should use 2.5 -  3.5.           Assessment/Plan:     Post tips hepatic encephalopathy  Decompensated cirrhosis of liver  Mild ascites  Sarcopenia  Hypotension    Encephalopathy post TIPs secondary to shunting of blood from splanchnic  circulation to systemic circulation, expected, continue rifaximin, will increase lactulose, obtain a Doppler ultrasound  of vessels to confirm patency.  Please also inform Dr. Wesley. No signs of pulm edema, heart failure.     2/7/2023  Slightly tachycardic with normotension, will hold lactulose today, gentle hydration with normal saline and albumin, continue rifaximin.  Leukocytosis with mild elevation of neutrophils, procalcitonin  is negative, recommend workup to rule out infection, on oral ciprofloxacin.    Low suspicion for shunt occlusion at this time, will defer CT abdomen with contrast especially with possible underlying dehydration and marginal renal function. Discussed with Dr. Wesley. Plan for now is standard treatment for HE, hydration, rule out infection, nutrition, lactulose as tolerated and rifaximin.    2/7/2023  Better oriented than yesterday, vitals improved, bilirubin continues to increase. Monitor closely. Continue lactulose TID and hold for more than 3 bowel movements      Results for orders placed or performed during the hospital encounter of 02/05/23 (from the past 2160 hour(s))   CT Head Without Contrast    Impression    No CT evidence of acute intracranial abnormality.    Appearance of chronic left maxillary sinus disease.    All CT scans at this facility use dose modulation, iterative reconstruction, and/or weight base dosing when appropriate to reduce radiation dose to as low as reasonably achievable.      Electronically signed by: Milad Eli  Date:    02/05/2023  Time:    13:25       Litzy Goodman MD  Transplant Hepatologist  Dept of Hepatology, [place] [facility]

## 2023-02-08 NOTE — ASSESSMENT & PLAN NOTE
R/t lactulose induced diarrhea; hold for now  Renal function stable.   Albumin ordered  Sodium bicarbonate x 3 doses.    2/8/23  Improved today. monitor

## 2023-02-08 NOTE — ASSESSMENT & PLAN NOTE
- stable from 5 days ago  - Will order 1 dose of lokelma    2/6/23  Stable    2/7/23  lokelma today    2/8/23  Stable.

## 2023-02-08 NOTE — SUBJECTIVE & OBJECTIVE
Interval History: more responsive today. Acidosis and tachycardia have improved.    Review of Systems   Unable to perform ROS: Mental status change       Objective:     Vital Signs (Most Recent):  Temp: 98.5 °F (36.9 °C) (02/08/23 1517)  Pulse: 68 (02/08/23 1517)  Resp: 16 (02/08/23 1517)  BP: (!) 111/53 (02/08/23 1517)  SpO2: 98 % (02/08/23 1517)   Vital Signs (24h Range):  Temp:  [98.1 °F (36.7 °C)-99.6 °F (37.6 °C)] 98.5 °F (36.9 °C)  Pulse:  [] 68  Resp:  [16-19] 16  SpO2:  [92 %-98 %] 98 %  BP: ()/(41-56) 111/53     Weight: 73.3 kg (161 lb 9.6 oz)  Body mass index is 21.92 kg/m².    Intake/Output Summary (Last 24 hours) at 2/8/2023 1602  Last data filed at 2/7/2023 1802  Gross per 24 hour   Intake 229.2 ml   Output --   Net 229.2 ml      Physical Exam  Vitals and nursing note reviewed.   Constitutional:       Comments: Chronically ill appearing    Cardiovascular:      Rate and Rhythm: Normal rate and regular rhythm.      Pulses: Normal pulses.      Heart sounds: Normal heart sounds.      Comments: Refuses cardiac monitor  Pulmonary:      Effort: Pulmonary effort is normal.      Breath sounds: Normal breath sounds. No wheezing.   Abdominal:      General: Bowel sounds are normal. There is no distension.      Palpations: Abdomen is soft.   Musculoskeletal:         General: No swelling. Normal range of motion.   Neurological:      Mental Status: He is disoriented.      Comments: Can follow simple commands. Cooperative today     Significant Labs: All pertinent labs within the past 24 hours have been reviewed.  CBC:   Recent Labs   Lab 02/07/23  0715 02/08/23  0653   WBC 15.17* 9.88   HGB 10.9* 9.2*   HCT 32.2* 28.0*   PLT 89* 113*     CMP:   Recent Labs   Lab 02/07/23  0715 02/08/23  0653    133*   K 5.4* 4.3   * 107   CO2 14* 18*   * 128*   BUN 27* 27*   CREATININE 1.4 1.4   CALCIUM 8.8 8.3*   PROT 6.2 5.5*   ALBUMIN 2.5* 2.5*   BILITOT 1.6* 1.8*   ALKPHOS 158* 119   AST 63* 54*   ALT  97* 71*   ANIONGAP 11 8       Significant Imaging: I have reviewed all pertinent imaging results/findings within the past 24 hours.

## 2023-02-08 NOTE — ASSESSMENT & PLAN NOTE
- No CT evidence of acute intracranial abnormality.   - Lactulose TID  - Repeat ammonia in AM, currently 80  - Consult Dr. Ariza as patient's wife states he has 2-3 BM daily and presented with hepatic encephalopathy.  - Document accurate I&O (specifically BM)    2/6/23  Ammonia level 61. Lactulose increased by hepatologist. Continue rifaximin    2/7/23  Multiple BM overnight. Lactulose held today given IVVD and metabolic acidosis  Hepatology folowing    2/8/23  Gradually improving. Resume lactulose today.

## 2023-02-09 LAB
ALBUMIN SERPL BCP-MCNC: 2.6 G/DL (ref 3.5–5.2)
ALP SERPL-CCNC: 111 U/L (ref 55–135)
ALT SERPL W/O P-5'-P-CCNC: 55 U/L (ref 10–44)
ANION GAP SERPL CALC-SCNC: 9 MMOL/L (ref 8–16)
AST SERPL-CCNC: 39 U/L (ref 10–40)
BASOPHILS # BLD AUTO: 0.03 K/UL (ref 0–0.2)
BASOPHILS NFR BLD: 0.4 % (ref 0–1.9)
BILIRUB SERPL-MCNC: 1.3 MG/DL (ref 0.1–1)
BUN SERPL-MCNC: 33 MG/DL (ref 8–23)
CALCIUM SERPL-MCNC: 7.8 MG/DL (ref 8.7–10.5)
CHLORIDE SERPL-SCNC: 103 MMOL/L (ref 95–110)
CO2 SERPL-SCNC: 15 MMOL/L (ref 23–29)
CREAT SERPL-MCNC: 1.7 MG/DL (ref 0.5–1.4)
DIFFERENTIAL METHOD: ABNORMAL
EOSINOPHIL # BLD AUTO: 0.2 K/UL (ref 0–0.5)
EOSINOPHIL NFR BLD: 3 % (ref 0–8)
ERYTHROCYTE [DISTWIDTH] IN BLOOD BY AUTOMATED COUNT: 17 % (ref 11.5–14.5)
EST. GFR  (NO RACE VARIABLE): 42 ML/MIN/1.73 M^2
GLUCOSE SERPL-MCNC: 229 MG/DL (ref 70–110)
HCT VFR BLD AUTO: 27.3 % (ref 40–54)
HGB BLD-MCNC: 8.8 G/DL (ref 14–18)
IMM GRANULOCYTES # BLD AUTO: 0.02 K/UL (ref 0–0.04)
IMM GRANULOCYTES NFR BLD AUTO: 0.3 % (ref 0–0.5)
LYMPHOCYTES # BLD AUTO: 0.4 K/UL (ref 1–4.8)
LYMPHOCYTES NFR BLD: 5.7 % (ref 18–48)
MCH RBC QN AUTO: 30.1 PG (ref 27–31)
MCHC RBC AUTO-ENTMCNC: 32.2 G/DL (ref 32–36)
MCV RBC AUTO: 94 FL (ref 82–98)
MONOCYTES # BLD AUTO: 1.3 K/UL (ref 0.3–1)
MONOCYTES NFR BLD: 18.7 % (ref 4–15)
NEUTROPHILS # BLD AUTO: 4.8 K/UL (ref 1.8–7.7)
NEUTROPHILS NFR BLD: 71.9 % (ref 38–73)
NRBC BLD-RTO: 0 /100 WBC
PLATELET # BLD AUTO: 107 K/UL (ref 150–450)
PMV BLD AUTO: 10.4 FL (ref 9.2–12.9)
POCT GLUCOSE: 121 MG/DL (ref 70–110)
POCT GLUCOSE: 169 MG/DL (ref 70–110)
POCT GLUCOSE: 191 MG/DL (ref 70–110)
POCT GLUCOSE: 289 MG/DL (ref 70–110)
POTASSIUM SERPL-SCNC: 4.2 MMOL/L (ref 3.5–5.1)
PROT SERPL-MCNC: 5.5 G/DL (ref 6–8.4)
RBC # BLD AUTO: 2.92 M/UL (ref 4.6–6.2)
SODIUM SERPL-SCNC: 127 MMOL/L (ref 136–145)
WBC # BLD AUTO: 6.7 K/UL (ref 3.9–12.7)

## 2023-02-09 PROCEDURE — 25000003 PHARM REV CODE 250: Performed by: FAMILY MEDICINE

## 2023-02-09 PROCEDURE — 36415 COLL VENOUS BLD VENIPUNCTURE: CPT | Performed by: NURSE PRACTITIONER

## 2023-02-09 PROCEDURE — 21400001 HC TELEMETRY ROOM

## 2023-02-09 PROCEDURE — 85025 COMPLETE CBC W/AUTO DIFF WBC: CPT | Performed by: NURSE PRACTITIONER

## 2023-02-09 PROCEDURE — 25000003 PHARM REV CODE 250: Performed by: INTERNAL MEDICINE

## 2023-02-09 PROCEDURE — 63600175 PHARM REV CODE 636 W HCPCS: Performed by: FAMILY MEDICINE

## 2023-02-09 PROCEDURE — 25000003 PHARM REV CODE 250: Performed by: NURSE PRACTITIONER

## 2023-02-09 PROCEDURE — 80053 COMPREHEN METABOLIC PANEL: CPT | Performed by: NURSE PRACTITIONER

## 2023-02-09 RX ORDER — LACTULOSE 10 G/15ML
20 SOLUTION ORAL 3 TIMES DAILY
Status: DISCONTINUED | OUTPATIENT
Start: 2023-02-09 | End: 2023-02-10 | Stop reason: HOSPADM

## 2023-02-09 RX ORDER — MIDODRINE HYDROCHLORIDE 5 MG/1
10 TABLET ORAL 2 TIMES DAILY WITH MEALS
Status: DISCONTINUED | OUTPATIENT
Start: 2023-02-09 | End: 2023-02-10 | Stop reason: HOSPADM

## 2023-02-09 RX ORDER — SODIUM CHLORIDE 9 MG/ML
INJECTION, SOLUTION INTRAVENOUS CONTINUOUS
Status: ACTIVE | OUTPATIENT
Start: 2023-02-09 | End: 2023-02-10

## 2023-02-09 RX ADMIN — CIPROFLOXACIN 500 MG: 500 TABLET, FILM COATED ORAL at 09:02

## 2023-02-09 RX ADMIN — RIFAXIMIN 550 MG: 550 TABLET ORAL at 09:02

## 2023-02-09 RX ADMIN — INSULIN ASPART 3 UNITS: 100 INJECTION, SOLUTION INTRAVENOUS; SUBCUTANEOUS at 12:02

## 2023-02-09 RX ADMIN — MIDODRINE HYDROCHLORIDE 10 MG: 5 TABLET ORAL at 05:02

## 2023-02-09 RX ADMIN — FAMOTIDINE 40 MG: 20 TABLET ORAL at 09:02

## 2023-02-09 RX ADMIN — TRAZODONE HYDROCHLORIDE 100 MG: 100 TABLET ORAL at 09:02

## 2023-02-09 RX ADMIN — SODIUM CHLORIDE: 9 INJECTION, SOLUTION INTRAVENOUS at 05:02

## 2023-02-09 RX ADMIN — LACTULOSE 20 G: 20 SOLUTION ORAL at 09:02

## 2023-02-09 RX ADMIN — EZETIMIBE 10 MG: 10 TABLET ORAL at 09:02

## 2023-02-09 NOTE — CARE UPDATE
Continues to stay oriented, BUN and Cr are worse, mild hypotension. Gentle hydration, will add midodrine. If renal function improves he can be dc'd with same lactulose and rifaximin and midodrine. Will schedule visit in  clinic in 1-2 weeks. Post dc. Arrange PT at home please. If needed please consult back.

## 2023-02-09 NOTE — PLAN OF CARE
[facility]  Discharge Reassessment    Primary Care Provider: [facility]    Expected Discharge Date: Per Attending     Reassessment (most recent)       Discharge Reassessment - 02/09/23 1153          Discharge Reassessment    Assessment Type Discharge Planning Reassessment     Did the patient's condition or plan change since previous assessment? Yes     Discharge Plan discussed with: --   Nurse Practitioner    Communicated JEREMIAH with patient/caregiver Date not available/Unable to determine     Discharge Plan A Home Health     DME Needed Upon Discharge  none     Discharge Barriers Identified None     Why the patient remains in the hospital Requires continued medical care        Post-Acute Status    Discharge Delays None known at this time                 Sw to follow up, as needed, for d/c planning.

## 2023-02-09 NOTE — PLAN OF CARE
Pt remains free of injuries and pain, POC discussed, chart check done.  Problem: Adult Inpatient Plan of Care  Goal: Plan of Care Review  Outcome: Ongoing, Progressing  Goal: Patient-Specific Goal (Individualized)  Outcome: Ongoing, Progressing  Goal: Absence of Hospital-Acquired Illness or Injury  Outcome: Ongoing, Progressing  Goal: Optimal Comfort and Wellbeing  Outcome: Ongoing, Progressing  Goal: Readiness for Transition of Care  Outcome: Ongoing, Progressing

## 2023-02-09 NOTE — PLAN OF CARE
Plan of care discussed with pt and spouse. Pt and spouse verbalized understanding. Free from injury. No s/s of distress noted. Vitals stable. IV SL. Meds as ordered. No c/o pain. Diet tolerated. Q2 hour rounding. No complaints. Will continue to monitor pt. 12 hour chart review.

## 2023-02-10 ENCOUNTER — TELEPHONE (OUTPATIENT)
Dept: HEPATOLOGY | Facility: CLINIC | Age: 78
End: 2023-02-10
Payer: MEDICARE

## 2023-02-10 VITALS
DIASTOLIC BLOOD PRESSURE: 55 MMHG | SYSTOLIC BLOOD PRESSURE: 115 MMHG | TEMPERATURE: 98 F | OXYGEN SATURATION: 98 % | HEART RATE: 101 BPM | HEIGHT: 72 IN | RESPIRATION RATE: 18 BRPM | WEIGHT: 173.5 LBS | BODY MASS INDEX: 23.5 KG/M2

## 2023-02-10 DIAGNOSIS — Z95.828 S/P TIPS (TRANSJUGULAR INTRAHEPATIC PORTOSYSTEMIC SHUNT): Primary | ICD-10-CM

## 2023-02-10 LAB
ALBUMIN SERPL BCP-MCNC: 2.3 G/DL (ref 3.5–5.2)
ALP SERPL-CCNC: 107 U/L (ref 55–135)
ALT SERPL W/O P-5'-P-CCNC: 43 U/L (ref 10–44)
ANION GAP SERPL CALC-SCNC: 8 MMOL/L (ref 8–16)
AST SERPL-CCNC: 33 U/L (ref 10–40)
BILIRUB SERPL-MCNC: 0.9 MG/DL (ref 0.1–1)
BUN SERPL-MCNC: 36 MG/DL (ref 8–23)
CALCIUM SERPL-MCNC: 7.3 MG/DL (ref 8.7–10.5)
CHLORIDE SERPL-SCNC: 104 MMOL/L (ref 95–110)
CO2 SERPL-SCNC: 15 MMOL/L (ref 23–29)
CREAT SERPL-MCNC: 1.6 MG/DL (ref 0.5–1.4)
EST. GFR  (NO RACE VARIABLE): 45 ML/MIN/1.73 M^2
GLUCOSE SERPL-MCNC: 149 MG/DL (ref 70–110)
POCT GLUCOSE: 157 MG/DL (ref 70–110)
POTASSIUM SERPL-SCNC: 4 MMOL/L (ref 3.5–5.1)
PROT SERPL-MCNC: 5.1 G/DL (ref 6–8.4)
SODIUM SERPL-SCNC: 127 MMOL/L (ref 136–145)

## 2023-02-10 PROCEDURE — 25000003 PHARM REV CODE 250: Performed by: FAMILY MEDICINE

## 2023-02-10 PROCEDURE — 36415 COLL VENOUS BLD VENIPUNCTURE: CPT | Performed by: NURSE PRACTITIONER

## 2023-02-10 PROCEDURE — 80053 COMPREHEN METABOLIC PANEL: CPT | Performed by: NURSE PRACTITIONER

## 2023-02-10 PROCEDURE — 25000003 PHARM REV CODE 250: Performed by: INTERNAL MEDICINE

## 2023-02-10 PROCEDURE — 25000003 PHARM REV CODE 250: Performed by: NURSE PRACTITIONER

## 2023-02-10 RX ORDER — LACTULOSE 10 G/15ML
20 SOLUTION ORAL; RECTAL 3 TIMES DAILY
Qty: 250 ML | Refills: 11 | Status: ON HOLD | OUTPATIENT
Start: 2023-02-10 | End: 2023-03-10 | Stop reason: SDUPTHER

## 2023-02-10 RX ORDER — MIDODRINE HYDROCHLORIDE 10 MG/1
10 TABLET ORAL 2 TIMES DAILY WITH MEALS
Qty: 60 TABLET | Refills: 1 | Status: SHIPPED | OUTPATIENT
Start: 2023-02-10 | End: 2023-02-13

## 2023-02-10 RX ADMIN — RIFAXIMIN 550 MG: 550 TABLET ORAL at 09:02

## 2023-02-10 RX ADMIN — LACTULOSE 20 G: 20 SOLUTION ORAL at 09:02

## 2023-02-10 RX ADMIN — OXYCODONE HYDROCHLORIDE 5 MG: 5 TABLET ORAL at 09:02

## 2023-02-10 RX ADMIN — CIPROFLOXACIN 500 MG: 500 TABLET, FILM COATED ORAL at 09:02

## 2023-02-10 RX ADMIN — MIDODRINE HYDROCHLORIDE 10 MG: 5 TABLET ORAL at 09:02

## 2023-02-10 RX ADMIN — EZETIMIBE 10 MG: 10 TABLET ORAL at 09:02

## 2023-02-10 RX ADMIN — FAMOTIDINE 40 MG: 20 TABLET ORAL at 09:02

## 2023-02-10 NOTE — ASSESSMENT & PLAN NOTE
Patient's FSGs are controlled on current medication regimen.  Last A1c reviewed-   Lab Results   Component Value Date    HGBA1C 6.7 (H) 02/05/2023     Most recent fingerstick glucose reviewed-   Recent Labs   Lab 02/08/23 2027 02/09/23  0552 02/09/23  1058 02/09/23  1735   POCTGLUCOSE 186* 121* 289* 191*     Current correctional scale  Low  Maintain anti-hyperglycemic dose as follows-   Antihyperglycemics (From admission, onward)    Start     Stop Route Frequency Ordered    02/05/23 1733  insulin aspart U-100 pen 0-5 Units         -- SubQ Before meals & nightly PRN 02/05/23 1637        Hold Oral hypoglycemics while patient is in the hospital.    - SSI and Accuchecks

## 2023-02-10 NOTE — ASSESSMENT & PLAN NOTE
- LFT reviewed in comparison to 5 days ago.  Alk phos, T bili and ALT has worsened.  - Dr. Wesley recommended a repeat Liver US AFTER completion of the cipro. Given his worsen LFTs would recommend discussing having the US done sooner with Dr. Wesley tomorrow.  - Repeat CMP in AM    2/6/23  Decompensated. Lactulose increased by hepatologist. LFT stable. Slowly improving. Ultrasound limited, but no gross evidence to suggest TIPS dysfunction    2/7/23  Input from hepatology appreciated. Will plan to complete  CT abdomen pelvis after completion of empiric abx. Hold lactulose as patient shows clinical signs of IVVD. Will resume once he is able to tolerate. Albumin ordered. Will monitor response.    2/8/23  Gradual improvement in mentation. Resume lactulose today. Marginally hypotension this morning. Give another dose of Albumin.     2/9/23  Mentation back to baseline. Started on fluids and midodrine due to hypotension. Closely monitor

## 2023-02-10 NOTE — PLAN OF CARE
02/10/23 0910   Post-Acute Status   Post-Acute Authorization HME;Home Health   HME Status Pending payor review   Home Health Status Set-up Complete/Auth obtained   Discharge Delays None known at this time   Discharge Plan   Discharge Plan A Home Health     [facility]     Oralia sent HME referral to [facility]; pending approval and delivery.

## 2023-02-10 NOTE — ASSESSMENT & PLAN NOTE
- Hold home medications due to hypotension  - No IV fluids at this time.  Monitor for now.    2/6/23  Stable.    2/9/23  Systolic still 's. Started on IV hydration and midodrine

## 2023-02-10 NOTE — PLAN OF CARE
Pt remains free of pain and injuries, in bed no distress POC discussed, chart check completed.  Problem: Adult Inpatient Plan of Care  Goal: Plan of Care Review  Outcome: Ongoing, Progressing  Goal: Patient-Specific Goal (Individualized)  Outcome: Ongoing, Progressing  Goal: Absence of Hospital-Acquired Illness or Injury  Outcome: Ongoing, Progressing  Goal: Optimal Comfort and Wellbeing  Outcome: Ongoing, Progressing  Goal: Readiness for Transition of Care  Outcome: Ongoing, Progressing     Problem: Diabetes Comorbidity  Goal: Blood Glucose Level Within Targeted Range  Outcome: Ongoing, Progressing

## 2023-02-10 NOTE — PROGRESS NOTES
[facility] Medicine  Progress Note    Patient Name: Korin Vivas  MRN: [id number]  Patient Class: IP- Inpatient   Admission Date: 2/5/2023  Length of Stay: 3 days  Attending Physician: Gertrudis Salmeron MD  Primary Care Provider: [facility]    Subjective:     Principal Problem:Hepatic encephalopathy        HPI:  Mr. Vivas is a 78 yo male with decompensated MENDEZ cirrhosis, recurrent ascites, CAD, HTN, DM, HLD and recent paracentesis and TIPS procedure per IF on 1/30.  Patient was discharged on 2/1 with instructions to take cipro for 10 days, as well as follow-up in 10 days with Dr. Wesley, follow up for liver doppler ultrasound in 10 days.  Since this time patient was doing well, until yesterday when wife noticed him being more confused prior to bed.  when he awoke this am, patient took his am meds (wife unsure if he took lactuluse) but he remained confused so wife brought him to ED.  Symptoms are constant and moderate in severity, no relieving or exacerbating factors, associated s/sx fatigue and lethargy.  In the ED, lab work notable for creatinine 1.4, K 5.2, bili 1.4, alt 141, ast 96; UDS negative, US with no signs of infection.  Chest xray with atelectasis, CT head negative for acute process; ammonia 80.  Patient given lactulose and will be admitted to observation for further treatment of hepatic encephalopathy.  Hepatology also consulted.      Code Status, Full  Surrogate Decision maker wife Jessica      Overview/Hospital Course:  Korin Vivas is a 77 year old male who was admitted for hepatic encephalopathy s/p TIPS procedure. Seen by hepatologist who increased lactulose for decompensated liver failure. Liver ultrasound limited, but did not show gross evidence of tips dysfunction. Interventional radiology consulted hepatology request.    2/7/23  Patient remains confused today.  Lactulose increased yesterday which resulted in multiple bowel movements overnight.  He is tachycardic  with worsening metabolic acidosis.  Case discussed with hepatology recommended holding lactulose today, albumin infusions x2, and infectious workup given leukocytosis.  He will have CT scan of the abdomen upon completion of prophylactic ciprofloxacin post tips procedure which will be Thursday.    2/8/23  Mentation improving. Hypotension low normal-will give albumin again today with appropriate response. Resume Lactulose today.    2/9/23  Mentation back to baseline. Renal impairment and hyponatremia noted. Started on gentle hydration and midodrine by hepatology. If patient condition improves can discharge in AM.       Interval History: wants to discharge home. Discussed need for gentle hydration for renal function and hyponatremia.   \    Review of Systems   Constitutional:  Negative for chills, diaphoresis, fatigue and fever.   HENT:  Negative for drooling, ear pain, rhinorrhea and sore throat.    Eyes: Negative.    Respiratory:  Negative for cough, shortness of breath and wheezing.    Cardiovascular:  Negative for palpitations and leg swelling.   Gastrointestinal:  Negative for abdominal pain, constipation, diarrhea and nausea.   Endocrine: Negative.    Genitourinary:  Negative for dysuria, hematuria and urgency.   Musculoskeletal: Negative.    Skin:  Negative for color change and wound.   Allergic/Immunologic: Negative.    Neurological:  Negative for dizziness, syncope and speech difficulty.   Hematological: Negative.    Psychiatric/Behavioral: Negative.         Objective:     Vital Signs (Most Recent):  Temp: 97.9 °F (36.6 °C) (02/09/23 1638)  Pulse: 76 (02/09/23 1638)  Resp: 17 (02/09/23 1638)  BP: (!) 100/53 (02/09/23 1638)  SpO2: 100 % (02/09/23 1638)   Vital Signs (24h Range):  Temp:  [97.5 °F (36.4 °C)-98.8 °F (37.1 °C)] 97.9 °F (36.6 °C)  Pulse:  [73-77] 76  Resp:  [14-18] 17  SpO2:  [95 %-100 %] 100 %  BP: ()/(50-58) 100/53     Weight: 77.1 kg (169 lb 15.6 oz)  Body mass index is 23.05  kg/m².    Intake/Output Summary (Last 24 hours) at 2/9/2023 1827  Last data filed at 2/9/2023 1018  Gross per 24 hour   Intake 729.39 ml   Output 1810 ml   Net -1080.61 ml      Physical Exam  Vitals and nursing note reviewed.   Constitutional:       Comments: Chronically ill appearing    Cardiovascular:      Rate and Rhythm: Normal rate and regular rhythm.      Pulses: Normal pulses.      Heart sounds: Normal heart sounds.   Pulmonary:      Effort: Pulmonary effort is normal.      Breath sounds: Normal breath sounds. No wheezing.   Abdominal:      General: Bowel sounds are normal. There is no distension.      Palpations: Abdomen is soft.   Musculoskeletal:         General: No swelling. Normal range of motion.   Neurological:      Mental Status: He is oriented to person, place, and time.       Significant Labs: All pertinent labs within the past 24 hours have been reviewed.  CBC:   Recent Labs   Lab 02/08/23  0653 02/09/23  1023   WBC 9.88 6.70   HGB 9.2* 8.8*   HCT 28.0* 27.3*   * 107*     CMP:   Recent Labs   Lab 02/08/23  0653 02/09/23  1023   * 127*   K 4.3 4.2    103   CO2 18* 15*   * 229*   BUN 27* 33*   CREATININE 1.4 1.7*   CALCIUM 8.3* 7.8*   PROT 5.5* 5.5*   ALBUMIN 2.5* 2.6*   BILITOT 1.8* 1.3*   ALKPHOS 119 111   AST 54* 39   ALT 71* 55*   ANIONGAP 8 9       Significant Imaging: I have reviewed all pertinent imaging results/findings within the past 24 hours.      Assessment/Plan:      * Hepatic encephalopathy  - No CT evidence of acute intracranial abnormality.   - Lactulose TID  - Repeat ammonia in AM, currently 80  - Consult Dr. Ariza as patient's wife states he has 2-3 BM daily and presented with hepatic encephalopathy.  - Document accurate I&O (specifically BM)    2/6/23  Ammonia level 61. Lactulose increased by hepatologist. Continue rifaximin    2/7/23  Multiple BM overnight. Lactulose held today given IVVD and metabolic acidosis  Hepatology folowing    2/8/23  Gradually  improving. Resume lactulose today.     2/9/23  resovled    Metabolic acidosis  R/t lactulose induced diarrhea; hold for now  Renal function stable.   Albumin ordered  Sodium bicarbonate x 3 doses.    2/8/23  Improved today. Monitor    2/9/23  Fluctuating. Monitor response after fluids        Hyperkalemia  - stable from 5 days ago  - Will order 1 dose of lokelma    2/6/23  Stable    2/7/23  lokelma today    2/8/23  Stable.    Type 2 diabetes mellitus without retinopathy  Patient's FSGs are controlled on current medication regimen.  Last A1c reviewed-   Lab Results   Component Value Date    HGBA1C 6.7 (H) 02/05/2023     Most recent fingerstick glucose reviewed-   Recent Labs   Lab 02/08/23 2027 02/09/23  0552 02/09/23  1058 02/09/23  1735   POCTGLUCOSE 186* 121* 289* 191*     Current correctional scale  Low  Maintain anti-hyperglycemic dose as follows-   Antihyperglycemics (From admission, onward)    Start     Stop Route Frequency Ordered    02/05/23 1733  insulin aspart U-100 pen 0-5 Units         -- SubQ Before meals & nightly PRN 02/05/23 1637        Hold Oral hypoglycemics while patient is in the hospital.    - SSI and Accuchecks    Mixed hyperlipidemia  - continue home med      Hypertension  - Hold home medications due to hypotension  - No IV fluids at this time.  Monitor for now.    2/6/23  Stable.    2/9/23  Systolic still 's. Started on IV hydration and midodrine      Hepatic cirrhosis  - LFT reviewed in comparison to 5 days ago.  Alk phos, T bili and ALT has worsened.  - Dr. Wesley recommended a repeat Liver US AFTER completion of the cipro. Given his worsen LFTs would recommend discussing having the US done sooner with Dr. Wesley tomorrow.  - Repeat CMP in AM    2/6/23  Decompensated. Lactulose increased by hepatologist. LFT stable. Slowly improving. Ultrasound limited, but no gross evidence to suggest TIPS dysfunction    2/7/23  Input from hepatology appreciated. Will plan to complete  CT abdomen  pelvis after completion of empiric abx. Hold lactulose as patient shows clinical signs of IVVD. Will resume once he is able to tolerate. Albumin ordered. Will monitor response.    2/8/23  Gradual improvement in mentation. Resume lactulose today. Marginally hypotension this morning. Give another dose of Albumin.     2/9/23  Mentation back to baseline. Started on fluids and midodrine due to hypotension. Closely monitor      VTE Risk Mitigation (From admission, onward)         Ordered     IP VTE HIGH RISK PATIENT  Once         02/05/23 1637     Place sequential compression device  Until discontinued         02/05/23 1637                Discharge Planning   JEREMIAH:      Code Status: Full Code   Is the patient medically ready for discharge?:     Reason for patient still in hospital (select all that apply): Treatment  Discharge Plan A: Home Health   Discharge Delays: None known at this time      Bucky Holbrook NP  Department of Hospital Medicine   [facility]

## 2023-02-10 NOTE — PLAN OF CARE
[facility]  Discharge Final Note    Primary Care Provider: [facility]    Expected Discharge Date: 2/10/2023    Final Discharge Note (most recent)       Final Note - 02/10/23 0954          Final Note    Assessment Type Final Discharge Note     Anticipated Discharge Disposition Home-Health Care Svc        Post-Acute Status    Post-Acute Authorization Home Health;HME     HME Status Set-up Complete/Auth obtained     Home Health Status Set-up Complete/Auth obtained     Discharge Delays None known at this time                     Important Message from Medicare       02/10/23 0955   Medicare Message   Important Message from Medicare regarding Discharge Appeal Rights Given to patient/caregiver;Explained to patient/caregiver;Signed/date by patient/caregiver   Date IMM was signed 02/10/23   Time IMM was signed 0950        Contact Info       Litzy Goodman MD   Specialty: Gastroenterology, Hepatology    [address] [facility]  [place] [address]   Phone: [phone]       Next Steps: Follow up        HME and HH arranged.     Pt to schedule PCP appointment as needed due to PCP being a non-[facility] provider.

## 2023-02-10 NOTE — TELEPHONE ENCOUNTER
----- Message from Litzy Goodman MD sent at 2/9/2023  9:12 PM CST -----  Please schedule an appointment in 1-2 weeks post discharge from hospital.

## 2023-02-10 NOTE — SUBJECTIVE & OBJECTIVE
Interval History: wants to discharge home. Discussed need for gentle hydration for renal function and hyponatremia.   \    Review of Systems   Constitutional:  Negative for chills, diaphoresis, fatigue and fever.   HENT:  Negative for drooling, ear pain, rhinorrhea and sore throat.    Eyes: Negative.    Respiratory:  Negative for cough, shortness of breath and wheezing.    Cardiovascular:  Negative for palpitations and leg swelling.   Gastrointestinal:  Negative for abdominal pain, constipation, diarrhea and nausea.   Endocrine: Negative.    Genitourinary:  Negative for dysuria, hematuria and urgency.   Musculoskeletal: Negative.    Skin:  Negative for color change and wound.   Allergic/Immunologic: Negative.    Neurological:  Negative for dizziness, syncope and speech difficulty.   Hematological: Negative.    Psychiatric/Behavioral: Negative.         Objective:     Vital Signs (Most Recent):  Temp: 97.9 °F (36.6 °C) (02/09/23 1638)  Pulse: 76 (02/09/23 1638)  Resp: 17 (02/09/23 1638)  BP: (!) 100/53 (02/09/23 1638)  SpO2: 100 % (02/09/23 1638)   Vital Signs (24h Range):  Temp:  [97.5 °F (36.4 °C)-98.8 °F (37.1 °C)] 97.9 °F (36.6 °C)  Pulse:  [73-77] 76  Resp:  [14-18] 17  SpO2:  [95 %-100 %] 100 %  BP: ()/(50-58) 100/53     Weight: 77.1 kg (169 lb 15.6 oz)  Body mass index is 23.05 kg/m².    Intake/Output Summary (Last 24 hours) at 2/9/2023 1827  Last data filed at 2/9/2023 1018  Gross per 24 hour   Intake 729.39 ml   Output 1810 ml   Net -1080.61 ml      Physical Exam  Vitals and nursing note reviewed.   Constitutional:       Comments: Chronically ill appearing    Cardiovascular:      Rate and Rhythm: Normal rate and regular rhythm.      Pulses: Normal pulses.      Heart sounds: Normal heart sounds.   Pulmonary:      Effort: Pulmonary effort is normal.      Breath sounds: Normal breath sounds. No wheezing.   Abdominal:      General: Bowel sounds are normal. There is no distension.      Palpations: Abdomen is  soft.   Musculoskeletal:         General: No swelling. Normal range of motion.   Neurological:      Mental Status: He is oriented to person, place, and time.       Significant Labs: All pertinent labs within the past 24 hours have been reviewed.  CBC:   Recent Labs   Lab 02/08/23  0653 02/09/23  1023   WBC 9.88 6.70   HGB 9.2* 8.8*   HCT 28.0* 27.3*   * 107*     CMP:   Recent Labs   Lab 02/08/23  0653 02/09/23  1023   * 127*   K 4.3 4.2    103   CO2 18* 15*   * 229*   BUN 27* 33*   CREATININE 1.4 1.7*   CALCIUM 8.3* 7.8*   PROT 5.5* 5.5*   ALBUMIN 2.5* 2.6*   BILITOT 1.8* 1.3*   ALKPHOS 119 111   AST 54* 39   ALT 71* 55*   ANIONGAP 8 9       Significant Imaging: I have reviewed all pertinent imaging results/findings within the past 24 hours.

## 2023-02-10 NOTE — ASSESSMENT & PLAN NOTE
R/t lactulose induced diarrhea; hold for now  Renal function stable.   Albumin ordered  Sodium bicarbonate x 3 doses.    2/8/23  Improved today. Monitor    2/9/23  Fluctuating. Monitor response after fluids

## 2023-02-10 NOTE — PLAN OF CARE
Discharge education given. Pt verbalized an understanding. IV removed, catheter intact. Pt to be discharged with personal belongings, walker and printed prescription. Pt wife present to bring him home.

## 2023-02-10 NOTE — DISCHARGE SUMMARY
[facility] Medicine  Discharge Summary      Patient Name: Korin Vivas  MRN: [id number]  JAME: [id number]  Patient Class: IP- Inpatient  Admission Date: 2/5/2023  Hospital Length of Stay: 4 days  Discharge Date and Time:  02/10/2023 11:20 AM  Attending Physician: No att. providers found   Discharging Provider: Bucky Holbrook NP  Primary Care Provider: [facility] Dept    Primary Care Team: Networked reference to record PCT     HPI:   Mr. Vivas is a 78 yo male with decompensated MENDEZ cirrhosis, recurrent ascites, CAD, HTN, DM, HLD and recent paracentesis and TIPS procedure per IF on 1/30.  Patient was discharged on 2/1 with instructions to take cipro for 10 days, as well as follow-up in 10 days with Dr. Wesley, follow up for liver doppler ultrasound in 10 days.  Since this time patient was doing well, until yesterday when wife noticed him being more confused prior to bed.  when he awoke this am, patient took his am meds (wife unsure if he took lactuluse) but he remained confused so wife brought him to ED.  Symptoms are constant and moderate in severity, no relieving or exacerbating factors, associated s/sx fatigue and lethargy.  In the ED, lab work notable for creatinine 1.4, K 5.2, bili 1.4, alt 141, ast 96; UDS negative, US with no signs of infection.  Chest xray with atelectasis, CT head negative for acute process; ammonia 80.  Patient given lactulose and will be admitted to observation for further treatment of hepatic encephalopathy.  Hepatology also consulted.      Code Status, Full  Surrogate Decision maker wife Jessica      * No surgery found *      Hospital Course:   Korin Vivas is a 77 year old male who was admitted for hepatic encephalopathy s/p TIPS procedure. Seen by hepatologist who increased lactulose for decompensated liver failure. Liver ultrasound limited, but did not show gross evidence of tips dysfunction. Interventional radiology consulted hepatology  request.    2/7/23  Patient remains confused today.  Lactulose increased yesterday which resulted in multiple bowel movements overnight.  He is tachycardic with worsening metabolic acidosis.  Case discussed with hepatology recommended holding lactulose today, albumin infusions x2, and infectious workup given leukocytosis.  He will have CT scan of the abdomen upon completion of prophylactic ciprofloxacin post tips procedure which will be Thursday.    2/8/23  Mentation improving. Hypotension low normal-will give albumin again today with appropriate response. Resume Lactulose today.    2/9/23  Mentation back to baseline. Renal impairment and hyponatremia noted. Started on gentle hydration and midodrine by hepatology. If patient condition improves can discharge in AM.     2/10/23  Doing well today. Kidney function improved. Sodium unchanged and stable. He will continue latulose 20 TID. The wife was told to give additional dose if on BM in 24 hours. He was asked to follow up with hepatology in 2 weeks. He has an abdominal ultrasound next week for follow up TIPS. Patient is stable for dischareg.       Goals of Care Treatment Preferences:  Code Status: Full Code      Consults:   Consults (From admission, onward)        Status Ordering Provider     Inpatient consult to Social Work  Once        Provider:  (Not yet assigned)    Completed TERE DRAKE     Inpatient consult to Interventional Radiology  Once        Provider:  Pedro Luis Wesley MD    Completed TERE DRAKE     Inpatient Consult to Telemedicine - Hepatology  Once        Provider:  (Not yet assigned)    Completed GHASSAN GREEN          No new Assessment & Plan notes have been filed under this hospital service since the last note was generated.  Service: Hospital Medicine    Final Active Diagnoses:    Diagnosis Date Noted POA    PRINCIPAL PROBLEM:  Hepatic encephalopathy [K76.82] 02/05/2023 Unknown    Metabolic acidosis [E87.20] 02/07/2023 Yes     "Hypertension [I10] 11/14/2022 Yes    Mixed hyperlipidemia [E78.2] 11/14/2022 Yes    Type 2 diabetes mellitus without retinopathy [E11.9] 11/14/2022 Yes    Hyperkalemia [E87.5] 11/14/2022 Yes    Hepatic cirrhosis [K74.60] 11/14/2022 Yes      Problems Resolved During this Admission:       Discharged Condition: stable    Disposition: Home or Self Care    Follow Up:   Follow-up Information     Litzy Goodman MD Follow up.    Specialties: Gastroenterology, Hepatology  Contact information:  [address]  [place] [id number] [phone]                       Patient Instructions:      WALKER FOR HOME USE     Order Specific Question Answer Comments   Type of Walker: Adult (5'4"-6'6")    With wheels? Yes    Height: 6' (1.829 m)    Weight: 78.7 kg (173 lb 8 oz)    Length of need (1-99 months): 99    Does patient have medical equipment at home? none    Please check all that apply: Patient's condition impairs ambulation.      Ambulatory referral/consult to Outpatient Case Management   Referral Priority: Routine Referral Type: Consultation   Referral Reason: Specialty Services Required   Number of Visits Requested: 1       Significant Diagnostic Studies: Labs:   CMP   Recent Labs   Lab 02/09/23  1023 02/10/23  0557   * 127*   K 4.2 4.0    104   CO2 15* 15*   * 149*   BUN 33* 36*   CREATININE 1.7* 1.6*   CALCIUM 7.8* 7.3*   PROT 5.5* 5.1*   ALBUMIN 2.6* 2.3*   BILITOT 1.3* 0.9   ALKPHOS 111 107   AST 39 33   ALT 55* 43   ANIONGAP 9 8    and CBC   Recent Labs   Lab 02/09/23  1023   WBC 6.70   HGB 8.8*   HCT 27.3*   *       Pending Diagnostic Studies:     None         Medications:  Reconciled Home Medications:      Medication List      START taking these medications    midodrine 10 MG tablet  Commonly known as: PROAMATINE  Take 1 tablet (10 mg total) by mouth 2 (two) times daily with meals.        CHANGE how you take these medications    lactulose 10 gram/15 mL solution  Commonly known as: " CHRONULAC  Take 30 mLs (20 g total) by mouth 3 (three) times daily.  What changed: when to take this        CONTINUE taking these medications    busPIRone 15 MG tablet  Commonly known as: BUSPAR  Take 15 mg by mouth 2 (two) times daily as needed (anxiety).     carvediloL 6.25 MG tablet  Commonly known as: COREG  Take 6.25 mg by mouth 2 (two) times daily.     ciprofloxacin HCl 500 MG tablet  Commonly known as: CIPRO  Take 1 tablet (500 mg total) by mouth 2 (two) times daily. for 10 days     empagliflozin 25 mg tablet  Commonly known as: JARDIANCE  Take 1 tablet by mouth every morning.     ezetimibe 10 mg tablet  Commonly known as: ZETIA  Take 10 mg by mouth once daily.     famotidine 40 MG tablet  Commonly known as: PEPCID  Take 40 mg by mouth once daily.     furosemide 40 MG tablet  Commonly known as: LASIX  Take 1 tablet (40 mg total) by mouth once daily at 6am.     hydrOXYzine HCL 25 MG tablet  Commonly known as: ATARAX  Take 1 tablet (25 mg total) by mouth 3 (three) times daily as needed for Itching.     lovastatin 40 MG tablet  Commonly known as: MEVACOR  Take 40 mg by mouth every evening.     metFORMIN 1000 MG tablet  Commonly known as: GLUCOPHAGE  Take 1,000 mg by mouth 2 (two) times daily with meals.     metoprolol tartrate 50 MG tablet  Commonly known as: LOPRESSOR  Take 50 mg by mouth 2 (two) times daily.     multivitamin capsule  Take 1 capsule by mouth once daily.     rifAXIMin 550 mg Tab  Commonly known as: XIFAXAN  Take 1 tablet (550 mg total) by mouth 2 (two) times daily.     spironolactone 100 MG tablet  Commonly known as: ALDACTONE  Take 1 tablet (100 mg total) by mouth 2 (two) times daily.     traZODone 100 MG tablet  Commonly known as: DESYREL  Take 100 mg by mouth every evening.     zinc sulfate 50 mg zinc (220 mg) capsule  Commonly known as: ZINCATE  Take 220 mg by mouth.            Indwelling Lines/Drains at time of discharge:   Lines/Drains/Airways     None                 Time spent on the  discharge of patient: >35 minutes         Bucky Holbrook NP  Department of Hospital Medicine  [facility] Surg

## 2023-02-10 NOTE — ASSESSMENT & PLAN NOTE
- No CT evidence of acute intracranial abnormality.   - Lactulose TID  - Repeat ammonia in AM, currently 80  - Consult Dr. Ariza as patient's wife states he has 2-3 BM daily and presented with hepatic encephalopathy.  - Document accurate I&O (specifically BM)    2/6/23  Ammonia level 61. Lactulose increased by hepatologist. Continue rifaximin    2/7/23  Multiple BM overnight. Lactulose held today given IVVD and metabolic acidosis  Hepatology folowing    2/8/23  Gradually improving. Resume lactulose today.     2/9/23  resovled

## 2023-02-10 NOTE — PROGRESS NOTES
C3 nurse returning call to patient's wife, Jessica, s/p speaking with patient for a TCC post-discharge hospital follow up call as she had left a voicemail in the TCM box. Unfortunately, no answer. Left VM for incase there were concerns, otherwise, no further outreach attempts to be made for this encounter.

## 2023-02-13 ENCOUNTER — HOSPITAL ENCOUNTER (OUTPATIENT)
Facility: HOSPITAL | Age: 78
Discharge: HOME | End: 2023-02-14
Attending: EMERGENCY MEDICINE | Admitting: FAMILY MEDICINE
Payer: MEDICARE

## 2023-02-13 DIAGNOSIS — R41.82 AMS (ALTERED MENTAL STATUS): ICD-10-CM

## 2023-02-13 DIAGNOSIS — N17.9 AKI (ACUTE KIDNEY INJURY): ICD-10-CM

## 2023-02-13 DIAGNOSIS — K76.82 HEPATIC ENCEPHALOPATHY: Primary | ICD-10-CM

## 2023-02-13 LAB
ALBUMIN SERPL BCP-MCNC: 2.3 G/DL (ref 3.5–5.2)
ALP SERPL-CCNC: 121 U/L (ref 55–135)
ALT SERPL W/O P-5'-P-CCNC: 31 U/L (ref 10–44)
AMMONIA PLAS-SCNC: 70 UMOL/L (ref 10–50)
ANION GAP SERPL CALC-SCNC: 13 MMOL/L (ref 8–16)
AST SERPL-CCNC: 33 U/L (ref 10–40)
BACTERIA #/AREA URNS HPF: NORMAL /HPF
BASOPHILS # BLD AUTO: 0.06 K/UL (ref 0–0.2)
BASOPHILS NFR BLD: 1 % (ref 0–1.9)
BILIRUB SERPL-MCNC: 1 MG/DL (ref 0.1–1)
BILIRUB UR QL STRIP: NEGATIVE
BNP SERPL-MCNC: 850 PG/ML (ref 0–99)
BUN SERPL-MCNC: 54 MG/DL (ref 8–23)
CALCIUM SERPL-MCNC: 8.3 MG/DL (ref 8.7–10.5)
CHLORIDE SERPL-SCNC: 106 MMOL/L (ref 95–110)
CK SERPL-CCNC: 33 U/L (ref 20–200)
CLARITY UR: CLEAR
CO2 SERPL-SCNC: 13 MMOL/L (ref 23–29)
COLOR UR: YELLOW
CREAT SERPL-MCNC: 2.3 MG/DL (ref 0.5–1.4)
DIFFERENTIAL METHOD: ABNORMAL
EOSINOPHIL # BLD AUTO: 0.3 K/UL (ref 0–0.5)
EOSINOPHIL NFR BLD: 5 % (ref 0–8)
ERYTHROCYTE [DISTWIDTH] IN BLOOD BY AUTOMATED COUNT: 17.2 % (ref 11.5–14.5)
EST. GFR  (NO RACE VARIABLE): 29 ML/MIN/1.73 M^2
GLUCOSE SERPL-MCNC: 123 MG/DL (ref 70–110)
GLUCOSE UR QL STRIP: ABNORMAL
HCT VFR BLD AUTO: 27.2 % (ref 40–54)
HGB BLD-MCNC: 9 G/DL (ref 14–18)
HGB UR QL STRIP: NEGATIVE
IMM GRANULOCYTES # BLD AUTO: 0.12 K/UL (ref 0–0.04)
IMM GRANULOCYTES NFR BLD AUTO: 2 % (ref 0–0.5)
KETONES UR QL STRIP: NEGATIVE
LEUKOCYTE ESTERASE UR QL STRIP: NEGATIVE
LYMPHOCYTES # BLD AUTO: 0.5 K/UL (ref 1–4.8)
LYMPHOCYTES NFR BLD: 8.6 % (ref 18–48)
MCH RBC QN AUTO: 29.4 PG (ref 27–31)
MCHC RBC AUTO-ENTMCNC: 33.1 G/DL (ref 32–36)
MCV RBC AUTO: 89 FL (ref 82–98)
MICROSCOPIC COMMENT: NORMAL
MONOCYTES # BLD AUTO: 1.6 K/UL (ref 0.3–1)
MONOCYTES NFR BLD: 25.4 % (ref 4–15)
NEUTROPHILS # BLD AUTO: 3.6 K/UL (ref 1.8–7.7)
NEUTROPHILS NFR BLD: 58 % (ref 38–73)
NITRITE UR QL STRIP: NEGATIVE
NRBC BLD-RTO: 0 /100 WBC
PH UR STRIP: 5 [PH] (ref 5–8)
PLATELET # BLD AUTO: 136 K/UL (ref 150–450)
PMV BLD AUTO: 10 FL (ref 9.2–12.9)
POCT GLUCOSE: 128 MG/DL (ref 70–110)
POCT GLUCOSE: 152 MG/DL (ref 70–110)
POCT GLUCOSE: 211 MG/DL (ref 70–110)
POTASSIUM SERPL-SCNC: 4.7 MMOL/L (ref 3.5–5.1)
PROT SERPL-MCNC: 5.8 G/DL (ref 6–8.4)
PROT UR QL STRIP: NEGATIVE
RBC # BLD AUTO: 3.06 M/UL (ref 4.6–6.2)
RBC #/AREA URNS HPF: 1 /HPF (ref 0–4)
SODIUM SERPL-SCNC: 132 MMOL/L (ref 136–145)
SP GR UR STRIP: 1.01 (ref 1–1.03)
SQUAMOUS #/AREA URNS HPF: 3 /HPF
TROPONIN I SERPL DL<=0.01 NG/ML-MCNC: <0.006 NG/ML (ref 0–0.03)
UNIDENT CRYS URNS QL MICRO: NORMAL
URN SPEC COLLECT METH UR: ABNORMAL
UROBILINOGEN UR STRIP-ACNC: NEGATIVE EU/DL
WBC # BLD AUTO: 6.15 K/UL (ref 3.9–12.7)
WBC #/AREA URNS HPF: 2 /HPF (ref 0–5)
YEAST URNS QL MICRO: NORMAL

## 2023-02-13 PROCEDURE — 84484 ASSAY OF TROPONIN QUANT: CPT | Performed by: EMERGENCY MEDICINE

## 2023-02-13 PROCEDURE — G0378 HOSPITAL OBSERVATION PER HR: HCPCS

## 2023-02-13 PROCEDURE — 25000003 PHARM REV CODE 250: Performed by: NURSE PRACTITIONER

## 2023-02-13 PROCEDURE — 82140 ASSAY OF AMMONIA: CPT | Performed by: EMERGENCY MEDICINE

## 2023-02-13 PROCEDURE — P9047 ALBUMIN (HUMAN), 25%, 50ML: HCPCS | Mod: JG | Performed by: INTERNAL MEDICINE

## 2023-02-13 PROCEDURE — 83880 ASSAY OF NATRIURETIC PEPTIDE: CPT | Performed by: EMERGENCY MEDICINE

## 2023-02-13 PROCEDURE — 96372 THER/PROPH/DIAG INJ SC/IM: CPT | Performed by: NURSE PRACTITIONER

## 2023-02-13 PROCEDURE — 93010 EKG 12-LEAD: ICD-10-PCS | Mod: ,,, | Performed by: INTERNAL MEDICINE

## 2023-02-13 PROCEDURE — 85025 COMPLETE CBC W/AUTO DIFF WBC: CPT | Performed by: EMERGENCY MEDICINE

## 2023-02-13 PROCEDURE — 82550 ASSAY OF CK (CPK): CPT | Performed by: EMERGENCY MEDICINE

## 2023-02-13 PROCEDURE — 93010 ELECTROCARDIOGRAM REPORT: CPT | Mod: ,,, | Performed by: INTERNAL MEDICINE

## 2023-02-13 PROCEDURE — 80053 COMPREHEN METABOLIC PANEL: CPT | Performed by: EMERGENCY MEDICINE

## 2023-02-13 PROCEDURE — 99222 1ST HOSP IP/OBS MODERATE 55: CPT | Mod: 95,,, | Performed by: INTERNAL MEDICINE

## 2023-02-13 PROCEDURE — 99222 PR INITIAL HOSPITAL CARE,LEVL II: ICD-10-PCS | Mod: 95,,, | Performed by: INTERNAL MEDICINE

## 2023-02-13 PROCEDURE — 81000 URINALYSIS NONAUTO W/SCOPE: CPT | Performed by: NURSE PRACTITIONER

## 2023-02-13 PROCEDURE — 63600175 PHARM REV CODE 636 W HCPCS: Mod: JG | Performed by: INTERNAL MEDICINE

## 2023-02-13 PROCEDURE — 99285 EMERGENCY DEPT VISIT HI MDM: CPT | Mod: 25

## 2023-02-13 PROCEDURE — 63600175 PHARM REV CODE 636 W HCPCS: Performed by: NURSE PRACTITIONER

## 2023-02-13 PROCEDURE — 93005 ELECTROCARDIOGRAM TRACING: CPT

## 2023-02-13 PROCEDURE — 96365 THER/PROPH/DIAG IV INF INIT: CPT

## 2023-02-13 RX ORDER — ALBUMIN HUMAN 250 G/1000ML
25 SOLUTION INTRAVENOUS EVERY 8 HOURS
Status: DISCONTINUED | OUTPATIENT
Start: 2023-02-13 | End: 2023-02-14 | Stop reason: HOSPADM

## 2023-02-13 RX ORDER — MIDODRINE HYDROCHLORIDE 5 MG/1
10 TABLET ORAL 2 TIMES DAILY WITH MEALS
Status: DISCONTINUED | OUTPATIENT
Start: 2023-02-13 | End: 2023-02-14 | Stop reason: HOSPADM

## 2023-02-13 RX ORDER — LACTULOSE 10 G/15ML
20 SOLUTION ORAL 3 TIMES DAILY
Status: DISCONTINUED | OUTPATIENT
Start: 2023-02-13 | End: 2023-02-14 | Stop reason: HOSPADM

## 2023-02-13 RX ORDER — NAPROXEN SODIUM 220 MG/1
2400 TABLET ORAL 2 TIMES DAILY
COMMUNITY

## 2023-02-13 RX ORDER — EZETIMIBE 10 MG/1
10 TABLET ORAL DAILY
Status: DISCONTINUED | OUTPATIENT
Start: 2023-02-14 | End: 2023-02-14 | Stop reason: HOSPADM

## 2023-02-13 RX ORDER — IBUPROFEN 200 MG
24 TABLET ORAL
Status: DISCONTINUED | OUTPATIENT
Start: 2023-02-13 | End: 2023-02-14 | Stop reason: HOSPADM

## 2023-02-13 RX ORDER — GLUCAGON 1 MG
1 KIT INJECTION
Status: DISCONTINUED | OUTPATIENT
Start: 2023-02-13 | End: 2023-02-14 | Stop reason: HOSPADM

## 2023-02-13 RX ORDER — FAMOTIDINE 20 MG/1
40 TABLET, FILM COATED ORAL
Status: DISCONTINUED | OUTPATIENT
Start: 2023-02-13 | End: 2023-02-14 | Stop reason: HOSPADM

## 2023-02-13 RX ORDER — INSULIN ASPART 100 [IU]/ML
0-5 INJECTION, SOLUTION INTRAVENOUS; SUBCUTANEOUS
Status: DISCONTINUED | OUTPATIENT
Start: 2023-02-13 | End: 2023-02-14 | Stop reason: HOSPADM

## 2023-02-13 RX ORDER — IBUPROFEN 200 MG
16 TABLET ORAL
Status: DISCONTINUED | OUTPATIENT
Start: 2023-02-13 | End: 2023-02-14 | Stop reason: HOSPADM

## 2023-02-13 RX ORDER — SODIUM BICARBONATE 650 MG/1
1300 TABLET ORAL 3 TIMES DAILY
Status: DISCONTINUED | OUTPATIENT
Start: 2023-02-13 | End: 2023-02-14 | Stop reason: HOSPADM

## 2023-02-13 RX ADMIN — SODIUM BICARBONATE 1300 MG: 650 TABLET ORAL at 09:02

## 2023-02-13 RX ADMIN — INSULIN ASPART 1 UNITS: 100 INJECTION, SOLUTION INTRAVENOUS; SUBCUTANEOUS at 09:02

## 2023-02-13 RX ADMIN — RIFAXIMIN 550 MG: 550 TABLET ORAL at 09:02

## 2023-02-13 RX ADMIN — LACTULOSE 20 G: 20 SOLUTION ORAL at 09:02

## 2023-02-13 RX ADMIN — SODIUM BICARBONATE 1300 MG: 650 TABLET ORAL at 04:02

## 2023-02-13 RX ADMIN — FAMOTIDINE 40 MG: 20 TABLET ORAL at 03:02

## 2023-02-13 RX ADMIN — LACTULOSE 20 G: 20 SOLUTION ORAL at 03:02

## 2023-02-13 RX ADMIN — ALBUMIN (HUMAN) 25 G: 5 SOLUTION INTRAVENOUS at 10:02

## 2023-02-13 RX ADMIN — MIDODRINE HYDROCHLORIDE 10 MG: 5 TABLET ORAL at 04:02

## 2023-02-13 NOTE — ED PROVIDER NOTES
"SCRIBE #1 NOTE: I, Rosa Carlos, am scribing for, and in the presence of, Pedro Nuñez MD. I have scribed the entire note.      History      Chief Complaint   Patient presents with    Further Evaluation      Per EMS, spouse called and stated he was "agitated". Recently discharged from hospital. Pt is calm per EMS and has no complaints.     altered mental status       Review of patient's allergies indicates:   Allergen Reactions    Statins-hmg-coa reductase inhibitors Other (See Comments)     muscle aches, joint pain            HPI   HPI    2/13/2023, 1:18 PM   History obtained from the patient and EMS      History of Present Illness: Korin Vivas is a 77 y.o. male patient with a PMHx of liver cirrhosis, CAD, DM, HTN, and HLD who presents to the Emergency Department for agitation. From 2/5/23 to 2/10/23, the pt was admitted at this facility for hepatic encephalopathy. Today, the pt's wife called for EMS because the pt was agitated. The pt has no complaints at this time. Patient denies any confusion, fever, chills, weakness, numbness, CP, SOB, and all other sxs at this time. No prior Tx reported. No further complaints or concerns at this time.       Arrival mode: EMS    PCP: [facility] Dept       Past Medical History:  Past Medical History:   Diagnosis Date    Arm fracture, right 2022, 2010    CAD (coronary artery disease)     Diabetes     GERD (gastroesophageal reflux disease)     HTN (hypertension)     Hyperlipidemia        Past Surgical History:  Past Surgical History:   Procedure Laterality Date    CATARACT EXTRACTION      COLONOSCOPY      COLONOSCOPY N/A 7/18/2016    Procedure: COLONOSCOPY;  Surgeon: Bryon Hickey MD;  Location: [facility] ENDO;  Service: Endoscopy;  Laterality: N/A;    COLONOSCOPY N/A 10/6/2020    Procedure: COLONOSCOPY;  Surgeon: Kait Isaacs MD;  Location: [facility] ENDO;  Service: Endoscopy;  Laterality: N/A;    CORONARY STENT PLACEMENT      ELBOW SURGERY      " ESOPHAGOGASTRODUODENOSCOPY N/A 10/6/2020    Procedure: ESOPHAGOGASTRODUODENOSCOPY (EGD);  Surgeon: Kait Isaacs MD;  Location: [facility] ENDO;  Service: Endoscopy;  Laterality: N/A;    FLUOROSCOPY N/A 1/30/2023    Procedure: FLUOROSCOPY/TIPS;  Surgeon: Pedro Luis Wesley MD;  Location: [facility] CATH LAB;  Service: General;  Laterality: N/A;    HERNIA REPAIR      2022    TONSILLECTOMY      VASECTOMY           Family History:  Family History   Problem Relation Age of Onset    Colon cancer Brother     No Known Problems Mother     Heart disease Father        Social History:  Social History     Tobacco Use    Smoking status: Former     Packs/day: 1.00     Years: 40.00     Pack years: 40.00     Types: Cigarettes    Smokeless tobacco: Never    Tobacco comments:     quit 3 years ago   Substance and Sexual Activity    Alcohol use: No    Drug use: No    Sexual activity: Not on file       ROS   Review of Systems   Constitutional:  Negative for chills and fever.   HENT:  Negative for sore throat.    Respiratory:  Negative for shortness of breath.    Cardiovascular:  Negative for chest pain.   Gastrointestinal:  Negative for nausea.   Genitourinary:  Negative for dysuria.   Musculoskeletal:  Negative for back pain.   Skin:  Negative for rash.   Neurological:  Negative for weakness and numbness.   Hematological:  Does not bruise/bleed easily.   Psychiatric/Behavioral:  Positive for agitation. Negative for confusion.    All other systems reviewed and are negative.    Physical Exam      Initial Vitals [02/13/23 0957]   BP Pulse Resp Temp SpO2   (!) 100/50 (!) 114 18 98 °F (36.7 °C) 100 %      MAP       --          Physical Exam  Nursing Notes and Vital Signs Reviewed.  Constitutional: Patient is in no apparent distress. Elderly. Frail.  Head: Atraumatic. Normocephalic.  Eyes: PERRL. EOM intact. Conjunctivae are not pale. No scleral icterus.  ENT: Mucous membranes are moist. Oropharynx is clear and symmetric.    Neck: Supple. Full ROM.  No lymphadenopathy.  Cardiovascular: Regular rate. Regular rhythm. No murmurs, rubs, or gallops. Distal pulses are 2+ and symmetric.  Pulmonary/Chest: No respiratory distress. Clear to auscultation bilaterally. No wheezing or rales.  Abdominal: Soft and non-distended.  There is no tenderness.  No rebound, guarding, or rigidity.   Musculoskeletal: Moves all extremities. No obvious deformities. No edema.  Skin: Warm and dry.  Neurological:  Alert, awake, and appropriate.  Normal speech.  No acute focal neurological deficits are appreciated.  Psychiatric: Normal affect. Good eye contact. Appropriate in content.    ED Course    Critical Care    Date/Time: 2/13/2023 1:24 PM  Performed by: Pedro Nuñez MD  Authorized by: Pedro Nuñez MD   Direct patient critical care time: 24 minutes  Additional history critical care time: 7 minutes  Ordering / reviewing critical care time: 6 minutes  Documentation critical care time: 7 minutes  Consulting other physicians critical care time: 16 minutes  Total critical care time (exclusive of procedural time) : 60 minutes  Critical care time was exclusive of separately billable procedures and treating other patients and teaching time.  Critical care was necessary to treat or prevent imminent or life-threatening deterioration of the following conditions: hepatic encephalopathy]  Critical care was time spent personally by me on the following activities: blood draw for specimens, development of treatment plan with patient or surrogate, discussions with consultants, interpretation of cardiac output measurements, evaluation of patient's response to treatment, examination of patient, ordering and performing treatments and interventions, obtaining history from patient or surrogate, ordering and review of laboratory studies, ordering and review of radiographic studies, pulse oximetry, re-evaluation of patient's condition and review of old charts.      ED Vital Signs:  Vitals:     02/13/23 0957 02/13/23 1010 02/13/23 1017 02/13/23 1044   BP: (!) 100/50 (!) 112/56     Pulse: (!) 114 67 67    Resp: 18      Temp: 98 °F (36.7 °C)      TempSrc: Oral      SpO2: 100% 100%     Weight:    75.4 kg (166 lb 3.6 oz)       Abnormal Lab Results:  Labs Reviewed   CBC W/ AUTO DIFFERENTIAL - Abnormal; Notable for the following components:       Result Value    RBC 3.06 (*)     Hemoglobin 9.0 (*)     Hematocrit 27.2 (*)     RDW 17.2 (*)     Platelets 136 (*)     Immature Granulocytes 2.0 (*)     Immature Grans (Abs) 0.12 (*)     Lymph # 0.5 (*)     Mono # 1.6 (*)     Lymph % 8.6 (*)     Mono % 25.4 (*)     All other components within normal limits   COMPREHENSIVE METABOLIC PANEL - Abnormal; Notable for the following components:    Sodium 132 (*)     CO2 13 (*)     Glucose 123 (*)     BUN 54 (*)     Creatinine 2.3 (*)     Calcium 8.3 (*)     Total Protein 5.8 (*)     Albumin 2.3 (*)     eGFR 29 (*)     All other components within normal limits   AMMONIA - Abnormal; Notable for the following components:    Ammonia 70 (*)     All other components within normal limits   B-TYPE NATRIURETIC PEPTIDE - Abnormal; Notable for the following components:     (*)     All other components within normal limits   CK   TROPONIN I   URINALYSIS, REFLEX TO URINE CULTURE        All Lab Results:  Results for orders placed or performed during the hospital encounter of 02/13/23   CBC Auto Differential   Result Value Ref Range    WBC 6.15 3.90 - 12.70 K/uL    RBC 3.06 (L) 4.60 - 6.20 M/uL    Hemoglobin 9.0 (L) 14.0 - 18.0 g/dL    Hematocrit 27.2 (L) 40.0 - 54.0 %    MCV 89 82 - 98 fL    MCH 29.4 27.0 - 31.0 pg    MCHC 33.1 32.0 - 36.0 g/dL    RDW 17.2 (H) 11.5 - 14.5 %    Platelets 136 (L) 150 - 450 K/uL    MPV 10.0 9.2 - 12.9 fL    Immature Granulocytes 2.0 (H) 0.0 - 0.5 %    Gran # (ANC) 3.6 1.8 - 7.7 K/uL    Immature Grans (Abs) 0.12 (H) 0.00 - 0.04 K/uL    Lymph # 0.5 (L) 1.0 - 4.8 K/uL    Mono # 1.6 (H) 0.3 - 1.0 K/uL    Eos  # 0.3 0.0 - 0.5 K/uL    Baso # 0.06 0.00 - 0.20 K/uL    nRBC 0 0 /100 WBC    Gran % 58.0 38.0 - 73.0 %    Lymph % 8.6 (L) 18.0 - 48.0 %    Mono % 25.4 (H) 4.0 - 15.0 %    Eosinophil % 5.0 0.0 - 8.0 %    Basophil % 1.0 0.0 - 1.9 %    Differential Method Automated    Comprehensive Metabolic Panel   Result Value Ref Range    Sodium 132 (L) 136 - 145 mmol/L    Potassium 4.7 3.5 - 5.1 mmol/L    Chloride 106 95 - 110 mmol/L    CO2 13 (L) 23 - 29 mmol/L    Glucose 123 (H) 70 - 110 mg/dL    BUN 54 (H) 8 - 23 mg/dL    Creatinine 2.3 (H) 0.5 - 1.4 mg/dL    Calcium 8.3 (L) 8.7 - 10.5 mg/dL    Total Protein 5.8 (L) 6.0 - 8.4 g/dL    Albumin 2.3 (L) 3.5 - 5.2 g/dL    Total Bilirubin 1.0 0.1 - 1.0 mg/dL    Alkaline Phosphatase 121 55 - 135 U/L    AST 33 10 - 40 U/L    ALT 31 10 - 44 U/L    Anion Gap 13 8 - 16 mmol/L    eGFR 29 (A) >60 mL/min/1.73 m^2   Ammonia   Result Value Ref Range    Ammonia 70 (H) 10 - 50 umol/L   BNP   Result Value Ref Range     (H) 0 - 99 pg/mL   CK   Result Value Ref Range    CPK 33 20 - 200 U/L   Troponin I   Result Value Ref Range    Troponin I <0.006 0.000 - 0.026 ng/mL         Imaging Results:  Imaging Results              CT Head Without Contrast (In process)  Result time 02/13/23 11:09:59                     X-Ray Chest AP Portable (In process)  Result time 02/13/23 10:48:38                   The EKG was ordered, reviewed, and independently interpreted by the ED provider.  Interpretation time: 12:37  Rate: 80 BPM  Rhythm: normal sinus rhythm  Interpretation: Septal infarct, age undetermined. No STEMI.         The Emergency Provider reviewed the vital signs and test results, which are outlined above.    ED Discussion     1:20 PM: Discussed case with Mague De Santiago, NP (Hospital Medicine). Dr. Salmeron agrees with current care and management of pt and accepts admission.   Admitting Service: Hospital Medicine  Admitting Physician: Dr. Salmeron  Admit to: Obs    1:21 PM: Re-evaluated pt. I have  discussed test results, shared treatment plan, and the need for admission with patient and family at bedside. Pt and family express understanding at this time and agree with all information. All questions answered. Pt and family have no further questions or concerns at this time. Pt is ready for admit.           ED Medication(s):  Medications   lactulose 20 gram/30 mL solution Soln 20 g (has no administration in time range)   midodrine tablet 10 mg (has no administration in time range)   rifAXIMin tablet 550 mg (has no administration in time range)           New Prescriptions    No medications on file         Medical Decision Making    Medical Decision Making:   History:   Old Records Summarized: records from previous admission(s).       <> Summary of Records: Hepatic encephalopathy  Initial Assessment:   Elderly and confused.  Recent admission  Differential Diagnosis:   AMS, Hepatic encephalopathy  Clinical Tests:   Lab Tests: Ordered and Reviewed  Radiological Study: Ordered and Reviewed  Medical Tests: Ordered and Reviewed  ED Management:  Elevated ammonia again.  Will admit to   Other:   I have discussed this case with another health care provider.       <> Summary of the Discussion: Case discussed with Dr. Salmeron () will admit to their service.           Scribe Attestation:   Scribe #1: I performed the above scribed service and the documentation accurately describes the services I performed. I attest to the accuracy of the note.    Attending:   Physician Attestation Statement for Scribe #1: I, Pedro Nuñez MD, personally performed the services described in this documentation, as scribed by Rosa Gonzalez, in my presence, and it is both accurate and complete.          Clinical Impression       ICD-10-CM ICD-9-CM   1. Hepatic encephalopathy  K76.82 572.2   2. AMS (altered mental status)  R41.82 780.97   3. FELICIA (acute kidney injury)  N17.9 584.9       Disposition:   Disposition: Placed in  Observation  Condition: Fair       Pedro Nuñez MD  02/13/23 0312

## 2023-02-13 NOTE — ASSESSMENT & PLAN NOTE
Patient with acute kidney injury likely due to IVVD/dehydration FELICIA is currently worsening. Labs reviewed- Renal function/electrolytes with Estimated Creatinine Clearance: 29.6 mL/min (A) (based on SCr of 2.3 mg/dL (H)). according to latest data. Monitor urine output and serial BMP and adjust therapy as needed. Avoid nephrotoxins and renally dose meds for GFR listed above.   - baseline creatinine per chart review 1.3-1.4  - will hold today lasix and aldactone   - hold off on IVF at this time,   - repeat BMP in am, consider renal consultation if creatinine not improving

## 2023-02-13 NOTE — ED PROVIDER NOTES
"SCRIBE #1 NOTE: I, Rosa Carlos, am scribing for, and in the presence of, Pedro Nuñez MD. I have scribed the entire note.      History      Chief Complaint   Patient presents with    Further Evaluation      Per EMS, spouse called and stated he was "agitated". Recently discharged from hospital. Pt is calm per EMS and has no complaints.        Review of patient's allergies indicates:   Allergen Reactions    Lipitor [atorvastatin] Other (See Comments)     Left arm/shooulder pain    Statins-hmg-coa reductase inhibitors Other (See Comments)     muscle aches, joint pain  Joint pain  Joint pain          HPI   HPI    2/13/2023, 9:56 AM   History obtained from EMS  HPI/ROS limited secondary to mental status change      History of Present Illness: Korin Vivas is a 77 y.o. male patient with a PMHx of liver cirrhosis, CAD, DM, HTN, and HLD who presents to the Emergency Department for agitation. From 2/5/23 to 2/10/23, the pt was admitted at this facility for hepatic encephalopathy. Today, the pt's wife called for EMS because the pt was agitated. The pt has no complaints at this time. Patient denies any confusion, fever, chills, weakness, numbness, CP, SOB, and all other sxs at this time. No prior Tx reported. No further complaints or concerns at this time.         Arrival mode: EMS    PCP: [facility]       Past Medical History:  Past Medical History:   Diagnosis Date    Arm fracture, right 2022, 2010    CAD (coronary artery disease)     Diabetes     GERD (gastroesophageal reflux disease)     HTN (hypertension)     Hyperlipidemia        Past Surgical History:  Past Surgical History:   Procedure Laterality Date    CATARACT EXTRACTION      COLONOSCOPY      COLONOSCOPY N/A 7/18/2016    Procedure: COLONOSCOPY;  Surgeon: Bryon Hickey MD;  Location: [facility];  Service: Endoscopy;  Laterality: N/A;    COLONOSCOPY N/A 10/6/2020    Procedure: COLONOSCOPY;  Surgeon: Kait Isaacs MD;  " Location: [facility] ENDO;  Service: Endoscopy;  Laterality: N/A;    CORONARY STENT PLACEMENT      ELBOW SURGERY      ESOPHAGOGASTRODUODENOSCOPY N/A 10/6/2020    Procedure: ESOPHAGOGASTRODUODENOSCOPY (EGD);  Surgeon: Kait Isaacs MD;  Location: [facility] ENDO;  Service: Endoscopy;  Laterality: N/A;    FLUOROSCOPY N/A 1/30/2023    Procedure: FLUOROSCOPY/TIPS;  Surgeon: Pedro Luis Wesley MD;  Location: [facility] CATH LAB;  Service: General;  Laterality: N/A;    HERNIA REPAIR      2022    TONSILLECTOMY      VASECTOMY           Family History:  Family History   Problem Relation Age of Onset    Colon cancer Brother     No Known Problems Mother     Heart disease Father        Social History:  Social History     Tobacco Use    Smoking status: Former     Packs/day: 1.00     Years: 40.00     Pack years: 40.00     Types: Cigarettes    Smokeless tobacco: Never    Tobacco comments:     quit 3 years ago   Substance and Sexual Activity    Alcohol use: No    Drug use: No    Sexual activity: Not on file       ROS   Review of Systems   Constitutional:  Negative for chills and fever.   HENT:  Negative for sore throat.    Respiratory:  Negative for shortness of breath.    Cardiovascular:  Negative for chest pain.   Gastrointestinal:  Negative for nausea.   Genitourinary:  Negative for dysuria.   Musculoskeletal:  Negative for back pain.   Skin:  Negative for rash.   Neurological:  Negative for weakness and numbness.   Hematological:  Does not bruise/bleed easily.   Psychiatric/Behavioral:  Positive for agitation. Negative for confusion.    All other systems reviewed and are negative.    Physical Exam      Initial Vitals [02/13/23 0957]   BP Pulse Resp Temp SpO2   (!) 100/50 (!) 114 18 98 °F (36.7 °C) 100 %      MAP       --          Physical Exam  Nursing Notes and Vital Signs Reviewed.  Constitutional: Patient is in {DISTRESS LEVEL:00338}. Well-developed and well-nourished.  Head: Atraumatic. Normocephalic.  Eyes: PERRL. EOM  intact. Conjunctivae are not pale. No scleral icterus.  ENT: Mucous membranes are moist. Oropharynx is clear and symmetric***.    Neck: Supple. Full ROM. No lymphadenopathy.  Cardiovascular: Regular rate. Regular rhythm***. No murmurs, rubs, or gallops. Distal pulses are 2+ and symmetric***.  Pulmonary/Chest: No respiratory distress. Clear to auscultation bilaterally***. No wheezing or rales.  Abdominal: Soft and non-distended.  There is no tenderness.  No rebound, guarding, or rigidity. Good bowel sounds***.  Genitourinary: No*** CVA tenderness  Musculoskeletal: Moves all extremities. No obvious deformities. No edema. No calf tenderness***.  Skin: Warm and dry.  Neurological:  Alert, awake, and appropriate.  Normal speech.  No acute focal neurological deficits are appreciated.  Psychiatric: Normal affect. Good eye contact. Appropriate in content.    ED Course    Procedures  ED Vital Signs:  Vitals:    02/13/23 0957   BP: (!) 100/50   Pulse: (!) 114   Resp: 18   Temp: 98 °F (36.7 °C)   TempSrc: Oral   SpO2: 100%       Abnormal Lab Results:  Labs Reviewed   CBC W/ AUTO DIFFERENTIAL   COMPREHENSIVE METABOLIC PANEL   URINALYSIS, REFLEX TO URINE CULTURE   AMMONIA   B-TYPE NATRIURETIC PEPTIDE   CK   TROPONIN I        All Lab Results:  ***    Imaging Results:  Imaging Results    None                 The Emergency Provider reviewed the vital signs and test results, which are outlined above.    ED Discussion     ***         ED Medication(s):  Medications - No data to display        New Prescriptions    No medications on file         Medical Decision Making              Scribe Attestation:   Scribe #1: I performed the above scribed service and the documentation accurately describes the services I performed. I attest to the accuracy of the note.    Attending:   Physician Attestation Statement for Scribe #1: I, Pedro Nuñez MD, personally performed the services described in this documentation, as scribed by Rosa  Carlos, in my presence, and it is both accurate and complete.          Clinical Impression       ICD-10-CM ICD-9-CM   1. AMS (altered mental status)  R41.82 780.97

## 2023-02-13 NOTE — HPI
Mr. Vivas is a 78 yo male with decompensated MENDEZ cirrhosis, recurrent ascites, CAD, HTN, DM, HLD and recent paracentesis and TIPS procedure per IR on 1/30.  He was recently hospitalized for hepatic encephalopathy/decompensated liver cirrhosis from 2/5-2/8 at [facility].  Per wife, patient was doing well last week but over the last few days is weaker, not eating/drinking as much and more confused.  This am he was also very agitated so she called EMS who bought him to the ED.  Wife states he had a large BM yesterday and a very small one this am.  Symptoms are constant and moderate in severity, no relieving or exacerbating factors, associated s/sx fatigue and lethargy.  In the ED, lab work notable for creatinine 2.3 (baseline 1.3-1.4), , , platelets 136.  Patient will be admitted to observation.    Code status, Full  Surrogate decision maker wife Jesisca

## 2023-02-13 NOTE — ASSESSMENT & PLAN NOTE
Patient's FSGs are controlled on current medication regimen.  Last A1c reviewed-   Lab Results   Component Value Date    HGBA1C 6.7 (H) 02/05/2023     Current correctional scale  Low  Maintain anti-hyperglycemic dose as follows-   Antihyperglycemics (From admission, onward)    Start     Stop Route Frequency Ordered    02/13/23 1524  insulin aspart U-100 pen 0-5 Units         -- SubQ Before meals & nightly PRN 02/13/23 1424        Hold Oral hypoglycemics while patient is in the hospital.

## 2023-02-13 NOTE — SUBJECTIVE & OBJECTIVE
Past Medical History:   Diagnosis Date    Arm fracture, right 2022, 2010    CAD (coronary artery disease)     Diabetes     GERD (gastroesophageal reflux disease)     HTN (hypertension)     Hyperlipidemia        Past Surgical History:   Procedure Laterality Date    CATARACT EXTRACTION      COLONOSCOPY      COLONOSCOPY N/A 7/18/2016    Procedure: COLONOSCOPY;  Surgeon: Bryon Hickey MD;  Location: [facility] ENDO;  Service: Endoscopy;  Laterality: N/A;    COLONOSCOPY N/A 10/6/2020    Procedure: COLONOSCOPY;  Surgeon: Kait Isaacs MD;  Location: [facility] ENDO;  Service: Endoscopy;  Laterality: N/A;    CORONARY STENT PLACEMENT      ELBOW SURGERY      ESOPHAGOGASTRODUODENOSCOPY N/A 10/6/2020    Procedure: ESOPHAGOGASTRODUODENOSCOPY (EGD);  Surgeon: Kait Isaacs MD;  Location: [facility] ENDO;  Service: Endoscopy;  Laterality: N/A;    FLUOROSCOPY N/A 1/30/2023    Procedure: FLUOROSCOPY/TIPS;  Surgeon: Pedro Luis Wesley MD;  Location: [facility] CATH LAB;  Service: General;  Laterality: N/A;    HERNIA REPAIR      2022    TONSILLECTOMY      VASECTOMY         Review of patient's allergies indicates:   Allergen Reactions    Statins-hmg-coa reductase inhibitors Other (See Comments)     muscle aches, joint pain           No current facility-administered medications on file prior to encounter.     Current Outpatient Medications on File Prior to Encounter   Medication Sig    busPIRone (BUSPAR) 15 MG tablet Take 15 mg by mouth 2 (two) times daily as needed (anxiety).    carvediloL (COREG) 6.25 MG tablet Take 6.25 mg by mouth 2 (two) times daily.    empagliflozin (JARDIANCE) 25 mg tablet Take 1 tablet by mouth every morning.    ezetimibe (ZETIA) 10 mg tablet Take 10 mg by mouth once daily.    famotidine (PEPCID) 40 MG tablet Take 40 mg by mouth once daily.    furosemide (LASIX) 40 MG tablet Take 1 tablet (40 mg total) by mouth once daily at 6am.    hydrOXYzine HCL (ATARAX) 25 MG tablet Take 1 tablet (25 mg total) by mouth 3 (three) times  daily as needed for Itching.    lactulose (CHRONULAC) 10 gram/15 mL solution Take 30 mLs (20 g total) by mouth 3 (three) times daily.    lovastatin (MEVACOR) 40 MG tablet Take 40 mg by mouth every evening.    metformin (GLUCOPHAGE) 1000 MG tablet Take 1,000 mg by mouth 2 (two) times daily with meals.    metoprolol tartrate (LOPRESSOR) 50 MG tablet Take 50 mg by mouth 2 (two) times daily.    midodrine (PROAMATINE) 10 MG tablet Take 1 tablet (10 mg total) by mouth 2 (two) times daily with meals.    multivitamin capsule Take 1 capsule by mouth once daily.    rifAXIMin (XIFAXAN) 550 mg Tab Take 1 tablet (550 mg total) by mouth 2 (two) times daily.    spironolactone (ALDACTONE) 100 MG tablet Take 1 tablet (100 mg total) by mouth 2 (two) times daily.    trazodone (DESYREL) 100 MG tablet Take 100 mg by mouth every evening.    zinc sulfate (ZINCATE) 50 mg zinc (220 mg) capsule Take 220 mg by mouth.     Family History       Problem Relation (Age of Onset)    Colon cancer Brother    Heart disease Father    No Known Problems Mother          Tobacco Use    Smoking status: Former     Packs/day: 1.00     Years: 40.00     Pack years: 40.00     Types: Cigarettes    Smokeless tobacco: Never    Tobacco comments:     quit 3 years ago   Substance and Sexual Activity    Alcohol use: No    Drug use: No    Sexual activity: Not on file     Review of Systems   Constitutional:  Positive for activity change and fatigue.   Psychiatric/Behavioral:  Positive for agitation and confusion.    Objective:     Vital Signs (Most Recent):  Temp: 98 °F (36.7 °C) (02/13/23 0957)  Pulse: 67 (02/13/23 1017)  Resp: 18 (02/13/23 0957)  BP: (!) 112/56 (02/13/23 1010)  SpO2: 100 % (02/13/23 1010)   Vital Signs (24h Range):  Temp:  [98 °F (36.7 °C)] 98 °F (36.7 °C)  Pulse:  [] 67  Resp:  [18] 18  SpO2:  [100 %] 100 %  BP: (100-112)/(50-56) 112/56     Weight: 75.4 kg (166 lb 3.6 oz)  Body mass index is 22.54 kg/m².    Physical Exam  Vitals and nursing note  reviewed.   Cardiovascular:      Rate and Rhythm: Regular rhythm. Tachycardia present.      Heart sounds: Normal heart sounds.   Pulmonary:      Effort: Pulmonary effort is normal.      Breath sounds: Normal breath sounds. No wheezing.   Abdominal:      General: Bowel sounds are normal. There is no distension.      Palpations: Abdomen is soft.      Tenderness: There is no abdominal tenderness.   Musculoskeletal:         General: No swelling. Normal range of motion.   Skin:     General: Skin is warm and dry.      Findings: No bruising.   Neurological:      Mental Status: He is alert and oriented to person, place, and time.           Significant Labs: All pertinent labs within the past 24 hours have been reviewed.    Significant Imaging: I have reviewed all pertinent imaging results/findings within the past 24 hours.

## 2023-02-13 NOTE — ASSESSMENT & PLAN NOTE
- ammonia 70   - lactulose TID, monitor BMs closely  - neuro checks q 4  - hepatology consulted, appreciate assistance  - repeat ammonia level in am

## 2023-02-13 NOTE — ASSESSMENT & PLAN NOTE
Recent TIPS procedure per Dr. Wesley on 1/30; completed 10 day course of cipro; today was planned for follow up liver doppler ultrasound as outpatient  - ammonia 70   - lactulose TID, monitor BMs closely  - neuro checks q 4  - hepatology consulted, appreciate assistance  - repeat ammonia level in am  - US ab doppler to eval TIPS ordered, will follow-up results

## 2023-02-13 NOTE — ASSESSMENT & PLAN NOTE
- now with hypotension/normotension  - continue home midodrine which was started during last hospitalization  - monitor trends

## 2023-02-13 NOTE — H&P
"[facility] - Emergency Dept.  [facility] Medicine  History & Physical    Patient Name: Korin Vivas  MRN: [id number]  Patient Class: OP- Observation  Admission Date: 2/13/2023  Attending Physician: Lucía Salmeron MD   Primary Care Provider: [facility]         Patient information was obtained from patient and ER records.     Subjective:     Principal Problem:Hepatic encephalopathy    Chief Complaint:   Chief Complaint   Patient presents with    Further Evaluation      Per EMS, spouse called and stated he was "agitated". Recently discharged from hospital. Pt is calm per EMS and has no complaints.     altered mental status        HPI: Mr. Vivas is a 76 yo male with decompensated MENDEZ cirrhosis, recurrent ascites, CAD, HTN, DM, HLD and recent paracentesis and TIPS procedure per IR on 1/30.  He was recently hospitalized for hepatic encephalopathy/decompensated liver cirrhosis from 2/5-2/8 at [facility].  Per wife, patient was doing well last week but over the last few days is weaker, not eating/drinking as much and more confused.  This am he was also very agitated so she called EMS who bought him to the ED.  Wife states he had a large BM yesterday and a very small one this am.  Symptoms are constant and moderate in severity, no relieving or exacerbating factors, associated s/sx fatigue and lethargy.  In the ED, lab work notable for creatinine 2.3 (baseline 1.3-1.4), , , platelets 136.  Patient will be admitted to observation.    Code status, Full  Surrogate decision maker wife Jessica      Past Medical History:   Diagnosis Date    Arm fracture, right 2022, 2010    CAD (coronary artery disease)     Diabetes     GERD (gastroesophageal reflux disease)     HTN (hypertension)     Hyperlipidemia        Past Surgical History:   Procedure Laterality Date    CATARACT EXTRACTION      COLONOSCOPY      COLONOSCOPY N/A 7/18/2016    Procedure: COLONOSCOPY;  Surgeon: Bryon Hickey MD;  Location: [facility];  Service: " Endoscopy;  Laterality: N/A;    COLONOSCOPY N/A 10/6/2020    Procedure: COLONOSCOPY;  Surgeon: Kait Isaacs MD;  Location: [facility] ENDO;  Service: Endoscopy;  Laterality: N/A;    CORONARY STENT PLACEMENT      ELBOW SURGERY      ESOPHAGOGASTRODUODENOSCOPY N/A 10/6/2020    Procedure: ESOPHAGOGASTRODUODENOSCOPY (EGD);  Surgeon: Kait Isaacs MD;  Location: [facility] ENDO;  Service: Endoscopy;  Laterality: N/A;    FLUOROSCOPY N/A 1/30/2023    Procedure: FLUOROSCOPY/TIPS;  Surgeon: Pedro Luis Wesley MD;  Location: [facility] CATH LAB;  Service: General;  Laterality: N/A;    HERNIA REPAIR      2022    TONSILLECTOMY      VASECTOMY         Review of patient's allergies indicates:   Allergen Reactions    Statins-hmg-coa reductase inhibitors Other (See Comments)     muscle aches, joint pain           No current facility-administered medications on file prior to encounter.     Current Outpatient Medications on File Prior to Encounter   Medication Sig    busPIRone (BUSPAR) 15 MG tablet Take 15 mg by mouth 2 (two) times daily as needed (anxiety).    carvediloL (COREG) 6.25 MG tablet Take 6.25 mg by mouth 2 (two) times daily.    empagliflozin (JARDIANCE) 25 mg tablet Take 1 tablet by mouth every morning.    ezetimibe (ZETIA) 10 mg tablet Take 10 mg by mouth once daily.    famotidine (PEPCID) 40 MG tablet Take 40 mg by mouth once daily.    furosemide (LASIX) 40 MG tablet Take 1 tablet (40 mg total) by mouth once daily at 6am.    hydrOXYzine HCL (ATARAX) 25 MG tablet Take 1 tablet (25 mg total) by mouth 3 (three) times daily as needed for Itching.    lactulose (CHRONULAC) 10 gram/15 mL solution Take 30 mLs (20 g total) by mouth 3 (three) times daily.    lovastatin (MEVACOR) 40 MG tablet Take 40 mg by mouth every evening.    metformin (GLUCOPHAGE) 1000 MG tablet Take 1,000 mg by mouth 2 (two) times daily with meals.    metoprolol tartrate (LOPRESSOR) 50 MG tablet Take 50 mg by mouth 2 (two) times daily.    midodrine (PROAMATINE) 10 MG  tablet Take 1 tablet (10 mg total) by mouth 2 (two) times daily with meals.    multivitamin capsule Take 1 capsule by mouth once daily.    rifAXIMin (XIFAXAN) 550 mg Tab Take 1 tablet (550 mg total) by mouth 2 (two) times daily.    spironolactone (ALDACTONE) 100 MG tablet Take 1 tablet (100 mg total) by mouth 2 (two) times daily.    trazodone (DESYREL) 100 MG tablet Take 100 mg by mouth every evening.    zinc sulfate (ZINCATE) 50 mg zinc (220 mg) capsule Take 220 mg by mouth.     Family History       Problem Relation (Age of Onset)    Colon cancer Brother    Heart disease Father    No Known Problems Mother          Tobacco Use    Smoking status: Former     Packs/day: 1.00     Years: 40.00     Pack years: 40.00     Types: Cigarettes    Smokeless tobacco: Never    Tobacco comments:     quit 3 years ago   Substance and Sexual Activity    Alcohol use: No    Drug use: No    Sexual activity: Not on file     Review of Systems   Constitutional:  Positive for activity change and fatigue.   Psychiatric/Behavioral:  Positive for agitation and confusion.    Objective:     Vital Signs (Most Recent):  Temp: 98 °F (36.7 °C) (02/13/23 0957)  Pulse: 67 (02/13/23 1017)  Resp: 18 (02/13/23 0957)  BP: (!) 112/56 (02/13/23 1010)  SpO2: 100 % (02/13/23 1010)   Vital Signs (24h Range):  Temp:  [98 °F (36.7 °C)] 98 °F (36.7 °C)  Pulse:  [] 67  Resp:  [18] 18  SpO2:  [100 %] 100 %  BP: (100-112)/(50-56) 112/56     Weight: 75.4 kg (166 lb 3.6 oz)  Body mass index is 22.54 kg/m².    Physical Exam  Vitals and nursing note reviewed.   Cardiovascular:      Rate and Rhythm: Regular rhythm. Tachycardia present.      Heart sounds: Normal heart sounds.   Pulmonary:      Effort: Pulmonary effort is normal.      Breath sounds: Normal breath sounds. No wheezing.   Abdominal:      General: Bowel sounds are normal. There is no distension.      Palpations: Abdomen is soft.      Tenderness: There is no abdominal tenderness.   Musculoskeletal:          General: No swelling. Normal range of motion.   Skin:     General: Skin is warm and dry.      Findings: No bruising.   Neurological:      Mental Status: He is alert and oriented to person, place, and time.           Significant Labs: All pertinent labs within the past 24 hours have been reviewed.    Significant Imaging: I have reviewed all pertinent imaging results/findings within the past 24 hours.    Assessment/Plan:     * Hepatic encephalopathy  Recent TIPS procedure per Dr. Wesley on 1/30; completed 10 day course of cipro; today was planned for follow up liver doppler ultrasound as outpatient  - ammonia 70   - lactulose TID, monitor BMs closely  - neuro checks q 4  - hepatology consulted, appreciate assistance  - repeat ammonia level in am  - US ab doppler to eval TIPS ordered, will follow-up results       FELICIA (acute kidney injury)  Patient with acute kidney injury likely due to IVVD/dehydration FELICIA is currently worsening. Labs reviewed- Renal function/electrolytes with Estimated Creatinine Clearance: 29.6 mL/min (A) (based on SCr of 2.3 mg/dL (H)). according to latest data. Monitor urine output and serial BMP and adjust therapy as needed. Avoid nephrotoxins and renally dose meds for GFR listed above.   - baseline creatinine per chart review 1.3-1.4  - will hold today lasix and aldactone   - hold off on IVF at this time,   - repeat BMP in am, consider renal consultation if creatinine not improving    Metabolic acidosis  - holding metformin   - Sodium bicarbonate  - repeat labs in am          Type 2 diabetes mellitus without retinopathy  Patient's FSGs are controlled on current medication regimen.  Last A1c reviewed-   Lab Results   Component Value Date    HGBA1C 6.7 (H) 02/05/2023     Current correctional scale  Low  Maintain anti-hyperglycemic dose as follows-   Antihyperglycemics (From admission, onward)      Start     Stop Route Frequency Ordered    02/13/23 1524  insulin aspart U-100 pen 0-5 Units          -- SubQ Before meals & nightly PRN 02/13/23 1424          Hold Oral hypoglycemics while patient is in the hospital.    Thrombocytopenia  In the setting of cirrhosis, no s/sx of bleeding,   - trend labs intermittently       Mixed hyperlipidemia  - continue statin       Hypertension  - now with hypotension/normotension  - continue home midodrine which was started during last hospitalization  - monitor trends        Gastroesophageal reflux disease  PPI        VTE Risk Mitigation (From admission, onward)           Ordered     Place sequential compression device  Until discontinued         02/13/23 4875                       Mague De Santiago NP  Department of Hospital Medicine   [facility] - Emergency Dept.

## 2023-02-14 ENCOUNTER — TELEPHONE (OUTPATIENT)
Dept: HEPATOLOGY | Facility: CLINIC | Age: 78
End: 2023-02-14
Payer: MEDICARE

## 2023-02-14 VITALS
SYSTOLIC BLOOD PRESSURE: 130 MMHG | TEMPERATURE: 97 F | DIASTOLIC BLOOD PRESSURE: 61 MMHG | BODY MASS INDEX: 23.05 KG/M2 | HEIGHT: 72 IN | WEIGHT: 170.19 LBS | RESPIRATION RATE: 16 BRPM | HEART RATE: 98 BPM | OXYGEN SATURATION: 100 %

## 2023-02-14 LAB
ALBUMIN SERPL BCP-MCNC: 2.5 G/DL (ref 3.5–5.2)
ALP SERPL-CCNC: 119 U/L (ref 55–135)
ALT SERPL W/O P-5'-P-CCNC: 28 U/L (ref 10–44)
AMMONIA PLAS-SCNC: 81 UMOL/L (ref 10–50)
ANION GAP SERPL CALC-SCNC: 11 MMOL/L (ref 8–16)
AST SERPL-CCNC: 28 U/L (ref 10–40)
BASOPHILS # BLD AUTO: 0.05 K/UL (ref 0–0.2)
BASOPHILS NFR BLD: 0.9 % (ref 0–1.9)
BILIRUB SERPL-MCNC: 1 MG/DL (ref 0.1–1)
BUN SERPL-MCNC: 42 MG/DL (ref 8–23)
CALCIUM SERPL-MCNC: 8.1 MG/DL (ref 8.7–10.5)
CHLORIDE SERPL-SCNC: 110 MMOL/L (ref 95–110)
CO2 SERPL-SCNC: 15 MMOL/L (ref 23–29)
CREAT SERPL-MCNC: 1.9 MG/DL (ref 0.5–1.4)
DIFFERENTIAL METHOD: ABNORMAL
EOSINOPHIL # BLD AUTO: 0.2 K/UL (ref 0–0.5)
EOSINOPHIL NFR BLD: 3.8 % (ref 0–8)
ERYTHROCYTE [DISTWIDTH] IN BLOOD BY AUTOMATED COUNT: 17.2 % (ref 11.5–14.5)
EST. GFR  (NO RACE VARIABLE): 36 ML/MIN/1.73 M^2
GLUCOSE SERPL-MCNC: 147 MG/DL (ref 70–110)
HCT VFR BLD AUTO: 24.4 % (ref 40–54)
HGB BLD-MCNC: 8.2 G/DL (ref 14–18)
IMM GRANULOCYTES # BLD AUTO: 0.1 K/UL (ref 0–0.04)
IMM GRANULOCYTES NFR BLD AUTO: 1.8 % (ref 0–0.5)
INR PPP: 1.8 (ref 0.8–1.2)
LYMPHOCYTES # BLD AUTO: 0.6 K/UL (ref 1–4.8)
LYMPHOCYTES NFR BLD: 10.2 % (ref 18–48)
MCH RBC QN AUTO: 29.7 PG (ref 27–31)
MCHC RBC AUTO-ENTMCNC: 33.6 G/DL (ref 32–36)
MCV RBC AUTO: 88 FL (ref 82–98)
MONOCYTES # BLD AUTO: 1.4 K/UL (ref 0.3–1)
MONOCYTES NFR BLD: 26 % (ref 4–15)
NEUTROPHILS # BLD AUTO: 3.1 K/UL (ref 1.8–7.7)
NEUTROPHILS NFR BLD: 57.3 % (ref 38–73)
NRBC BLD-RTO: 0 /100 WBC
PLATELET # BLD AUTO: 143 K/UL (ref 150–450)
PMV BLD AUTO: 10.5 FL (ref 9.2–12.9)
POCT GLUCOSE: 147 MG/DL (ref 70–110)
POCT GLUCOSE: 185 MG/DL (ref 70–110)
POTASSIUM SERPL-SCNC: 4.1 MMOL/L (ref 3.5–5.1)
PROT SERPL-MCNC: 5.5 G/DL (ref 6–8.4)
PROTHROMBIN TIME: 18.8 SEC (ref 9–12.5)
RBC # BLD AUTO: 2.76 M/UL (ref 4.6–6.2)
SODIUM SERPL-SCNC: 136 MMOL/L (ref 136–145)
SODIUM UR-SCNC: 25 MMOL/L (ref 20–250)
WBC # BLD AUTO: 5.49 K/UL (ref 3.9–12.7)

## 2023-02-14 PROCEDURE — G0378 HOSPITAL OBSERVATION PER HR: HCPCS

## 2023-02-14 PROCEDURE — 82140 ASSAY OF AMMONIA: CPT | Performed by: NURSE PRACTITIONER

## 2023-02-14 PROCEDURE — 96366 THER/PROPH/DIAG IV INF ADDON: CPT

## 2023-02-14 PROCEDURE — 85610 PROTHROMBIN TIME: CPT | Performed by: NURSE PRACTITIONER

## 2023-02-14 PROCEDURE — 97166 OT EVAL MOD COMPLEX 45 MIN: CPT

## 2023-02-14 PROCEDURE — 25000003 PHARM REV CODE 250: Performed by: NURSE PRACTITIONER

## 2023-02-14 PROCEDURE — 85025 COMPLETE CBC W/AUTO DIFF WBC: CPT | Performed by: NURSE PRACTITIONER

## 2023-02-14 PROCEDURE — 97162 PT EVAL MOD COMPLEX 30 MIN: CPT

## 2023-02-14 PROCEDURE — 36415 COLL VENOUS BLD VENIPUNCTURE: CPT | Performed by: NURSE PRACTITIONER

## 2023-02-14 PROCEDURE — 63600175 PHARM REV CODE 636 W HCPCS: Mod: JG | Performed by: INTERNAL MEDICINE

## 2023-02-14 PROCEDURE — 80053 COMPREHEN METABOLIC PANEL: CPT | Performed by: NURSE PRACTITIONER

## 2023-02-14 PROCEDURE — P9047 ALBUMIN (HUMAN), 25%, 50ML: HCPCS | Mod: JG | Performed by: INTERNAL MEDICINE

## 2023-02-14 PROCEDURE — 97530 THERAPEUTIC ACTIVITIES: CPT

## 2023-02-14 PROCEDURE — 97116 GAIT TRAINING THERAPY: CPT

## 2023-02-14 PROCEDURE — 84300 ASSAY OF URINE SODIUM: CPT | Performed by: INTERNAL MEDICINE

## 2023-02-14 RX ADMIN — MIDODRINE HYDROCHLORIDE 10 MG: 5 TABLET ORAL at 08:02

## 2023-02-14 RX ADMIN — RIFAXIMIN 550 MG: 550 TABLET ORAL at 08:02

## 2023-02-14 RX ADMIN — SODIUM BICARBONATE 1300 MG: 650 TABLET ORAL at 08:02

## 2023-02-14 RX ADMIN — ALBUMIN (HUMAN) 25 G: 5 SOLUTION INTRAVENOUS at 05:02

## 2023-02-14 RX ADMIN — EZETIMIBE 10 MG: 10 TABLET ORAL at 08:02

## 2023-02-14 RX ADMIN — LACTULOSE 20 G: 20 SOLUTION ORAL at 08:02

## 2023-02-14 NOTE — SUBJECTIVE & OBJECTIVE
Review of Systems   Constitutional: Negative.    HENT: Negative.     Eyes: Negative.    Respiratory: Negative.     Cardiovascular: Negative.    Gastrointestinal: Negative.    Genitourinary: Negative.    Musculoskeletal: Negative.    Skin: Negative.    Neurological: Negative.    Psychiatric/Behavioral: Negative.       Past Medical History:   Diagnosis Date    Arm fracture, right 2022, 2010    CAD (coronary artery disease)     Diabetes     GERD (gastroesophageal reflux disease)     HTN (hypertension)     Hyperlipidemia        Past Surgical History:   Procedure Laterality Date    CATARACT EXTRACTION      COLONOSCOPY      COLONOSCOPY N/A 7/18/2016    Procedure: COLONOSCOPY;  Surgeon: Bryon Hickey MD;  Location: [facility] ENDO;  Service: Endoscopy;  Laterality: N/A;    COLONOSCOPY N/A 10/6/2020    Procedure: COLONOSCOPY;  Surgeon: Kait Isaacs MD;  Location: [facility] ENDO;  Service: Endoscopy;  Laterality: N/A;    CORONARY STENT PLACEMENT      ELBOW SURGERY      ESOPHAGOGASTRODUODENOSCOPY N/A 10/6/2020    Procedure: ESOPHAGOGASTRODUODENOSCOPY (EGD);  Surgeon: Kait Isaacs MD;  Location: [facility] ENDO;  Service: Endoscopy;  Laterality: N/A;    FLUOROSCOPY N/A 1/30/2023    Procedure: FLUOROSCOPY/TIPS;  Surgeon: Pedro Luis Wesley MD;  Location: [facility] CATH LAB;  Service: General;  Laterality: N/A;    HERNIA REPAIR      2022    TONSILLECTOMY      VASECTOMY         Family history of liver disease: N/a    Review of patient's allergies indicates:   Allergen Reactions    Statins-hmg-coa reductase inhibitors Other (See Comments)     muscle aches, joint pain             Tobacco Use    Smoking status: Former     Packs/day: 1.00     Years: 40.00     Pack years: 40.00     Types: Cigarettes    Smokeless tobacco: Never    Tobacco comments:     quit 3 years ago   Substance and Sexual Activity    Alcohol use: No    Drug use: No    Sexual activity: Not on file       Medications Prior to Admission   Medication Sig Dispense Refill Last Dose     busPIRone (BUSPAR) 15 MG tablet Take 15 mg by mouth 2 (two) times daily as needed (anxiety).   2/12/2023    carvediloL (COREG) 6.25 MG tablet Take 6.25 mg by mouth 2 (two) times daily.   2/12/2023    ezetimibe (ZETIA) 10 mg tablet Take 10 mg by mouth once daily.   2/12/2023    famotidine (PEPCID) 40 MG tablet Take 40 mg by mouth once daily.   2/12/2023    furosemide (LASIX) 40 MG tablet Take 1 tablet (40 mg total) by mouth once daily at 6am. 30 tablet 11 2/12/2023    metformin (GLUCOPHAGE) 1000 MG tablet Take 1,000 mg by mouth 2 (two) times daily with meals.   2/12/2023    multivitamin capsule Take 1 capsule by mouth once daily.   2/12/2023    omega 3-dha-epa-fish oil (FISH OIL) 1,200 (144-216) mg Cap Take 2,400 mg by mouth 2 (two) times a day.   2/12/2023    spironolactone (ALDACTONE) 100 MG tablet Take 1 tablet (100 mg total) by mouth 2 (two) times daily. 60 tablet 6 2/12/2023    trazodone (DESYREL) 100 MG tablet Take 100 mg by mouth every evening.   2/12/2023    zinc sulfate (ZINCATE) 50 mg zinc (220 mg) capsule Take 220 mg by mouth.   2/12/2023    lactulose (CHRONULAC) 10 gram/15 mL solution Take 30 mLs (20 g total) by mouth 3 (three) times daily. 250 mL 11 Unknown at PRN       Objective:     Vital Signs (Most Recent):  Temp: 97.7 °F (36.5 °C) (02/13/23 1919)  Pulse: 80 (02/13/23 1919)  Resp: 18 (02/13/23 1919)  BP: 111/60 (02/13/23 1919)  SpO2: 100 % (02/13/23 1919) Vital Signs (24h Range):  Temp:  [97.7 °F (36.5 °C)-98.2 °F (36.8 °C)] 97.7 °F (36.5 °C)  Pulse:  [] 80  Resp:  [13-18] 18  SpO2:  [99 %-100 %] 100 %  BP: (100-139)/(50-63) 111/60     Weight: 78.4 kg (172 lb 13.5 oz) (02/13/23 1700)  Body mass index is 23.44 kg/m².    Physical Exam  Vitals reviewed.   Constitutional:       General: He is not in acute distress.     Appearance: He is well-developed.   HENT:      Head: Normocephalic and atraumatic.      Mouth/Throat:      Pharynx: No oropharyngeal exudate.   Eyes:      General: No scleral  icterus.        Right eye: No discharge.         Left eye: No discharge.      Conjunctiva/sclera: Conjunctivae normal.      Pupils: Pupils are equal, round, and reactive to light.   Pulmonary:      Effort: Pulmonary effort is normal. No respiratory distress.      Breath sounds: Normal breath sounds. No wheezing.   Abdominal:      General: There is no distension.      Palpations: Abdomen is soft.      Tenderness: There is no abdominal tenderness.   Neurological:      Mental Status: He is alert and oriented to person, place, and time.   Psychiatric:         Behavior: Behavior normal.       MELD-Na score: 18 at 1/31/2023  5:56 AM  MELD score: 14 at 1/31/2023  5:56 AM  Calculated from:  Serum Creatinine: 1.3 mg/dL at 1/31/2023  5:56 AM  Serum Sodium: 132 mmol/L at 1/31/2023  5:56 AM  Total Bilirubin: 1.3 mg/dL at 1/31/2023  5:56 AM  INR(ratio): 1.4 at 1/30/2023 12:30 PM  Age: 77 years    Significant Labs:  CBC:   Recent Labs   Lab 02/13/23  1119   WBC 6.15   RBC 3.06*   HGB 9.0*   HCT 27.2*   *     CMP:   Recent Labs   Lab 02/13/23  1119   *   CALCIUM 8.3*   ALBUMIN 2.3*   PROT 5.8*   *   K 4.7   CO2 13*      BUN 54*   CREATININE 2.3*   ALKPHOS 121   ALT 31   AST 33   BILITOT 1.0     Coagulation: No results for input(s): PT, INR, APTT in the last 168 hours.    Significant Imaging:  Labs: Reviewed

## 2023-02-14 NOTE — PLAN OF CARE
Pt admitted from ED to telemetry room 213. Report received from Trish. Pt AAOx4. NSR on the heart monitor. On room air; tolerating well. Pt voids spontaneously without difficulty. Pt turned and repositioned with use of pillows. 20 G PIV in right wrist CDI with no redness, swelling, warmth, or drainage saline locked. Bed low, wheels locked, alarms audible. Plan of care reviewed. Vital signs monitored. Fall precautions in place. Pain assessed. Safety promoted. Infection risks reduced.

## 2023-02-14 NOTE — ASSESSMENT & PLAN NOTE
Decomp with HE that may be worsened by TIPS. Patient is not a transplant candidate 2/2 cormorbidities.  -Monitor MELD score  -No significant volume issues at this time  -Admitted with HE but none apparent at this time    MELD-Na score: 18 at 1/31/2023  5:56 AM  MELD score: 14 at 1/31/2023  5:56 AM  Calculated from:  Serum Creatinine: 1.3 mg/dL at 1/31/2023  5:56 AM  Serum Sodium: 132 mmol/L at 1/31/2023  5:56 AM  Total Bilirubin: 1.3 mg/dL at 1/31/2023  5:56 AM  INR(ratio): 1.4 at 1/30/2023 12:30 PM  Age: 77 years

## 2023-02-14 NOTE — CONSULTS
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 He was also found to have a worsening Cr. He denies any confusion at this time. He is resting comfortably in bed. No significant volume issues or GIB.      Review of Systems   Constitutional: Negative.    HENT: Negative.     Eyes: Negative.    Respiratory: Negative.     Cardiovascular: Negative.    Gastrointestinal: Negative.    Genitourinary: Negative.    Musculoskeletal: Negative.    Skin: Negative.    Neurological: Negative.    Psychiatric/Behavioral: Negative.       Past Medical History:   Diagnosis Date    Arm fracture, right 2022, 2010    CAD (coronary artery disease)     Diabetes     GERD (gastroesophageal reflux disease)     HTN (hypertension)     Hyperlipidemia        Past Surgical History:   Procedure Laterality Date    CATARACT EXTRACTION      COLONOSCOPY      COLONOSCOPY N/A 7/18/2016    Procedure: COLONOSCOPY;  Surgeon: Bryon Hickey MD;  Location: [facility];  Service: Endoscopy;  Laterality: N/A;    COLONOSCOPY N/A 10/6/2020    Procedure: COLONOSCOPY;  Surgeon: Kait Isaacs MD;  Location: [facility];  Service: Endoscopy;  Laterality: N/A;    CORONARY STENT PLACEMENT      ELBOW SURGERY      ESOPHAGOGASTRODUODENOSCOPY N/A 10/6/2020    Procedure: ESOPHAGOGASTRODUODENOSCOPY (EGD);  Surgeon: Kait Isaacs MD;  Location: [facility];  Service: Endoscopy;  Laterality: N/A;    FLUOROSCOPY N/A 1/30/2023    Procedure: FLUOROSCOPY/TIPS;  Surgeon: Pedro Luis Wesley MD;  Location: [facility] CATH LAB;  Service: General;  Laterality: N/A;    HERNIA  REPAIR      2022    TONSILLECTOMY      VASECTOMY         Family history of liver disease: N/a    Review of patient's allergies indicates:   Allergen Reactions    Statins-hmg-coa reductase inhibitors Other (See Comments)     muscle aches, joint pain             Tobacco Use    Smoking status: Former     Packs/day: 1.00     Years: 40.00     Pack years: 40.00     Types: Cigarettes    Smokeless tobacco: Never    Tobacco comments:     quit 3 years ago   Substance and Sexual Activity    Alcohol use: No    Drug use: No    Sexual activity: Not on file       Medications Prior to Admission   Medication Sig Dispense Refill Last Dose    busPIRone (BUSPAR) 15 MG tablet Take 15 mg by mouth 2 (two) times daily as needed (anxiety).   2/12/2023    carvediloL (COREG) 6.25 MG tablet Take 6.25 mg by mouth 2 (two) times daily.   2/12/2023    ezetimibe (ZETIA) 10 mg tablet Take 10 mg by mouth once daily.   2/12/2023    famotidine (PEPCID) 40 MG tablet Take 40 mg by mouth once daily.   2/12/2023    furosemide (LASIX) 40 MG tablet Take 1 tablet (40 mg total) by mouth once daily at 6am. 30 tablet 11 2/12/2023    metformin (GLUCOPHAGE) 1000 MG tablet Take 1,000 mg by mouth 2 (two) times daily with meals.   2/12/2023    multivitamin capsule Take 1 capsule by mouth once daily.   2/12/2023    omega 3-dha-epa-fish oil (FISH OIL) 1,200 (144-216) mg Cap Take 2,400 mg by mouth 2 (two) times a day.   2/12/2023    spironolactone (ALDACTONE) 100 MG tablet Take 1 tablet (100 mg total) by mouth 2 (two) times daily. 60 tablet 6 2/12/2023    trazodone (DESYREL) 100 MG tablet Take 100 mg by mouth every evening.   2/12/2023    zinc sulfate (ZINCATE) 50 mg zinc (220 mg) capsule Take 220 mg by mouth.   2/12/2023    lactulose (CHRONULAC) 10 gram/15 mL solution Take 30 mLs (20 g total) by mouth 3 (three) times daily. 250 mL 11 Unknown at PRN       Objective:     Vital Signs (Most Recent):  Temp: 97.7 °F (36.5 °C) (02/13/23 1919)  Pulse: 80  (02/13/23 1919)  Resp: 18 (02/13/23 1919)  BP: 111/60 (02/13/23 1919)  SpO2: 100 % (02/13/23 1919) Vital Signs (24h Range):  Temp:  [97.7 °F (36.5 °C)-98.2 °F (36.8 °C)] 97.7 °F (36.5 °C)  Pulse:  [] 80  Resp:  [13-18] 18  SpO2:  [99 %-100 %] 100 %  BP: (100-139)/(50-63) 111/60     Weight: 78.4 kg (172 lb 13.5 oz) (02/13/23 1700)  Body mass index is 23.44 kg/m².    Physical Exam  Vitals reviewed.   Constitutional:       General: He is not in acute distress.     Appearance: He is well-developed.   HENT:      Head: Normocephalic and atraumatic.      Mouth/Throat:      Pharynx: No oropharyngeal exudate.   Eyes:      General: No scleral icterus.        Right eye: No discharge.         Left eye: No discharge.      Conjunctiva/sclera: Conjunctivae normal.      Pupils: Pupils are equal, round, and reactive to light.   Pulmonary:      Effort: Pulmonary effort is normal. No respiratory distress.      Breath sounds: Normal breath sounds. No wheezing.   Abdominal:      General: There is no distension.      Palpations: Abdomen is soft.      Tenderness: There is no abdominal tenderness.   Neurological:      Mental Status: He is alert and oriented to person, place, and time.   Psychiatric:         Behavior: Behavior normal.       MELD-Na score: 18 at 1/31/2023  5:56 AM  MELD score: 14 at 1/31/2023  5:56 AM  Calculated from:  Serum Creatinine: 1.3 mg/dL at 1/31/2023  5:56 AM  Serum Sodium: 132 mmol/L at 1/31/2023  5:56 AM  Total Bilirubin: 1.3 mg/dL at 1/31/2023  5:56 AM  INR(ratio): 1.4 at 1/30/2023 12:30 PM  Age: 77 years    Significant Labs:  CBC:   Recent Labs   Lab 02/13/23  1119   WBC 6.15   RBC 3.06*   HGB 9.0*   HCT 27.2*   *     CMP:   Recent Labs   Lab 02/13/23  1119   *   CALCIUM 8.3*   ALBUMIN 2.3*   PROT 5.8*   *   K 4.7   CO2 13*      BUN 54*   CREATININE 2.3*   ALKPHOS 121   ALT 31   AST 33   BILITOT 1.0     Coagulation: No results for input(s): PT, INR, APTT in the last 168  hours.    Significant Imaging:  Labs: Reviewed    Assessment/Plan:     * Hepatic encephalopathy  None apparent at this time. HE can be worsened by TIPs but patient seems to be doing well at this time.  -Eval for infection and bleedng, but nothing obvious at this time  -Continue on lactulose and rifaximin  -If persistent issues or admits then would have to consider downsizing the TIPS    FELICIA (acute kidney injury)  Unclear etiology needs further evaluation  -Give albumin challenge  -Check urine sodium  -Monitor labs    Decompensated hepatic cirrhosis  Decomp with HE that may be worsened by TIPS. Patient is not a transplant candidate 2/2 cormorbidities.  -Monitor MELD score  -No significant volume issues at this time  -Admitted with HE but none apparent at this time    MELD-Na score: 18 at 1/31/2023  5:56 AM  MELD score: 14 at 1/31/2023  5:56 AM  Calculated from:  Serum Creatinine: 1.3 mg/dL at 1/31/2023  5:56 AM  Serum Sodium: 132 mmol/L at 1/31/2023  5:56 AM  Total Bilirubin: 1.3 mg/dL at 1/31/2023  5:56 AM  INR(ratio): 1.4 at 1/30/2023 12:30 PM  Age: 77 years          Thank you for your consult. I will follow-up with patient. Please contact us if you have any additional questions.    Bree Wells MD  Hepatology  [facility] Tele[facility] ([facility])

## 2023-02-14 NOTE — PLAN OF CARE
OT ki completed. Sup>sit SBA, sit>stand CGA, functional mobility x60ft x2 trials with RW and CGA, step>pivot to bedside chair with CGA. Recommending HHOT with BSC and shower chair at d/c.

## 2023-02-14 NOTE — PLAN OF CARE
[facility] Tele[facility] (Hospital)  Discharge Final Note    Primary Care Provider: Jim [facility] - [facility]    Expected Discharge Date: 2/14/2023    Final Discharge Note (most recent)       Final Note - 02/14/23 1349          Final Note    Assessment Type Final Discharge Note     Anticipated Discharge Disposition Home-Health Care [facility]     Hospital Resources/Appts/Education Provided Appointments scheduled and added to AVS        Post-Acute Status    Discharge Delays None known at this time                     Important Message from Medicare             Contact Info       Litzy Goodman MD   Specialty: Gastroenterology, Hepatology    [address]  [place] [address]   Phone: [phone]       Next Steps: Follow up in 1 week(s)

## 2023-02-14 NOTE — HPI
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 He was also found to have a worsening Cr. He denies any confusion at this time. He is resting comfortably in bed. No significant volume issues or GIB.

## 2023-02-14 NOTE — ASSESSMENT & PLAN NOTE
Unclear etiology needs further evaluation  -Give albumin challenge  -Check urine sodium  -Monitor labs

## 2023-02-14 NOTE — PLAN OF CARE
EVAL AND TX COMPLETED: facilitated bed mobility with SBA, transfers with CGA. Ambulated CGA, 50 ft x 2 with RW. Recommend HHPT with use of RW (already owned)

## 2023-02-14 NOTE — PLAN OF CARE
Okay to discharge pt now per MD.   MAX Abad went over discharge instructions with patient.   Stressed importance of making and keeping all follow ups as well as making prescribed medication changes.   Paper prescription delivered to pt bedside prior to discharge.  Patient verbalized understanding and has had all questions in regards to discharge answered to satisfaction.  IV removed without complications by MAX Abad.  Telemetry box removed and returned to monitor tech by MAX Abad.  Patient transported via wheelchair to personal transportation at main entrance.

## 2023-02-14 NOTE — ASSESSMENT & PLAN NOTE
None apparent at this time. HE can be worsened by TIPs but patient seems to be doing well at this time.  -Eval for infection and bleedng, but nothing obvious at this time  -Continue on lactulose and rifaximin  -If persistent issues or admits then would have to consider downsizing the TIPS

## 2023-02-14 NOTE — PT/OT/SLP EVAL
"Occupational Therapy Evaluation and Treatment    Name: Korin Vivas  MRN: [id number]  Admitting Diagnosis: Hepatic encephalopathy  Recent Surgery: * No surgery found *      Recommendations:     Discharge Recommendations: home health OT (24/7 SPV and A)  Level of Assistance Recommended: 24 hours light assistance  Discharge Equipment Recommendations: shower chair, bedside commode  Barriers to discharge: None    Assessment:     Korin Vivas is a 77 y.o. male with a medical diagnosis of Hepatic encephalopathy. He presents with performance deficits affecting function including weakness, impaired endurance, impaired self care skills, impaired functional mobility, impaired balance, impaired cardiopulmonary response to activity, decreased safety awareness, impaired cognition, impaired fine motor.    Rehab Prognosis: Fair; patient would benefit from acute OT services to address these deficits and reach maximum level of function.    Plan:     Patient to be seen 2 x/week to address the above listed problems via self-care/home management, therapeutic activities, therapeutic exercises  Plan of Care Expires: 02/28/23  Plan of Care Reviewed with: patient, spouse    Subjective     Chief Complaint: Reported "I am doing fine."  Patient Comments/Goals: return to PLOF  Pain/Comfort:  Pain Rating 1: 0/10    Social History:  Living Environment: Patient lives with their spouse in a single story house with number of outside stair(s): 0 .  Prior Level of Function: Prior to initial admission, patient was independent with community distance ambulation and ADLs. Has required A of wife for ADLs and has been limited to HH distances since d/c home. Was set to begin HH therapy prior to readmission.  Roles and Routines: Patient was driving and retired prior to initial admission.  Equipment Used at Home: none  DME owned (not currently used): rolling walker  Assistance Upon Discharge: significant other    Objective:     Communicated with EPIC prior " to session. Patient found supine with peripheral IV, telemetry, bed alarm upon OT entry to room.    General Precautions: Standard, fall   Orthopedic Precautions:N/A   Braces: N/A  Respiratory Status: Room air    Occupational Performance    Gait belt applied - Yes    Bed Mobility:  Supine to sit from right side of bed with stand by assistance    Functional Mobility/Transfers:  Sit <> Stand Transfer with contact guard assistance with rolling walker  Bed <> Chair Transfer using Step Transfer technique with contact guard assistance with rolling walker  Functional Mobility: Patient completed x60ft x2 trials functional mobility with RW and CGA to increase dynamic standing balance and activity tolerance needed for ADL completion.    Activities of Daily Living:  Grooming: set up assistance .  Lower Body Dressing: moderate assistance donning socks    Cognitive/Visual Perceptual:  Cognitive/Psychosocial Skills:    -       Oriented to: Person, Place  -       Follows Commands/attention:Easily distracted and Follows one-step commands  -       Safety awareness/insight to disability: impaired   -       Mood/Affect/Coping skills/emotional control: Appropriate to situation    Physical Exam:  Balance:    -       Sitting: supervision  -       Standing: contact guard assistance  Upper Extremity Range of Motion:    -       Right Upper Extremity: WFL  -       Left Upper Extremity: WFL  Upper Extremity Strength:    -       Right Upper Extremity: Deficits: grossly 4/5  -       Left Upper Extremity: Deficits: grossly 4/5   Strength:    -       Right Upper Extremity: Deficits: fair  -       Left Upper Extremity: Deficits: fair    AMPAC 6 Click ADL:  AMPAC Total Score: 18    Treatment & Education:  Therapist provided facilitation and instruction of proper body mechanics, energy conservation, and fall prevention strategies during tasks listed above.  Patient educated on role of OT, POC and goals for therapy  Patient educated on importance  of OOB activities with staff member assistance and sitting OOB majority of the day.   Encouraged completion of B UE AROM therex throughout the day to increase functional strength and activity tolerance needed for ADL completion.  Wife present for treatment and stated understanding to education. Patient will require reinforcement    Patient left up in chair with all lines intact, call button in reach, chair alarm on, and spouse present.    GOALS:   Multidisciplinary Problems       Occupational Therapy Goals          Problem: Occupational Therapy    Goal Priority Disciplines Outcome Interventions   Occupational Therapy Goal     OT, PT/OT     Description: Goals to be met by: 2/28/23     Patient will increase functional independence with ADLs by performing:    Toileting from toilet with Stand-by Assistance for hygiene and clothing management.   Toilet transfer to toilet with Stand-by Assistance.  Increased functional strength in B UE grossly by 1/2 MM grade.                         History:     Past Medical History:   Diagnosis Date    Arm fracture, right 2022, 2010    CAD (coronary artery disease)     Diabetes     GERD (gastroesophageal reflux disease)     HTN (hypertension)     Hyperlipidemia          Past Surgical History:   Procedure Laterality Date    CATARACT EXTRACTION      COLONOSCOPY      COLONOSCOPY N/A 7/18/2016    Procedure: COLONOSCOPY;  Surgeon: Bryon Hickey MD;  Location: [facility];  Service: Endoscopy;  Laterality: N/A;    COLONOSCOPY N/A 10/6/2020    Procedure: COLONOSCOPY;  Surgeon: Kait Isaacs MD;  Location: [facility];  Service: Endoscopy;  Laterality: N/A;    CORONARY STENT PLACEMENT      ELBOW SURGERY      ESOPHAGOGASTRODUODENOSCOPY N/A 10/6/2020    Procedure: ESOPHAGOGASTRODUODENOSCOPY (EGD);  Surgeon: Kait Isaacs MD;  Location: [facility];  Service: Endoscopy;  Laterality: N/A;    FLUOROSCOPY N/A 1/30/2023    Procedure: FLUOROSCOPY/TIPS;  Surgeon: Pedro Luis Wesley MD;  Location:  [facility] CATH LAB;  Service: General;  Laterality: N/A;    HERNIA REPAIR      2022    TONSILLECTOMY      VASECTOMY         Time Tracking:     OT Date of Treatment: 02/14/23  OT Start Time: 1025  OT Stop Time: 1050  OT Total Time (min): 25 min    Billable Minutes: Evaluation 15 and Therapeutic Activity 10    2/14/2023

## 2023-02-14 NOTE — PT/OT/SLP EVAL
"Physical Therapy Evaluation    Patient Name:  Korin Vivas   MRN:  [id number]    Recommendations:     Discharge Recommendations: home health PT   Discharge Equipment Recommendations: shower chair, bedside commode   Barriers to discharge: None    Assessment:     Korin Vivas is a 77 y.o. male admitted with a medical diagnosis of Hepatic encephalopathy.  He presents with the following impairments/functional limitations: weakness, impaired endurance, impaired functional mobility, gait instability, impaired balance, decreased safety awareness, decreased lower extremity function, pain which limits safe functional mobility and increases risk of falls as well as caregiver burden.    Rehab Prognosis: Good; patient would benefit from acute skilled PT services to address these deficits and reach maximum level of function.    Recent Surgery: * No surgery found *      Plan:     During this hospitalization, patient to be seen 3 x/week to address the identified rehab impairments via gait training, therapeutic activities, therapeutic exercises, neuromuscular re-education and progress toward the following goals:    Plan of Care Expires:  02/28/23    Subjective     Chief Complaint:  hepatic encephalopathy  Patient/Family Comments/goals: to go home/get better  Pain/Comfort:  Pain Rating 1: 0/10    Patients cultural, spiritual, Uatsdin conflicts given the current situation: no    Living Environment:  Pt lives with wife in [facility] with 1 step at entry.   Prior to admission, patients level of function was independent for ADLs and mobility. Wife reports that pt used furniture/walls to steady with mobility.  Equipment used at home: none, despite recently receiving RW "he used it for a few days then stopped."   DME owned (not currently used): rolling walker.  Upon discharge, patient will have assistance from family.    Objective:     Communicated with nursing and performed chart review via epic prior to session.  Patient found supine with " peripheral IV, telemetry  upon PT entry to room.    General Precautions: Standard, fall  Orthopedic Precautions:N/A   Braces: N/A  Respiratory Status: Room air    Exams:  Cognitive Exam:  Patient is oriented but minimal verbalizations throughout session, wife provided subjective history  Gross Motor Coordination:  WFL  RLE ROM: WFL  RLE Strength: WFL  LLE ROM: WFL  LLE Strength: WFL    Functional Mobility:  Bed Mobility:     Rolling Right: stand by assistance  Scooting: stand by assistance  Supine to Sit: stand by assistance  Tolerated sitting EOB x 3-5 mins  Transfers:     Sit to Stand:  contact guard assistance with rolling walker  Bed to Chair: contact guard assistance with  rolling walker  using  Stand Pivot  Gait: 50ft x 2 CGA with RW, demonstrates slow pace, narrow JULIANE, decreased step length and foot clearance overall unsteadiness on feet  Balance: fair dynamic standing balance      AM-PAC 6 CLICK MOBILITY  Total Score:16       Treatment & Education:  Educated pt on benefits of consistent participation in PT services to meet functional goals. Educated pt on seated therex to promote strength and joint mobility. Exercises included AP, LAQ, marching x 10-15 reps. Educated to perform exercises intermittently throughout day to tolerance. Educated pt on importance of sitting OOB to promote endurance and overall activity tolerance. Educated pt on call don't fall policy and use of call button to alert nursing staff of needs.  Pt expressed understanding.     Patient left up in chair with all lines intact, call button in reach, nursing notified, and wife present.    GOALS:   Multidisciplinary Problems       Physical Therapy Goals          Problem: Physical Therapy    Goal Priority Disciplines Outcome Goal Variances Interventions   Physical Therapy Goal     PT, PT/OT      Description: Pt will perform bed mobility independently in order to participate in EOB activity.  Pt will perform transfers independently in order to  participate in OOB activity.   Pt will ambulate 150ft mod I with LRAD in order to participate in daily tasks.                         History:     Past Medical History:   Diagnosis Date    Arm fracture, right 2022, 2010    CAD (coronary artery disease)     Diabetes     GERD (gastroesophageal reflux disease)     HTN (hypertension)     Hyperlipidemia        Past Surgical History:   Procedure Laterality Date    CATARACT EXTRACTION      COLONOSCOPY      COLONOSCOPY N/A 7/18/2016    Procedure: COLONOSCOPY;  Surgeon: Bryon Hickey MD;  Location: [facility] ENDO;  Service: Endoscopy;  Laterality: N/A;    COLONOSCOPY N/A 10/6/2020    Procedure: COLONOSCOPY;  Surgeon: Kait Isaacs MD;  Location: [facility] ENDO;  Service: Endoscopy;  Laterality: N/A;    CORONARY STENT PLACEMENT      ELBOW SURGERY      ESOPHAGOGASTRODUODENOSCOPY N/A 10/6/2020    Procedure: ESOPHAGOGASTRODUODENOSCOPY (EGD);  Surgeon: Kait Isaacs MD;  Location: [facility] ENDO;  Service: Endoscopy;  Laterality: N/A;    FLUOROSCOPY N/A 1/30/2023    Procedure: FLUOROSCOPY/TIPS;  Surgeon: Pedro Luis Wesley MD;  Location: [facility] CATH LAB;  Service: General;  Laterality: N/A;    HERNIA REPAIR      2022    TONSILLECTOMY      VASECTOMY         Time Tracking:     PT Received On: 02/14/23  PT Start Time: 1030     PT Stop Time: 1054  PT Total Time (min): 24 min     Billable Minutes: Evaluation 10 and Gait Training 14      02/14/2023

## 2023-02-14 NOTE — PLAN OF CARE
[facility] - Telemetry (Hospital)  Discharge Assessment    Primary Care Provider: [facility]     Discharge Assessment (most recent)       BRIEF DISCHARGE ASSESSMENT - 02/14/23 4797          Discharge Planning    Assessment Type Discharge Planning Brief Assessment     Resource/Environmental Concerns none     Support Systems Spouse/significant other     Current Living Arrangements home     Patient/Family Anticipates Transition to home with family     Patient/Family Anticipated Services at Transition home health care     DME Needed Upon Discharge  none     Discharge Plan A Home with family;Home Health

## 2023-02-15 ENCOUNTER — OUTPATIENT CASE MANAGEMENT (OUTPATIENT)
Dept: ADMINISTRATIVE | Facility: OTHER | Age: 78
End: 2023-02-15
Payer: MEDICARE

## 2023-02-15 NOTE — HOSPITAL COURSE
Patient was admitted for hepatic encephalopathy. Ammonia levels were slightly elevated. He was continued on lactulose and xifaxin. Hepatology consulted who recommended continuing current therapy. Mentation was at baseline and patient discharged home with outpatient f/u with hepatology.

## 2023-02-15 NOTE — DISCHARGE SUMMARY
[facility] - Telemetry ([facility] Medicine  Discharge Summary      Patient Name: Korin Vivas  MRN: [id number]  [facility]: [id number]  Patient Class: OP- Observation  Admission Date: 2/13/2023  Hospital Length of Stay: 0 days  Discharge Date and Time: 2/14/2023  3:18 PM  Attending Physician: No att. providers found   Discharging Provider: Terry Robledo MD  Primary Care Provider: [facility] Dept    Primary Care Team: [facility] MEDICINE A    HPI:   Mr. Vivas is a 76 yo male with decompensated MENDEZ cirrhosis, recurrent ascites, CAD, HTN, DM, HLD and recent paracentesis and TIPS procedure per IR on 1/30.  He was recently hospitalized for hepatic encephalopathy/decompensated liver cirrhosis from 2/5-2/8 at [facility].  Per wife, patient was doing well last week but over the last few days is weaker, not eating/drinking as much and more confused.  This am he was also very agitated so she called EMS who bought him to the ED.  Wife states he had a large BM yesterday and a very small one this am.  Symptoms are constant and moderate in severity, no relieving or exacerbating factors, associated s/sx fatigue and lethargy.  In the ED, lab work notable for creatinine 2.3 (baseline 1.3-1.4), , , platelets 136.  Patient will be admitted to observation.    Code status, Full  Surrogate decision maker wife Jessica      * No surgery found *      Hospital Course:   Patient was admitted for hepatic encephalopathy. Ammonia levels were slightly elevated. He was continued on lactulose and xifaxin. Hepatology consulted who recommended continuing current therapy. Mentation was at baseline and patient discharged home with outpatient f/u with hepatology.        Goals of Care Treatment Preferences:  Code Status: DNR      Consults:     * Hepatic encephalopathy  Recent TIPS procedure per Dr. Wesley on 1/30; completed 10 day course of cipro; today was planned for follow up liver doppler ultrasound as outpatient  - ammonia 70   -  lactulose TID, monitor BMs closely  - neuro checks q 4  - hepatology consulted, appreciate assistance  - repeat ammonia level in am  - US ab doppler to eval TIPS ordered, will follow-up results         Final Active Diagnoses:    Diagnosis Date Noted POA    PRINCIPAL PROBLEM:  Hepatic encephalopathy [K76.82] 02/05/2023 Yes    FELICIA (acute kidney injury) [N17.9] 02/13/2023 Unknown    Metabolic acidosis [E87.20] 02/07/2023 Yes    Decompensated hepatic cirrhosis [K72.90, K74.60] 01/30/2023 Yes     Chronic    Gastroesophageal reflux disease [K21.9] 11/14/2022 Yes    Hypertension [I10] 11/14/2022 Yes    Mixed hyperlipidemia [E78.2] 11/14/2022 Yes    Type 2 diabetes mellitus without retinopathy [E11.9] 11/14/2022 Yes    Thrombocytopenia [D69.6] 11/14/2022 Unknown      Problems Resolved During this Admission:       Discharged Condition: good    Disposition: Home or Self Care    Follow Up:   Follow-up Information     Litzy Goodman MD Follow up in 1 week(s).    Specialties: Gastroenterology, Hepatology  Contact information:  [address]  [place] [address] [phone]                       Patient Instructions:   No discharge procedures on file.    Significant Diagnostic Studies: Labs:   BMP:   Recent Labs   Lab 02/14/23  0445   *      K 4.1      CO2 15*   BUN 42*   CREATININE 1.9*   CALCIUM 8.1*    and CBC   Recent Labs   Lab 02/14/23  0445   WBC 5.49   HGB 8.2*   HCT 24.4*   *       Pending Diagnostic Studies:     None         Medications:  Reconciled Home Medications:      Medication List      CONTINUE taking these medications    busPIRone 15 MG tablet  Commonly known as: BUSPAR  Take 15 mg by mouth 2 (two) times daily as needed (anxiety).     ezetimibe 10 mg tablet  Commonly known as: ZETIA  Take 10 mg by mouth once daily.     famotidine 40 MG tablet  Commonly known as: PEPCID  Take 40 mg by mouth once daily.     Fish OiL 1,200 (144-216) mg Cap  Generic drug: omega  3-dha-epa-fish oil  Take 2,400 mg by mouth 2 (two) times a day.     furosemide 40 MG tablet  Commonly known as: LASIX  Take 1 tablet (40 mg total) by mouth once daily at 6am.     lactulose 10 gram/15 mL solution  Commonly known as: CHRONULAC  Take 30 mLs (20 g total) by mouth 3 (three) times daily.     metFORMIN 1000 MG tablet  Commonly known as: GLUCOPHAGE  Take 1,000 mg by mouth 2 (two) times daily with meals.     multivitamin capsule  Take 1 capsule by mouth once daily.     rifAXIMin 550 mg Tab  Commonly known as: XIFAXAN  Take 1 tablet (550 mg total) by mouth 2 (two) times daily.     spironolactone 100 MG tablet  Commonly known as: ALDACTONE  Take 1 tablet (100 mg total) by mouth 2 (two) times daily.     traZODone 100 MG tablet  Commonly known as: DESYREL  Take 100 mg by mouth every evening.     zinc sulfate 50 mg zinc (220 mg) capsule  Commonly known as: ZINCATE  Take 220 mg by mouth.        STOP taking these medications    carvediloL 6.25 MG tablet  Commonly known as: COREG            Indwelling Lines/Drains at time of discharge:   Lines/Drains/Airways     None                 Time spent on the discharge of patient: 37 minutes         Terry Robledo MD  Department of Hospital Medicine  '[facility] - Telemetry ([facility]

## 2023-02-15 NOTE — PROGRESS NOTES
Outpatient Care Management  Patient Does Not Consent    Patient: Korin Vivas  MRN:  [id number]  Date of Service:  2/15/2023  Completed by:  Janet Zelaya RN    Chief Complaint   Patient presents with    OPCM RN First Assessment Attempt    Case Closure     Declined       Patient Summary     Program:  OPCM High Risk     Consent Received:  Decline            2/15/23 -  Wife reports he has been admitted to home health and feels his needs are being met with these services. Janet Zelaya RN

## 2023-02-15 NOTE — PHYSICIAN QUERY
PT Name: Korin Vivas  MR #: [id number]     DOCUMENTATION CLARIFICATION     CDS/: Tiesha Grande RN, CDI            Contact information:[email or web address]     This form is a permanent document in the medical record.    Query Date: February 15, 2023    By submitting this query, we are merely seeking further clarification of documentation.  Please utilize your independent clinical judgment when addressing the question(s) below.    The Medical Record contains the following:   Indicator Supporting Clinical Findings Location in Medical Record   x Kidney (Renal) Insufficiency Renal impairment and hyponatremia noted   HM PN 2/9    Kidney (Renal) Failure/Injury      Nephrotoxic Agents     x BUN/Creatinine           GFR Cr 1.3-->1.4-->1.7-->1.6  GFR 57-->52-->42-->45 Lab 2/6-10      Urine: Casts         Eosinophils     x Dehydration Low suspicion for shunt occlusion at this time, will defer CT abdomen with contrast especially with possible underlying dehydration and marginal renal function. GI PN 2/8    Nausea/Vomiting      Dialysis/CRRT     x Treatment Discussed need for gentle hydration for renal function and hyponatremia    Started on gentle hydration and midodrine by hepatology. If patient condition improves can discharge in AM    2/10/23  Doing well today. Kidney function improved  PN 2/9                Discharge summary 2/10   x Other Slightly tachycardic with normotension, will hold lactulose today, gentle hydration with normal saline and albumin, continue rifaximin.  Leukocytosis with mild elevation of neutrophils, procalcitonin  is negative, recommend workup to rule out infection, on oral ciprofloxacin. GI PN 2/8       [facility] approved diagnostic criteria for acute kidney injury is based on KDIGO criteria:    An increase in serum creatinine > 0.3mg/dl within 48 hours  OR  Increase in serum creatinine to > 1.5x baseline, which is known or presumed to have occurred within the prior 7 days  OR  Urine  volume <0.5 ml/kg/hr for 6 hours       The clinical guidelines noted above are only a system guideline. It does not replace the providers clinical judgment.     Provider, please specify the diagnosis or diagnoses associated with above clinical findings.     [    ] Unspecified Acute Kidney Failure/Injury     [ x   ] Acute Renal Insufficiency  - Consider if SCr rise is transient and normalizes quickly with no efforts at real resuscitation of vital signs and perfusion   [    ] Other (please specify): _______________________________     Please document in your progress notes daily for the duration of treatment until resolved and include in your discharge summary.    References:   KDIGO Clinical Practice Guideline for Acute Kidney Injury. (2012, March). Retrieved October 21, 2020, from https://kdigo.org/wp-content/uploads/2016/10/VZXXL-4946-SWM-Guideline-English.pdf    JAYNA Cazares MD, ANA Orozco MD, & CAROLINA Cloud MD. (1960). Renal medullary necrosis [Abstract]. The American Journal of Medicine, 29(1), 132-156. Doi:https://www.sciencedirect.com/science/article/abs/pii/1254668318295440    CAROLINA Combs MD, & RICARDO Guerra MD, MS. (2020, June 18). Definition and staging of chronic kidney disease in adults ([id number] ANA Grande MD, ScD & [id number] LEN Montes MD, MSc, Eds.). Retrieved October 21, 2020, from https://www.[employer]/contents/definition-and-staging-of-chronic-kidney-disease-in-adults?search=ckd%20staging&source=search_result&selectedTitle=1~150&usage_type=default&display_rank=1     MISAEL Hope MD, FACP. (2015, Margy 15). Acute kidney injury revisited. Retrieved October 21, 2020, from https://acphospitalist.org/archives/2015/06/coding-acute-kidney-injury.htm    BENEDICTO Reddy MD. (2019, July). Renal Cortical Necrosis. Retrieved October 21, 2020, from https://www.[employer]/professional/genitourinary-disorders/renovascular-disorders/renal-cortical-necrosis    Form No. 01980

## 2023-02-20 PROBLEM — K62.5 RECTAL BLEEDING: Status: RESOLVED | Noted: 2022-11-14 | Resolved: 2023-02-20

## 2023-02-27 ENCOUNTER — OFFICE VISIT (OUTPATIENT)
Dept: HEPATOLOGY | Facility: CLINIC | Age: 78
End: 2023-02-27
Payer: OTHER GOVERNMENT

## 2023-02-27 VITALS — HEIGHT: 72 IN | WEIGHT: 166 LBS | BODY MASS INDEX: 22.48 KG/M2 | HEART RATE: 70 BPM

## 2023-02-27 DIAGNOSIS — Z95.828 S/P TIPS (TRANSJUGULAR INTRAHEPATIC PORTOSYSTEMIC SHUNT): Primary | ICD-10-CM

## 2023-02-27 PROCEDURE — 99213 OFFICE O/P EST LOW 20 MIN: CPT | Mod: PBBFAC | Performed by: INTERNAL MEDICINE

## 2023-02-27 PROCEDURE — 99215 PR OFFICE/OUTPT VISIT, EST, LEVL V, 40-54 MIN: ICD-10-PCS | Mod: S$PBB,,, | Performed by: INTERNAL MEDICINE

## 2023-02-27 PROCEDURE — 99215 OFFICE O/P EST HI 40 MIN: CPT | Mod: S$PBB,,, | Performed by: INTERNAL MEDICINE

## 2023-02-27 PROCEDURE — 99999 PR PBB SHADOW E&M-EST. PATIENT-LVL III: CPT | Mod: PBBFAC,,, | Performed by: INTERNAL MEDICINE

## 2023-02-27 PROCEDURE — 99999 PR PBB SHADOW E&M-EST. PATIENT-LVL III: ICD-10-PCS | Mod: PBBFAC,,, | Performed by: INTERNAL MEDICINE

## 2023-02-27 NOTE — PROGRESS NOTES
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  He was hospitalized on May 31st for ascites, underwent paracentesis, reportedly also had episodes of hepatic encephalopathy.  Then he had incarcerated hernia repaired at [facility] and discharged from the hospital on June 18th.  Previously he had episodes of GI bleed, underwent an upper endoscopy with banding of esophageal varices, taken off of Plavix in 2019, since then no episodes of bleed per patient.  He was referred to us not liver Clinic for management of cirrhosis of liver          Interval history: 8/18/22  He was asked to come to clinic ASAP as he has been getting  paracentesis every week, says he gets breathless, stays on the bed for 2-3 days, feels fatigue. Has been removing 5-7 L every time.  He was asked to come 2 weeks after last appointment, however says he didn't have an appointment and didn't know about it. He had SBP in the past with encephalopathy that was improved with lactulose, now he is taking  lactulose almost daily.     9/1/22  Last paracentesis was on 08/18/2022.  He returned to the clinic with his wife to discuss the possibility of liver transplantation with a living donor as boyfriend of his daughter is willing to donate     12/8/2022  Requiring paracentesis every week, complains of extreme fatigue.  He is not following salt restriction and fluid restriction, also has been noncompliant with lactulose    2/27/2023  He is status post tips shunting on 01/30/2023.  Was hospitalized twice since then for hepatic encephalopathy.  Today came for post hospital discharge follow-up.  He is alert but appears sluggish, oriented to place, month, year, to himself, location and was able to say my name.  Wife reports fluctuating alertness and confusion at home, reportedly had a bowel movement in the kitchen  last week, got into the bathtub and had to call outside orders for assistance to help him get out of the tub.  Oral intake has been okay.  Abdominal distention significantly improved.     Review of Systems   Constitutional:  Positive for fatigue. Negative for fever and unexpected weight change.   Respiratory:  Positive for shortness of breath.    Gastrointestinal:  Positive for abdominal distention. Negative for abdominal pain, blood in stool, nausea and vomiting.   Musculoskeletal:  Positive for arthralgias. Negative for gait problem.   Skin:  Negative for pallor and rash.   Neurological:  Negative for dizziness.   Hematological:  Does not bruise/bleed easily.   Psychiatric/Behavioral:  Negative for confusion, hallucinations and sleep disturbance.      Objective:     Physical Exam  Vitals and nursing note reviewed.   Constitutional:       Appearance: Normal appearance.   Eyes:      General: No scleral icterus.  Cardiovascular:      Heart sounds: No murmur heard.  Pulmonary:      Effort: Pulmonary effort is normal.      Breath sounds: No wheezing, rhonchi or rales.   Abdominal:      General: Bowel sounds are normal. There is no distension.      Palpations: There is no mass.      Tenderness: There is no abdominal tenderness.   Musculoskeletal:         General: No tenderness.      Right lower leg: No edema.      Left lower leg: No edema.      Comments: LE edema improved. He is easily able to get up and walk.     Lymphadenopathy:      Cervical: No cervical adenopathy.   Skin:     Coloration: Skin is not jaundiced.      Findings: No bruising or rash.   Neurological:      Mental Status: He is alert and oriented to person, place, and time.      Coordination: Coordination normal.   Psychiatric:         Behavior: Behavior normal.       MELD-Na score: 20 at 2/14/2023  4:45 AM  MELD score: 19 at 2/14/2023  4:45 AM  Calculated from:  Serum Creatinine: 1.9 mg/dL at 2/14/2023  4:45 AM  Serum Sodium: 136 mmol/L at 2/14/2023  4:45  AM  Total Bilirubin: 1.0 mg/dL at 2/14/2023  4:45 AM  INR(ratio): 1.8 at 2/14/2023  4:45 AM  Age: 77 years    WBC   Date Value Ref Range Status   02/14/2023 5.49 3.90 - 12.70 K/uL Final     Hemoglobin   Date Value Ref Range Status   02/14/2023 8.2 (L) 14.0 - 18.0 g/dL Final     Hematocrit   Date Value Ref Range Status   02/14/2023 24.4 (L) 40.0 - 54.0 % Final     Platelets   Date Value Ref Range Status   02/14/2023 143 (L) 150 - 450 K/uL Final     BUN   Date Value Ref Range Status   02/14/2023 42 (H) 8 - 23 mg/dL Final     Creatinine   Date Value Ref Range Status   02/14/2023 1.9 (H) 0.5 - 1.4 mg/dL Final     Glucose   Date Value Ref Range Status   02/14/2023 147 (H) 70 - 110 mg/dL Final     Calcium   Date Value Ref Range Status   02/14/2023 8.1 (L) 8.7 - 10.5 mg/dL Final     Sodium   Date Value Ref Range Status   02/14/2023 136 136 - 145 mmol/L Final     Potassium   Date Value Ref Range Status   02/14/2023 4.1 3.5 - 5.1 mmol/L Final     Chloride   Date Value Ref Range Status   02/14/2023 110 95 - 110 mmol/L Final     AST   Date Value Ref Range Status   02/14/2023 28 10 - 40 U/L Final     ALT   Date Value Ref Range Status   02/14/2023 28 10 - 44 U/L Final     Alkaline Phosphatase   Date Value Ref Range Status   02/14/2023 119 55 - 135 U/L Final     Total Bilirubin   Date Value Ref Range Status   02/14/2023 1.0 0.1 - 1.0 mg/dL Final     Comment:     For infants and newborns, interpretation of results should be based  on gestational age, weight and in agreement with clinical  observations.    Premature Infant recommended reference ranges:  Up to 24 hours.............<8.0 mg/dL  Up to 48 hours............<12.0 mg/dL  3-5 days..................<15.0 mg/dL  6-29 days.................<15.0 mg/dL       Albumin   Date Value Ref Range Status   02/14/2023 2.5 (L) 3.5 - 5.2 g/dL Final     INR   Date Value Ref Range Status   02/14/2023 1.8 (H) 0.8 - 1.2 Final     Comment:     Coumadin Therapy:  2.0 - 3.0 for INR for all  indicators except mechanical heart valves  and antiphospholipid syndromes which should use 2.5 - 3.5.           Assessment/Plan:     1.Cirrhosis:  Decompensated by ascites, hepatic encephalopathy  Last MELD - 21  Creatinine  stable around 1.8, with furosemide 40 mg QD  and spironolactone to100 mg BID.  Therapeutic paracentesis Q2 weeks as needed, however he is receiving every 1 week  Continue with HCC surveillance: US 4/9/2022: shows no liver lesions, schedule repeat in a month  Continue lactulose 15 mL twice a day, adjust for 2-3 soft bowel movements per day  Esophageal varices surveillance:  Last EGD 12/2021 with 3 columns of G grade 1 esophagea lV varices, questionable gastric polyp versus GV, next  EGD due 12/2022     His comorbidities include hypertension, coronary artery disease with stent in place 15 yrs ago, long history of diabetes mellitus, hyperlipidemia, fraility with falls says balance issues happen when his ascites is worse otherwise he ambulates well.    Discussed at transplant committee, decision was due to his age, history of coronary artery disease, frailty, other comorbidities he is not a candidate for liver transplantation  With previous episode of encephalopathy he is at high risk for recurrence with TIPS, discussed option of hospice and an abdominal drain for comfort, he would like to proceed with shunt placement even with possible risk of encephalopathy.  Will consult IR  Stressed the importance of compliance with a low-salt diet, fluid restriction, weight-bearing exercises     RTC - post TIPS    2/27/2023  Grade 1-2 hepatic encephalopathy since tips placement.  Continues to take lactulose with good bowel movements and rifaximin.  We will monitor Q 1 month if no significant improvement in his encephalopathy in the next 3 months, may have to consider widening his shunt. Continue current diuretics, low-salt diet. Decrease protein shakes.     RTC;1 month    I have reviewed existing labs, imaging.  Educated patient and family about disease process, prognosis. Ordered required labs, images, procedures and discussed treatment plan.       Litzy Goodman MD  Transplant Hepatologist  Dept of Hepatology, [place] [facility]

## 2023-03-03 ENCOUNTER — EXTERNAL HOME HEALTH (OUTPATIENT)
Dept: HOME HEALTH SERVICES | Facility: HOSPITAL | Age: 78
End: 2023-03-03
Payer: MEDICARE

## 2023-03-09 ENCOUNTER — HOSPITAL ENCOUNTER (INPATIENT)
Facility: HOSPITAL | Age: 78
LOS: 4 days | Discharge: HOME HEALTH | DRG: 988 | End: 2023-03-14
Attending: EMERGENCY MEDICINE | Admitting: STUDENT IN AN ORGANIZED HEALTH CARE EDUCATION/TRAINING PROGRAM
Payer: MEDICARE

## 2023-03-09 DIAGNOSIS — K72.90 DECOMPENSATED HEPATIC CIRRHOSIS: Chronic | ICD-10-CM

## 2023-03-09 DIAGNOSIS — R00.0 SINUS TACHYCARDIA: ICD-10-CM

## 2023-03-09 DIAGNOSIS — E87.1 HYPONATREMIA: ICD-10-CM

## 2023-03-09 DIAGNOSIS — R07.9 CHEST PAIN: ICD-10-CM

## 2023-03-09 DIAGNOSIS — K76.82 HEPATIC ENCEPHALOPATHY: Primary | ICD-10-CM

## 2023-03-09 DIAGNOSIS — K74.60 DECOMPENSATED HEPATIC CIRRHOSIS: Chronic | ICD-10-CM

## 2023-03-09 DIAGNOSIS — R41.82 AMS (ALTERED MENTAL STATUS): ICD-10-CM

## 2023-03-09 PROBLEM — G93.41 ACUTE METABOLIC ENCEPHALOPATHY: Status: ACTIVE | Noted: 2023-02-05

## 2023-03-09 LAB
ALBUMIN SERPL BCP-MCNC: 2.3 G/DL (ref 3.5–5.2)
ALP SERPL-CCNC: 133 U/L (ref 55–135)
ALT SERPL W/O P-5'-P-CCNC: 26 U/L (ref 10–44)
AMMONIA PLAS-SCNC: 71 UMOL/L (ref 10–50)
ANION GAP SERPL CALC-SCNC: 9 MMOL/L (ref 8–16)
AST SERPL-CCNC: 52 U/L (ref 10–40)
BACTERIA #/AREA URNS HPF: ABNORMAL /HPF
BASOPHILS # BLD AUTO: 0.06 K/UL (ref 0–0.2)
BASOPHILS NFR BLD: 1 % (ref 0–1.9)
BILIRUB SERPL-MCNC: 2 MG/DL (ref 0.1–1)
BILIRUB UR QL STRIP: NEGATIVE
BNP SERPL-MCNC: 502 PG/ML (ref 0–99)
BUN SERPL-MCNC: 31 MG/DL (ref 8–23)
CALCIUM SERPL-MCNC: 8.5 MG/DL (ref 8.7–10.5)
CHLORIDE SERPL-SCNC: 108 MMOL/L (ref 95–110)
CK SERPL-CCNC: 298 U/L (ref 20–200)
CLARITY UR: CLEAR
CO2 SERPL-SCNC: 19 MMOL/L (ref 23–29)
COLOR UR: YELLOW
CREAT SERPL-MCNC: 1.8 MG/DL (ref 0.5–1.4)
DIFFERENTIAL METHOD: ABNORMAL
EOSINOPHIL # BLD AUTO: 0.4 K/UL (ref 0–0.5)
EOSINOPHIL NFR BLD: 6.4 % (ref 0–8)
ERYTHROCYTE [DISTWIDTH] IN BLOOD BY AUTOMATED COUNT: 21.8 % (ref 11.5–14.5)
EST. GFR  (NO RACE VARIABLE): 39 ML/MIN/1.73 M^2
GLUCOSE SERPL-MCNC: 81 MG/DL (ref 70–110)
GLUCOSE UR QL STRIP: ABNORMAL
HCT VFR BLD AUTO: 31.2 % (ref 40–54)
HGB BLD-MCNC: 10.3 G/DL (ref 14–18)
HGB UR QL STRIP: NEGATIVE
HYALINE CASTS #/AREA URNS LPF: 3 /LPF
IMM GRANULOCYTES # BLD AUTO: 0.02 K/UL (ref 0–0.04)
IMM GRANULOCYTES NFR BLD AUTO: 0.3 % (ref 0–0.5)
KETONES UR QL STRIP: NEGATIVE
LEUKOCYTE ESTERASE UR QL STRIP: ABNORMAL
LYMPHOCYTES # BLD AUTO: 0.6 K/UL (ref 1–4.8)
LYMPHOCYTES NFR BLD: 10.2 % (ref 18–48)
MCH RBC QN AUTO: 31.3 PG (ref 27–31)
MCHC RBC AUTO-ENTMCNC: 33 G/DL (ref 32–36)
MCV RBC AUTO: 95 FL (ref 82–98)
MICROSCOPIC COMMENT: ABNORMAL
MONOCYTES # BLD AUTO: 0.9 K/UL (ref 0.3–1)
MONOCYTES NFR BLD: 13.9 % (ref 4–15)
NEUTROPHILS # BLD AUTO: 4.3 K/UL (ref 1.8–7.7)
NEUTROPHILS NFR BLD: 68.2 % (ref 38–73)
NITRITE UR QL STRIP: NEGATIVE
NRBC BLD-RTO: 0 /100 WBC
PH UR STRIP: 6 [PH] (ref 5–8)
PLATELET # BLD AUTO: 153 K/UL (ref 150–450)
PMV BLD AUTO: 10.2 FL (ref 9.2–12.9)
POTASSIUM SERPL-SCNC: 5.8 MMOL/L (ref 3.5–5.1)
PROT SERPL-MCNC: 7 G/DL (ref 6–8.4)
PROT UR QL STRIP: NEGATIVE
RBC # BLD AUTO: 3.29 M/UL (ref 4.6–6.2)
RBC #/AREA URNS HPF: 1 /HPF (ref 0–4)
SODIUM SERPL-SCNC: 136 MMOL/L (ref 136–145)
SP GR UR STRIP: 1.02 (ref 1–1.03)
SQUAMOUS #/AREA URNS HPF: 7 /HPF
TROPONIN I SERPL DL<=0.01 NG/ML-MCNC: <0.006 NG/ML (ref 0–0.03)
URN SPEC COLLECT METH UR: ABNORMAL
UROBILINOGEN UR STRIP-ACNC: NEGATIVE EU/DL
WBC # BLD AUTO: 6.28 K/UL (ref 3.9–12.7)
WBC #/AREA URNS HPF: 4 /HPF (ref 0–5)
YEAST URNS QL MICRO: ABNORMAL

## 2023-03-09 PROCEDURE — 83880 ASSAY OF NATRIURETIC PEPTIDE: CPT | Performed by: EMERGENCY MEDICINE

## 2023-03-09 PROCEDURE — 99285 EMERGENCY DEPT VISIT HI MDM: CPT | Mod: 25

## 2023-03-09 PROCEDURE — 93010 EKG 12-LEAD: ICD-10-PCS | Mod: ,,, | Performed by: INTERNAL MEDICINE

## 2023-03-09 PROCEDURE — 25000003 PHARM REV CODE 250: Performed by: STUDENT IN AN ORGANIZED HEALTH CARE EDUCATION/TRAINING PROGRAM

## 2023-03-09 PROCEDURE — 81000 URINALYSIS NONAUTO W/SCOPE: CPT | Performed by: EMERGENCY MEDICINE

## 2023-03-09 PROCEDURE — 84484 ASSAY OF TROPONIN QUANT: CPT | Performed by: EMERGENCY MEDICINE

## 2023-03-09 PROCEDURE — G0378 HOSPITAL OBSERVATION PER HR: HCPCS

## 2023-03-09 PROCEDURE — 82140 ASSAY OF AMMONIA: CPT | Performed by: EMERGENCY MEDICINE

## 2023-03-09 PROCEDURE — 63600175 PHARM REV CODE 636 W HCPCS: Performed by: STUDENT IN AN ORGANIZED HEALTH CARE EDUCATION/TRAINING PROGRAM

## 2023-03-09 PROCEDURE — 93005 ELECTROCARDIOGRAM TRACING: CPT

## 2023-03-09 PROCEDURE — 85025 COMPLETE CBC W/AUTO DIFF WBC: CPT | Performed by: EMERGENCY MEDICINE

## 2023-03-09 PROCEDURE — 82550 ASSAY OF CK (CPK): CPT | Performed by: EMERGENCY MEDICINE

## 2023-03-09 PROCEDURE — 93010 ELECTROCARDIOGRAM REPORT: CPT | Mod: ,,, | Performed by: INTERNAL MEDICINE

## 2023-03-09 PROCEDURE — 80053 COMPREHEN METABOLIC PANEL: CPT | Performed by: EMERGENCY MEDICINE

## 2023-03-09 PROCEDURE — 96372 THER/PROPH/DIAG INJ SC/IM: CPT | Performed by: STUDENT IN AN ORGANIZED HEALTH CARE EDUCATION/TRAINING PROGRAM

## 2023-03-09 RX ORDER — NALOXONE HCL 0.4 MG/ML
0.02 VIAL (ML) INJECTION
Status: DISCONTINUED | OUTPATIENT
Start: 2023-03-09 | End: 2023-03-14 | Stop reason: HOSPADM

## 2023-03-09 RX ORDER — IBUPROFEN 200 MG
24 TABLET ORAL
Status: DISCONTINUED | OUTPATIENT
Start: 2023-03-09 | End: 2023-03-14 | Stop reason: HOSPADM

## 2023-03-09 RX ORDER — FUROSEMIDE 40 MG/1
40 TABLET ORAL DAILY
Status: DISCONTINUED | OUTPATIENT
Start: 2023-03-10 | End: 2023-03-14 | Stop reason: HOSPADM

## 2023-03-09 RX ORDER — LACTULOSE 10 G/15ML
20 SOLUTION ORAL 3 TIMES DAILY
Status: DISCONTINUED | OUTPATIENT
Start: 2023-03-09 | End: 2023-03-10

## 2023-03-09 RX ORDER — ENOXAPARIN SODIUM 100 MG/ML
40 INJECTION SUBCUTANEOUS EVERY 24 HOURS
Status: DISCONTINUED | OUTPATIENT
Start: 2023-03-09 | End: 2023-03-13

## 2023-03-09 RX ORDER — IBUPROFEN 200 MG
16 TABLET ORAL
Status: DISCONTINUED | OUTPATIENT
Start: 2023-03-09 | End: 2023-03-14 | Stop reason: HOSPADM

## 2023-03-09 RX ORDER — SODIUM CHLORIDE 0.9 % (FLUSH) 0.9 %
10 SYRINGE (ML) INJECTION EVERY 12 HOURS PRN
Status: DISCONTINUED | OUTPATIENT
Start: 2023-03-09 | End: 2023-03-14 | Stop reason: HOSPADM

## 2023-03-09 RX ORDER — FAMOTIDINE 20 MG/1
20 TABLET, FILM COATED ORAL DAILY
Status: DISCONTINUED | OUTPATIENT
Start: 2023-03-10 | End: 2023-03-14 | Stop reason: HOSPADM

## 2023-03-09 RX ORDER — SPIRONOLACTONE 25 MG/1
25 TABLET ORAL DAILY
Status: DISCONTINUED | OUTPATIENT
Start: 2023-03-09 | End: 2023-03-09

## 2023-03-09 RX ORDER — TRAZODONE HYDROCHLORIDE 100 MG/1
100 TABLET ORAL NIGHTLY
Status: DISCONTINUED | OUTPATIENT
Start: 2023-03-09 | End: 2023-03-14 | Stop reason: HOSPADM

## 2023-03-09 RX ORDER — LACTULOSE 10 G/15ML
20 SOLUTION ORAL 3 TIMES DAILY
Status: DISCONTINUED | OUTPATIENT
Start: 2023-03-09 | End: 2023-03-09

## 2023-03-09 RX ORDER — GLUCAGON 1 MG
1 KIT INJECTION
Status: DISCONTINUED | OUTPATIENT
Start: 2023-03-09 | End: 2023-03-14 | Stop reason: HOSPADM

## 2023-03-09 RX ADMIN — LACTULOSE 20 G: 20 SOLUTION ORAL at 02:03

## 2023-03-09 RX ADMIN — LACTULOSE 20 G: 20 SOLUTION ORAL at 10:03

## 2023-03-09 RX ADMIN — TRAZODONE HYDROCHLORIDE 100 MG: 100 TABLET ORAL at 10:03

## 2023-03-09 RX ADMIN — SPIRONOLACTONE 25 MG: 25 TABLET, FILM COATED ORAL at 05:03

## 2023-03-09 RX ADMIN — ENOXAPARIN SODIUM 40 MG: 40 INJECTION SUBCUTANEOUS at 05:03

## 2023-03-09 RX ADMIN — RIFAXIMIN 550 MG: 550 TABLET ORAL at 10:03

## 2023-03-09 NOTE — HPI
77 y.o. male patient with a PMHx of CAD, DM, GERD, and HTn who presents to the Emergency Department for evaluation of AMS which onset gradually 2 days ago. The ambulance reports that pt has been confused and combative with his wife. Patient's initial blood sugar was 68 and was given D10 which got his sugar up to 130. Symptoms are constant and moderate in severity. No mitigating or exacerbating factors reported. No further complaints or concerns at this time.    Admitted for hepatic encephalopathy s/t unintentional med noncompliance

## 2023-03-09 NOTE — PHARMACY MED REC
"Admission Medication History     The home medication history was taken by Diony Duff.    You may go to "Admission" then "Reconcile Home Medications" tabs to review and/or act upon these items.     The home medication list has been updated by the Pharmacy department.   Please read ALL comments highlighted in yellow.   Please address this information as you see fit.    Feel free to contact us if you have any questions or require assistance.      Medications listed below were obtained from: Analytic software- [employer]* and Medical records  (Not in a hospital admission)    Diony Duff  [id number]    Current Outpatient Medications on File Prior to Encounter   Medication Sig Dispense Refill Last Dose    busPIRone (BUSPAR) 15 MG tablet Take 15 mg by mouth 2 (two) times daily as needed (anxiety).       ezetimibe (ZETIA) 10 mg tablet Take 10 mg by mouth once daily.       famotidine (PEPCID) 40 MG tablet Take 40 mg by mouth once daily.       furosemide (LASIX) 40 MG tablet Take 1 tablet (40 mg total) by mouth once daily at 6am. 30 tablet 11     lactulose (CHRONULAC) 10 gram/15 mL solution Take 30 mLs (20 g total) by mouth 3 (three) times daily. 250 mL 11     metformin (GLUCOPHAGE) 1000 MG tablet Take 1,000 mg by mouth 2 (two) times daily with meals.       multivitamin capsule Take 1 capsule by mouth once daily.       omega 3-dha-epa-fish oil 1,200 (144-216) mg Cap Take 2,400 mg by mouth 2 (two) times a day.       rifAXIMin (XIFAXAN) 550 mg Tab Take 1 tablet (550 mg total) by mouth 2 (two) times daily. 60 tablet 6     spironolactone (ALDACTONE) 100 MG tablet Take 1 tablet (100 mg total) by mouth 2 (two) times daily. 60 tablet 6     trazodone (DESYREL) 100 MG tablet Take 100 mg by mouth every evening.       zinc sulfate (ZINCATE) 50 mg zinc (220 mg) capsule Take 220 mg by mouth.                            .        "

## 2023-03-09 NOTE — ASSESSMENT & PLAN NOTE
Patient with acute kidney injury likely due to IVVD/dehydration and pre-renal azotemia FELICIA is currently stable. Labs reviewed- Renal function/electrolytes with Estimated Creatinine Clearance: 38.9 mL/min (A) (based on SCr of 1.8 mg/dL (H)). according to latest data. Monitor urine output and serial BMP and adjust therapy as needed. Avoid nephrotoxins and renally dose meds for GFR listed above.     FELICIA on CKD  Trend Cr

## 2023-03-09 NOTE — ED PROVIDER NOTES
SCRIBE #1 NOTE: I, Kirstin Lange, am scribing for, and in the presence of, Pedro Nuñez MD. I have scribed the entire note.       History     Chief Complaint   Patient presents with    Altered Mental Status     Altered mental status since yesterday, history of high ammonia     Review of patient's allergies indicates:   Allergen Reactions    Statins-hmg-coa reductase inhibitors Other (See Comments)     muscle aches, joint pain             History of Present Illness     HPI    3/9/2023, 12:23 PM  History obtained from the Ambulance  HPI limited due to patients mental status      History of Present Illness: Korin Vivas is a 77 y.o. male patient with a PMHx of CAD, DM, GERD, and HTn who presents to the Emergency Department for evaluation of AMS which onset gradually 2 days ago. The ambulance reports that pt has been confused and combative with his wife. Patient's initial blood sugar was 68 and was given D10 which got his sugar up to 130.  Symptoms are constant and moderate in severity. No mitigating or exacerbating factors reported. No further complaints or concerns at this time.       Arrival mode: Ambulance Service    PCP: [facility]        Past Medical History:  Past Medical History:   Diagnosis Date    Arm fracture, right 2022, 2010    CAD (coronary artery disease)     Diabetes     GERD (gastroesophageal reflux disease)     HTN (hypertension)     Hyperlipidemia        Past Surgical History:  Past Surgical History:   Procedure Laterality Date    CATARACT EXTRACTION      COLONOSCOPY      COLONOSCOPY N/A 7/18/2016    Procedure: COLONOSCOPY;  Surgeon: Bryon Hickey MD;  Location: [facility];  Service: Endoscopy;  Laterality: N/A;    COLONOSCOPY N/A 10/6/2020    Procedure: COLONOSCOPY;  Surgeon: Kait Isaacs MD;  Location: [facility];  Service: Endoscopy;  Laterality: N/A;    CORONARY STENT PLACEMENT      ELBOW SURGERY      ESOPHAGOGASTRODUODENOSCOPY N/A 10/6/2020    Procedure:  ESOPHAGOGASTRODUODENOSCOPY (EGD);  Surgeon: Kait Isaacs MD;  Location: [facility] ENDO;  Service: Endoscopy;  Laterality: N/A;    FLUOROSCOPY N/A 1/30/2023    Procedure: FLUOROSCOPY/TIPS;  Surgeon: Pedro Luis Wesley MD;  Location: [facility] CATH LAB;  Service: General;  Laterality: N/A;    HERNIA REPAIR      2022    TONSILLECTOMY      VASECTOMY           Family History:  Family History   Problem Relation Age of Onset    Colon cancer Brother     No Known Problems Mother     Heart disease Father        Social History:  Social History     Tobacco Use    Smoking status: Former     Packs/day: 1.00     Years: 40.00     Pack years: 40.00     Types: Cigarettes    Smokeless tobacco: Never    Tobacco comments:     quit 3 years ago   Substance and Sexual Activity    Alcohol use: No    Drug use: No    Sexual activity: Not on file        Review of Systems     Review of Systems   Unable to perform ROS: Mental status change   Psychiatric/Behavioral:  Positive for confusion.       Physical Exam     Initial Vitals [03/09/23 1233]   BP Pulse Resp Temp SpO2   114/60 68 (!) 22 98.1 °F (36.7 °C) 98 %      MAP       --          Physical Exam  Nursing Notes and Vital Signs Reviewed.  Constitutional: Patient is in no acute distress. Elderly. Frail.  Head: Atraumatic. Normocephalic.  Eyes: PERRL. EOM intact. Conjunctivae are not pale. No scleral icterus.  ENT: Mucous membranes are moist. Oropharynx is clear and symmetric.    Neck: Supple. Full ROM.  Cardiovascular: Regular rate. Regular rhythm. No murmurs, rubs, or gallops. Distal pulses are 2+ and symmetric.  Pulmonary/Chest: No respiratory distress. Clear to auscultation bilaterally. No wheezing or rales.  Abdominal: Soft and non-distended.  There is no tenderness.  No rebound, guarding, or rigidity.   Musculoskeletal: Moves all extremities. No obvious deformities. No edema. No calf tenderness.  Skin: Warm and dry.  Neurological:  Awake. Confused.  No acute focal neurological deficits are  appreciated.  Psychiatric: Normal affect. Good eye contact. Appropriate in content.     ED Course   Procedures  ED Vital Signs:  Vitals:    03/09/23 1233 03/09/23 1322 03/09/23 1330 03/09/23 1430   BP: 114/60  (!) 125/58 (!) 120/53   Pulse: 68 64 62 97   Resp: (!) 22 13 19   Temp: 98.1 °F (36.7 °C)   98.1 °F (36.7 °C)   TempSrc: Oral   Oral   SpO2: 98%  100% 100%   Weight:  79.3 kg (174 lb 13.2 oz)         Abnormal Lab Results:  Labs Reviewed   CBC W/ AUTO DIFFERENTIAL - Abnormal; Notable for the following components:       Result Value    RBC 3.29 (*)     Hemoglobin 10.3 (*)     Hematocrit 31.2 (*)     MCH 31.3 (*)     RDW 21.8 (*)     Lymph # 0.6 (*)     Lymph % 10.2 (*)     All other components within normal limits   COMPREHENSIVE METABOLIC PANEL - Abnormal; Notable for the following components:    Potassium 5.8 (*)     CO2 19 (*)     BUN 31 (*)     Creatinine 1.8 (*)     Calcium 8.5 (*)     Albumin 2.3 (*)     Total Bilirubin 2.0 (*)     AST 52 (*)     eGFR 39 (*)     All other components within normal limits   URINALYSIS, REFLEX TO URINE CULTURE - Abnormal; Notable for the following components:    Glucose, UA 3+ (*)     Leukocytes, UA 1+ (*)     All other components within normal limits    Narrative:     Specimen Source->Urine   AMMONIA - Abnormal; Notable for the following components:    Ammonia 71 (*)     All other components within normal limits   B-TYPE NATRIURETIC PEPTIDE - Abnormal; Notable for the following components:     (*)     All other components within normal limits   CK - Abnormal; Notable for the following components:     (*)     All other components within normal limits   URINALYSIS MICROSCOPIC - Abnormal; Notable for the following components:    Hyaline Casts, UA 3 (*)     All other components within normal limits    Narrative:     Specimen Source->Urine   TROPONIN I        All Lab Results:  Results for orders placed or performed during the hospital encounter of 03/09/23   CBC  Auto Differential   Result Value Ref Range    WBC 6.28 3.90 - 12.70 K/uL    RBC 3.29 (L) 4.60 - 6.20 M/uL    Hemoglobin 10.3 (L) 14.0 - 18.0 g/dL    Hematocrit 31.2 (L) 40.0 - 54.0 %    MCV 95 82 - 98 fL    MCH 31.3 (H) 27.0 - 31.0 pg    MCHC 33.0 32.0 - 36.0 g/dL    RDW 21.8 (H) 11.5 - 14.5 %    Platelets 153 150 - 450 K/uL    MPV 10.2 9.2 - 12.9 fL    Immature Granulocytes 0.3 0.0 - 0.5 %    Gran # (ANC) 4.3 1.8 - 7.7 K/uL    Immature Grans (Abs) 0.02 0.00 - 0.04 K/uL    Lymph # 0.6 (L) 1.0 - 4.8 K/uL    Mono # 0.9 0.3 - 1.0 K/uL    Eos # 0.4 0.0 - 0.5 K/uL    Baso # 0.06 0.00 - 0.20 K/uL    nRBC 0 0 /100 WBC    Gran % 68.2 38.0 - 73.0 %    Lymph % 10.2 (L) 18.0 - 48.0 %    Mono % 13.9 4.0 - 15.0 %    Eosinophil % 6.4 0.0 - 8.0 %    Basophil % 1.0 0.0 - 1.9 %    Differential Method Automated    Comprehensive Metabolic Panel   Result Value Ref Range    Sodium 136 136 - 145 mmol/L    Potassium 5.8 (H) 3.5 - 5.1 mmol/L    Chloride 108 95 - 110 mmol/L    CO2 19 (L) 23 - 29 mmol/L    Glucose 81 70 - 110 mg/dL    BUN 31 (H) 8 - 23 mg/dL    Creatinine 1.8 (H) 0.5 - 1.4 mg/dL    Calcium 8.5 (L) 8.7 - 10.5 mg/dL    Total Protein 7.0 6.0 - 8.4 g/dL    Albumin 2.3 (L) 3.5 - 5.2 g/dL    Total Bilirubin 2.0 (H) 0.1 - 1.0 mg/dL    Alkaline Phosphatase 133 55 - 135 U/L    AST 52 (H) 10 - 40 U/L    ALT 26 10 - 44 U/L    Anion Gap 9 8 - 16 mmol/L    eGFR 39 (A) >60 mL/min/1.73 m^2   Urinalysis, Reflex to Urine Culture Urine, Clean Catch    Specimen: Urine   Result Value Ref Range    Specimen UA Urine, Clean Catch     Color, UA Yellow Yellow, Straw, Steff    Appearance, UA Clear Clear    pH, UA 6.0 5.0 - 8.0    Specific Gravity, UA 1.020 1.005 - 1.030    Protein, UA Negative Negative    Glucose, UA 3+ (A) Negative    Ketones, UA Negative Negative    Bilirubin (UA) Negative Negative    Occult Blood UA Negative Negative    Nitrite, UA Negative Negative    Urobilinogen, UA Negative <2.0 EU/dL    Leukocytes, UA 1+ (A) Negative    Ammonia   Result Value Ref Range    Ammonia 71 (H) 10 - 50 umol/L   BNP   Result Value Ref Range     (H) 0 - 99 pg/mL   CK   Result Value Ref Range     (H) 20 - 200 U/L   Troponin I   Result Value Ref Range    Troponin I <0.006 0.000 - 0.026 ng/mL   Urinalysis Microscopic   Result Value Ref Range    RBC, UA 1 0 - 4 /hpf    WBC, UA 4 0 - 5 /hpf    Bacteria Rare None-Occ /hpf    Yeast, UA None None    Squam Epithel, UA 7 /hpf    Hyaline Casts, UA 3 (A) 0-1/lpf /lpf    Microscopic Comment SEE COMMENT          Imaging Results:  Imaging Results              CT Head Without Contrast (Final result)  Result time 03/09/23 13:20:40      Final result by MISAEL Infante Sr., MD (03/09/23 13:20:40)                   Impression:      1. There is no evidence of an acute ischemic event.  There are mild chronic appearing ischemic changes in the deep white matter of both cerebral hemispheres.  2. There is no intracranial hemorrhage.  3. There is mild partial opacification of the maxillary sinuses bilaterally.  This is characteristic of sinusitis.  All CT scans at this facility use dose modulation, iterative reconstruction, and/or weight base dosing when appropriate to reduce radiation dose when appropriate to reduce radiation dose to as low as reasonably achievable.      Electronically signed by: Garland Infante MD  Date:    03/09/2023  Time:    13:20               Narrative:    EXAMINATION:  CT HEAD WITHOUT CONTRAST    CLINICAL HISTORY:  Mental status change, unknown cause;    TECHNIQUE:  Standard brain CT protocol without IV contrast was performed.    COMPARISON:  02/13/2023    FINDINGS:  This is a limited examination secondary to patient motion.  There is no evidence of an acute ischemic event.  There are mild chronic appearing ischemic changes in the deep white matter of both cerebral hemispheres.  There is no intracranial hemorrhage.  There is mild partial opacification of the maxillary sinuses bilaterally.                                        X-Ray Chest AP Portable (Final result)  Result time 03/09/23 12:50:36      Final result by MISAEL Infante Sr., MD (03/09/23 12:50:36)                   Impression:      1. There is a mild amount of alveolar consolidation scattered throughout the left lung.  An infectious process cannot be excluded.  2. There is blunting of the left costophrenic angle.  This is characteristic of a tiny pleural effusion.  .      Electronically signed by: Garland Infante MD  Date:    03/09/2023  Time:    12:50               Narrative:    EXAMINATION:  XR CHEST AP PORTABLE    CLINICAL HISTORY:  AMS;    COMPARISON:  02/13/2023    FINDINGS:  The size of the heart is normal.  There is a mild amount of alveolar consolidation scattered throughout the left lung.  There is no focal pulmonary infiltrate visualized in the right lung.  There is blunting of the left costophrenic angle.  The right costophrenic angle is sharp.  There is no pneumothorax.                                       The EKG was ordered, reviewed, and independently interpreted by the ED provider.  Interpretation time: 12:45  Rate: 68 BPM  Rhythm:  Sinus rhythm with premature atrial complexes  Interpretation: Low voltage QRS. No STEMI.           The Emergency Provider reviewed the vital signs and test results, which are outlined above.     ED Discussion       2:56 PM: Discussed case with Gertrudis Salmeron MD (Hospital Medicine). Dr. Salmeron agrees with current care and management of pt and accepts admission.   Admitting Service: Hospital Medicine  Admitting Physician: Dr. Salmeron  Admit to: Observation    2:56 PM: Re-evaluated pt. I have discussed test results, shared treatment plan, and the need for admission with patient and family at bedside. Pt and family express understanding at this time and agree with all information. All questions answered. Pt and family have no further questions or concerns at this time. Pt is ready for admit.          Medical Decision Making:   Initial Assessment:   Hx of hepatic encephalopathy. Brought in for AMS  Differential Diagnosis:   Hepatic encephalopathy, medication noncompliance  Clinical Tests:   Lab Tests: Ordered and Reviewed  Radiological Study: Ordered and Reviewed  Medical Tests: Ordered and Reviewed  ED Management:  Labs and imaging reviewed by me.  Ammonia, CPK, BNP, and potassium are elevated.    Other:   I have discussed this case with another health care provider.       <> Summary of the Discussion: Case discussed with DR. Gonzalez (), Will place in observation         ED Medication(s):  Medications   lactulose 20 gram/30 mL solution Soln 20 g (20 g Oral Given 3/9/23 2394)       New Prescriptions    No medications on file               Scribe Attestation:   Scribe #1: I performed the above scribed service and the documentation accurately describes the services I performed. I attest to the accuracy of the note.     Attending:   Physician Attestation Statement for Scribe #1: I, Pedro Nuñez MD, personally performed the services described in this documentation, as scribed by Kirstin Lange, in my presence, and it is both accurate and complete.           Clinical Impression       ICD-10-CM ICD-9-CM   1. AMS (altered mental status)  R41.82 780.97   2. Hepatic encephalopathy  K76.82 572.2       Disposition:   Disposition: Placed in Observation  Condition: Fair       Pedro Nuñez MD  03/09/23 7656

## 2023-03-09 NOTE — SUBJECTIVE & OBJECTIVE
Past Medical History:   Diagnosis Date    Arm fracture, right 2022, 2010    CAD (coronary artery disease)     Diabetes     GERD (gastroesophageal reflux disease)     HTN (hypertension)     Hyperlipidemia        Past Surgical History:   Procedure Laterality Date    CATARACT EXTRACTION      COLONOSCOPY      COLONOSCOPY N/A 7/18/2016    Procedure: COLONOSCOPY;  Surgeon: Bryon Hickey MD;  Location: [facility] ENDO;  Service: Endoscopy;  Laterality: N/A;    COLONOSCOPY N/A 10/6/2020    Procedure: COLONOSCOPY;  Surgeon: Kait Isaacs MD;  Location: [facility] ENDO;  Service: Endoscopy;  Laterality: N/A;    CORONARY STENT PLACEMENT      ELBOW SURGERY      ESOPHAGOGASTRODUODENOSCOPY N/A 10/6/2020    Procedure: ESOPHAGOGASTRODUODENOSCOPY (EGD);  Surgeon: Kait Isaacs MD;  Location: [facility] ENDO;  Service: Endoscopy;  Laterality: N/A;    FLUOROSCOPY N/A 1/30/2023    Procedure: FLUOROSCOPY/TIPS;  Surgeon: Pedro Luis Wesley MD;  Location: [facility] CATH LAB;  Service: General;  Laterality: N/A;    HERNIA REPAIR      2022    TONSILLECTOMY      VASECTOMY         Review of patient's allergies indicates:   Allergen Reactions    Statins-hmg-coa reductase inhibitors Other (See Comments)     muscle aches, joint pain           No current facility-administered medications on file prior to encounter.     Current Outpatient Medications on File Prior to Encounter   Medication Sig    busPIRone (BUSPAR) 15 MG tablet Take 15 mg by mouth 2 (two) times daily as needed (anxiety).    ezetimibe (ZETIA) 10 mg tablet Take 10 mg by mouth once daily.    famotidine (PEPCID) 40 MG tablet Take 40 mg by mouth once daily.    furosemide (LASIX) 40 MG tablet Take 1 tablet (40 mg total) by mouth once daily at 6am.    lactulose (CHRONULAC) 10 gram/15 mL solution Take 30 mLs (20 g total) by mouth 3 (three) times daily.    metformin (GLUCOPHAGE) 1000 MG tablet Take 1,000 mg by mouth 2 (two) times daily with meals.    multivitamin capsule Take 1 capsule by mouth once  daily.    omega 3-dha-epa-fish oil 1,200 (144-216) mg Cap Take 2,400 mg by mouth 2 (two) times a day.    rifAXIMin (XIFAXAN) 550 mg Tab Take 1 tablet (550 mg total) by mouth 2 (two) times daily.    spironolactone (ALDACTONE) 100 MG tablet Take 1 tablet (100 mg total) by mouth 2 (two) times daily.    trazodone (DESYREL) 100 MG tablet Take 100 mg by mouth every evening.    zinc sulfate (ZINCATE) 50 mg zinc (220 mg) capsule Take 220 mg by mouth.     Family History       Problem Relation (Age of Onset)    Colon cancer Brother    Heart disease Father    No Known Problems Mother          Tobacco Use    Smoking status: Former     Packs/day: 1.00     Years: 40.00     Pack years: 40.00     Types: Cigarettes    Smokeless tobacco: Never    Tobacco comments:     quit 3 years ago   Substance and Sexual Activity    Alcohol use: No    Drug use: No    Sexual activity: Not on file     Review of Systems   Unable to perform ROS: Mental status change   Objective:     Vital Signs (Most Recent):  Temp: 98.1 °F (36.7 °C) (03/09/23 1430)  Pulse: 63 (03/09/23 1530)  Resp: 13 (03/09/23 1530)  BP: (!) 124/59 (03/09/23 1530)  SpO2: 100 % (03/09/23 1530)   Vital Signs (24h Range):  Temp:  [98.1 °F (36.7 °C)] 98.1 °F (36.7 °C)  Pulse:  [62-97] 63  Resp:  [13-22] 13  SpO2:  [98 %-100 %] 100 %  BP: (114-125)/(53-60) 124/59     Weight: 79.3 kg (174 lb 13.2 oz)  Body mass index is 23.71 kg/m².    Physical Exam  Constitutional:       General: He is not in acute distress.     Appearance: Normal appearance. He is ill-appearing.   HENT:      Head: Normocephalic and atraumatic.   Cardiovascular:      Rate and Rhythm: Normal rate and regular rhythm.   Pulmonary:      Effort: Pulmonary effort is normal. No respiratory distress.      Breath sounds: Normal breath sounds.   Abdominal:      General: Abdomen is flat. Bowel sounds are normal. There is no distension.      Palpations: Abdomen is soft.      Tenderness: There is no abdominal tenderness. There is no  guarding.   Musculoskeletal:         General: Normal range of motion.      Right lower leg: No edema.      Left lower leg: No edema.   Skin:     General: Skin is warm and dry.   Neurological:      Mental Status: He is alert. He is disoriented.           Significant Labs: All pertinent labs within the past 24 hours have been reviewed.    Significant Imaging: I have reviewed all pertinent imaging results/findings within the past 24 hours.

## 2023-03-09 NOTE — PROGRESS NOTES
Pharmacist Renal Dose Adjustment Note    Korin Vivas is a 77 y.o. male being treated with the medication famotidine.     Patient Data:    Vital Signs (Most Recent):  Temp: 98.1 °F (36.7 °C) (03/09/23 1430)  Pulse: 63 (03/09/23 1530)  Resp: 13 (03/09/23 1530)  BP: (!) 124/59 (03/09/23 1530)  SpO2: 100 % (03/09/23 1530)   Vital Signs (72h Range):  Temp:  [98.1 °F (36.7 °C)]   Pulse:  [62-97]   Resp:  [13-22]   BP: (114-125)/(53-60)   SpO2:  [98 %-100 %]      Recent Labs   Lab 03/09/23  1328   CREATININE 1.8*     Serum creatinine: 1.8 mg/dL (H) 03/09/23 1328  Estimated creatinine clearance: 38.9 mL/min (A)    Famotidine 40 mg oral every 24 hours will be changed to famotidine 20 mg oral every 24 hours per renal dose protocol for CrCl < 50 ml/min.     Thank you,  Pharmacist's Name: Milad Ruiz

## 2023-03-09 NOTE — H&P
[facility]  [facility] Medicine  History & Physical    Patient Name: Korin Vivas  MRN: [id number]  Patient Class: OP- Observation  Admission Date: 3/9/2023  Attending Physician: Gertrudis Salmeron MD   Primary Care Provider: [facility] Dept         Patient information was obtained from patient, spouse/SO, past medical records and ER records.     Subjective:     Principal Problem:Acute metabolic encephalopathy    Chief Complaint:   Chief Complaint   Patient presents with    Altered Mental Status     Altered mental status since yesterday, history of high ammonia        HPI: 77 y.o. male patient with a PMHx of CAD, DM, GERD, and HTn who presents to the Emergency Department for evaluation of AMS which onset gradually 2 days ago. The ambulance reports that pt has been confused and combative with his wife. Patient's initial blood sugar was 68 and was given D10 which got his sugar up to 130. Symptoms are constant and moderate in severity. No mitigating or exacerbating factors reported. No further complaints or concerns at this time.    Admitted for hepatic encephalopathy s/t unintentional med noncompliance      Past Medical History:   Diagnosis Date    Arm fracture, right 2022, 2010    CAD (coronary artery disease)     Diabetes     GERD (gastroesophageal reflux disease)     HTN (hypertension)     Hyperlipidemia        Past Surgical History:   Procedure Laterality Date    CATARACT EXTRACTION      COLONOSCOPY      COLONOSCOPY N/A 7/18/2016    Procedure: COLONOSCOPY;  Surgeon: Bryon Hickey MD;  Location: [facility];  Service: Endoscopy;  Laterality: N/A;    COLONOSCOPY N/A 10/6/2020    Procedure: COLONOSCOPY;  Surgeon: Kait Isaacs MD;  Location: [facility];  Service: Endoscopy;  Laterality: N/A;    CORONARY STENT PLACEMENT      ELBOW SURGERY      ESOPHAGOGASTRODUODENOSCOPY N/A 10/6/2020    Procedure: ESOPHAGOGASTRODUODENOSCOPY (EGD);  Surgeon: Kait Isaacs MD;  Location: [facility]  ENDO;  Service: Endoscopy;  Laterality: N/A;    FLUOROSCOPY N/A 1/30/2023    Procedure: FLUOROSCOPY/TIPS;  Surgeon: Pedro Luis Wesley MD;  Location: [facility] CATH LAB;  Service: General;  Laterality: N/A;    HERNIA REPAIR      2022    TONSILLECTOMY      VASECTOMY         Review of patient's allergies indicates:   Allergen Reactions    Statins-hmg-coa reductase inhibitors Other (See Comments)     muscle aches, joint pain           No current facility-administered medications on file prior to encounter.     Current Outpatient Medications on File Prior to Encounter   Medication Sig    busPIRone (BUSPAR) 15 MG tablet Take 15 mg by mouth 2 (two) times daily as needed (anxiety).    ezetimibe (ZETIA) 10 mg tablet Take 10 mg by mouth once daily.    famotidine (PEPCID) 40 MG tablet Take 40 mg by mouth once daily.    furosemide (LASIX) 40 MG tablet Take 1 tablet (40 mg total) by mouth once daily at 6am.    lactulose (CHRONULAC) 10 gram/15 mL solution Take 30 mLs (20 g total) by mouth 3 (three) times daily.    metformin (GLUCOPHAGE) 1000 MG tablet Take 1,000 mg by mouth 2 (two) times daily with meals.    multivitamin capsule Take 1 capsule by mouth once daily.    omega 3-dha-epa-fish oil 1,200 (144-216) mg Cap Take 2,400 mg by mouth 2 (two) times a day.    rifAXIMin (XIFAXAN) 550 mg Tab Take 1 tablet (550 mg total) by mouth 2 (two) times daily.    spironolactone (ALDACTONE) 100 MG tablet Take 1 tablet (100 mg total) by mouth 2 (two) times daily.    trazodone (DESYREL) 100 MG tablet Take 100 mg by mouth every evening.    zinc sulfate (ZINCATE) 50 mg zinc (220 mg) capsule Take 220 mg by mouth.     Family History       Problem Relation (Age of Onset)    Colon cancer Brother    Heart disease Father    No Known Problems Mother          Tobacco Use    Smoking status: Former     Packs/day: 1.00     Years: 40.00     Pack years: 40.00     Types: Cigarettes    Smokeless tobacco: Never    Tobacco comments:     quit 3 years  ago   Substance and Sexual Activity    Alcohol use: No    Drug use: No    Sexual activity: Not on file     Review of Systems   Unable to perform ROS: Mental status change   Objective:     Vital Signs (Most Recent):  Temp: 98.1 °F (36.7 °C) (03/09/23 1430)  Pulse: 63 (03/09/23 1530)  Resp: 13 (03/09/23 1530)  BP: (!) 124/59 (03/09/23 1530)  SpO2: 100 % (03/09/23 1530)   Vital Signs (24h Range):  Temp:  [98.1 °F (36.7 °C)] 98.1 °F (36.7 °C)  Pulse:  [62-97] 63  Resp:  [13-22] 13  SpO2:  [98 %-100 %] 100 %  BP: (114-125)/(53-60) 124/59     Weight: 79.3 kg (174 lb 13.2 oz)  Body mass index is 23.71 kg/m².    Physical Exam  Constitutional:       General: He is not in acute distress.     Appearance: Normal appearance. He is ill-appearing.   HENT:      Head: Normocephalic and atraumatic.   Cardiovascular:      Rate and Rhythm: Normal rate and regular rhythm.   Pulmonary:      Effort: Pulmonary effort is normal. No respiratory distress.      Breath sounds: Normal breath sounds.   Abdominal:      General: Abdomen is flat. Bowel sounds are normal. There is no distension.      Palpations: Abdomen is soft.      Tenderness: There is no abdominal tenderness. There is no guarding.   Musculoskeletal:         General: Normal range of motion.      Right lower leg: No edema.      Left lower leg: No edema.   Skin:     General: Skin is warm and dry.   Neurological:      Mental Status: He is alert. He is disoriented.           Significant Labs: All pertinent labs within the past 24 hours have been reviewed.    Significant Imaging: I have reviewed all pertinent imaging results/findings within the past 24 hours.    Assessment/Plan:     * Acute metabolic encephalopathy  S/t above      Decompensated hepatic cirrhosis  Start lactulose and rifaximin  Titrate to BM  Continue home diuretics  Hepatology consult      FELICIA (acute kidney injury)  Patient with acute kidney injury likely due to IVVD/dehydration and pre-renal azotemia FELICIA is  currently stable. Labs reviewed- Renal function/electrolytes with Estimated Creatinine Clearance: 38.9 mL/min (A) (based on SCr of 1.8 mg/dL (H)). according to latest data. Monitor urine output and serial BMP and adjust therapy as needed. Avoid nephrotoxins and renally dose meds for GFR listed above.     FELICIA on CKD  Trend Cr    Hyperkalemia  Repeat BMP in AM      Post-traumatic stress disorder, chronic  Continue home meds      VTE Risk Mitigation (From admission, onward)         Ordered     enoxaparin injection 40 mg  Daily         03/09/23 1549     IP VTE HIGH RISK PATIENT  Once         03/09/23 1549     Place sequential compression device  Until discontinued         03/09/23 1549                   Gertrudis Salmeron MD  Department of Hospital Medicine   [facility] - Emergency Dept.

## 2023-03-10 DIAGNOSIS — K75.81 LIVER CIRRHOSIS SECONDARY TO NASH: ICD-10-CM

## 2023-03-10 DIAGNOSIS — K74.60 LIVER CIRRHOSIS SECONDARY TO NASH: ICD-10-CM

## 2023-03-10 DIAGNOSIS — K76.82 HEPATIC ENCEPHALOPATHY: Primary | ICD-10-CM

## 2023-03-10 LAB
ALBUMIN SERPL BCP-MCNC: 2.1 G/DL (ref 3.5–5.2)
ALP SERPL-CCNC: 121 U/L (ref 55–135)
ALT SERPL W/O P-5'-P-CCNC: 25 U/L (ref 10–44)
AMMONIA PLAS-SCNC: 99 UMOL/L (ref 10–50)
ANION GAP SERPL CALC-SCNC: 9 MMOL/L (ref 8–16)
AST SERPL-CCNC: 48 U/L (ref 10–40)
BASOPHILS # BLD AUTO: 0.07 K/UL (ref 0–0.2)
BASOPHILS NFR BLD: 1 % (ref 0–1.9)
BILIRUB DIRECT SERPL-MCNC: 1 MG/DL (ref 0.1–0.3)
BILIRUB SERPL-MCNC: 1.9 MG/DL (ref 0.1–1)
BUN SERPL-MCNC: 28 MG/DL (ref 8–23)
CALCIUM SERPL-MCNC: 8.1 MG/DL (ref 8.7–10.5)
CHLORIDE SERPL-SCNC: 106 MMOL/L (ref 95–110)
CO2 SERPL-SCNC: 20 MMOL/L (ref 23–29)
CREAT SERPL-MCNC: 1.6 MG/DL (ref 0.5–1.4)
DIFFERENTIAL METHOD: ABNORMAL
EOSINOPHIL # BLD AUTO: 0.6 K/UL (ref 0–0.5)
EOSINOPHIL NFR BLD: 8 % (ref 0–8)
ERYTHROCYTE [DISTWIDTH] IN BLOOD BY AUTOMATED COUNT: 21.3 % (ref 11.5–14.5)
EST. GFR  (NO RACE VARIABLE): 45 ML/MIN/1.73 M^2
GLUCOSE SERPL-MCNC: 84 MG/DL (ref 70–110)
HCT VFR BLD AUTO: 30.2 % (ref 40–54)
HGB BLD-MCNC: 10 G/DL (ref 14–18)
IMM GRANULOCYTES # BLD AUTO: 0.03 K/UL (ref 0–0.04)
IMM GRANULOCYTES NFR BLD AUTO: 0.4 % (ref 0–0.5)
INR PPP: 1.8 (ref 0.8–1.2)
LYMPHOCYTES # BLD AUTO: 0.9 K/UL (ref 1–4.8)
LYMPHOCYTES NFR BLD: 12.4 % (ref 18–48)
MCH RBC QN AUTO: 31.4 PG (ref 27–31)
MCHC RBC AUTO-ENTMCNC: 33.1 G/DL (ref 32–36)
MCV RBC AUTO: 95 FL (ref 82–98)
MONOCYTES # BLD AUTO: 0.9 K/UL (ref 0.3–1)
MONOCYTES NFR BLD: 13.4 % (ref 4–15)
NEUTROPHILS # BLD AUTO: 4.5 K/UL (ref 1.8–7.7)
NEUTROPHILS NFR BLD: 64.8 % (ref 38–73)
NRBC BLD-RTO: 0 /100 WBC
PLATELET # BLD AUTO: 155 K/UL (ref 150–450)
PMV BLD AUTO: 10.5 FL (ref 9.2–12.9)
POTASSIUM SERPL-SCNC: 5.1 MMOL/L (ref 3.5–5.1)
PROCALCITONIN SERPL IA-MCNC: 0.11 NG/ML
PROT SERPL-MCNC: 6.3 G/DL (ref 6–8.4)
PROTHROMBIN TIME: 18.8 SEC (ref 9–12.5)
RBC # BLD AUTO: 3.18 M/UL (ref 4.6–6.2)
SODIUM SERPL-SCNC: 135 MMOL/L (ref 136–145)
WBC # BLD AUTO: 7 K/UL (ref 3.9–12.7)

## 2023-03-10 PROCEDURE — 11000001 HC ACUTE MED/SURG PRIVATE ROOM

## 2023-03-10 PROCEDURE — 93010 ELECTROCARDIOGRAM REPORT: CPT | Mod: ,,, | Performed by: INTERNAL MEDICINE

## 2023-03-10 PROCEDURE — 93005 ELECTROCARDIOGRAM TRACING: CPT

## 2023-03-10 PROCEDURE — 80048 BASIC METABOLIC PNL TOTAL CA: CPT | Performed by: STUDENT IN AN ORGANIZED HEALTH CARE EDUCATION/TRAINING PROGRAM

## 2023-03-10 PROCEDURE — 93010 EKG 12-LEAD: ICD-10-PCS | Mod: ,,, | Performed by: INTERNAL MEDICINE

## 2023-03-10 PROCEDURE — 84145 PROCALCITONIN (PCT): CPT | Performed by: STUDENT IN AN ORGANIZED HEALTH CARE EDUCATION/TRAINING PROGRAM

## 2023-03-10 PROCEDURE — 36415 COLL VENOUS BLD VENIPUNCTURE: CPT | Performed by: INTERNAL MEDICINE

## 2023-03-10 PROCEDURE — 82140 ASSAY OF AMMONIA: CPT | Performed by: STUDENT IN AN ORGANIZED HEALTH CARE EDUCATION/TRAINING PROGRAM

## 2023-03-10 PROCEDURE — 25000003 PHARM REV CODE 250: Performed by: INTERNAL MEDICINE

## 2023-03-10 PROCEDURE — 92610 EVALUATE SWALLOWING FUNCTION: CPT

## 2023-03-10 PROCEDURE — 36415 COLL VENOUS BLD VENIPUNCTURE: CPT | Performed by: STUDENT IN AN ORGANIZED HEALTH CARE EDUCATION/TRAINING PROGRAM

## 2023-03-10 PROCEDURE — 99222 PR INITIAL HOSPITAL CARE,LEVL II: ICD-10-PCS | Mod: 95,,, | Performed by: INTERNAL MEDICINE

## 2023-03-10 PROCEDURE — 25000003 PHARM REV CODE 250: Performed by: STUDENT IN AN ORGANIZED HEALTH CARE EDUCATION/TRAINING PROGRAM

## 2023-03-10 PROCEDURE — 63600175 PHARM REV CODE 636 W HCPCS: Performed by: STUDENT IN AN ORGANIZED HEALTH CARE EDUCATION/TRAINING PROGRAM

## 2023-03-10 PROCEDURE — 80076 HEPATIC FUNCTION PANEL: CPT | Performed by: STUDENT IN AN ORGANIZED HEALTH CARE EDUCATION/TRAINING PROGRAM

## 2023-03-10 PROCEDURE — 85610 PROTHROMBIN TIME: CPT | Performed by: INTERNAL MEDICINE

## 2023-03-10 PROCEDURE — 99222 1ST HOSP IP/OBS MODERATE 55: CPT | Mod: 95,,, | Performed by: INTERNAL MEDICINE

## 2023-03-10 PROCEDURE — 85025 COMPLETE CBC W/AUTO DIFF WBC: CPT | Performed by: STUDENT IN AN ORGANIZED HEALTH CARE EDUCATION/TRAINING PROGRAM

## 2023-03-10 RX ORDER — LACTULOSE 10 G/15ML
30 SOLUTION ORAL 3 TIMES DAILY
Status: DISCONTINUED | OUTPATIENT
Start: 2023-03-10 | End: 2023-03-11

## 2023-03-10 RX ORDER — LACTULOSE 10 G/15ML
30 SOLUTION ORAL ONCE
Status: COMPLETED | OUTPATIENT
Start: 2023-03-10 | End: 2023-03-10

## 2023-03-10 RX ORDER — METOPROLOL TARTRATE 1 MG/ML
5 INJECTION, SOLUTION INTRAVENOUS ONCE
Status: COMPLETED | OUTPATIENT
Start: 2023-03-10 | End: 2023-03-10

## 2023-03-10 RX ADMIN — RIFAXIMIN 550 MG: 550 TABLET ORAL at 09:03

## 2023-03-10 RX ADMIN — ENOXAPARIN SODIUM 40 MG: 40 INJECTION SUBCUTANEOUS at 05:03

## 2023-03-10 RX ADMIN — TRAZODONE HYDROCHLORIDE 100 MG: 100 TABLET ORAL at 09:03

## 2023-03-10 RX ADMIN — FUROSEMIDE 40 MG: 40 TABLET ORAL at 10:03

## 2023-03-10 RX ADMIN — LACTULOSE 30 G: 20 SOLUTION ORAL at 02:03

## 2023-03-10 RX ADMIN — LACTULOSE 30 G: 20 SOLUTION ORAL at 09:03

## 2023-03-10 RX ADMIN — LACTULOSE 30 G: 20 SOLUTION ORAL at 10:03

## 2023-03-10 RX ADMIN — FAMOTIDINE 20 MG: 20 TABLET, FILM COATED ORAL at 10:03

## 2023-03-10 RX ADMIN — METOROPROLOL TARTRATE 5 MG: 5 INJECTION, SOLUTION INTRAVENOUS at 02:03

## 2023-03-10 RX ADMIN — RIFAXIMIN 550 MG: 550 TABLET ORAL at 10:03

## 2023-03-10 NOTE — PROGRESS NOTES
[facility] Medicine  Progress Note    Patient Name: Korin Vivas  MRN: [id number]  Patient Class: IP- Inpatient   Admission Date: 3/9/2023  Length of Stay: 0 days  Attending Physician: Emigdio Chu, *  Primary Care Provider: [facility] Dept        Subjective:     Principal Problem:Hepatic encephalopathy        HPI:  77 y.o. male patient with a PMHx of CAD, DM, GERD, and HTn who presents to the Emergency Department for evaluation of AMS which onset gradually 2 days ago. The ambulance reports that pt has been confused and combative with his wife. Patient's initial blood sugar was 68 and was given D10 which got his sugar up to 130. Symptoms are constant and moderate in severity. No mitigating or exacerbating factors reported. No further complaints or concerns at this time.    Admitted for hepatic encephalopathy s/t unintentional med noncompliance      Overview/Hospital Course:      Discussed with patient's wife at bedside, she stated that patient had history of recurrent ascites with requirement of paracentesis every 1-2 weeks.    Hence he underwent tips placement on January 30, 2023, following the placement noted to have 3 episodes of hepatic encephalopathy including most recent visit.    As per wife, patient missed 4 days of lactulose prior to admission, due to delay in getting the refills.    As per wife, at baseline patient alert and oriented although has some memory deficits, however since TIPS noted to have gradual deterioration;   On arrival noted to be agitated, confused, with elevated ammonia levels;     Discussed with hepatologist, recommended to continue lactulose, rifaximin; f/u ammonia;   Also stated that pt got TIPS placement done by Dr. Wesley, pt might need to f/u either IP/ OP to consider for TIPS exchange given recurrent HE;  Also given age and comorbidities, deemed not a candidate for liver transplant at this time.            Interval History:     Appeared  confused, no significant change compared to yesterday as per wife at bedside.    Had 3-4 bowel movements yesterday, we will continue lactulose, rifaximin.    Patient able to tolerate low-sodium diet, however given confusion, we will follow up with SLP for further recommendations.    We will follow up on ultrasound abdomen, we will consider antibiotics if needed.    Given history of fall, we will follow up on x-ray knee bilateral.    Follow up on wound care       Review of Systems    Unable to perform ROS: Mental status change       Objective:     Vital Signs (Most Recent):  Temp: 97.7 °F (36.5 °C) (03/10/23 0810)  Pulse: 76 (03/10/23 0810)  Resp: 18 (03/10/23 0810)  BP: (!) 115/56 (03/10/23 0810)  SpO2: 100 % (03/10/23 0810)   Vital Signs (24h Range):  Temp:  [97.6 °F (36.4 °C)-98.5 °F (36.9 °C)] 97.7 °F (36.5 °C)  Pulse:  [] 76  Resp:  [13-22] 18  SpO2:  [94 %-100 %] 100 %  BP: (114-127)/(53-60) 115/56     Weight: 71 kg (156 lb 8.4 oz)  Body mass index is 21.23 kg/m².    Intake/Output Summary (Last 24 hours) at 3/10/2023 1157  Last data filed at 3/10/2023 0830  Gross per 24 hour   Intake 120 ml   Output --   Net 120 ml      Physical Exam    Constitutional:       General: He is not in acute distress.     Appearance: Normal appearance. He is ill-appearing.   HENT:      Head: Normocephalic and atraumatic.   Cardiovascular:      Rate and Rhythm: Normal rate and regular rhythm.   Pulmonary:      Effort: Pulmonary effort is normal. No respiratory distress.      Breath sounds: Normal breath sounds.   Abdominal:      General: Abdomen is flat. Bowel sounds are normal. There is no distension.      Palpations: Abdomen is soft.      Tenderness: There is no abdominal tenderness. There is no guarding.   Musculoskeletal:         General: Normal range of motion.      Right lower leg: No edema.      Left lower leg: No edema.   Skin:     General: Skin is warm and dry.   Neurological:      Mental Status: He is alert. He is  disoriented.         Significant Labs: All pertinent labs within the past 24 hours have been reviewed.  CBC:   Recent Labs   Lab 03/09/23  1328 03/10/23  0910   WBC 6.28 7.00   HGB 10.3* 10.0*   HCT 31.2* 30.2*    155     CMP:   Recent Labs   Lab 03/09/23  1328 03/10/23  0612    135*   K 5.8* 5.1    106   CO2 19* 20*   GLU 81 84   BUN 31* 28*   CREATININE 1.8* 1.6*   CALCIUM 8.5* 8.1*   PROT 7.0 6.3   ALBUMIN 2.3* 2.1*   BILITOT 2.0* 1.9*   ALKPHOS 133 121   AST 52* 48*   ALT 26 25   ANIONGAP 9 9       Significant Imaging:     Imaging Results              CT Head Without Contrast (Final result)  Result time 03/09/23 13:20:40      Final result by MISAEL Infante Sr., MD (03/09/23 13:20:40)                   Impression:      1. There is no evidence of an acute ischemic event.  There are mild chronic appearing ischemic changes in the deep white matter of both cerebral hemispheres.  2. There is no intracranial hemorrhage.  3. There is mild partial opacification of the maxillary sinuses bilaterally.  This is characteristic of sinusitis.  All CT scans at this facility use dose modulation, iterative reconstruction, and/or weight base dosing when appropriate to reduce radiation dose when appropriate to reduce radiation dose to as low as reasonably achievable.      Electronically signed by: Garland Infante MD  Date:    03/09/2023  Time:    13:20               Narrative:    EXAMINATION:  CT HEAD WITHOUT CONTRAST    CLINICAL HISTORY:  Mental status change, unknown cause;    TECHNIQUE:  Standard brain CT protocol without IV contrast was performed.    COMPARISON:  02/13/2023    FINDINGS:  This is a limited examination secondary to patient motion.  There is no evidence of an acute ischemic event.  There are mild chronic appearing ischemic changes in the deep white matter of both cerebral hemispheres.  There is no intracranial hemorrhage.  There is mild partial opacification of the maxillary sinuses  bilaterally.                                       X-Ray Chest AP Portable (Final result)  Result time 03/09/23 12:50:36      Final result by MISAEL Infante Sr., MD (03/09/23 12:50:36)                   Impression:      1. There is a mild amount of alveolar consolidation scattered throughout the left lung.  An infectious process cannot be excluded.  2. There is blunting of the left costophrenic angle.  This is characteristic of a tiny pleural effusion.  .      Electronically signed by: Garland Infante MD  Date:    03/09/2023  Time:    12:50               Narrative:    EXAMINATION:  XR CHEST AP PORTABLE    CLINICAL HISTORY:  AMS;    COMPARISON:  02/13/2023    FINDINGS:  The size of the heart is normal.  There is a mild amount of alveolar consolidation scattered throughout the left lung.  There is no focal pulmonary infiltrate visualized in the right lung.  There is blunting of the left costophrenic angle.  The right costophrenic angle is sharp.  There is no pneumothorax.                                         Assessment/Plan:      * Hepatic encephalopathy  S/t above      FELICIA (acute kidney injury)  Patient with acute kidney injury likely due to IVVD/dehydration and pre-renal azotemia FELICIA is currently stable. Labs reviewed- Renal function/electrolytes with Estimated Creatinine Clearance: 38.9 mL/min (A) (based on SCr of 1.8 mg/dL (H)). according to latest data. Monitor urine output and serial BMP and adjust therapy as needed. Avoid nephrotoxins and renally dose meds for GFR listed above.     FELICIA on CKD  Trend Cr    Decompensated hepatic cirrhosis  Start lactulose and rifaximin  Titrate to BM  Continue home diuretics  Hepatology consult      Hyperkalemia  Repeat BMP in AM      Post-traumatic stress disorder, chronic  Continue home meds        VTE Risk Mitigation (From admission, onward)         Ordered     enoxaparin injection 40 mg  Daily         03/09/23 7387     IP VTE HIGH RISK PATIENT  Once         03/09/23 9907      Place sequential compression device  Until discontinued         03/09/23 1549                Discharge Planning   JEREMIAH:      Code Status: Full Code   Is the patient medically ready for discharge?:     Reason for patient still in hospital (select all that apply): monitor clinical improvement   Discharge Plan A: [facility]                  [facility] Chris Chu MD  Department of Hospital Medicine   [facility] Surg

## 2023-03-10 NOTE — HOSPITAL COURSE
Discussed with patient's wife at bedside, she stated that patient had history of recurrent ascites with requirement of paracentesis every 1-2 weeks.    Hence he underwent tips placement on January 30, 2023, following the placement noted to have 3 episodes of hepatic encephalopathy including most recent visit.  As per wife, patient missed 4 days of lactulose prior to admission, due to delay in getting the refills. At baseline patient alert and oriented although has some memory deficits, however since TIPS noted to have gradual deterioration; On arrival noted to be agitated, confused, with elevated ammonia levels; Bilateral knee x-ray did not show any evidence of fractures. Wound care on board.  Discussed with hepatologist, recommended to continue lactulose, rifaximin; f/u ammonia; TIPS placement done by Dr. Wesley, pt needs TIPS downsizing Monday by IR. Per Hepatology, after downsizing, consider hospice pending clinical course. Given age and comorbidities, deemed not a candidate for liver transplant at this time. Underwent TIPS revision/downsizing with IR 03/13/23.     Pt seen and examined on day of discharge with wife at bedside. HE is awake, alert, oriented x 3, has no complaints today. Denies CP, SOB, abd pain, n/v. PT deemed stable for discharge from hepatology standpoint, discussed with Dr. Wells. HE was able to ambulate > 150 feet with PT/OT who recommend return home with . Pt appears stable for discharge home today per my exam. Follow up with PCP and hepatology within 1 week. [facility] NP at home referral placed.

## 2023-03-10 NOTE — PLAN OF CARE
Discussed poc with pt and spouse, spouse verbalized understanding.  Pt A&O only to self     Purposeful rounding every 2hours     Fall precautions in place, remains injury free  Pt denies c/o nausea or pain     Accurate I&Os  Lactulose given as ordered  Bed locked at lowest position  Call light within reach     Chart check complete  Reviewed active orders  Will continue with POC

## 2023-03-10 NOTE — HPI
77M w/ h/o decomp cirrhosis had TIPS placed in Jan for refractory ascites. He also had varices. Since TIPS placement this is his 3rd admit for HE. It seems there was a delay in getting a refill of lactulose and was w/o meds for 4 days. He has been increasingly combative. He is having BMs here but still confused, fidgeting and  combative. No evidence of GI or significant abdominal distension.

## 2023-03-10 NOTE — PT/OT/SLP EVAL
ST orders received and chart reviewed. Patient asleep during attempted evaluation. Will attempt again at a later time.       Aggie Young, CCC-SLP, CBIS   Speech Language Pathologist   Certified Brain Injury Specialist   3/10/2023

## 2023-03-10 NOTE — PLAN OF CARE
[facility] - Med Surg  Initial Discharge Assessment       Primary [facility] Provider: [facility] Dept    Admission Diagnosis: Hepatic encephalopathy [K76.82]  Chest pain [R07.9]  AMS (altered mental status) [R41.82]    Admission Date: 3/9/2023  Expected Discharge Date:     Discharge Barriers Identified: None    Payor: MEDICARE / Plan: MEDICARE PART A & B / Product Type: Government /     Extended Emergency Contact Information  Primary Emergency Contact: Jessica Aguilar  Address: [address] [place], [place] [address]           STEFFI ANGUIANO [address] [place]  Mobile Phone: [phone]  Relation: Spouse    Discharge Plan A: [id number] - [place] [facility] - [address] AT [facility] [address] [facility]  [address]  [place] [address]  Phone: [phone] Fax: [phone]      Initial Assessment (most recent)       Adult Discharge Assessment - 03/10/23 0908          Discharge Assessment    Assessment Type Discharge Planning Assessment     Confirmed/corrected address, phone number and insurance Yes     Confirmed Demographics Correct on Facesheet     Source of Information patient;family     Communicated JEREMIAH with patient/caregiver Date not available/Unable to determine     Reason For Admission encephalopathy     People in Home spouse     Facility Arrived From: home     Do you expect to return to your current living situation? Yes     Do you have help at home or someone to help you manage your care at home? Yes     Who are your caregiver(s) and their phone number(s)? spouse     Prior to hospitilization cognitive status: Alert/Oriented     Current cognitive status: Alert/Oriented     Walking or Climbing Stairs ambulation difficulty, requires equipment     Dressing/Bathing bathing difficulty, assistance 1 person;dressing difficulty, assistance 1 person     Dressing/Bathing Management spouse assists     Home Accessibility wheelchair accessible     Equipment Currently Used  at Home walker, rolling     Readmission within 30 days? No     Patient currently being followed by outpatient case management? No     Do you currently have service(s) that help you manage your care at home? No     Do you take prescription medications? Yes     Do you have prescription coverage? Yes     Do you have any problems affording any of your prescribed medications? No     Is the patient taking medications as prescribed? yes     Who is going to help you get home at discharge? spouse     How do you get to doctors appointments? family or friend will provide     Are you on dialysis? No     Discharge Plan A Home Health     DME Needed Upon Discharge  none     Discharge Plan discussed with: Patient;Spouse/sig other     Discharge Barriers Identified None                   Anticipated DC Dispo: home with continued [facility]  Prior Level of Function: wife assists with dressing/bathing, patient uses walker for mobility  [facility]  Comments:  CM met with patient/spouse at bedside to introduce role and discuss d/c planning. Patient lives with spouse who is help at home. Confirmed patient is [facility] with [facility]. CM following.

## 2023-03-10 NOTE — SUBJECTIVE & OBJECTIVE
Interval History:     Appeared confused, no significant change compared to yesterday as per wife at bedside.    Had 3-4 bowel movements yesterday, we will continue lactulose, rifaximin.    Patient able to tolerate low-sodium diet, however given confusion, we will follow up with SLP for further recommendations.    We will follow up on ultrasound abdomen, we will consider antibiotics if needed.    Given history of fall, we will follow up on x-ray knee bilateral.    Follow up on wound care       Review of Systems    Unable to perform ROS: Mental status change       Objective:     Vital Signs (Most Recent):  Temp: 97.7 °F (36.5 °C) (03/10/23 0810)  Pulse: 76 (03/10/23 0810)  Resp: 18 (03/10/23 0810)  BP: (!) 115/56 (03/10/23 0810)  SpO2: 100 % (03/10/23 0810)   Vital Signs (24h Range):  Temp:  [97.6 °F (36.4 °C)-98.5 °F (36.9 °C)] 97.7 °F (36.5 °C)  Pulse:  [] 76  Resp:  [13-22] 18  SpO2:  [94 %-100 %] 100 %  BP: (114-127)/(53-60) 115/56     Weight: 71 kg (156 lb 8.4 oz)  Body mass index is 21.23 kg/m².    Intake/Output Summary (Last 24 hours) at 3/10/2023 1157  Last data filed at 3/10/2023 0830  Gross per 24 hour   Intake 120 ml   Output --   Net 120 ml      Physical Exam    Constitutional:       General: He is not in acute distress.     Appearance: Normal appearance. He is ill-appearing.   HENT:      Head: Normocephalic and atraumatic.   Cardiovascular:      Rate and Rhythm: Normal rate and regular rhythm.   Pulmonary:      Effort: Pulmonary effort is normal. No respiratory distress.      Breath sounds: Normal breath sounds.   Abdominal:      General: Abdomen is flat. Bowel sounds are normal. There is no distension.      Palpations: Abdomen is soft.      Tenderness: There is no abdominal tenderness. There is no guarding.   Musculoskeletal:         General: Normal range of motion.      Right lower leg: No edema.      Left lower leg: No edema.   Skin:     General: Skin is warm and dry.   Neurological:      Mental  Status: He is alert. He is disoriented.         Significant Labs: All pertinent labs within the past 24 hours have been reviewed.  CBC:   Recent Labs   Lab 03/09/23  1328 03/10/23  0910   WBC 6.28 7.00   HGB 10.3* 10.0*   HCT 31.2* 30.2*    155     CMP:   Recent Labs   Lab 03/09/23  1328 03/10/23  0612    135*   K 5.8* 5.1    106   CO2 19* 20*   GLU 81 84   BUN 31* 28*   CREATININE 1.8* 1.6*   CALCIUM 8.5* 8.1*   PROT 7.0 6.3   ALBUMIN 2.3* 2.1*   BILITOT 2.0* 1.9*   ALKPHOS 133 121   AST 52* 48*   ALT 26 25   ANIONGAP 9 9       Significant Imaging:     Imaging Results              CT Head Without Contrast (Final result)  Result time 03/09/23 13:20:40      Final result by MISAEL Infante Sr., MD (03/09/23 13:20:40)                   Impression:      1. There is no evidence of an acute ischemic event.  There are mild chronic appearing ischemic changes in the deep white matter of both cerebral hemispheres.  2. There is no intracranial hemorrhage.  3. There is mild partial opacification of the maxillary sinuses bilaterally.  This is characteristic of sinusitis.  All CT scans at this facility use dose modulation, iterative reconstruction, and/or weight base dosing when appropriate to reduce radiation dose when appropriate to reduce radiation dose to as low as reasonably achievable.      Electronically signed by: Garland Infante MD  Date:    03/09/2023  Time:    13:20               Narrative:    EXAMINATION:  CT HEAD WITHOUT CONTRAST    CLINICAL HISTORY:  Mental status change, unknown cause;    TECHNIQUE:  Standard brain CT protocol without IV contrast was performed.    COMPARISON:  02/13/2023    FINDINGS:  This is a limited examination secondary to patient motion.  There is no evidence of an acute ischemic event.  There are mild chronic appearing ischemic changes in the deep white matter of both cerebral hemispheres.  There is no intracranial hemorrhage.  There is mild partial opacification of the  maxillary sinuses bilaterally.                                       X-Ray Chest AP Portable (Final result)  Result time 03/09/23 12:50:36      Final result by MISAEL Infante Sr., MD (03/09/23 12:50:36)                   Impression:      1. There is a mild amount of alveolar consolidation scattered throughout the left lung.  An infectious process cannot be excluded.  2. There is blunting of the left costophrenic angle.  This is characteristic of a tiny pleural effusion.  .      Electronically signed by: Garland Infante MD  Date:    03/09/2023  Time:    12:50               Narrative:    EXAMINATION:  XR CHEST AP PORTABLE    CLINICAL HISTORY:  AMS;    COMPARISON:  02/13/2023    FINDINGS:  The size of the heart is normal.  There is a mild amount of alveolar consolidation scattered throughout the left lung.  There is no focal pulmonary infiltrate visualized in the right lung.  There is blunting of the left costophrenic angle.  The right costophrenic angle is sharp.  There is no pneumothorax.

## 2023-03-10 NOTE — PLAN OF CARE
Problem: Adult Inpatient Plan of Care  Goal: Plan of Care Review  3/10/2023 0504 by Heydi Gonzalez RN  Outcome: Ongoing, Progressing  3/10/2023 0441 by Heydi Gonzalez RN  Outcome: Ongoing, Progressing  Goal: Patient-Specific Goal (Individualized)  3/10/2023 0504 by Heydi Gonzalez RN  Outcome: Ongoing, Progressing  3/10/2023 0441 by Heydi Gonzalez RN  Outcome: Ongoing, Progressing  Goal: Absence of Hospital-Acquired Illness or Injury  3/10/2023 0504 by Heydi Gonzalez RN  Outcome: Ongoing, Progressing  3/10/2023 0441 by Heydi Gonzalez RN  Outcome: Ongoing, Progressing  Goal: Optimal Comfort and Wellbeing  3/10/2023 0504 by Heydi Gonzalez RN  Outcome: Ongoing, Progressing  3/10/2023 0441 by Heydi Gonzalez RN  Outcome: Ongoing, Progressing  Goal: Readiness for Transition of Care  3/10/2023 0504 by Heydi Gonzalez RN  Outcome: Ongoing, Progressing  3/10/2023 0441 by Heydi Gonzalez RN  Outcome: Ongoing, Progressing     Problem: Diabetes Comorbidity  Goal: Blood Glucose Level Within Targeted Range  3/10/2023 0504 by Heydi Gonzalez RN  Outcome: Ongoing, Progressing  3/10/2023 0441 by Heydi Gonzalez RN  Outcome: Ongoing, Progressing     Problem: Fluid and Electrolyte Imbalance (Acute Kidney Injury/Impairment)  Goal: Fluid and Electrolyte Balance  3/10/2023 0504 by Heydi Gonzalez RN  Outcome: Ongoing, Progressing  3/10/2023 0441 by Heydi Gonzalez RN  Outcome: Ongoing, Progressing     Problem: Oral Intake Inadequate (Acute Kidney Injury/Impairment)  Goal: Optimal Nutrition Intake  3/10/2023 0504 by Heydi Gonzalez RN  Outcome: Ongoing, Progressing  3/10/2023 0441 by Heydi Gonzalez RN  Outcome: Ongoing, Progressing     Problem: Renal Function Impairment (Acute Kidney Injury/Impairment)  Goal: Effective Renal Function  3/10/2023 0504 by Heydi Gonzalez RN  Outcome: Ongoing, Progressing  3/10/2023 0441 by Heydi Gonzalez  RN  Outcome: Ongoing, Progressing     Problem: Skin Injury Risk Increased  Goal: Skin Health and Integrity  3/10/2023 0504 by Heydi Gonzalez RN  Outcome: Ongoing, Progressing  3/10/2023 0441 by Heydi Gonzalez RN  Outcome: Ongoing, Progressing     Problem: Impaired Wound Healing  Goal: Optimal Wound Healing  Outcome: Ongoing, Progressing

## 2023-03-10 NOTE — PT/OT/SLP EVAL
Speech Language Pathology Evaluation  Bedside Swallow    Patient Name:  Korin Vivas   MRN:  [id number]  Admitting Diagnosis: Hepatic encephalopathy    Recommendations:                 General Recommendations:  Dysphagia therapy  Diet recommendations:  Regular Diet - IDDSI Level 7, Thin liquids - IDDSI Level 0   Aspiration Precautions: Strict aspiration precautions   General Precautions: Standard, aspiration  Communication strategies:  none    History:     Past Medical History:   Diagnosis Date    Arm fracture, right 2022, 2010    CAD (coronary artery disease)     Diabetes     GERD (gastroesophageal reflux disease)     HTN (hypertension)     Hyperlipidemia        Past Surgical History:   Procedure Laterality Date    CATARACT EXTRACTION      COLONOSCOPY      COLONOSCOPY N/A 7/18/2016    Procedure: COLONOSCOPY;  Surgeon: Bryon Hickey MD;  Location: [facility] ENDO;  Service: Endoscopy;  Laterality: N/A;    COLONOSCOPY N/A 10/6/2020    Procedure: COLONOSCOPY;  Surgeon: Kait Isaacs MD;  Location: [facility];  Service: Endoscopy;  Laterality: N/A;    CORONARY STENT PLACEMENT      ELBOW SURGERY      ESOPHAGOGASTRODUODENOSCOPY N/A 10/6/2020    Procedure: ESOPHAGOGASTRODUODENOSCOPY (EGD);  Surgeon: Kait Isaacs MD;  Location: [facility] ENDO;  Service: Endoscopy;  Laterality: N/A;    FLUOROSCOPY N/A 1/30/2023    Procedure: FLUOROSCOPY/TIPS;  Surgeon: Pedro Luis Wesley MD;  Location: [facility] CATH LAB;  Service: General;  Laterality: N/A;    HERNIA REPAIR      2022    TONSILLECTOMY      VASECTOMY           Subjective     Patient seen at bedside with spouse present. She reported he was able to eat breakfast with no difficulty although has experienced decreased alertness throughout the day. Patient was sat upright but was unable to follow directions.     Patient goals: Unable to state      Pain/Comfort:  Pain Rating 1: 0/10  Pain Rating Post-Intervention 1: 0/10  Pain Rating 2: 0/10  Pain Rating Post-Intervention 2:  0/10    Respiratory Status: Room air    Objective:     Oral Musculature Evaluation  Oral Musculature: general weakness  Dentition: scattered dentition  Secretion Management: adequate  Mucosal Quality: dry  Mandibular Strength and Mobility: impaired  Oral Labial Strength and Mobility: impaired retraction  Lingual Strength and Mobility: impaired strength  Velar Elevation: impaired  Buccal Strength and Mobility: impaired  Volitional Cough: Present  Volitional Swallow: Present  Voice Prior to PO Intake: WFL  Oral Musculature Comments: Generalized weakness    Bedside Swallow Eval:   No PO trials were attempted due to decreased mentation. However, patient's spouse reported he tolerated his breakfast with no overt s/s aspiration. She did report some coughing/choking on medication earlier.     Education:   Patient's spouse was provided education regarding what speech therapy evaluation entails. She was provided education regarding the importance of oral hygiene and feeding only when awake/alert. She verbalized understanding of all discussed.     Assessment:     Korin Vivas is a 77 y.o. male with an SLP diagnosis of Dysphagia.  He presents with fluctuating mentation. At the time of this evaluation, he was unable to participate in any PO trials. ST will follow up for ongoing swallow assessment as mentation improves.     Goals:   Multidisciplinary Problems       SLP Goals          Problem: SLP    Goal Priority Disciplines Outcome   SLP Goal     SLP    Description: 1. Ongoing S/E as mentation improves.                       Plan:     Patient to be seen:  2 x/week, 3 x/week   Plan of Care expires:  03/17/23  Plan of Care reviewed with:  patient, spouse   SLP Follow-Up:  Yes       Discharge recommendations:  other (see comments) (pending acute progress)   Barriers to Discharge:  Safety Awareness      Time Tracking:     SLP Treatment Date:   03/10/23  Speech Start Time:  1550  Speech Stop Time:  1603     Speech Total Time  (min):  13 min    Billable Minutes: Eval Swallow and Oral Function 13 minutes.    03/10/2023

## 2023-03-10 NOTE — H&P (VIEW-ONLY)
[facility]  Hepatology  Telemetry Consult Note    Patient Name: Korin Vivas  MRN: [id number]  Admission Date: 3/9/2023  Hospital Length of Stay: 0 days  Attending Provider: Emigdio Chu, *   Primary Care Physician: [facility]  Principal Problem:Hepatic encephalopathy    Inpatient Consult to Telemedicine - Hepatology  Consult performed by: Bree Wells MD  Consult ordered by: Gertrudis Salmeron MD  Reason for consult: Hepatic encephaloathy  Assessment/Recommendations: Lactulose and rifaximin  Consider TIPS downsizing        Subjective:     Transplant status: No    HPI:  77M w/ h/o decomp cirrhosis had TIPS placed in Jan for refractory ascites. He also had varices. Since TIPS placement this is his 3rd admit for HE. It seems there was a delay in getting a refill of lactulose and was w/o meds for 4 days. He has been increasingly combative. He is having BMs here but still confused, fidgeting and  combative. No evidence of GI or significant abdominal distension.      Review of Systems   Unable to perform ROS: Mental status change     Past Medical History:   Diagnosis Date    Arm fracture, right 2022, 2010    CAD (coronary artery disease)     Diabetes     GERD (gastroesophageal reflux disease)     HTN (hypertension)     Hyperlipidemia        Past Surgical History:   Procedure Laterality Date    CATARACT EXTRACTION      COLONOSCOPY      COLONOSCOPY N/A 7/18/2016    Procedure: COLONOSCOPY;  Surgeon: Bryon Hickey MD;  Location: [facility];  Service: Endoscopy;  Laterality: N/A;    COLONOSCOPY N/A 10/6/2020    Procedure: COLONOSCOPY;  Surgeon: Kait Isaacs MD;  Location: [facility];  Service: Endoscopy;  Laterality: N/A;    CORONARY STENT PLACEMENT      ELBOW SURGERY      ESOPHAGOGASTRODUODENOSCOPY N/A 10/6/2020    Procedure: ESOPHAGOGASTRODUODENOSCOPY (EGD);  Surgeon: Kait Isaacs MD;  Location: [facility];  Service: Endoscopy;  Laterality: N/A;    FLUOROSCOPY N/A  1/30/2023    Procedure: FLUOROSCOPY/TIPS;  Surgeon: Pedro Luis Wesley MD;  Location: [facility] CATH LAB;  Service: General;  Laterality: N/A;    HERNIA REPAIR      2022    TONSILLECTOMY      VASECTOMY         Family history of liver disease: N/A    Review of patient's allergies indicates:   Allergen Reactions    Statins-hmg-coa reductase inhibitors Other (See Comments)     muscle aches, joint pain             Tobacco Use    Smoking status: Former     Packs/day: 1.00     Years: 40.00     Pack years: 40.00     Types: Cigarettes    Smokeless tobacco: Never    Tobacco comments:     quit 3 years ago   Substance and Sexual Activity    Alcohol use: No    Drug use: No    Sexual activity: Not on file       Medications Prior to Admission   Medication Sig Dispense Refill Last Dose    busPIRone (BUSPAR) 15 MG tablet Take 15 mg by mouth 2 (two) times daily as needed (anxiety).   3/8/2023    ezetimibe (ZETIA) 10 mg tablet Take 10 mg by mouth once daily.   3/8/2023    famotidine (PEPCID) 40 MG tablet Take 40 mg by mouth once daily.   3/8/2023    furosemide (LASIX) 40 MG tablet Take 1 tablet (40 mg total) by mouth once daily at 6am. 30 tablet 11 3/8/2023    lactulose (CHRONULAC) 10 gram/15 mL solution Take 30 mLs (20 g total) by mouth 3 (three) times daily. 250 mL 11 3/8/2023    metformin (GLUCOPHAGE) 1000 MG tablet Take 1,000 mg by mouth 2 (two) times daily with meals.   3/8/2023    multivitamin capsule Take 1 capsule by mouth once daily.   3/8/2023    omega 3-dha-epa-fish oil 1,200 (144-216) mg Cap Take 2,400 mg by mouth 2 (two) times a day.   3/8/2023    rifAXIMin (XIFAXAN) 550 mg Tab Take 1 tablet (550 mg total) by mouth 2 (two) times daily. 60 tablet 6 3/8/2023    spironolactone (ALDACTONE) 100 MG tablet Take 1 tablet (100 mg total) by mouth 2 (two) times daily. 60 tablet 6 3/8/2023    trazodone (DESYREL) 100 MG tablet Take 100 mg by mouth every evening.   3/8/2023    zinc sulfate (ZINCATE) 50 mg zinc (220 mg)  capsule Take 220 mg by mouth once daily.   3/8/2023       Objective:     Vital Signs (Most Recent):  Temp: 97.7 °F (36.5 °C) (03/10/23 0810)  Pulse: 76 (03/10/23 0810)  Resp: 18 (03/10/23 0810)  BP: (!) 115/56 (03/10/23 0810)  SpO2: 100 % (03/10/23 0810)   Vital Signs (24h Range):  Temp:  [97.6 °F (36.4 °C)-98.5 °F (36.9 °C)] 97.7 °F (36.5 °C)  Pulse:  [] 76  Resp:  [13-22] 18  SpO2:  [94 %-100 %] 100 %  BP: (114-127)/(53-60) 115/56     Weight: 71 kg (156 lb 8.4 oz) (03/09/23 2354)  Body mass index is 21.23 kg/m².    Physical Exam  Vitals reviewed.   Constitutional:       General: He is not in acute distress.     Appearance: He is well-developed.   HENT:      Head: Normocephalic and atraumatic.      Mouth/Throat:      Pharynx: No oropharyngeal exudate.   Eyes:      General: No scleral icterus.        Right eye: No discharge.         Left eye: No discharge.      Conjunctiva/sclera: Conjunctivae normal.      Pupils: Pupils are equal, round, and reactive to light.   Pulmonary:      Effort: Pulmonary effort is normal. No respiratory distress.      Breath sounds: Normal breath sounds. No wheezing.   Abdominal:      General: There is no distension.      Palpations: Abdomen is soft.      Tenderness: There is no abdominal tenderness.   Musculoskeletal:      Right lower leg: No edema.      Left lower leg: No edema.   Neurological:      Mental Status: He is alert. He is disoriented.      Comments: Alert but confused and fidgeting       MELD-Na score: 20 at 2/14/2023  4:45 AM  MELD score: 19 at 2/14/2023  4:45 AM  Calculated from:  Serum Creatinine: 1.9 mg/dL at 2/14/2023  4:45 AM  Serum Sodium: 136 mmol/L at 2/14/2023  4:45 AM  Total Bilirubin: 1.0 mg/dL at 2/14/2023  4:45 AM  INR(ratio): 1.8 at 2/14/2023  4:45 AM  Age: 77 years    Significant Labs:  CBC:   Recent Labs   Lab 03/10/23  0910   WBC 7.00   RBC 3.18*   HGB 10.0*   HCT 30.2*        BMP:   Recent Labs   Lab 03/10/23  0612   GLU 84   *   K 5.1   CL  106   CO2 20*   BUN 28*   CREATININE 1.6*   CALCIUM 8.1*     CMP:   Recent Labs   Lab 03/09/23  1328 03/10/23  0612   GLU 81 84   CALCIUM 8.5* 8.1*   ALBUMIN 2.3*  --    PROT 7.0  --     135*   K 5.8* 5.1   CO2 19* 20*    106   BUN 31* 28*   CREATININE 1.8* 1.6*   ALKPHOS 133  --    ALT 26  --    AST 52*  --    BILITOT 2.0*  --      Coagulation: No results for input(s): PT, INR, APTT in the last 168 hours.    Significant Imaging:  X-Ray: Reviewed    Assessment/Plan:     GI  * Hepatic encephalopathy  Likely precipitated by missing doses of lactulose. Patient still significant confusion. Also concerned that patient is not tolerating TIPS and may ultimately need TIPS downsizing.  -Ammonia levels ordered   -Continue with lactulose and rifaximin  -Will try to place outpatient order for lactulose and rifaximin to prevent missing doses  -Avoid benzos for agitation and try haldol if needed    Decompensated hepatic cirrhosis  Main issues recently have been HE likely worsened by TIPS. Given patient's age and comorbidities he is not transplant candidate.  -Labs placed for today and tomorrow        Thank you for your consult. I will follow-up with patient. Please contact us if you have any additional questions.    Bree Wells MD  Hepatology  [facility]

## 2023-03-10 NOTE — CONSULTS
[facility] Surg  Hepatology  Telemetry Consult Note    Patient Name: Korin Vivas  MRN: [id number]  Admission Date: 3/9/2023  Hospital Length of Stay: 0 days  Attending Provider: Emigdio Chu, *   Primary Care Physician: [facility]  Principal Problem:Hepatic encephalopathy    Inpatient Consult to Telemedicine - Hepatology  Consult performed by: Bree Wells MD  Consult ordered by: Gertrudis Salmeron MD  Reason for consult: Hepatic encephaloathy  Assessment/Recommendations: Lactulose and rifaximin  Consider TIPS downsizing        Subjective:     Transplant status: No    HPI:  77M w/ h/o decomp cirrhosis had TIPS placed in Jan for refractory ascites. He also had varices. Since TIPS placement this is his 3rd admit for HE. It seems there was a delay in getting a refill of lactulose and was w/o meds for 4 days. He has been increasingly combative. He is having BMs here but still confused, fidgeting and  combative. No evidence of GI or significant abdominal distension.      Review of Systems   Unable to perform ROS: Mental status change     Past Medical History:   Diagnosis Date    Arm fracture, right 2022, 2010    CAD (coronary artery disease)     Diabetes     GERD (gastroesophageal reflux disease)     HTN (hypertension)     Hyperlipidemia        Past Surgical History:   Procedure Laterality Date    CATARACT EXTRACTION      COLONOSCOPY      COLONOSCOPY N/A 7/18/2016    Procedure: COLONOSCOPY;  Surgeon: Bryon Hickey MD;  Location: [facility];  Service: Endoscopy;  Laterality: N/A;    COLONOSCOPY N/A 10/6/2020    Procedure: COLONOSCOPY;  Surgeon: Kait Isaacs MD;  Location: [facility];  Service: Endoscopy;  Laterality: N/A;    CORONARY STENT PLACEMENT      ELBOW SURGERY      ESOPHAGOGASTRODUODENOSCOPY N/A 10/6/2020    Procedure: ESOPHAGOGASTRODUODENOSCOPY (EGD);  Surgeon: Kait Isaacs MD;  Location: [facility];  Service: Endoscopy;  Laterality: N/A;    FLUOROSCOPY N/A  1/30/2023    Procedure: FLUOROSCOPY/TIPS;  Surgeon: Pedro Luis Wesley MD;  Location: [facility] CATH LAB;  Service: General;  Laterality: N/A;    HERNIA REPAIR      2022    TONSILLECTOMY      VASECTOMY         Family history of liver disease: N/A    Review of patient's allergies indicates:   Allergen Reactions    Statins-hmg-coa reductase inhibitors Other (See Comments)     muscle aches, joint pain             Tobacco Use    Smoking status: Former     Packs/day: 1.00     Years: 40.00     Pack years: 40.00     Types: Cigarettes    Smokeless tobacco: Never    Tobacco comments:     quit 3 years ago   Substance and Sexual Activity    Alcohol use: No    Drug use: No    Sexual activity: Not on file       Medications Prior to Admission   Medication Sig Dispense Refill Last Dose    busPIRone (BUSPAR) 15 MG tablet Take 15 mg by mouth 2 (two) times daily as needed (anxiety).   3/8/2023    ezetimibe (ZETIA) 10 mg tablet Take 10 mg by mouth once daily.   3/8/2023    famotidine (PEPCID) 40 MG tablet Take 40 mg by mouth once daily.   3/8/2023    furosemide (LASIX) 40 MG tablet Take 1 tablet (40 mg total) by mouth once daily at 6am. 30 tablet 11 3/8/2023    lactulose (CHRONULAC) 10 gram/15 mL solution Take 30 mLs (20 g total) by mouth 3 (three) times daily. 250 mL 11 3/8/2023    metformin (GLUCOPHAGE) 1000 MG tablet Take 1,000 mg by mouth 2 (two) times daily with meals.   3/8/2023    multivitamin capsule Take 1 capsule by mouth once daily.   3/8/2023    omega 3-dha-epa-fish oil 1,200 (144-216) mg Cap Take 2,400 mg by mouth 2 (two) times a day.   3/8/2023    rifAXIMin (XIFAXAN) 550 mg Tab Take 1 tablet (550 mg total) by mouth 2 (two) times daily. 60 tablet 6 3/8/2023    spironolactone (ALDACTONE) 100 MG tablet Take 1 tablet (100 mg total) by mouth 2 (two) times daily. 60 tablet 6 3/8/2023    trazodone (DESYREL) 100 MG tablet Take 100 mg by mouth every evening.   3/8/2023    zinc sulfate (ZINCATE) 50 mg zinc (220 mg)  capsule Take 220 mg by mouth once daily.   3/8/2023       Objective:     Vital Signs (Most Recent):  Temp: 97.7 °F (36.5 °C) (03/10/23 0810)  Pulse: 76 (03/10/23 0810)  Resp: 18 (03/10/23 0810)  BP: (!) 115/56 (03/10/23 0810)  SpO2: 100 % (03/10/23 0810)   Vital Signs (24h Range):  Temp:  [97.6 °F (36.4 °C)-98.5 °F (36.9 °C)] 97.7 °F (36.5 °C)  Pulse:  [] 76  Resp:  [13-22] 18  SpO2:  [94 %-100 %] 100 %  BP: (114-127)/(53-60) 115/56     Weight: 71 kg (156 lb 8.4 oz) (03/09/23 2354)  Body mass index is 21.23 kg/m².    Physical Exam  Vitals reviewed.   Constitutional:       General: He is not in acute distress.     Appearance: He is well-developed.   HENT:      Head: Normocephalic and atraumatic.      Mouth/Throat:      Pharynx: No oropharyngeal exudate.   Eyes:      General: No scleral icterus.        Right eye: No discharge.         Left eye: No discharge.      Conjunctiva/sclera: Conjunctivae normal.      Pupils: Pupils are equal, round, and reactive to light.   Pulmonary:      Effort: Pulmonary effort is normal. No respiratory distress.      Breath sounds: Normal breath sounds. No wheezing.   Abdominal:      General: There is no distension.      Palpations: Abdomen is soft.      Tenderness: There is no abdominal tenderness.   Musculoskeletal:      Right lower leg: No edema.      Left lower leg: No edema.   Neurological:      Mental Status: He is alert. He is disoriented.      Comments: Alert but confused and fidgeting       MELD-Na score: 20 at 2/14/2023  4:45 AM  MELD score: 19 at 2/14/2023  4:45 AM  Calculated from:  Serum Creatinine: 1.9 mg/dL at 2/14/2023  4:45 AM  Serum Sodium: 136 mmol/L at 2/14/2023  4:45 AM  Total Bilirubin: 1.0 mg/dL at 2/14/2023  4:45 AM  INR(ratio): 1.8 at 2/14/2023  4:45 AM  Age: 77 years    Significant Labs:  CBC:   Recent Labs   Lab 03/10/23  0910   WBC 7.00   RBC 3.18*   HGB 10.0*   HCT 30.2*        BMP:   Recent Labs   Lab 03/10/23  0612   GLU 84   *   K 5.1   CL  106   CO2 20*   BUN 28*   CREATININE 1.6*   CALCIUM 8.1*     CMP:   Recent Labs   Lab 03/09/23  1328 03/10/23  0612   GLU 81 84   CALCIUM 8.5* 8.1*   ALBUMIN 2.3*  --    PROT 7.0  --     135*   K 5.8* 5.1   CO2 19* 20*    106   BUN 31* 28*   CREATININE 1.8* 1.6*   ALKPHOS 133  --    ALT 26  --    AST 52*  --    BILITOT 2.0*  --      Coagulation: No results for input(s): PT, INR, APTT in the last 168 hours.    Significant Imaging:  X-Ray: Reviewed    Assessment/Plan:     GI  * Hepatic encephalopathy  Likely precipitated by missing doses of lactulose. Patient still significant confusion. Also concerned that patient is not tolerating TIPS and may ultimately need TIPS downsizing.  -Ammonia levels ordered   -Continue with lactulose and rifaximin  -Will try to place outpatient order for lactulose and rifaximin to prevent missing doses  -Avoid benzos for agitation and try haldol if needed    Decompensated hepatic cirrhosis  Main issues recently have been HE likely worsened by TIPS. Given patient's age and comorbidities he is not transplant candidate.  -Labs placed for today and tomorrow        Thank you for your consult. I will follow-up with patient. Please contact us if you have any additional questions.    Bree Wells MD  Hepatology  [facility]

## 2023-03-10 NOTE — CONSULTS
[facility] Surg  Wound Care    Patient Name:  Korin Viavs   MRN:  [id number]  Date: 3/10/2023  Diagnosis: Hepatic encephalopathy    History:     Past Medical History:   Diagnosis Date    Arm fracture, right 2022, 2010    CAD (coronary artery disease)     Diabetes     GERD (gastroesophageal reflux disease)     HTN (hypertension)     Hyperlipidemia        Social History     Socioeconomic History    Marital status:    Tobacco Use    Smoking status: Former     Packs/day: 1.00     Years: 40.00     Pack years: 40.00     Types: Cigarettes    Smokeless tobacco: Never    Tobacco comments:     quit 3 years ago   Substance and Sexual Activity    Alcohol use: No    Drug use: No     Social Determinants of Health     Physical Activity: Inactive    Days of Exercise per Week: 0 days    Minutes of Exercise per Session: 0 min       Precautions:     Allergies as of 03/09/2023 - Reviewed 03/09/2023   Allergen Reaction Noted    Statins-hmg-coa reductase inhibitors Other (See Comments) 03/26/2019       [facility] Assessment Details/Treatment     Consulted on this 76 y/o M patient due to present on admission skin breakdown. He is awake and alert, restless. S/o at bedside.   BUE dressings and bandaids removed. Multiple skin tears noted to BUE with sanguinous drainage. Cleansed with saline and patted dry. Adaptic applied to cover skin tears, topped with nonbordered foam dressings, then wrapped with sofform gauze. LUQ wrapped with ACE to secure, RUE tubigrip (from home) applied to secure.   Patient turned to left side for sacral assessement. Stage 2 PI to coccyx noted with moist red wound bed, measures 2x1x0.1cm. cleansed with saline and foam dressing applied.   Recommend turn q2 hours.        03/10/23 0950   WOCN Assessment   WOCN Total Time (mins) 45   Visit Date 03/10/23   Visit Time 0950   Consult Type New   WO Speciality Wound   Wound skin tear;pressure        Altered Skin Integrity Left Arm Skin Tear   No Date First Assessed or  Time First Assessed found.   Side: Left  Location: Arm  Primary Wound Type: Skin Tear   Dressing Appearance Intact;Moist drainage;Dried drainage   Drainage Amount Small   Drainage Characteristics/Odor Sanguineous   Appearance Red;Pink;Moist;Ecchymotic   Tissue loss description Partial thickness   Wound Edges Jagged   Care Cleansed with:;Sterile normal saline;Applied:;Skin Barrier   Dressing Foam;Rolled gauze;Tubular bandage;Non-adherent        Altered Skin Integrity 03/09/23 1930 Right Arm Skin Tear   Date First Assessed/Time First Assessed: 03/09/23 1930   Altered Skin Integrity Present on Admission - Did Patient arrive to the hospital with altered skin?: yes  Side: Right  Location: Arm  Primary Wound Type: Skin Tear   Description of Altered Skin Integrity   (not pressure-related skin injury)   Dressing Appearance Intact;Moist drainage;Dried drainage   Drainage Amount Small   Drainage Characteristics/Odor Sanguineous   Appearance Red;Moist   Tissue loss description Partial thickness   Wound Edges Jagged   Care Cleansed with:;Sterile normal saline;Applied:;Skin Barrier   Dressing Non-adherent;Foam;Rolled gauze;Tubular bandage        Altered Skin Integrity 03/10/23 Coccyx Partial thickness tissue loss. Shallow open ulcer with a red or pink wound bed, without slough. Intact or Open/Ruptured Serum-filled blister.   Date First Assessed: 03/10/23   Altered Skin Integrity Present on Admission - Did Patient arrive to the hospital with altered skin?: yes  Location: Coccyx  Description of Altered Skin Integrity: Partial thickness tissue loss. Shallow open ulcer with a...   Description of Altered Skin Integrity Partial thickness tissue loss. Shallow open ulcer with a red or pink wound bed, without slough. Intact or Open/Ruptured Serum-filled blister.   Dressing Appearance Open to air   Drainage Amount Scant   Drainage Characteristics/Odor Serous   Appearance Red;Moist   Tissue loss description Partial thickness   Red (%),  Wound Tissue Color 100 %   Periwound Area Redness   Wound Edges Open   Wound Length (cm) 2 cm   Wound Width (cm) 1 cm   Wound Depth (cm) 0.1 cm   Wound Volume (cm^3) 0.2 cm^3   Wound Surface Area (cm^2) 2 cm^2   Care Cleansed with:;Sterile normal saline;Applied:;Skin Barrier   Dressing Foam;Applied       03/10/2023

## 2023-03-10 NOTE — ASSESSMENT & PLAN NOTE
Likely precipitated by missing doses of lactulose. Patient still significant confusion. Also concerned that patient is not tolerating TIPS and may ultimately need TIPS downsizing.  -Ammonia levels ordered   -Continue with lactulose and rifaximin  -Will try to place outpatient order for lactulose and rifaximin to prevent missing doses  -Avoid benzos for agitation and try haldol if needed

## 2023-03-10 NOTE — SUBJECTIVE & OBJECTIVE
Review of Systems   Unable to perform ROS: Mental status change     Past Medical History:   Diagnosis Date    Arm fracture, right 2022, 2010    CAD (coronary artery disease)     Diabetes     GERD (gastroesophageal reflux disease)     HTN (hypertension)     Hyperlipidemia        Past Surgical History:   Procedure Laterality Date    CATARACT EXTRACTION      COLONOSCOPY      COLONOSCOPY N/A 7/18/2016    Procedure: COLONOSCOPY;  Surgeon: Bryon Hickey MD;  Location: [facility] ENDO;  Service: Endoscopy;  Laterality: N/A;    COLONOSCOPY N/A 10/6/2020    Procedure: COLONOSCOPY;  Surgeon: Kait Isaacs MD;  Location: [facility] ENDO;  Service: Endoscopy;  Laterality: N/A;    CORONARY STENT PLACEMENT      ELBOW SURGERY      ESOPHAGOGASTRODUODENOSCOPY N/A 10/6/2020    Procedure: ESOPHAGOGASTRODUODENOSCOPY (EGD);  Surgeon: Kait Isaacs MD;  Location: [facility] ENDO;  Service: Endoscopy;  Laterality: N/A;    FLUOROSCOPY N/A 1/30/2023    Procedure: FLUOROSCOPY/TIPS;  Surgeon: Pedro Luis Wesley MD;  Location: [facility] CATH LAB;  Service: General;  Laterality: N/A;    HERNIA REPAIR      2022    TONSILLECTOMY      VASECTOMY         Family history of liver disease: N/A    Review of patient's allergies indicates:   Allergen Reactions    Statins-hmg-coa reductase inhibitors Other (See Comments)     muscle aches, joint pain             Tobacco Use    Smoking status: Former     Packs/day: 1.00     Years: 40.00     Pack years: 40.00     Types: Cigarettes    Smokeless tobacco: Never    Tobacco comments:     quit 3 years ago   Substance and Sexual Activity    Alcohol use: No    Drug use: No    Sexual activity: Not on file       Medications Prior to Admission   Medication Sig Dispense Refill Last Dose    busPIRone (BUSPAR) 15 MG tablet Take 15 mg by mouth 2 (two) times daily as needed (anxiety).   3/8/2023    ezetimibe (ZETIA) 10 mg tablet Take 10 mg by mouth once daily.   3/8/2023    famotidine (PEPCID) 40 MG tablet Take 40 mg by mouth once daily.    3/8/2023    furosemide (LASIX) 40 MG tablet Take 1 tablet (40 mg total) by mouth once daily at 6am. 30 tablet 11 3/8/2023    lactulose (CHRONULAC) 10 gram/15 mL solution Take 30 mLs (20 g total) by mouth 3 (three) times daily. 250 mL 11 3/8/2023    metformin (GLUCOPHAGE) 1000 MG tablet Take 1,000 mg by mouth 2 (two) times daily with meals.   3/8/2023    multivitamin capsule Take 1 capsule by mouth once daily.   3/8/2023    omega 3-dha-epa-fish oil 1,200 (144-216) mg Cap Take 2,400 mg by mouth 2 (two) times a day.   3/8/2023    rifAXIMin (XIFAXAN) 550 mg Tab Take 1 tablet (550 mg total) by mouth 2 (two) times daily. 60 tablet 6 3/8/2023    spironolactone (ALDACTONE) 100 MG tablet Take 1 tablet (100 mg total) by mouth 2 (two) times daily. 60 tablet 6 3/8/2023    trazodone (DESYREL) 100 MG tablet Take 100 mg by mouth every evening.   3/8/2023    zinc sulfate (ZINCATE) 50 mg zinc (220 mg) capsule Take 220 mg by mouth once daily.   3/8/2023       Objective:     Vital Signs (Most Recent):  Temp: 97.7 °F (36.5 °C) (03/10/23 0810)  Pulse: 76 (03/10/23 0810)  Resp: 18 (03/10/23 0810)  BP: (!) 115/56 (03/10/23 0810)  SpO2: 100 % (03/10/23 0810)   Vital Signs (24h Range):  Temp:  [97.6 °F (36.4 °C)-98.5 °F (36.9 °C)] 97.7 °F (36.5 °C)  Pulse:  [] 76  Resp:  [13-22] 18  SpO2:  [94 %-100 %] 100 %  BP: (114-127)/(53-60) 115/56     Weight: 71 kg (156 lb 8.4 oz) (03/09/23 2354)  Body mass index is 21.23 kg/m².    Physical Exam  Vitals reviewed.   Constitutional:       General: He is not in acute distress.     Appearance: He is well-developed.   HENT:      Head: Normocephalic and atraumatic.      Mouth/Throat:      Pharynx: No oropharyngeal exudate.   Eyes:      General: No scleral icterus.        Right eye: No discharge.         Left eye: No discharge.      Conjunctiva/sclera: Conjunctivae normal.      Pupils: Pupils are equal, round, and reactive to light.   Pulmonary:      Effort: Pulmonary effort is normal. No respiratory  distress.      Breath sounds: Normal breath sounds. No wheezing.   Abdominal:      General: There is no distension.      Palpations: Abdomen is soft.      Tenderness: There is no abdominal tenderness.   Musculoskeletal:      Right lower leg: No edema.      Left lower leg: No edema.   Neurological:      Mental Status: He is alert. He is disoriented.      Comments: Alert but confused and fidgeting       MELD-Na score: 20 at 2/14/2023  4:45 AM  MELD score: 19 at 2/14/2023  4:45 AM  Calculated from:  Serum Creatinine: 1.9 mg/dL at 2/14/2023  4:45 AM  Serum Sodium: 136 mmol/L at 2/14/2023  4:45 AM  Total Bilirubin: 1.0 mg/dL at 2/14/2023  4:45 AM  INR(ratio): 1.8 at 2/14/2023  4:45 AM  Age: 77 years    Significant Labs:  CBC:   Recent Labs   Lab 03/10/23  0910   WBC 7.00   RBC 3.18*   HGB 10.0*   HCT 30.2*        BMP:   Recent Labs   Lab 03/10/23  0612   GLU 84   *   K 5.1      CO2 20*   BUN 28*   CREATININE 1.6*   CALCIUM 8.1*     CMP:   Recent Labs   Lab 03/09/23  1328 03/10/23  0612   GLU 81 84   CALCIUM 8.5* 8.1*   ALBUMIN 2.3*  --    PROT 7.0  --     135*   K 5.8* 5.1   CO2 19* 20*    106   BUN 31* 28*   CREATININE 1.8* 1.6*   ALKPHOS 133  --    ALT 26  --    AST 52*  --    BILITOT 2.0*  --      Coagulation: No results for input(s): PT, INR, APTT in the last 168 hours.    Significant Imaging:  X-Ray: Reviewed

## 2023-03-10 NOTE — ASSESSMENT & PLAN NOTE
Main issues recently have been HE likely worsened by TIPS. Given patient's age and comorbidities he is not transplant candidate.  -Labs placed for today and tomorrow

## 2023-03-11 PROBLEM — M25.569 KNEE PAIN: Status: ACTIVE | Noted: 2023-03-11

## 2023-03-11 LAB
ALBUMIN SERPL BCP-MCNC: 2.1 G/DL (ref 3.5–5.2)
ALP SERPL-CCNC: 126 U/L (ref 55–135)
ALT SERPL W/O P-5'-P-CCNC: 26 U/L (ref 10–44)
AMMONIA PLAS-SCNC: 41 UMOL/L (ref 10–50)
ANION GAP SERPL CALC-SCNC: 7 MMOL/L (ref 8–16)
ANION GAP SERPL CALC-SCNC: 7 MMOL/L (ref 8–16)
AST SERPL-CCNC: 44 U/L (ref 10–40)
BASOPHILS # BLD AUTO: 0.07 K/UL (ref 0–0.2)
BASOPHILS NFR BLD: 0.9 % (ref 0–1.9)
BILIRUB SERPL-MCNC: 2.2 MG/DL (ref 0.1–1)
BUN SERPL-MCNC: 21 MG/DL (ref 8–23)
BUN SERPL-MCNC: 21 MG/DL (ref 8–23)
CALCIUM SERPL-MCNC: 7.9 MG/DL (ref 8.7–10.5)
CALCIUM SERPL-MCNC: 7.9 MG/DL (ref 8.7–10.5)
CHLORIDE SERPL-SCNC: 107 MMOL/L (ref 95–110)
CHLORIDE SERPL-SCNC: 107 MMOL/L (ref 95–110)
CO2 SERPL-SCNC: 20 MMOL/L (ref 23–29)
CO2 SERPL-SCNC: 20 MMOL/L (ref 23–29)
CREAT SERPL-MCNC: 1.4 MG/DL (ref 0.5–1.4)
CREAT SERPL-MCNC: 1.4 MG/DL (ref 0.5–1.4)
DIFFERENTIAL METHOD: ABNORMAL
EOSINOPHIL # BLD AUTO: 0.8 K/UL (ref 0–0.5)
EOSINOPHIL NFR BLD: 10.8 % (ref 0–8)
ERYTHROCYTE [DISTWIDTH] IN BLOOD BY AUTOMATED COUNT: 20.9 % (ref 11.5–14.5)
EST. GFR  (NO RACE VARIABLE): 52 ML/MIN/1.73 M^2
EST. GFR  (NO RACE VARIABLE): 52 ML/MIN/1.73 M^2
GLUCOSE SERPL-MCNC: 99 MG/DL (ref 70–110)
GLUCOSE SERPL-MCNC: 99 MG/DL (ref 70–110)
HCT VFR BLD AUTO: 30.8 % (ref 40–54)
HGB BLD-MCNC: 10.4 G/DL (ref 14–18)
IMM GRANULOCYTES # BLD AUTO: 0.03 K/UL (ref 0–0.04)
IMM GRANULOCYTES NFR BLD AUTO: 0.4 % (ref 0–0.5)
INR PPP: 1.9 (ref 0.8–1.2)
LYMPHOCYTES # BLD AUTO: 0.8 K/UL (ref 1–4.8)
LYMPHOCYTES NFR BLD: 11.2 % (ref 18–48)
MCH RBC QN AUTO: 31.3 PG (ref 27–31)
MCHC RBC AUTO-ENTMCNC: 33.8 G/DL (ref 32–36)
MCV RBC AUTO: 93 FL (ref 82–98)
MONOCYTES # BLD AUTO: 1.3 K/UL (ref 0.3–1)
MONOCYTES NFR BLD: 18 % (ref 4–15)
NEUTROPHILS # BLD AUTO: 4.3 K/UL (ref 1.8–7.7)
NEUTROPHILS NFR BLD: 58.7 % (ref 38–73)
NRBC BLD-RTO: 0 /100 WBC
PLATELET # BLD AUTO: 141 K/UL (ref 150–450)
PMV BLD AUTO: 9.6 FL (ref 9.2–12.9)
POTASSIUM SERPL-SCNC: 4.6 MMOL/L (ref 3.5–5.1)
POTASSIUM SERPL-SCNC: 4.6 MMOL/L (ref 3.5–5.1)
PROT SERPL-MCNC: 6.4 G/DL (ref 6–8.4)
PROTHROMBIN TIME: 19.7 SEC (ref 9–12.5)
RBC # BLD AUTO: 3.32 M/UL (ref 4.6–6.2)
SODIUM SERPL-SCNC: 134 MMOL/L (ref 136–145)
SODIUM SERPL-SCNC: 134 MMOL/L (ref 136–145)
WBC # BLD AUTO: 7.38 K/UL (ref 3.9–12.7)

## 2023-03-11 PROCEDURE — 63600175 PHARM REV CODE 636 W HCPCS: Performed by: STUDENT IN AN ORGANIZED HEALTH CARE EDUCATION/TRAINING PROGRAM

## 2023-03-11 PROCEDURE — 85025 COMPLETE CBC W/AUTO DIFF WBC: CPT | Performed by: INTERNAL MEDICINE

## 2023-03-11 PROCEDURE — 25000003 PHARM REV CODE 250: Performed by: STUDENT IN AN ORGANIZED HEALTH CARE EDUCATION/TRAINING PROGRAM

## 2023-03-11 PROCEDURE — 92526 ORAL FUNCTION THERAPY: CPT

## 2023-03-11 PROCEDURE — 80053 COMPREHEN METABOLIC PANEL: CPT | Performed by: INTERNAL MEDICINE

## 2023-03-11 PROCEDURE — 25000003 PHARM REV CODE 250: Performed by: INTERNAL MEDICINE

## 2023-03-11 PROCEDURE — 85610 PROTHROMBIN TIME: CPT | Performed by: INTERNAL MEDICINE

## 2023-03-11 PROCEDURE — 11000001 HC ACUTE MED/SURG PRIVATE ROOM

## 2023-03-11 PROCEDURE — 36415 COLL VENOUS BLD VENIPUNCTURE: CPT | Performed by: INTERNAL MEDICINE

## 2023-03-11 PROCEDURE — 99232 SBSQ HOSP IP/OBS MODERATE 35: CPT | Mod: 95,,, | Performed by: INTERNAL MEDICINE

## 2023-03-11 PROCEDURE — 82140 ASSAY OF AMMONIA: CPT | Performed by: INTERNAL MEDICINE

## 2023-03-11 PROCEDURE — 99232 PR SUBSEQUENT HOSPITAL CARE,LEVL II: ICD-10-PCS | Mod: 95,,, | Performed by: INTERNAL MEDICINE

## 2023-03-11 RX ORDER — LACTULOSE 10 G/15ML
20 SOLUTION ORAL; RECTAL 3 TIMES DAILY
Qty: 1892 ML | Refills: 5 | Status: SHIPPED | OUTPATIENT
Start: 2023-03-11

## 2023-03-11 RX ORDER — LACTULOSE 10 G/15ML
30 SOLUTION ORAL EVERY 8 HOURS
Status: DISCONTINUED | OUTPATIENT
Start: 2023-03-11 | End: 2023-03-14 | Stop reason: HOSPADM

## 2023-03-11 RX ADMIN — RIFAXIMIN 550 MG: 550 TABLET ORAL at 09:03

## 2023-03-11 RX ADMIN — RIFAXIMIN 550 MG: 550 TABLET ORAL at 10:03

## 2023-03-11 RX ADMIN — TRAZODONE HYDROCHLORIDE 100 MG: 100 TABLET ORAL at 10:03

## 2023-03-11 RX ADMIN — LACTULOSE 30 G: 20 SOLUTION ORAL at 02:03

## 2023-03-11 RX ADMIN — ENOXAPARIN SODIUM 40 MG: 40 INJECTION SUBCUTANEOUS at 05:03

## 2023-03-11 RX ADMIN — FAMOTIDINE 20 MG: 20 TABLET, FILM COATED ORAL at 09:03

## 2023-03-11 RX ADMIN — LACTULOSE 30 G: 20 SOLUTION ORAL at 09:03

## 2023-03-11 RX ADMIN — FUROSEMIDE 40 MG: 40 TABLET ORAL at 09:03

## 2023-03-11 RX ADMIN — LACTULOSE 30 G: 20 SOLUTION ORAL at 10:03

## 2023-03-11 NOTE — ASSESSMENT & PLAN NOTE
Start lactulose and rifaximin  Titrate to BM  Continue home diuretics  Hepatology consult    Likely secondary to missed lactulose dose at home due to issues with feeling.    Hepatology on board, recommended to continue lactulose/rifaximin     3/11   Ammonia trended down to 41   We will continue current management   Follow up with hepatology

## 2023-03-11 NOTE — ASSESSMENT & PLAN NOTE
S/p due to fall at home   Bilateral x-ray knee did not show any evidence of fracture/effusions

## 2023-03-11 NOTE — PROGRESS NOTES
[facility] Medicine  Progress Note    Patient Name: Korin Vivas  MRN: [id number]  Patient Class: IP- Inpatient   Admission Date: 3/9/2023  Length of Stay: 1 days  Attending Physician: Emigdio Chu, *  Primary Care Provider: [facility] Dept        Subjective:     Principal Problem:Hepatic encephalopathy        HPI:  77 y.o. male patient with a PMHx of CAD, DM, GERD, and HTn who presents to the Emergency Department for evaluation of AMS which onset gradually 2 days ago. The ambulance reports that pt has been confused and combative with his wife. Patient's initial blood sugar was 68 and was given D10 which got his sugar up to 130. Symptoms are constant and moderate in severity. No mitigating or exacerbating factors reported. No further complaints or concerns at this time.    Admitted for hepatic encephalopathy s/t unintentional med noncompliance      Overview/Hospital Course:      Discussed with patient's wife at bedside, she stated that patient had history of recurrent ascites with requirement of paracentesis every 1-2 weeks.    Hence he underwent tips placement on January 30, 2023, following the placement noted to have 3 episodes of hepatic encephalopathy including most recent visit.    As per wife, patient missed 4 days of lactulose prior to admission, due to delay in getting the refills.    As per wife, at baseline patient alert and oriented although has some memory deficits, however since TIPS noted to have gradual deterioration;   On arrival noted to be agitated, confused, with elevated ammonia levels;     Discussed with hepatologist, recommended to continue lactulose, rifaximin; f/u ammonia;   Also stated that pt got TIPS placement done by Dr. Wesley, pt might need to f/u either IP/ OP to consider for TIPS exchange given recurrent HE;  Also given age and comorbidities, deemed not a candidate for liver transplant at this time.      3/11:  Ammonia level trended down to 41  from 99, follow up on SLP evaluation.    Bilateral knee x-ray did not show any evidence of fractures.    Wound care on board.            Interval History:     Ammonia level trended down to 41 from 99, follow up on SLP evaluation.    Bilateral knee x-ray did not show any evidence of fractures.    Wound care on board.    Follow up with hepatology       Review of Systems    Unable to perform ROS: Mental status change       Objective:     Vital Signs (Most Recent):  Temp: 97.7 °F (36.5 °C) (03/11/23 0733)  Pulse: 71 (03/11/23 0733)  Resp: 18 (03/11/23 0733)  BP: (!) 130/58 (03/11/23 0733)  SpO2: 99 % (03/11/23 0733)   Vital Signs (24h Range):  Temp:  [97.6 °F (36.4 °C)-98.3 °F (36.8 °C)] 97.7 °F (36.5 °C)  Pulse:  [] 71  Resp:  [17-20] 18  SpO2:  [98 %-99 %] 99 %  BP: ()/(54-58) 130/58     Weight: 71 kg (156 lb 8.4 oz)  Body mass index is 21.23 kg/m².    Intake/Output Summary (Last 24 hours) at 3/11/2023 1056  Last data filed at 3/11/2023 0900  Gross per 24 hour   Intake 240 ml   Output --   Net 240 ml      Physical Exam    Constitutional:       General: He is not in acute distress.     Appearance: Normal appearance. He is ill-appearing.   HENT:      Head: Normocephalic and atraumatic.   Cardiovascular:      Rate and Rhythm: Normal rate and regular rhythm.   Pulmonary:      Effort: Pulmonary effort is normal. No respiratory distress.      Breath sounds: Normal breath sounds.   Abdominal:      General: Abdomen is flat. Bowel sounds are normal. There is no distension.      Palpations: Abdomen is soft.      Tenderness: There is no abdominal tenderness. There is no guarding.   Musculoskeletal:         General: Normal range of motion.      Right lower leg: No edema.      Left lower leg: No edema.   Skin:     General: Skin is warm and dry.   Neurological:      Mental Status: He is alert. He is disoriented.         Significant Labs: All pertinent labs within the past 24 hours have been reviewed.  CBC:   Recent Labs    Lab 03/09/23  1328 03/10/23  0910 03/11/23  0614   WBC 6.28 7.00 7.38   HGB 10.3* 10.0* 10.4*   HCT 31.2* 30.2* 30.8*    155 141*     CMP:   Recent Labs   Lab 03/09/23  1328 03/10/23  0612 03/11/23  0614    135* 134*  134*   K 5.8* 5.1 4.6  4.6    106 107  107   CO2 19* 20* 20*  20*   GLU 81 84 99  99   BUN 31* 28* 21  21   CREATININE 1.8* 1.6* 1.4  1.4   CALCIUM 8.5* 8.1* 7.9*  7.9*   PROT 7.0 6.3 6.4   ALBUMIN 2.3* 2.1* 2.1*   BILITOT 2.0* 1.9* 2.2*   ALKPHOS 133 121 126   AST 52* 48* 44*   ALT 26 25 26   ANIONGAP 9 9 7*  7*       Significant Imaging:     Imaging Results              CT Head Without Contrast (Final result)  Result time 03/09/23 13:20:40      Final result by MISAEL Infante Sr., MD (03/09/23 13:20:40)                   Impression:      1. There is no evidence of an acute ischemic event.  There are mild chronic appearing ischemic changes in the deep white matter of both cerebral hemispheres.  2. There is no intracranial hemorrhage.  3. There is mild partial opacification of the maxillary sinuses bilaterally.  This is characteristic of sinusitis.  All CT scans at this facility use dose modulation, iterative reconstruction, and/or weight base dosing when appropriate to reduce radiation dose when appropriate to reduce radiation dose to as low as reasonably achievable.      Electronically signed by: Garland Infante MD  Date:    03/09/2023  Time:    13:20               Narrative:    EXAMINATION:  CT HEAD WITHOUT CONTRAST    CLINICAL HISTORY:  Mental status change, unknown cause;    TECHNIQUE:  Standard brain CT protocol without IV contrast was performed.    COMPARISON:  02/13/2023    FINDINGS:  This is a limited examination secondary to patient motion.  There is no evidence of an acute ischemic event.  There are mild chronic appearing ischemic changes in the deep white matter of both cerebral hemispheres.  There is no intracranial hemorrhage.  There is mild partial  opacification of the maxillary sinuses bilaterally.                                       X-Ray Chest AP Portable (Final result)  Result time 03/09/23 12:50:36      Final result by MISAEL Infante Sr., MD (03/09/23 12:50:36)                   Impression:      1. There is a mild amount of alveolar consolidation scattered throughout the left lung.  An infectious process cannot be excluded.  2. There is blunting of the left costophrenic angle.  This is characteristic of a tiny pleural effusion.  .      Electronically signed by: Garland Infante MD  Date:    03/09/2023  Time:    12:50               Narrative:    EXAMINATION:  XR CHEST AP PORTABLE    CLINICAL HISTORY:  AMS;    COMPARISON:  02/13/2023    FINDINGS:  The size of the heart is normal.  There is a mild amount of alveolar consolidation scattered throughout the left lung.  There is no focal pulmonary infiltrate visualized in the right lung.  There is blunting of the left costophrenic angle.  The right costophrenic angle is sharp.  There is no pneumothorax.                                         Assessment/Plan:      * Hepatic encephalopathy  Start lactulose and rifaximin  Titrate to BM  Continue home diuretics  Hepatology consult    Likely secondary to missed lactulose dose at home due to issues with feeling.    Hepatology on board, recommended to continue lactulose/rifaximin     3/11   Ammonia trended down to 41   We will continue current management   Follow up with hepatology         Knee pain  S/p due to fall at home   Bilateral x-ray knee did not show any evidence of fracture/effusions         FELICIA (acute kidney injury)  Patient with acute kidney injury likely due to IVVD/dehydration and pre-renal azotemia FELICIA is currently stable. Labs reviewed- Renal function/electrolytes with Estimated Creatinine Clearance: 45.8 mL/min (based on SCr of 1.4 mg/dL). according to latest data. Monitor urine output and serial BMP and adjust therapy as needed. Avoid nephrotoxins  and renally dose meds for GFR listed above.     FELICIA on CKD  Trend Cr    3/11   Creatinine 1.4, gradually trending down       Decompensated hepatic cirrhosis  Start lactulose and rifaximin  Titrate to BM  Continue home diuretics  Hepatology consult      Hyperkalemia  Repeat BMP in AM      Post-traumatic stress disorder, chronic  Continue home meds        VTE Risk Mitigation (From admission, onward)         Ordered     enoxaparin injection 40 mg  Daily         03/09/23 1549     IP VTE HIGH RISK PATIENT  Once         03/09/23 1549     Place sequential compression device  Until discontinued         03/09/23 1549                Discharge Planning   JEREMIAH:      Code Status: Full Code   Is the patient medically ready for discharge?:     Reason for patient still in hospital (select all that apply):  Monitor clinical improvement  Discharge Plan A: Home Health                  Richmondlori Chu MD  Department of Hospital Medicine   [facility]

## 2023-03-11 NOTE — ASSESSMENT & PLAN NOTE
Concerned about patient persistent confusion despite normal ammonia. Discussed with wife that we should move forward with downsizing TIPS as he is unlikely to be manageable as an outpt in his current state. Also discussed that overall prognosis is poor if he does not improve with TIPS downsizing then hospice should be considered. She is in agreement with the above.  -Proceed with TIPS downsizing Monday  -After downsizing, monitor and consider hospice  -Continue lactulose and rifaximin    MELD-Na score: 22 at 3/11/2023  6:14 AM  MELD score: 20 at 3/11/2023  6:14 AM  Calculated from:  Serum Creatinine: 1.4 mg/dL at 3/11/2023  6:14 AM  Serum Sodium: 134 mmol/L at 3/11/2023  6:14 AM  Total Bilirubin: 2.2 mg/dL at 3/11/2023  6:14 AM  INR(ratio): 1.9 at 3/11/2023  6:14 AM  Age: 77 years     What Is The Reason For Today's Visit?: Excessive sun exposure Additional History: Patient last seen May 15, 2013.

## 2023-03-11 NOTE — PLAN OF CARE
Discussed poc with pt and wife, wife verbalized understanding    Purposeful rounding every 2hours    VS wnl  Cardiac monitoring in use, pt is NSR, tele monitor #[id number]    Fall precautions in place, remains injury free  Pt. Seem to be resting comfortable between rounds.    Accurate I&Os    Bed locked at lowest position  Call light within reach    Chart check complete  Will cont with POC

## 2023-03-11 NOTE — PROGRESS NOTES
[facility] S[facility]  Hepatology  Telemedicine Progress Note    Patient Name: Korin Vivas  MRN: [id number]  Admission Date: 3/9/2023  Hospital Length of Stay: 1 days  Attending Provider: Emigdio Chu, *   Primary Care Physician: [facility]  Principal Problem:Hepatic encephalopathy    Subjective:     Transplant status: No    HPI: 77M w/ h/o decomp cirrhosis had TIPS placed in Jan for refractory ascites. He also had varices. Since TIPS placement this is his 3rd admit for HE. It seems there was a delay in getting a refill of lactulose and was w/o meds for 4 days. He has been increasingly combative. He is having BMs here but still confused, fidgeting and  combative. No evidence of GI or significant abdominal distension.      Interval History: Patient is still not at baseline but wife reports he is doing much better. His ammonia is normal.    Current Facility-Administered Medications   Medication    busPIRone tablet 15 mg    dextrose 10% bolus 125 mL 125 mL    dextrose 10% bolus 250 mL 250 mL    enoxaparin injection 40 mg    famotidine tablet 20 mg    furosemide tablet 40 mg    glucagon (human recombinant) injection 1 mg    glucose chewable tablet 16 g    glucose chewable tablet 24 g    lactulose 20 gram/30 mL solution Soln 30 g    naloxone 0.4 mg/mL injection 0.02 mg    rifAXIMin tablet 550 mg    sodium chloride 0.9% flush 10 mL    sodium zirconium cyclosilicate packet 5 g    traZODone tablet 100 mg       Objective:     Vital Signs (Most Recent):  Temp: 98.2 °F (36.8 °C) (03/11/23 1134)  Pulse: 69 (03/11/23 1315)  Resp: 17 (03/11/23 1134)  BP: 122/63 (03/11/23 1134)  SpO2: 99 % (03/11/23 1134) Vital Signs (24h Range):  Temp:  [97.6 °F (36.4 °C)-98.3 °F (36.8 °C)] 98.2 °F (36.8 °C)  Pulse:  [] 69  Resp:  [17-20] 17  SpO2:  [99 %] 99 %  BP: ()/(54-63) 122/63     Weight: 71 kg (156 lb 8.4 oz) (03/09/23 9365)  Body mass index is 21.23 kg/m².    Physical Exam  Vitals  reviewed.   Constitutional:       General: He is not in acute distress.     Appearance: He is well-developed.   HENT:      Head: Normocephalic and atraumatic.      Mouth/Throat:      Pharynx: No oropharyngeal exudate.   Eyes:      General: No scleral icterus.        Right eye: No discharge.         Left eye: No discharge.      Conjunctiva/sclera: Conjunctivae normal.      Pupils: Pupils are equal, round, and reactive to light.   Pulmonary:      Effort: Pulmonary effort is normal. No respiratory distress.      Breath sounds: Normal breath sounds. No wheezing.   Abdominal:      General: There is no distension.      Palpations: Abdomen is soft.      Tenderness: There is no abdominal tenderness.   Neurological:      Mental Status: He is alert.      Comments: Alert but not responding appropriately to question       MELD-Na score: 22 at 3/11/2023  6:14 AM  MELD score: 20 at 3/11/2023  6:14 AM  Calculated from:  Serum Creatinine: 1.4 mg/dL at 3/11/2023  6:14 AM  Serum Sodium: 134 mmol/L at 3/11/2023  6:14 AM  Total Bilirubin: 2.2 mg/dL at 3/11/2023  6:14 AM  INR(ratio): 1.9 at 3/11/2023  6:14 AM  Age: 77 years    Significant Labs:  CBC:   Recent Labs   Lab 03/11/23 0614   WBC 7.38   RBC 3.32*   HGB 10.4*   HCT 30.8*   *     CMP:   Recent Labs   Lab 03/11/23 0614   GLU 99  99   CALCIUM 7.9*  7.9*   ALBUMIN 2.1*   PROT 6.4   *  134*   K 4.6  4.6   CO2 20*  20*     107   BUN 21  21   CREATININE 1.4  1.4   ALKPHOS 126   ALT 26   AST 44*   BILITOT 2.2*     Coagulation:   Recent Labs   Lab 03/11/23 0614   INR 1.9*       Significant Imaging:  US: Reviewed    Assessment/Plan:     GI  * Hepatic encephalopathy  Concerned about patient persistent confusion despite normal ammonia. Discussed with wife that we should move forward with downsizing TIPS as he is unlikely to be manageable as an outpt in his current state. Also discussed that overall prognosis is poor if he does not improve with TIPS downsizing  then hospice should be considered. She is in agreement with the above.  -Proceed with TIPS downsizing Monday  -After downsizing, monitor and consider hospice  -Continue lactulose and rifaximin    MELD-Na score: 22 at 3/11/2023  6:14 AM  MELD score: 20 at 3/11/2023  6:14 AM  Calculated from:  Serum Creatinine: 1.4 mg/dL at 3/11/2023  6:14 AM  Serum Sodium: 134 mmol/L at 3/11/2023  6:14 AM  Total Bilirubin: 2.2 mg/dL at 3/11/2023  6:14 AM  INR(ratio): 1.9 at 3/11/2023  6:14 AM  Age: 77 years          Thank you for your consult. I will follow-up with patient. Please contact us if you have any additional questions.    Bree Wells MD  Hepatology  [facility]

## 2023-03-11 NOTE — SUBJECTIVE & OBJECTIVE
Interval History:     Ammonia level trended down to 41 from 99, follow up on SLP evaluation.    Bilateral knee x-ray did not show any evidence of fractures.    Wound care on board.    Follow up with hepatology       Review of Systems    Unable to perform ROS: Mental status change       Objective:     Vital Signs (Most Recent):  Temp: 97.7 °F (36.5 °C) (03/11/23 0733)  Pulse: 71 (03/11/23 0733)  Resp: 18 (03/11/23 0733)  BP: (!) 130/58 (03/11/23 0733)  SpO2: 99 % (03/11/23 0733)   Vital Signs (24h Range):  Temp:  [97.6 °F (36.4 °C)-98.3 °F (36.8 °C)] 97.7 °F (36.5 °C)  Pulse:  [] 71  Resp:  [17-20] 18  SpO2:  [98 %-99 %] 99 %  BP: ()/(54-58) 130/58     Weight: 71 kg (156 lb 8.4 oz)  Body mass index is 21.23 kg/m².    Intake/Output Summary (Last 24 hours) at 3/11/2023 1056  Last data filed at 3/11/2023 0900  Gross per 24 hour   Intake 240 ml   Output --   Net 240 ml      Physical Exam    Constitutional:       General: He is not in acute distress.     Appearance: Normal appearance. He is ill-appearing.   HENT:      Head: Normocephalic and atraumatic.   Cardiovascular:      Rate and Rhythm: Normal rate and regular rhythm.   Pulmonary:      Effort: Pulmonary effort is normal. No respiratory distress.      Breath sounds: Normal breath sounds.   Abdominal:      General: Abdomen is flat. Bowel sounds are normal. There is no distension.      Palpations: Abdomen is soft.      Tenderness: There is no abdominal tenderness. There is no guarding.   Musculoskeletal:         General: Normal range of motion.      Right lower leg: No edema.      Left lower leg: No edema.   Skin:     General: Skin is warm and dry.   Neurological:      Mental Status: He is alert. He is disoriented.         Significant Labs: All pertinent labs within the past 24 hours have been reviewed.  CBC:   Recent Labs   Lab 03/09/23  1328 03/10/23  0910 03/11/23  0614   WBC 6.28 7.00 7.38   HGB 10.3* 10.0* 10.4*   HCT 31.2* 30.2* 30.8*    155  141*     CMP:   Recent Labs   Lab 03/09/23  1328 03/10/23  0612 03/11/23  0614    135* 134*  134*   K 5.8* 5.1 4.6  4.6    106 107  107   CO2 19* 20* 20*  20*   GLU 81 84 99  99   BUN 31* 28* 21  21   CREATININE 1.8* 1.6* 1.4  1.4   CALCIUM 8.5* 8.1* 7.9*  7.9*   PROT 7.0 6.3 6.4   ALBUMIN 2.3* 2.1* 2.1*   BILITOT 2.0* 1.9* 2.2*   ALKPHOS 133 121 126   AST 52* 48* 44*   ALT 26 25 26   ANIONGAP 9 9 7*  7*       Significant Imaging:     Imaging Results              CT Head Without Contrast (Final result)  Result time 03/09/23 13:20:40      Final result by MISAEL Infante Sr., MD (03/09/23 13:20:40)                   Impression:      1. There is no evidence of an acute ischemic event.  There are mild chronic appearing ischemic changes in the deep white matter of both cerebral hemispheres.  2. There is no intracranial hemorrhage.  3. There is mild partial opacification of the maxillary sinuses bilaterally.  This is characteristic of sinusitis.  All CT scans at this facility use dose modulation, iterative reconstruction, and/or weight base dosing when appropriate to reduce radiation dose when appropriate to reduce radiation dose to as low as reasonably achievable.      Electronically signed by: Garland Infante MD  Date:    03/09/2023  Time:    13:20               Narrative:    EXAMINATION:  CT HEAD WITHOUT CONTRAST    CLINICAL HISTORY:  Mental status change, unknown cause;    TECHNIQUE:  Standard brain CT protocol without IV contrast was performed.    COMPARISON:  02/13/2023    FINDINGS:  This is a limited examination secondary to patient motion.  There is no evidence of an acute ischemic event.  There are mild chronic appearing ischemic changes in the deep white matter of both cerebral hemispheres.  There is no intracranial hemorrhage.  There is mild partial opacification of the maxillary sinuses bilaterally.                                       X-Ray Chest AP Portable (Final result)  Result time  03/09/23 12:50:36      Final result by MISAEL Infante Sr., MD (03/09/23 12:50:36)                   Impression:      1. There is a mild amount of alveolar consolidation scattered throughout the left lung.  An infectious process cannot be excluded.  2. There is blunting of the left costophrenic angle.  This is characteristic of a tiny pleural effusion.  .      Electronically signed by: Garland Infante MD  Date:    03/09/2023  Time:    12:50               Narrative:    EXAMINATION:  XR CHEST AP PORTABLE    CLINICAL HISTORY:  AMS;    COMPARISON:  02/13/2023    FINDINGS:  The size of the heart is normal.  There is a mild amount of alveolar consolidation scattered throughout the left lung.  There is no focal pulmonary infiltrate visualized in the right lung.  There is blunting of the left costophrenic angle.  The right costophrenic angle is sharp.  There is no pneumothorax.

## 2023-03-11 NOTE — PT/OT/SLP PROGRESS
Speech Language Pathology Treatment    Patient Name:  Korin Vivas   MRN:  [id number]  Admitting Diagnosis: Hepatic encephalopathy    Recommendations:                 General Recommendations:  Dysphagia therapy, Speech/language therapy, and Cognitive-linguistic therapy  Diet recommendations:  Minced & Moist Diet - IDDSI Level 5, Liquid Diet Level: Thin liquids - IDDSI Level 0   Aspiration Precautions: Assistance with meals, Feed only when awake/alert, HOB to 90 degrees, Meds whole buried in puree, Monitor for s/s of aspiration, Remain upright 30 minutes post meal, Small bites/sips, and Standard aspiration precautions   General Precautions: Standard, aspiration  Communication strategies:  provide increased time to answer    Subjective     Patient seen at bedside with spouse assisting with lunch.   Patient goals: n/a     Pain/Comfort:  Pain Rating 1: 0/10  Pain Rating Post-Intervention 1: 0/10    Respiratory Status: Room air    Objective:     Has the patient been evaluated by SLP for swallowing?   Yes  Keep patient NPO? No   Current Respiratory Status:        Wife assisting patient with intake of lunch upon entry into room. Observed po intake of soft solids and thin liquids via cup. Patient with wet vocal quality after po intake and required cues for cough to clear. Wife educated on s/s of aspiration and dysphagia strategies to decrease risk of aspiration. Patient with poor eye contact and delayed responses. Recommend Minced & Moist Diet - IDDSI Level 5, Liquid Diet Level: Thin liquids - IDDSI Level 0 with aspiration precautions    Assessment:     Korin Vivas is a 77 y.o. male with an SLP diagnosis of Dysphagia.  Diet recommendations Minced & Moist Diet - IDDSI Level 5, Liquid Diet Level: Thin liquids - IDDSI Level 0 . He is at an increase aspiration risk due to varying levels of confusion and s/s of aspiration. ST to continue POC.    Goals:   Multidisciplinary Problems       SLP Goals          Problem: SLP    Goal  Priority Disciplines Outcome   SLP Goal     SLP Ongoing, Progressing   Description: 1. Ongoing S/E as mentation improves.                       Plan:     Patient to be seen:  2 x/week   Plan of Care expires:  03/17/23  Plan of Care reviewed with:  patient, spouse   SLP Follow-Up:  Yes       Discharge recommendations:  nursing facility, skilled       Time Tracking:     SLP Treatment Date:   03/11/23  Speech Start Time:  1352  Speech Stop Time:  1409     Speech Total Time (min):  17 min    Billable Minutes: Speech Therapy Individual 15    03/11/2023

## 2023-03-11 NOTE — ASSESSMENT & PLAN NOTE
Patient with acute kidney injury likely due to IVVD/dehydration and pre-renal azotemia FELICIA is currently stable. Labs reviewed- Renal function/electrolytes with Estimated Creatinine Clearance: 45.8 mL/min (based on SCr of 1.4 mg/dL). according to latest data. Monitor urine output and serial BMP and adjust therapy as needed. Avoid nephrotoxins and renally dose meds for GFR listed above.     FELICIA on CKD  Trend Cr    3/11   Creatinine 1.4, gradually trending down

## 2023-03-11 NOTE — PLAN OF CARE
Discussed poc with pt, pt verbalized understanding    Purposeful rounding every 2hours    VS wnl  Cardiac monitoring in use, pt is NSR, tele monitor # [id number]  Remains injury free  Fall precautions in place,   Pain and nausea under control with PRN meds    Incontinent of bowel and bladder  Loose brown watery stools noted  Bed locked at lowest position  Call light within reach    Chart check complete  Will cont with POC

## 2023-03-11 NOTE — SUBJECTIVE & OBJECTIVE
Interval History: Patient is still not at baseline but wife reports he is doing much better. His ammonia is normal.    Current Facility-Administered Medications   Medication    busPIRone tablet 15 mg    dextrose 10% bolus 125 mL 125 mL    dextrose 10% bolus 250 mL 250 mL    enoxaparin injection 40 mg    famotidine tablet 20 mg    furosemide tablet 40 mg    glucagon (human recombinant) injection 1 mg    glucose chewable tablet 16 g    glucose chewable tablet 24 g    lactulose 20 gram/30 mL solution Soln 30 g    naloxone 0.4 mg/mL injection 0.02 mg    rifAXIMin tablet 550 mg    sodium chloride 0.9% flush 10 mL    sodium zirconium cyclosilicate packet 5 g    traZODone tablet 100 mg       Objective:     Vital Signs (Most Recent):  Temp: 98.2 °F (36.8 °C) (03/11/23 1134)  Pulse: 69 (03/11/23 1315)  Resp: 17 (03/11/23 1134)  BP: 122/63 (03/11/23 1134)  SpO2: 99 % (03/11/23 1134) Vital Signs (24h Range):  Temp:  [97.6 °F (36.4 °C)-98.3 °F (36.8 °C)] 98.2 °F (36.8 °C)  Pulse:  [] 69  Resp:  [17-20] 17  SpO2:  [99 %] 99 %  BP: ()/(54-63) 122/63     Weight: 71 kg (156 lb 8.4 oz) (03/09/23 2354)  Body mass index is 21.23 kg/m².    Physical Exam  Vitals reviewed.   Constitutional:       General: He is not in acute distress.     Appearance: He is well-developed.   HENT:      Head: Normocephalic and atraumatic.      Mouth/Throat:      Pharynx: No oropharyngeal exudate.   Eyes:      General: No scleral icterus.        Right eye: No discharge.         Left eye: No discharge.      Conjunctiva/sclera: Conjunctivae normal.      Pupils: Pupils are equal, round, and reactive to light.   Pulmonary:      Effort: Pulmonary effort is normal. No respiratory distress.      Breath sounds: Normal breath sounds. No wheezing.   Abdominal:      General: There is no distension.      Palpations: Abdomen is soft.      Tenderness: There is no abdominal tenderness.   Neurological:      Mental Status: He is alert.      Comments: Alert but  not responding appropriately to question       MELD-Na score: 22 at 3/11/2023  6:14 AM  MELD score: 20 at 3/11/2023  6:14 AM  Calculated from:  Serum Creatinine: 1.4 mg/dL at 3/11/2023  6:14 AM  Serum Sodium: 134 mmol/L at 3/11/2023  6:14 AM  Total Bilirubin: 2.2 mg/dL at 3/11/2023  6:14 AM  INR(ratio): 1.9 at 3/11/2023  6:14 AM  Age: 77 years    Significant Labs:  CBC:   Recent Labs   Lab 03/11/23 0614   WBC 7.38   RBC 3.32*   HGB 10.4*   HCT 30.8*   *     CMP:   Recent Labs   Lab 03/11/23 0614   GLU 99  99   CALCIUM 7.9*  7.9*   ALBUMIN 2.1*   PROT 6.4   *  134*   K 4.6  4.6   CO2 20*  20*     107   BUN 21  21   CREATININE 1.4  1.4   ALKPHOS 126   ALT 26   AST 44*   BILITOT 2.2*     Coagulation:   Recent Labs   Lab 03/11/23 0614   INR 1.9*       Significant Imaging:  US: Reviewed

## 2023-03-12 LAB
AMMONIA PLAS-SCNC: 33 UMOL/L (ref 10–50)
ANION GAP SERPL CALC-SCNC: 8 MMOL/L (ref 8–16)
BASOPHILS # BLD AUTO: 0.08 K/UL (ref 0–0.2)
BASOPHILS NFR BLD: 0.9 % (ref 0–1.9)
BUN SERPL-MCNC: 19 MG/DL (ref 8–23)
CALCIUM SERPL-MCNC: 8 MG/DL (ref 8.7–10.5)
CHLORIDE SERPL-SCNC: 103 MMOL/L (ref 95–110)
CO2 SERPL-SCNC: 20 MMOL/L (ref 23–29)
CREAT SERPL-MCNC: 1.5 MG/DL (ref 0.5–1.4)
DIFFERENTIAL METHOD: ABNORMAL
EOSINOPHIL # BLD AUTO: 0.9 K/UL (ref 0–0.5)
EOSINOPHIL NFR BLD: 9.4 % (ref 0–8)
ERYTHROCYTE [DISTWIDTH] IN BLOOD BY AUTOMATED COUNT: 20.5 % (ref 11.5–14.5)
EST. GFR  (NO RACE VARIABLE): 48 ML/MIN/1.73 M^2
GLUCOSE SERPL-MCNC: 115 MG/DL (ref 70–110)
HCT VFR BLD AUTO: 32.3 % (ref 40–54)
HGB BLD-MCNC: 10.8 G/DL (ref 14–18)
IMM GRANULOCYTES # BLD AUTO: 0.05 K/UL (ref 0–0.04)
IMM GRANULOCYTES NFR BLD AUTO: 0.6 % (ref 0–0.5)
LYMPHOCYTES # BLD AUTO: 0.9 K/UL (ref 1–4.8)
LYMPHOCYTES NFR BLD: 9.6 % (ref 18–48)
MCH RBC QN AUTO: 31.4 PG (ref 27–31)
MCHC RBC AUTO-ENTMCNC: 33.4 G/DL (ref 32–36)
MCV RBC AUTO: 94 FL (ref 82–98)
MONOCYTES # BLD AUTO: 1.5 K/UL (ref 0.3–1)
MONOCYTES NFR BLD: 16.1 % (ref 4–15)
NEUTROPHILS # BLD AUTO: 5.8 K/UL (ref 1.8–7.7)
NEUTROPHILS NFR BLD: 63.4 % (ref 38–73)
NRBC BLD-RTO: 0 /100 WBC
PLATELET # BLD AUTO: 153 K/UL (ref 150–450)
PMV BLD AUTO: 9.9 FL (ref 9.2–12.9)
POTASSIUM SERPL-SCNC: 4.5 MMOL/L (ref 3.5–5.1)
RBC # BLD AUTO: 3.44 M/UL (ref 4.6–6.2)
SODIUM SERPL-SCNC: 131 MMOL/L (ref 136–145)
WBC # BLD AUTO: 9.09 K/UL (ref 3.9–12.7)

## 2023-03-12 PROCEDURE — 82140 ASSAY OF AMMONIA: CPT | Performed by: STUDENT IN AN ORGANIZED HEALTH CARE EDUCATION/TRAINING PROGRAM

## 2023-03-12 PROCEDURE — 25000003 PHARM REV CODE 250: Performed by: STUDENT IN AN ORGANIZED HEALTH CARE EDUCATION/TRAINING PROGRAM

## 2023-03-12 PROCEDURE — 11000001 HC ACUTE MED/SURG PRIVATE ROOM

## 2023-03-12 PROCEDURE — 85025 COMPLETE CBC W/AUTO DIFF WBC: CPT | Performed by: STUDENT IN AN ORGANIZED HEALTH CARE EDUCATION/TRAINING PROGRAM

## 2023-03-12 PROCEDURE — 80048 BASIC METABOLIC PNL TOTAL CA: CPT | Performed by: STUDENT IN AN ORGANIZED HEALTH CARE EDUCATION/TRAINING PROGRAM

## 2023-03-12 PROCEDURE — 63600175 PHARM REV CODE 636 W HCPCS: Performed by: STUDENT IN AN ORGANIZED HEALTH CARE EDUCATION/TRAINING PROGRAM

## 2023-03-12 PROCEDURE — 36415 COLL VENOUS BLD VENIPUNCTURE: CPT | Performed by: STUDENT IN AN ORGANIZED HEALTH CARE EDUCATION/TRAINING PROGRAM

## 2023-03-12 RX ADMIN — LACTULOSE 30 G: 20 SOLUTION ORAL at 10:03

## 2023-03-12 RX ADMIN — TRAZODONE HYDROCHLORIDE 100 MG: 100 TABLET ORAL at 08:03

## 2023-03-12 RX ADMIN — RIFAXIMIN 550 MG: 550 TABLET ORAL at 08:03

## 2023-03-12 RX ADMIN — LACTULOSE 30 G: 20 SOLUTION ORAL at 02:03

## 2023-03-12 RX ADMIN — ENOXAPARIN SODIUM 40 MG: 40 INJECTION SUBCUTANEOUS at 08:03

## 2023-03-12 RX ADMIN — FAMOTIDINE 20 MG: 20 TABLET, FILM COATED ORAL at 09:03

## 2023-03-12 RX ADMIN — FUROSEMIDE 40 MG: 40 TABLET ORAL at 09:03

## 2023-03-12 RX ADMIN — LACTULOSE 30 G: 20 SOLUTION ORAL at 05:03

## 2023-03-12 RX ADMIN — BUSPIRONE HYDROCHLORIDE 15 MG: 10 TABLET ORAL at 08:03

## 2023-03-12 RX ADMIN — RIFAXIMIN 550 MG: 550 TABLET ORAL at 09:03

## 2023-03-12 RX ADMIN — SODIUM CHLORIDE 250 ML: 9 INJECTION, SOLUTION INTRAVENOUS at 09:03

## 2023-03-12 NOTE — ASSESSMENT & PLAN NOTE
Start lactulose and rifaximin  Titrate to BM  Continue home diuretics  Hepatology consult    Likely secondary to missed lactulose dose at home due to issues with feeling.    Hepatology on board, recommended to continue lactulose/rifaximin     3/11   Ammonia trended down to 41   We will continue current management   Follow up with hepatology     3/12      Discussed with hepatologist, recommended to continue lactulose, rifaximin; f/u ammonia;   Also stated that pt got TIPS placement done by Dr. Wesley, pt needs TIPS downsizing Monday by IR; Dr. Wells discussed case with IR Dr. Wesley;   -After downsizing, to monitor and consider hospice; wife agreed to the current plan;    Given age and comorbidities, deemed not a candidate for liver transplant at this time.

## 2023-03-12 NOTE — PLAN OF CARE
Discussed plan of care with pt. Pt and spouse verbalized understanding. Wife at bedside, attentive to pt. No signs of acute distress, pt resting well with bed at lowest position. Call light within reach. Purposeful rounding Q2h.      Chart check complete.       Problem: Adult Inpatient Plan of Care  Goal: Plan of Care Review  3/12/2023 0500 by Ceci Orellana RN  Outcome: Ongoing, Progressing    Goal: Patient-Specific Goal (Individualized)  3/12/2023 0500 by Ceci Orellana RN  Outcome: Ongoing, Progressing    Goal: Absence of Hospital-Acquired Illness or Injury  3/12/2023 0500 by Ceci Orellana RN  Outcome: Ongoing, Progressing    Goal: Optimal Comfort and Wellbeing  3/12/2023 0500 by Ceci Orellana RN  Outcome: Ongoing, Progressing    Goal: Readiness for Transition of Care  3/12/2023 0500 by Ceci Orellana RN  Outcome: Ongoing, Progressing    Problem: Diabetes Comorbidity  Goal: Blood Glucose Level Within Targeted Range  3/12/2023 0500 by Ceci Orellana RN  Outcome: Ongoing, Progressing       Problem: Fluid and Electrolyte Imbalance (Acute Kidney Injury/Impairment)  Goal: Fluid and Electrolyte Balance  3/12/2023 0500 by Ceci Orellana RN  Outcome: Ongoing, Progressing       Problem: Oral Intake Inadequate (Acute Kidney Injury/Impairment)  Goal: Optimal Nutrition Intake  3/12/2023 0500 by Ceci Orellana RN  Outcome: Ongoing, Progressing       Problem: Renal Function Impairment (Acute Kidney Injury/Impairment)  Goal: Effective Renal Function  3/12/2023 0500 by Ceci Orellana RN  Outcome: Ongoing, Progressing       Problem: Skin Injury Risk Increased  Goal: Skin Health and Integrity  3/12/2023 0500 by Ceci Orellana RN  Outcome: Ongoing, Progressing       Problem: Impaired Wound Healing  Goal: Optimal Wound Healing  3/12/2023 0500 by Ceci Orellana RN  Outcome: Ongoing, Progressing       Problem: Pain Acute  Goal: Acceptable Pain Control and Functional Ability  3/12/2023 0500 by Ceci  MISAEL Orellana, RN  Outcome: Ongoing, Progressing       Problem: Fall Injury Risk  Goal: Absence of Fall and Fall-Related Injury  3/12/2023 0500 by Ceci Orellana, RN  Outcome: Ongoing, Progressing

## 2023-03-12 NOTE — PLAN OF CARE
Vital signs stable. In no acute distress. Foam placed to left lateral heel over scab that was present before hospital admit. VS stable.  Cardiac monitoring in use tele monitor # [id number]. Remains injury free. Fall precautions in place. No adverse events during shift. Will continue to monitor.

## 2023-03-12 NOTE — ASSESSMENT & PLAN NOTE
Patient with acute kidney injury likely due to IVVD/dehydration and pre-renal azotemia FELICIA is currently stable. Labs reviewed- Renal function/electrolytes with Estimated Creatinine Clearance: 42.7 mL/min (A) (based on SCr of 1.5 mg/dL (H)). according to latest data. Monitor urine output and serial BMP and adjust therapy as needed. Avoid nephrotoxins and renally dose meds for GFR listed above.     FELICIA on CKD  Trend Cr    3/11   Creatinine 1.4, gradually trending down     3/12  Cr 1.5, monitor

## 2023-03-12 NOTE — NURSING
Bath and Linen change performed. Pt tolerated well. In no acute distress. Foam placed to left lateral heel over scab that was present before hospital admission. Wound care consult placed.

## 2023-03-12 NOTE — PROGRESS NOTES
[facility] Medicine  Progress Note    Patient Name: Korin Vvias  MRN: [id number]  Patient Class: IP- Inpatient   Admission Date: 3/9/2023  Length of Stay: 2 days  Attending Physician: Emigdio Chu, *  Primary Care Provider: [facility]        Subjective:     Principal Problem:Hepatic encephalopathy        HPI:  77 y.o. male patient with a PMHx of CAD, DM, GERD, and HTn who presents to the Emergency Department for evaluation of AMS which onset gradually 2 days ago. The ambulance reports that pt has been confused and combative with his wife. Patient's initial blood sugar was 68 and was given D10 which got his sugar up to 130. Symptoms are constant and moderate in severity. No mitigating or exacerbating factors reported. No further complaints or concerns at this time.    Admitted for hepatic encephalopathy s/t unintentional med noncompliance      Overview/Hospital Course:      Discussed with patient's wife at bedside, she stated that patient had history of recurrent ascites with requirement of paracentesis every 1-2 weeks.    Hence he underwent tips placement on January 30, 2023, following the placement noted to have 3 episodes of hepatic encephalopathy including most recent visit.    As per wife, patient missed 4 days of lactulose prior to admission, due to delay in getting the refills.    As per wife, at baseline patient alert and oriented although has some memory deficits, however since TIPS noted to have gradual deterioration;   On arrival noted to be agitated, confused, with elevated ammonia levels; Bilateral knee x-ray did not show any evidence of fractures. Wound care on board.      Discussed with hepatologist, recommended to continue lactulose, rifaximin; f/u ammonia;   Also stated that pt got TIPS placement done by Dr. Wesley, pt needs TIPS downsizing Monday by IR; Dr. Wells discussed case with IR Dr. Wesley;   -After downsizing, to monitor and consider hospice; wife  agreed to the current plan;    Given age and comorbidities, deemed not a candidate for liver transplant at this time.     SLP eval and recommended Minced & Moist Diet - IDDSI Level 5;             Interval History:     Patient appeared more calm and oriented today.    Alert and oriented x2, unable to tell date of birth.    Hepatologist Dr. Wells recommended to continue lactulose, rifaximin.    Recommended to follow with intervention Radiology Dr. Wesley for tips downsizing tomorrow, NPO since midnight       Review of Systems    Unable to perform ROS: Mental status change        Objective:     Vital Signs (Most Recent):  Temp: 98.3 °F (36.8 °C) (03/12/23 0832)  Pulse: 84 (03/12/23 0832)  Resp: 14 (03/12/23 0832)  BP: (!) 121/56 (03/12/23 0832)  SpO2: 99 % (03/12/23 0832)   Vital Signs (24h Range):  Temp:  [98.2 °F (36.8 °C)-98.3 °F (36.8 °C)] 98.3 °F (36.8 °C)  Pulse:  [68-90] 84  Resp:  [14-18] 14  SpO2:  [99 %] 99 %  BP: (107-121)/(49-56) 121/56     Weight: 71 kg (156 lb 8.4 oz)  Body mass index is 21.23 kg/m².    Intake/Output Summary (Last 24 hours) at 3/12/2023 1503  Last data filed at 3/11/2023 1658  Gross per 24 hour   Intake 260 ml   Output --   Net 260 ml      Physical Exam    Constitutional:       General: He is not in acute distress.     Appearance: Normal appearance. He is ill-appearing.   HENT:      Head: Normocephalic and atraumatic.   Cardiovascular:      Rate and Rhythm: Normal rate and regular rhythm.   Pulmonary:      Effort: Pulmonary effort is normal. No respiratory distress.      Breath sounds: Normal breath sounds.   Abdominal:      General: Abdomen is flat. Bowel sounds are normal. There is no distension.      Palpations: Abdomen is soft.      Tenderness: There is no abdominal tenderness. There is no guarding.   Musculoskeletal:         General: Normal range of motion.      Right lower leg: No edema.      Left lower leg: No edema.   Skin:     General: Skin is warm and dry.   Neurological:       Mental Status: He is alert.  Oriented x2      Significant Labs: All pertinent labs within the past 24 hours have been reviewed.  CBC:   Recent Labs   Lab 03/11/23 0614 03/12/23 0613   WBC 7.38 9.09   HGB 10.4* 10.8*   HCT 30.8* 32.3*   * 153     CMP:   Recent Labs   Lab 03/11/23 0614 03/12/23 0613   *  134* 131*   K 4.6  4.6 4.5     107 103   CO2 20*  20* 20*   GLU 99  99 115*   BUN 21  21 19   CREATININE 1.4  1.4 1.5*   CALCIUM 7.9*  7.9* 8.0*   PROT 6.4  --    ALBUMIN 2.1*  --    BILITOT 2.2*  --    ALKPHOS 126  --    AST 44*  --    ALT 26  --    ANIONGAP 7*  7* 8       Significant Imaging:     Imaging Results              CT Head Without Contrast (Final result)  Result time 03/09/23 13:20:40      Final result by MISAEL Infante Sr., MD (03/09/23 13:20:40)                   Impression:      1. There is no evidence of an acute ischemic event.  There are mild chronic appearing ischemic changes in the deep white matter of both cerebral hemispheres.  2. There is no intracranial hemorrhage.  3. There is mild partial opacification of the maxillary sinuses bilaterally.  This is characteristic of sinusitis.  All CT scans at this facility use dose modulation, iterative reconstruction, and/or weight base dosing when appropriate to reduce radiation dose when appropriate to reduce radiation dose to as low as reasonably achievable.      Electronically signed by: Garland Infante MD  Date:    03/09/2023  Time:    13:20               Narrative:    EXAMINATION:  CT HEAD WITHOUT CONTRAST    CLINICAL HISTORY:  Mental status change, unknown cause;    TECHNIQUE:  Standard brain CT protocol without IV contrast was performed.    COMPARISON:  02/13/2023    FINDINGS:  This is a limited examination secondary to patient motion.  There is no evidence of an acute ischemic event.  There are mild chronic appearing ischemic changes in the deep white matter of both cerebral hemispheres.  There is no intracranial  hemorrhage.  There is mild partial opacification of the maxillary sinuses bilaterally.                                       X-Ray Chest AP Portable (Final result)  Result time 03/09/23 12:50:36      Final result by MISAEL Infante Sr., MD (03/09/23 12:50:36)                   Impression:      1. There is a mild amount of alveolar consolidation scattered throughout the left lung.  An infectious process cannot be excluded.  2. There is blunting of the left costophrenic angle.  This is characteristic of a tiny pleural effusion.  .      Electronically signed by: Garland Infante MD  Date:    03/09/2023  Time:    12:50               Narrative:    EXAMINATION:  XR CHEST AP PORTABLE    CLINICAL HISTORY:  AMS;    COMPARISON:  02/13/2023    FINDINGS:  The size of the heart is normal.  There is a mild amount of alveolar consolidation scattered throughout the left lung.  There is no focal pulmonary infiltrate visualized in the right lung.  There is blunting of the left costophrenic angle.  The right costophrenic angle is sharp.  There is no pneumothorax.                                         Assessment/Plan:      * Hepatic encephalopathy  Start lactulose and rifaximin  Titrate to BM  Continue home diuretics  Hepatology consult    Likely secondary to missed lactulose dose at home due to issues with feeling.    Hepatology on board, recommended to continue lactulose/rifaximin     3/11   Ammonia trended down to 41   We will continue current management   Follow up with hepatology     3/12      Discussed with hepatologist, recommended to continue lactulose, rifaximin; f/u ammonia;   Also stated that pt got TIPS placement done by Dr. Wesley, pt needs TIPS downsizing Monday by IR; Dr. Wells discussed case with IR Dr. Wesley;   -After downsizing, to monitor and consider hospice; wife agreed to the current plan;    Given age and comorbidities, deemed not a candidate for liver transplant at this time.     Knee pain  S/p due to fall at  home   Bilateral x-ray knee did not show any evidence of fracture/effusions         FELICIA (acute kidney injury)  Patient with acute kidney injury likely due to IVVD/dehydration and pre-renal azotemia FELICIA is currently stable. Labs reviewed- Renal function/electrolytes with Estimated Creatinine Clearance: 42.7 mL/min (A) (based on SCr of 1.5 mg/dL (H)). according to latest data. Monitor urine output and serial BMP and adjust therapy as needed. Avoid nephrotoxins and renally dose meds for GFR listed above.     FELICIA on CKD  Trend Cr    3/11   Creatinine 1.4, gradually trending down     3/12  Cr 1.5, monitor        Decompensated hepatic cirrhosis  Continue lactulose and rifaximin  Titrate to BM  Continue home diuretics  Hepatology consult      Hyperkalemia  Repeat BMP in AM      Post-traumatic stress disorder, chronic  Continue home meds        VTE Risk Mitigation (From admission, onward)         Ordered     enoxaparin injection 40 mg  Daily         03/09/23 1549     IP VTE HIGH RISK PATIENT  Once         03/09/23 1549     Place sequential compression device  Until discontinued         03/09/23 1549                Discharge Planning   JEREMIAH:      Code Status: Full Code   Is the patient medically ready for discharge?:     Reason for patient still in hospital (select all that apply):  Follow up with hepatology, intervention Radiology; possible downsizing of TIPS tomorrow  Discharge Plan A: Home Health                  [facility] Chris Chu MD  Department of Hospital Medicine   [facility] - Med Surg

## 2023-03-12 NOTE — SUBJECTIVE & OBJECTIVE
Interval History:     Patient appeared more calm and oriented today.    Alert and oriented x2, unable to tell date of birth.    Hepatologist Dr. Wells recommended to continue lactulose, rifaximin.    Recommended to follow with intervention Radiology Dr. Wesley for tips downsizing tomorrow, NPO since midnight       Review of Systems    Unable to perform ROS: Mental status change        Objective:     Vital Signs (Most Recent):  Temp: 98.3 °F (36.8 °C) (03/12/23 0832)  Pulse: 84 (03/12/23 0832)  Resp: 14 (03/12/23 0832)  BP: (!) 121/56 (03/12/23 0832)  SpO2: 99 % (03/12/23 0832)   Vital Signs (24h Range):  Temp:  [98.2 °F (36.8 °C)-98.3 °F (36.8 °C)] 98.3 °F (36.8 °C)  Pulse:  [68-90] 84  Resp:  [14-18] 14  SpO2:  [99 %] 99 %  BP: (107-121)/(49-56) 121/56     Weight: 71 kg (156 lb 8.4 oz)  Body mass index is 21.23 kg/m².    Intake/Output Summary (Last 24 hours) at 3/12/2023 1503  Last data filed at 3/11/2023 1658  Gross per 24 hour   Intake 260 ml   Output --   Net 260 ml      Physical Exam    Constitutional:       General: He is not in acute distress.     Appearance: Normal appearance. He is ill-appearing.   HENT:      Head: Normocephalic and atraumatic.   Cardiovascular:      Rate and Rhythm: Normal rate and regular rhythm.   Pulmonary:      Effort: Pulmonary effort is normal. No respiratory distress.      Breath sounds: Normal breath sounds.   Abdominal:      General: Abdomen is flat. Bowel sounds are normal. There is no distension.      Palpations: Abdomen is soft.      Tenderness: There is no abdominal tenderness. There is no guarding.   Musculoskeletal:         General: Normal range of motion.      Right lower leg: No edema.      Left lower leg: No edema.   Skin:     General: Skin is warm and dry.   Neurological:      Mental Status: He is alert.  Oriented x2      Significant Labs: All pertinent labs within the past 24 hours have been reviewed.  CBC:   Recent Labs   Lab 03/11/23  0614 03/12/23  0613   WBC 7.38  9.09   HGB 10.4* 10.8*   HCT 30.8* 32.3*   * 153     CMP:   Recent Labs   Lab 03/11/23  0614 03/12/23  0613   *  134* 131*   K 4.6  4.6 4.5     107 103   CO2 20*  20* 20*   GLU 99  99 115*   BUN 21  21 19   CREATININE 1.4  1.4 1.5*   CALCIUM 7.9*  7.9* 8.0*   PROT 6.4  --    ALBUMIN 2.1*  --    BILITOT 2.2*  --    ALKPHOS 126  --    AST 44*  --    ALT 26  --    ANIONGAP 7*  7* 8       Significant Imaging:     Imaging Results              CT Head Without Contrast (Final result)  Result time 03/09/23 13:20:40      Final result by MISAEL Infante Sr., MD (03/09/23 13:20:40)                   Impression:      1. There is no evidence of an acute ischemic event.  There are mild chronic appearing ischemic changes in the deep white matter of both cerebral hemispheres.  2. There is no intracranial hemorrhage.  3. There is mild partial opacification of the maxillary sinuses bilaterally.  This is characteristic of sinusitis.  All CT scans at this facility use dose modulation, iterative reconstruction, and/or weight base dosing when appropriate to reduce radiation dose when appropriate to reduce radiation dose to as low as reasonably achievable.      Electronically signed by: Garland Infante MD  Date:    03/09/2023  Time:    13:20               Narrative:    EXAMINATION:  CT HEAD WITHOUT CONTRAST    CLINICAL HISTORY:  Mental status change, unknown cause;    TECHNIQUE:  Standard brain CT protocol without IV contrast was performed.    COMPARISON:  02/13/2023    FINDINGS:  This is a limited examination secondary to patient motion.  There is no evidence of an acute ischemic event.  There are mild chronic appearing ischemic changes in the deep white matter of both cerebral hemispheres.  There is no intracranial hemorrhage.  There is mild partial opacification of the maxillary sinuses bilaterally.                                       X-Ray Chest AP Portable (Final result)  Result time 03/09/23 12:50:36       Final result by MISAEL Infante Sr., MD (03/09/23 12:50:36)                   Impression:      1. There is a mild amount of alveolar consolidation scattered throughout the left lung.  An infectious process cannot be excluded.  2. There is blunting of the left costophrenic angle.  This is characteristic of a tiny pleural effusion.  .      Electronically signed by: Garland Infante MD  Date:    03/09/2023  Time:    12:50               Narrative:    EXAMINATION:  XR CHEST AP PORTABLE    CLINICAL HISTORY:  AMS;    COMPARISON:  02/13/2023    FINDINGS:  The size of the heart is normal.  There is a mild amount of alveolar consolidation scattered throughout the left lung.  There is no focal pulmonary infiltrate visualized in the right lung.  There is blunting of the left costophrenic angle.  The right costophrenic angle is sharp.  There is no pneumothorax.

## 2023-03-13 PROBLEM — E87.1 HYPONATREMIA: Status: ACTIVE | Noted: 2023-03-13

## 2023-03-13 LAB
ANION GAP SERPL CALC-SCNC: 7 MMOL/L (ref 8–16)
BASOPHILS # BLD AUTO: 0.04 K/UL (ref 0–0.2)
BASOPHILS NFR BLD: 0.4 % (ref 0–1.9)
BUN SERPL-MCNC: 18 MG/DL (ref 8–23)
CALCIUM SERPL-MCNC: 7.3 MG/DL (ref 8.7–10.5)
CHLORIDE SERPL-SCNC: 102 MMOL/L (ref 95–110)
CO2 SERPL-SCNC: 20 MMOL/L (ref 23–29)
CREAT SERPL-MCNC: 1.5 MG/DL (ref 0.5–1.4)
DIFFERENTIAL METHOD: ABNORMAL
EOSINOPHIL # BLD AUTO: 1 K/UL (ref 0–0.5)
EOSINOPHIL NFR BLD: 10.4 % (ref 0–8)
ERYTHROCYTE [DISTWIDTH] IN BLOOD BY AUTOMATED COUNT: 19.9 % (ref 11.5–14.5)
EST. GFR  (NO RACE VARIABLE): 48 ML/MIN/1.73 M^2
GLUCOSE SERPL-MCNC: 135 MG/DL (ref 70–110)
HCT VFR BLD AUTO: 27.1 % (ref 40–54)
HGB BLD-MCNC: 9.3 G/DL (ref 14–18)
IMM GRANULOCYTES # BLD AUTO: 0.05 K/UL (ref 0–0.04)
IMM GRANULOCYTES NFR BLD AUTO: 0.5 % (ref 0–0.5)
LYMPHOCYTES # BLD AUTO: 0.6 K/UL (ref 1–4.8)
LYMPHOCYTES NFR BLD: 6.4 % (ref 18–48)
MCH RBC QN AUTO: 31.2 PG (ref 27–31)
MCHC RBC AUTO-ENTMCNC: 34.3 G/DL (ref 32–36)
MCV RBC AUTO: 91 FL (ref 82–98)
MONOCYTES # BLD AUTO: 1.5 K/UL (ref 0.3–1)
MONOCYTES NFR BLD: 16 % (ref 4–15)
NEUTROPHILS # BLD AUTO: 6.4 K/UL (ref 1.8–7.7)
NEUTROPHILS NFR BLD: 66.3 % (ref 38–73)
NRBC BLD-RTO: 0 /100 WBC
PLATELET # BLD AUTO: 104 K/UL (ref 150–450)
PMV BLD AUTO: 9.2 FL (ref 9.2–12.9)
POTASSIUM SERPL-SCNC: 4 MMOL/L (ref 3.5–5.1)
RBC # BLD AUTO: 2.98 M/UL (ref 4.6–6.2)
SODIUM SERPL-SCNC: 129 MMOL/L (ref 136–145)
WBC # BLD AUTO: 9.59 K/UL (ref 3.9–12.7)

## 2023-03-13 PROCEDURE — C1769 GUIDE WIRE: HCPCS | Performed by: RADIOLOGY

## 2023-03-13 PROCEDURE — 63600175 PHARM REV CODE 636 W HCPCS: Performed by: RADIOLOGY

## 2023-03-13 PROCEDURE — C1887 CATHETER, GUIDING: HCPCS | Performed by: RADIOLOGY

## 2023-03-13 PROCEDURE — C1894 INTRO/SHEATH, NON-LASER: HCPCS | Performed by: RADIOLOGY

## 2023-03-13 PROCEDURE — 99153 MOD SED SAME PHYS/QHP EA: CPT | Performed by: RADIOLOGY

## 2023-03-13 PROCEDURE — 85025 COMPLETE CBC W/AUTO DIFF WBC: CPT | Performed by: STUDENT IN AN ORGANIZED HEALTH CARE EDUCATION/TRAINING PROGRAM

## 2023-03-13 PROCEDURE — 25500020 PHARM REV CODE 255: Performed by: RADIOLOGY

## 2023-03-13 PROCEDURE — 99152 MOD SED SAME PHYS/QHP 5/>YRS: CPT | Performed by: RADIOLOGY

## 2023-03-13 PROCEDURE — C1725 CATH, TRANSLUMIN NON-LASER: HCPCS | Performed by: RADIOLOGY

## 2023-03-13 PROCEDURE — C1876 STENT, NON-COA/NON-COV W/DEL: HCPCS | Performed by: RADIOLOGY

## 2023-03-13 PROCEDURE — 36415 COLL VENOUS BLD VENIPUNCTURE: CPT | Performed by: STUDENT IN AN ORGANIZED HEALTH CARE EDUCATION/TRAINING PROGRAM

## 2023-03-13 PROCEDURE — C1874 STENT, COATED/COV W/DEL SYS: HCPCS | Performed by: RADIOLOGY

## 2023-03-13 PROCEDURE — 25000003 PHARM REV CODE 250: Performed by: RADIOLOGY

## 2023-03-13 PROCEDURE — 25000003 PHARM REV CODE 250: Performed by: STUDENT IN AN ORGANIZED HEALTH CARE EDUCATION/TRAINING PROGRAM

## 2023-03-13 PROCEDURE — 11000001 HC ACUTE MED/SURG PRIVATE ROOM

## 2023-03-13 PROCEDURE — 80048 BASIC METABOLIC PNL TOTAL CA: CPT | Performed by: STUDENT IN AN ORGANIZED HEALTH CARE EDUCATION/TRAINING PROGRAM

## 2023-03-13 DEVICE — IMPLANTABLE DEVICE
Type: IMPLANTABLE DEVICE | Site: LIVER | Status: FUNCTIONAL
Brand: PALMAZ GENESIS TRANSHEPATIC BILIARY STENT ON OPTA PRO .035" DELIVERY SYSTEM

## 2023-03-13 RX ORDER — LIDOCAINE HYDROCHLORIDE 20 MG/ML
INJECTION, SOLUTION INFILTRATION; PERINEURAL
Status: DISCONTINUED | OUTPATIENT
Start: 2023-03-13 | End: 2023-03-13 | Stop reason: HOSPADM

## 2023-03-13 RX ORDER — MIDAZOLAM HYDROCHLORIDE 1 MG/ML
INJECTION, SOLUTION INTRAMUSCULAR; INTRAVENOUS
Status: DISCONTINUED | OUTPATIENT
Start: 2023-03-13 | End: 2023-03-13 | Stop reason: HOSPADM

## 2023-03-13 RX ORDER — FENTANYL CITRATE 50 UG/ML
INJECTION, SOLUTION INTRAMUSCULAR; INTRAVENOUS
Status: DISCONTINUED | OUTPATIENT
Start: 2023-03-13 | End: 2023-03-13 | Stop reason: HOSPADM

## 2023-03-13 RX ADMIN — FUROSEMIDE 40 MG: 40 TABLET ORAL at 09:03

## 2023-03-13 RX ADMIN — LACTULOSE 30 G: 20 SOLUTION ORAL at 09:03

## 2023-03-13 RX ADMIN — TRAZODONE HYDROCHLORIDE 100 MG: 100 TABLET ORAL at 09:03

## 2023-03-13 RX ADMIN — LACTULOSE 30 G: 20 SOLUTION ORAL at 03:03

## 2023-03-13 RX ADMIN — RIFAXIMIN 550 MG: 550 TABLET ORAL at 09:03

## 2023-03-13 RX ADMIN — BUSPIRONE HYDROCHLORIDE 15 MG: 10 TABLET ORAL at 09:03

## 2023-03-13 RX ADMIN — FAMOTIDINE 20 MG: 20 TABLET, FILM COATED ORAL at 09:03

## 2023-03-13 RX ADMIN — LACTULOSE 30 G: 20 SOLUTION ORAL at 05:03

## 2023-03-13 NOTE — ASSESSMENT & PLAN NOTE
S/p due to fall at home   Bilateral x-ray knee did not show any evidence of fracture/effusions   Supportive tx

## 2023-03-13 NOTE — PLAN OF CARE
Problem: Adult Inpatient Plan of Care  Goal: Plan of Care Review  Outcome: Ongoing, Progressing  Goal: Patient-Specific Goal (Individualized)  Outcome: Ongoing, Progressing  Goal: Absence of Hospital-Acquired Illness or Injury  Outcome: Ongoing, Progressing  Goal: Optimal Comfort and Wellbeing  Outcome: Ongoing, Progressing  Goal: Readiness for Transition of Care  Outcome: Ongoing, Progressing     Problem: Diabetes Comorbidity  Goal: Blood Glucose Level Within Targeted Range  Outcome: Ongoing, Progressing     Problem: Skin Injury Risk Increased  Goal: Skin Health and Integrity  Outcome: Ongoing, Progressing     Problem: Impaired Wound Healing  Goal: Optimal Wound Healing  Outcome: Ongoing, Progressing

## 2023-03-13 NOTE — CONSULTS
Chart reviewed by Dr. Wesley.       ASSESSMENT/PLAN:    78 yo M hx IR TIPs Insertion 1/30/23 who presented to ED with AMS and hepatic encephalopathy.    The order for a IR TIPS Revision has been placed and the procedure will be performed asap.          Thank you for the consult.

## 2023-03-13 NOTE — PLAN OF CARE
Pt awakened after procedure and remains AAOx3 at time of transfer.   R neck dressing remains CDI at time of transfer.   Transferred pt to room 504 via bed in no apparent distress.   Report given to Susy LYN. No further questions at this time.

## 2023-03-13 NOTE — ASSESSMENT & PLAN NOTE
- Cr baseline appears to fluctuate between 1.5- 2.3 over last month, 1.8 on admission    - currently stable at 1.5  - monitor BMP with diuretics as above   - renally dose meds; avoid nephrotoxins  - strict Is and Os

## 2023-03-13 NOTE — ASSESSMENT & PLAN NOTE
- likely in setting of cirrhosis and diuresis   - monitor BMP   - consider holding lasix if continues to downtrend

## 2023-03-13 NOTE — ASSESSMENT & PLAN NOTE
Hepatology following as above  Continue lactulose and rifaximin   Continue home diuretics  - Per hepatology, if pt clinical status does not improve with TIPS downsizing, monitor and consider hospice

## 2023-03-13 NOTE — INTERVAL H&P NOTE
The patient has been examined and the H&P has been reviewed:    I concur with the findings and no changes have occurred since H&P was written.    Surgery risks, benefits and alternative options discussed and understood by patient/family.          Active Hospital Problems    Diagnosis  POA    *Hepatic encephalopathy [K76.82]  Yes    Knee pain [M25.569]  Unknown    FELICIA (acute kidney injury) [N17.9]  Yes    Decompensated hepatic cirrhosis [K72.90, K74.60]  Yes     Chronic    Post-traumatic stress disorder, chronic [F43.12]  Yes    Hyperkalemia [E87.5]  Yes      Resolved Hospital Problems   No resolved problems to display.

## 2023-03-13 NOTE — PLAN OF CARE
Plan of care reviewed with patients family with verbal understanding. Chart and orders reviewed.  Pt remains free from falls this shift, Safety precautions in place.   Pt currently resting comfortably in bed. Pain controlled with po med (see emar for pain admin). Pt is oriented to person and place. Multiple skin tears to B. Upper extremities.  Hourly rounding with bed in lowest position, side rails up, call light in reach.  Will continue to monitor until end of shift.       Problem: Adult Inpatient Plan of Care  Goal: Optimal Comfort and Wellbeing  Outcome: Ongoing, Progressing     Problem: Impaired Wound Healing  Goal: Optimal Wound Healing  Outcome: Ongoing, Progressing

## 2023-03-13 NOTE — CONSULTS
New consult noted on Mr. Vivas due to scab to left lateral foot. Patient was seen for wound care consult on admit with scattered scabs and abrasions noted to skin including this site. Since that visit, scab no longer present and wound to left lateral foot is covered with thin layer adherent yellow slough with redness surrounding. Cleansed with saline and foam dressing applied. Recommend change foam twice weekly. Continue wound care per orders.

## 2023-03-13 NOTE — SUBJECTIVE & OBJECTIVE
Interval History: NAEON. Pt seen with wife at bedside. He is alert, oriented to self, place, year and situation. Denies abd pain, CP, SOB. Per wife, close to baseline mental status. Planned for TIPS downsizing today with IR.     Review of Systems  Objective:     Vital Signs (Most Recent):  Temp: 99.2 °F (37.3 °C) (03/13/23 0829)  Pulse: 81 (03/13/23 0944)  Resp: 16 (03/13/23 0829)  BP: (!) 113/52 (03/13/23 0829)  SpO2: 98 % (03/13/23 0829)   Vital Signs (24h Range):  Temp:  [97.6 °F (36.4 °C)-99.2 °F (37.3 °C)] 99.2 °F (37.3 °C)  Pulse:  [] 81  Resp:  [16-18] 16  SpO2:  [97 %-98 %] 98 %  BP: (101-113)/(49-56) 113/52     Weight: 70.7 kg (155 lb 13.8 oz)  Body mass index is 21.14 kg/m².  No intake or output data in the 24 hours ending 03/13/23 1017   Physical Exam  Vitals and nursing note reviewed. Exam conducted with a chaperone present.   Constitutional:       General: He is not in acute distress.     Appearance: Ill appearance: chronically ill appearing.   Cardiovascular:      Rate and Rhythm: Normal rate and regular rhythm.      Heart sounds: No murmur heard.    No friction rub. No gallop.   Pulmonary:      Effort: Pulmonary effort is normal.      Breath sounds: Normal breath sounds. No wheezing, rhonchi or rales.      Comments: On room air   Abdominal:      General: Bowel sounds are normal. There is distension (mild distension).      Palpations: Abdomen is soft.      Tenderness: There is no abdominal tenderness. There is no guarding or rebound.   Musculoskeletal:      Right lower leg: No edema.      Left lower leg: No edema.   Neurological:      Mental Status: He is alert.      Comments: Oriented to self, place, year and situation, answers most questions appropriately        Significant Labs: All pertinent labs within the past 24 hours have been reviewed.    Significant Imaging: I have reviewed all pertinent imaging results/findings within the past 24 hours.

## 2023-03-13 NOTE — PROGRESS NOTES
[facility] Medicine  Progress Note    Patient Name: Korin Vivas  MRN: [id number]  Patient Class: IP- Inpatient   Admission Date: 3/9/2023  Length of Stay: 3 days  Attending Physician: Alia Gonzalez MD  Primary Care Provider: [facility]        Subjective:     Principal Problem:Hepatic encephalopathy        HPI:  77 y.o. male patient with a PMHx of CAD, DM, GERD, and HTn who presents to the Emergency Department for evaluation of AMS which onset gradually 2 days ago. The ambulance reports that pt has been confused and combative with his wife. Patient's initial blood sugar was 68 and was given D10 which got his sugar up to 130. Symptoms are constant and moderate in severity. No mitigating or exacerbating factors reported. No further complaints or concerns at this time.    Admitted for hepatic encephalopathy s/t unintentional med noncompliance      Overview/Hospital Course:  Discussed with patient's wife at bedside, she stated that patient had history of recurrent ascites with requirement of paracentesis every 1-2 weeks.    Hence he underwent tips placement on January 30, 2023, following the placement noted to have 3 episodes of hepatic encephalopathy including most recent visit.  As per wife, patient missed 4 days of lactulose prior to admission, due to delay in getting the refills. At baseline patient alert and oriented although has some memory deficits, however since TIPS noted to have gradual deterioration; On arrival noted to be agitated, confused, with elevated ammonia levels; Bilateral knee x-ray did not show any evidence of fractures. Wound care on board.  Discussed with hepatologist, recommended to continue lactulose, rifaximin; f/u ammonia; TIPS placement done by Dr. Wesley, pt needs TIPS downsizing Monday by IR. Per Hepatology, after downsizing, consider hospice pending clinical course. Given age and comorbidities, deemed not a candidate for liver transplant at this time.  Planned for TIPS revision with IR 03/13/23.         Interval History: NAEON. Pt seen with wife at bedside. He is alert, oriented to self, place, year and situation. Denies abd pain, CP, SOB. Per wife, close to baseline mental status. Planned for TIPS downsizing today with IR.     Review of Systems  Objective:     Vital Signs (Most Recent):  Temp: 99.2 °F (37.3 °C) (03/13/23 0829)  Pulse: 81 (03/13/23 0944)  Resp: 16 (03/13/23 0829)  BP: (!) 113/52 (03/13/23 0829)  SpO2: 98 % (03/13/23 0829)   Vital Signs (24h Range):  Temp:  [97.6 °F (36.4 °C)-99.2 °F (37.3 °C)] 99.2 °F (37.3 °C)  Pulse:  [] 81  Resp:  [16-18] 16  SpO2:  [97 %-98 %] 98 %  BP: (101-113)/(49-56) 113/52     Weight: 70.7 kg (155 lb 13.8 oz)  Body mass index is 21.14 kg/m².  No intake or output data in the 24 hours ending 03/13/23 1017   Physical Exam  Vitals and nursing note reviewed. Exam conducted with a chaperone present.   Constitutional:       General: He is not in acute distress.     Appearance: Ill appearance: chronically ill appearing.   Cardiovascular:      Rate and Rhythm: Normal rate and regular rhythm.      Heart sounds: No murmur heard.    No friction rub. No gallop.   Pulmonary:      Effort: Pulmonary effort is normal.      Breath sounds: Normal breath sounds. No wheezing, rhonchi or rales.      Comments: On room air   Abdominal:      General: Bowel sounds are normal. There is distension (mild distension).      Palpations: Abdomen is soft.      Tenderness: There is no abdominal tenderness. There is no guarding or rebound.   Musculoskeletal:      Right lower leg: No edema.      Left lower leg: No edema.   Neurological:      Mental Status: He is alert.      Comments: Oriented to self, place, year and situation, answers most questions appropriately        Significant Labs: All pertinent labs within the past 24 hours have been reviewed.    Significant Imaging: I have reviewed all pertinent imaging results/findings within the past 24  hours.      Assessment/Plan:      * Hepatic encephalopathy  - Hepatology consulted   - Ammonia downtrended and mental status improving with Lactulose + Rifaximin  - Continue lactulose titrated to 2-3 BM daily   - IR consulted; pt planned for TIPS downsizing today, discusseed with Dr. Wells   -After downsizing, to monitor and consider hospice- discussed continuation of HH and potential bridge to hospice on discharge    Given age and comorbidities, deemed not a candidate for liver transplant at this time.     Decompensated hepatic cirrhosis  Hepatology following as above  Continue lactulose and rifaximin   Continue home diuretics  - Per hepatology, if pt clinical status does not improve with TIPS downsizing, monitor and consider hospice     FELICIA (acute kidney injury)  - Cr baseline appears to fluctuate between 1.5- 2.3 over last month, 1.8 on admission    - currently stable at 1.5  - monitor BMP with diuretics as above   - renally dose meds; avoid nephrotoxins  - strict Is and Os         Hyponatremia  - likely in setting of cirrhosis and diuresis   - monitor BMP   - consider holding lasix if continues to downtrend       Hyperkalemia  Resolved  Monitor BMP     Knee pain  S/p due to fall at home   Bilateral x-ray knee did not show any evidence of fracture/effusions   Supportive tx         Post-traumatic stress disorder, chronic  Continue home meds        VTE Risk Mitigation (From admission, onward)         Ordered     IP VTE HIGH RISK PATIENT  Once         03/09/23 1549     Place sequential compression device  Until discontinued         03/09/23 1549                Discharge Planning   JEREMIAH:      Code Status: Full Code   Is the patient medically ready for discharge?:     Reason for patient still in hospital (select all that apply): Patient trending condition and Treatment  Discharge Plan A: Home Health                  Alia Gonzalez MD  Department of Hospital Medicine   [facility]

## 2023-03-13 NOTE — PT/OT/SLP PROGRESS
Speech Language Pathology      Korin Vivas  MRN: [id number]    Patient not seen today secondary to Other (Comment) (Patient off the floor in cath lab.). Will follow-up later.

## 2023-03-14 VITALS
RESPIRATION RATE: 16 BRPM | WEIGHT: 155.88 LBS | SYSTOLIC BLOOD PRESSURE: 114 MMHG | BODY MASS INDEX: 21.11 KG/M2 | DIASTOLIC BLOOD PRESSURE: 57 MMHG | HEIGHT: 72 IN | OXYGEN SATURATION: 100 % | HEART RATE: 102 BPM | TEMPERATURE: 96 F

## 2023-03-14 PROBLEM — E87.5 HYPERKALEMIA: Status: RESOLVED | Noted: 2022-11-14 | Resolved: 2023-03-14

## 2023-03-14 LAB
ALBUMIN SERPL BCP-MCNC: 2 G/DL (ref 3.5–5.2)
ALP SERPL-CCNC: 125 U/L (ref 55–135)
ALT SERPL W/O P-5'-P-CCNC: 29 U/L (ref 10–44)
ANION GAP SERPL CALC-SCNC: 7 MMOL/L (ref 8–16)
ANISOCYTOSIS BLD QL SMEAR: SLIGHT
AST SERPL-CCNC: 46 U/L (ref 10–40)
BASOPHILS # BLD AUTO: 0.07 K/UL (ref 0–0.2)
BASOPHILS NFR BLD: 0.6 % (ref 0–1.9)
BILIRUB SERPL-MCNC: 2.2 MG/DL (ref 0.1–1)
BUN SERPL-MCNC: 18 MG/DL (ref 8–23)
CALCIUM SERPL-MCNC: 7.6 MG/DL (ref 8.7–10.5)
CHLORIDE SERPL-SCNC: 103 MMOL/L (ref 95–110)
CO2 SERPL-SCNC: 19 MMOL/L (ref 23–29)
CREAT SERPL-MCNC: 1.3 MG/DL (ref 0.5–1.4)
DACRYOCYTES BLD QL SMEAR: ABNORMAL
DIFFERENTIAL METHOD: ABNORMAL
EOSINOPHIL # BLD AUTO: 1.2 K/UL (ref 0–0.5)
EOSINOPHIL NFR BLD: 11.2 % (ref 0–8)
ERYTHROCYTE [DISTWIDTH] IN BLOOD BY AUTOMATED COUNT: 19.9 % (ref 11.5–14.5)
EST. GFR  (NO RACE VARIABLE): 57 ML/MIN/1.73 M^2
GLUCOSE SERPL-MCNC: 98 MG/DL (ref 70–110)
HCT VFR BLD AUTO: 29.7 % (ref 40–54)
HGB BLD-MCNC: 9.9 G/DL (ref 14–18)
IMM GRANULOCYTES # BLD AUTO: 0.05 K/UL (ref 0–0.04)
IMM GRANULOCYTES NFR BLD AUTO: 0.5 % (ref 0–0.5)
LYMPHOCYTES # BLD AUTO: 0.7 K/UL (ref 1–4.8)
LYMPHOCYTES NFR BLD: 6.6 % (ref 18–48)
MCH RBC QN AUTO: 31.3 PG (ref 27–31)
MCHC RBC AUTO-ENTMCNC: 33.3 G/DL (ref 32–36)
MCV RBC AUTO: 94 FL (ref 82–98)
MONOCYTES # BLD AUTO: 2.1 K/UL (ref 0.3–1)
MONOCYTES NFR BLD: 19.2 % (ref 4–15)
NEUTROPHILS # BLD AUTO: 6.7 K/UL (ref 1.8–7.7)
NEUTROPHILS NFR BLD: 61.9 % (ref 38–73)
NRBC BLD-RTO: 0 /100 WBC
PLATELET # BLD AUTO: 89 K/UL (ref 150–450)
PLATELET BLD QL SMEAR: ABNORMAL
PMV BLD AUTO: 9.7 FL (ref 9.2–12.9)
POTASSIUM SERPL-SCNC: 3.8 MMOL/L (ref 3.5–5.1)
PROT SERPL-MCNC: 6.2 G/DL (ref 6–8.4)
RBC # BLD AUTO: 3.16 M/UL (ref 4.6–6.2)
SODIUM SERPL-SCNC: 129 MMOL/L (ref 136–145)
TARGETS BLD QL SMEAR: ABNORMAL
WBC # BLD AUTO: 10.8 K/UL (ref 3.9–12.7)

## 2023-03-14 PROCEDURE — 85025 COMPLETE CBC W/AUTO DIFF WBC: CPT | Performed by: STUDENT IN AN ORGANIZED HEALTH CARE EDUCATION/TRAINING PROGRAM

## 2023-03-14 PROCEDURE — 97530 THERAPEUTIC ACTIVITIES: CPT

## 2023-03-14 PROCEDURE — 80053 COMPREHEN METABOLIC PANEL: CPT | Performed by: INTERNAL MEDICINE

## 2023-03-14 PROCEDURE — 99231 PR SUBSEQUENT HOSPITAL CARE,LEVL I: ICD-10-PCS | Mod: 95,,, | Performed by: INTERNAL MEDICINE

## 2023-03-14 PROCEDURE — 99231 SBSQ HOSP IP/OBS SF/LOW 25: CPT | Mod: 95,,, | Performed by: INTERNAL MEDICINE

## 2023-03-14 PROCEDURE — 97166 OT EVAL MOD COMPLEX 45 MIN: CPT

## 2023-03-14 PROCEDURE — 97161 PT EVAL LOW COMPLEX 20 MIN: CPT

## 2023-03-14 PROCEDURE — 25000003 PHARM REV CODE 250: Performed by: STUDENT IN AN ORGANIZED HEALTH CARE EDUCATION/TRAINING PROGRAM

## 2023-03-14 PROCEDURE — 36415 COLL VENOUS BLD VENIPUNCTURE: CPT | Performed by: INTERNAL MEDICINE

## 2023-03-14 RX ADMIN — FUROSEMIDE 40 MG: 40 TABLET ORAL at 08:03

## 2023-03-14 RX ADMIN — RIFAXIMIN 550 MG: 550 TABLET ORAL at 08:03

## 2023-03-14 RX ADMIN — FAMOTIDINE 20 MG: 20 TABLET, FILM COATED ORAL at 08:03

## 2023-03-14 RX ADMIN — LACTULOSE 30 G: 20 SOLUTION ORAL at 05:03

## 2023-03-14 NOTE — ASSESSMENT & PLAN NOTE
- Cr baseline appears to fluctuate between 1.5- 2.3 over last month, 1.8 on admission    - currently stable at 1.3-1.5  - monitor BMP with diuretics as above   - continue lasix, spironolactone discontinued    - monitor BMP

## 2023-03-14 NOTE — DISCHARGE SUMMARY
[facility] Medicine  Discharge Summary      Patient Name: Korin Vivas  MRN: [id number]  JAME: [id number]  Patient Class: IP- Inpatient  Admission Date: 3/9/2023  Hospital Length of Stay: 4 days  Discharge Date and Time: No discharge date for patient encounter.  Attending Physician: Alia Gonzalez MD   Discharging Provider: Alia Gonzlaez MD  Primary Care Provider: [facility]    Primary Care Team: Networked reference to record PCT     HPI:   77 y.o. male patient with a PMHx of CAD, DM, GERD, and HTn who presents to the Emergency Department for evaluation of AMS which onset gradually 2 days ago. The ambulance reports that pt has been confused and combative with his wife. Patient's initial blood sugar was 68 and was given D10 which got his sugar up to 130. Symptoms are constant and moderate in severity. No mitigating or exacerbating factors reported. No further complaints or concerns at this time.    Admitted for hepatic encephalopathy s/t unintentional med noncompliance      Procedure(s) (LRB):  TIPS revision (N/A)      Hospital Course:   Discussed with patient's wife at bedside, she stated that patient had history of recurrent ascites with requirement of paracentesis every 1-2 weeks.    Hence he underwent tips placement on January 30, 2023, following the placement noted to have 3 episodes of hepatic encephalopathy including most recent visit.  As per wife, patient missed 4 days of lactulose prior to admission, due to delay in getting the refills. At baseline patient alert and oriented although has some memory deficits, however since TIPS noted to have gradual deterioration; On arrival noted to be agitated, confused, with elevated ammonia levels; Bilateral knee x-ray did not show any evidence of fractures. Wound care on board.  Discussed with hepatologist, recommended to continue lactulose, rifaximin; f/u ammonia; TIPS placement done by Dr. Wesley, pt needs TIPS downsizing  Monday by IR. Per Hepatology, after downsizing, consider hospice pending clinical course. Given age and comorbidities, deemed not a candidate for liver transplant at this time. Underwent TIPS revision/downsizing with IR 03/13/23.     Pt seen and examined on day of discharge with wife at bedside. HE is awake, alert, oriented x 3, has no complaints today. Denies CP, SOB, abd pain, n/v. PT deemed stable for discharge from hepatology standpoint, discussed with Dr. Wells. HE was able to ambulate > 150 feet with PT/OT who recommend return home with HH. Pt appears stable for discharge home today per my exam. Follow up with PCP and hepatology within 1 week. [facility] home referral placed.        Goals of Care Treatment Preferences:  Code Status: Full Code      Consults:   Consults (From admission, onward)        Status Ordering Provider     Inpatient consult to Interventional Radiology  Once        Provider:  Pedro Luis Wesley MD    Completed NATALY SHAW     Inpatient Consult to Telemedicine - Hepatology  Once        Provider:  Bree Wells MD    Completed DEANA GREEN          Renal/  Hyponatremia  - likely in setting of cirrhosis and diuresis, stable at 129   - monitor BMP   - Discussed with Dr. Wells, ok to continue lasix for now, will follow up in hepatology clinic next week       FELICIA (acute kidney injury)  - Cr baseline appears to fluctuate between 1.5- 2.3 over last month, 1.8 on admission    - currently stable at 1.3-1.5  - monitor BMP with diuretics as above   - continue lasix, spironolactone discontinued    - monitor BMP         GI  * Hepatic encephalopathy  - Hepatology consulted   - Ammonia downtrended and mental status improving with Lactulose + Rifaximin  - Continue lactulose titrated to 2-3 BM daily   - IR consulted; s/p TIPS downsizing 03/13/23      Decompensated hepatic cirrhosis  Hepatology consulted as above- Dr. Wells   S/p TIPS downsizing 03/13   Continue lactulose and rifaximin    Continue home lasix (spironolactone stopped due to hyperkalemia on admission)   - Per hepatology, given pt age and comorbidities, he is not a candidate for transplant at this time. If pt clinical status does not improve with TIPS downsizing, monitor and consider hospice  - Follow up with Dr. Goodman within 1 week        Final Active Diagnoses:    Diagnosis Date Noted POA    PRINCIPAL PROBLEM:  Hepatic encephalopathy [K76.82] 02/05/2023 Yes    Decompensated hepatic cirrhosis [K72.90, K74.60] 01/30/2023 Yes     Chronic    FELICIA (acute kidney injury) [N17.9] 02/13/2023 Yes    Hyponatremia [E87.1] 03/13/2023 No    Knee pain [M25.569] 03/11/2023 Yes    Post-traumatic stress disorder, chronic [F43.12] 11/14/2022 Yes      Problems Resolved During this Admission:    Diagnosis Date Noted Date Resolved POA    Hyperkalemia [E87.5] 11/14/2022 03/14/2023 Yes       Discharged Condition: good    Disposition: Home-Health Care [facility]    Follow Up:   Contact information for follow-up providers     [facility] Follow up in 3 day(s).    Specialties: Behavioral Health, Psychiatry, Psychology  Contact information:  [address]  [place] [address]             Litzy Goodman MD. Schedule an appointment as soon as possible for a visit in 1 week(s).    Specialties: Gastroenterology, Hepatology  Contact information:  [address]  [place] [address] [phone]                   Contact information for after-discharge care     Home Medical Care     [facility] .    Service: Home Health Services  Contact information:  [address]  [place] [address] [phone]                           Patient Instructions:      Ambulatory referral/consult to Home Health   Standing Status: Future   Referral Priority: Routine Referral Type: Home Health Care   Referral Reason: Specialty Services Required   Requested Specialty: Home Health Services   Number of Visits Requested:  1     Ambulatory referral/consult to [facility]   Standing Status: Future   Referral Priority: Routine Referral Type: Consultation   Referral Reason: Specialty Services Required   Number of Visits Requested: 1     Notify your health care provider if you experience any of the following:  increased confusion or weakness     Activity as tolerated       Significant Diagnostic Studies: See Hospital Course     Pending Diagnostic Studies:     Procedure Component Value Units Date/Time    IR TIPS Revision [[id number]] Resulted: 03/13/23 1151    Order Status: Sent Lab Status: In process Updated: 03/13/23 1251    IR TIPS Revision [[id number]]     Order Status: Sent Lab Status: No result          Medications:  Reconciled Home Medications:      Medication List      CONTINUE taking these medications    busPIRone 15 MG tablet  Commonly known as: BUSPAR  Take 15 mg by mouth 2 (two) times daily as needed (anxiety).     ezetimibe 10 mg tablet  Commonly known as: ZETIA  Take 10 mg by mouth once daily.     famotidine 40 MG tablet  Commonly known as: PEPCID  Take 40 mg by mouth once daily.     furosemide 40 MG tablet  Commonly known as: LASIX  Take 1 tablet (40 mg total) by mouth once daily at 6am.     lactulose 10 gram/15 mL solution  Commonly known as: CHRONULAC  Take 30 mLs (20 g total) by mouth 3 (three) times daily.     multivitamin capsule  Take 1 capsule by mouth once daily.     omega 3-dha-epa-fish oil 1,200 (144-216) mg Cap  Take 2,400 mg by mouth 2 (two) times a day.     rifAXIMin 550 mg Tab  Commonly known as: XIFAXAN  Take 1 tablet (550 mg total) by mouth 2 (two) times daily.     traZODone 100 MG tablet  Commonly known as: DESYREL  Take 100 mg by mouth every evening.     zinc sulfate 50 mg zinc (220 mg) capsule  Commonly known as: ZINCATE  Take 220 mg by mouth once daily.        STOP taking these medications    metFORMIN 1000 MG tablet  Commonly known as: GLUCOPHAGE     spironolactone 100 MG  tablet  Commonly known as: ALDACTONE            Indwelling Lines/Drains at time of discharge:   Lines/Drains/Airways     None                 Time spent on the discharge of patient: 40 minutes         Alia Gonzalez MD  Department of Hospital Medicine  [facility]

## 2023-03-14 NOTE — SUBJECTIVE & OBJECTIVE
Interval History: s/p TIPS w/o complication. Patient is alert today, sitting in the chair. He reports feeling great, no specific concerns. He was even able to work with PT and walk with a walker.     Current Facility-Administered Medications   Medication    busPIRone tablet 15 mg    dextrose 10% bolus 125 mL 125 mL    dextrose 10% bolus 250 mL 250 mL    famotidine tablet 20 mg    furosemide tablet 40 mg    glucagon (human recombinant) injection 1 mg    glucose chewable tablet 16 g    glucose chewable tablet 24 g    lactulose 20 gram/30 mL solution Soln 30 g    naloxone 0.4 mg/mL injection 0.02 mg    rifAXIMin tablet 550 mg    sodium chloride 0.9% flush 10 mL    traZODone tablet 100 mg       Objective:     Vital Signs (Most Recent):  Temp: 96.4 °F (35.8 °C) (03/14/23 1118)  Pulse: 102 (03/14/23 1118)  Resp: 16 (03/14/23 1118)  BP: (!) 114/57 (03/14/23 1118)  SpO2: 100 % (03/14/23 1118)   Vital Signs (24h Range):  Temp:  [96.4 °F (35.8 °C)-98.7 °F (37.1 °C)] 96.4 °F (35.8 °C)  Pulse:  [] 102  Resp:  [14-17] 16  SpO2:  [98 %-100 %] 100 %  BP: ()/(36-59) 114/57     Weight: 70.7 kg (155 lb 13.8 oz) (03/13/23 0015)  Body mass index is 21.14 kg/m².    Physical Exam  Vitals reviewed.   Constitutional:       General: He is not in acute distress.     Appearance: He is well-developed.   HENT:      Head: Normocephalic and atraumatic.      Mouth/Throat:      Pharynx: No oropharyngeal exudate.   Eyes:      General: No scleral icterus.        Right eye: No discharge.         Left eye: No discharge.      Conjunctiva/sclera: Conjunctivae normal.      Pupils: Pupils are equal, round, and reactive to light.   Pulmonary:      Effort: Pulmonary effort is normal. No respiratory distress.      Breath sounds: Normal breath sounds. No wheezing.   Abdominal:      General: There is no distension.      Palpations: Abdomen is soft.      Tenderness: There is no abdominal tenderness.   Neurological:      Mental Status: He is alert and  oriented to person, place, and time.   Psychiatric:         Behavior: Behavior normal.       MELD-Na score: 25 at 3/13/2023  6:52 AM  MELD score: 20 at 3/13/2023  6:52 AM  Calculated from:  Serum Creatinine: 1.5 mg/dL at 3/13/2023  6:52 AM  Serum Sodium: 129 mmol/L at 3/13/2023  6:52 AM  Total Bilirubin: 2.2 mg/dL at 3/11/2023  6:14 AM  INR(ratio): 1.9 at 3/11/2023  6:14 AM  Age: 77 years    Significant Labs:  CBC:   Recent Labs   Lab 03/14/23  0539   WBC 10.80   RBC 3.16*   HGB 9.9*   HCT 29.7*   PLT 89*     CMP:   Recent Labs   Lab 03/14/23  0539   GLU 98   CALCIUM 7.6*   ALBUMIN 2.0*   PROT 6.2   *   K 3.8   CO2 19*      BUN 18   CREATININE 1.3   ALKPHOS 125   ALT 29   AST 46*   BILITOT 2.2*     Coagulation:   Recent Labs   Lab 03/11/23  0614   INR 1.9*       Significant Imaging:  N/a

## 2023-03-14 NOTE — PROGRESS NOTES
[facility]  Hepatology  Telemedicine Progress Note    Patient Name: Korin Vivas  MRN: [id number]  Admission Date: 3/9/2023  Hospital Length of Stay: 4 days  Attending Provider: Alia Gonzalez MD   Primary Care Physician: [facility] Dept  Principal Problem:Hepatic encephalopathy    Subjective:     Transplant status: No    HPI: 77M w/ h/o decomp cirrhosis had TIPS placed in Jan for refractory ascites. He also had varices. Since TIPS placement this is his 3rd admit for HE. It seems there was a delay in getting a refill of lactulose and was w/o meds for 4 days. He has been increasingly combative. He is having BMs here but still confused, fidgeting and  combative. No evidence of GI or significant abdominal distension.      Interval History: s/p TIPS w/o complication. Patient is alert today, sitting in the chair. He reports feeling great, no specific concerns. He was even able to work with PT and walk with a walker.     Current Facility-Administered Medications   Medication    busPIRone tablet 15 mg    dextrose 10% bolus 125 mL 125 mL    dextrose 10% bolus 250 mL 250 mL    famotidine tablet 20 mg    furosemide tablet 40 mg    glucagon (human recombinant) injection 1 mg    glucose chewable tablet 16 g    glucose chewable tablet 24 g    lactulose 20 gram/30 mL solution Soln 30 g    naloxone 0.4 mg/mL injection 0.02 mg    rifAXIMin tablet 550 mg    sodium chloride 0.9% flush 10 mL    traZODone tablet 100 mg       Objective:     Vital Signs (Most Recent):  Temp: 96.4 °F (35.8 °C) (03/14/23 1118)  Pulse: 102 (03/14/23 1118)  Resp: 16 (03/14/23 1118)  BP: (!) 114/57 (03/14/23 1118)  SpO2: 100 % (03/14/23 1118)   Vital Signs (24h Range):  Temp:  [96.4 °F (35.8 °C)-98.7 °F (37.1 °C)] 96.4 °F (35.8 °C)  Pulse:  [] 102  Resp:  [14-17] 16  SpO2:  [98 %-100 %] 100 %  BP: ()/(36-59) 114/57     Weight: 70.7 kg (155 lb 13.8 oz) (03/13/23 0015)  Body mass index is 21.14 kg/m².    Physical  Exam  Vitals reviewed.   Constitutional:       General: He is not in acute distress.     Appearance: He is well-developed.   HENT:      Head: Normocephalic and atraumatic.      Mouth/Throat:      Pharynx: No oropharyngeal exudate.   Eyes:      General: No scleral icterus.        Right eye: No discharge.         Left eye: No discharge.      Conjunctiva/sclera: Conjunctivae normal.      Pupils: Pupils are equal, round, and reactive to light.   Pulmonary:      Effort: Pulmonary effort is normal. No respiratory distress.      Breath sounds: Normal breath sounds. No wheezing.   Abdominal:      General: There is no distension.      Palpations: Abdomen is soft.      Tenderness: There is no abdominal tenderness.   Neurological:      Mental Status: He is alert and oriented to person, place, and time.   Psychiatric:         Behavior: Behavior normal.       MELD-Na score: 25 at 3/13/2023  6:52 AM  MELD score: 20 at 3/13/2023  6:52 AM  Calculated from:  Serum Creatinine: 1.5 mg/dL at 3/13/2023  6:52 AM  Serum Sodium: 129 mmol/L at 3/13/2023  6:52 AM  Total Bilirubin: 2.2 mg/dL at 3/11/2023  6:14 AM  INR(ratio): 1.9 at 3/11/2023  6:14 AM  Age: 77 years    Significant Labs:  CBC:   Recent Labs   Lab 03/14/23  0539   WBC 10.80   RBC 3.16*   HGB 9.9*   HCT 29.7*   PLT 89*     CMP:   Recent Labs   Lab 03/14/23  0539   GLU 98   CALCIUM 7.6*   ALBUMIN 2.0*   PROT 6.2   *   K 3.8   CO2 19*      BUN 18   CREATININE 1.3   ALKPHOS 125   ALT 29   AST 46*   BILITOT 2.2*     Coagulation:   Recent Labs   Lab 03/11/23  0614   INR 1.9*       Significant Imaging:  N/a    Assessment/Plan:     GI  * Hepatic encephalopathy  Significant improvement in mental status today. He is doing well s/p TIPS downsizing.  -Ok to d/c from liver perspective  -Continue on lactulose with goal of 3BMs/day  -Continue rifaxmin  -Message sent to nurse for patient to f/u with Dr. Goodman  Next week          Thank you for your consult. I will sign off.  Please contact us if you have any additional questions.    Bree Wells MD  Hepatology  [facility]

## 2023-03-14 NOTE — PLAN OF CARE
Plan of care reviewed with patient with verbal understanding. Chart and orders reviewed.  Pt remains free from falls this shift, Safety precautions in place.   Pt currently resting comfortably in bed. Pt is AAOx4 with family at bedside.   Hourly rounding with bed in lowest position, side rails up, call light in reach.  At 441 am pt had a nine beat run of VTACH nonsustained, pt was at baseline orientation, asymptomatic and resting comfortably in bed. Dr Sanderson was informed.   Will continue to monitor until end of shift.       Problem: Skin Injury Risk Increased  Goal: Skin Health and Integrity  Outcome: Ongoing, Progressing     Problem: Impaired Wound Healing  Goal: Optimal Wound Healing  Outcome: Ongoing, Progressing

## 2023-03-14 NOTE — PHYSICIAN QUERY
PT Name: Korin Vivas  MR #: [id number]    DOCUMENTATION CLARIFICATION     CDS/: Tiesha Grande               Contact information:  This form is a permanent document in the medical record.     Query Date: March 14, 2023    By submitting this query, we are merely seeking further clarification of documentation. Please utilize your independent clinical judgment when addressing the question(s) below.    The Medical Record contains the following:   Indicators   Supporting Clinical Findings Location in Medical Record   x AMS, Confusion,  LOC, etc.  The ambulance reports that pt has been confused and combative with his wife  He is disoriented H&P 3/9   x Acute/Chronic Illness Principal Problem:Acute metabolic encephalopathy    Admitted for hepatic encephalopathy s/t unintentional med noncompliance     77M w/ h/o decomp cirrhosis had TIPS placed in Jan for refractory ascites. He also had varices. Since TIPS placement this is his 3rd admit for HE. It seems there was a delay in getting a refill of lactulose and was w/o meds for 4 days. He has been increasingly combative. He is having BMs here but still confused, fidgeting and  combative. H&P 3/9          Hepatology consult 3/10    Radiology Findings     x Electrolyte Imbalance Ammonia 71-->99-->41 Lab 3/9-11    Medication     x Treatment         Patient's initial blood sugar was 68 and was given D10 which got his sugar up to 130.     Start lactulose and rifaximin  Titrate to BM  Continue home diuretics  Hepatology consult    -Ammonia levels ordered   -Continue with lactulose and rifaximin  -Will try to place outpatient order for lactulose and rifaximin to prevent missing doses  -Avoid benzos for agitation and try haldol if needed H&P 3/9                Hepatology consult 3/10    Other       The noted clinical guidelines are only system guidelines and do not replace the providers clinical judgment.    The National [place] of Neurologic Disorders and Stroke (NINDS) of  the NIH describes encephalopathy as any diffuse disease of the brain that alters brain function or structure.    Provider, please specify the diagnosis or diagnoses associated with above clinical findings. Please select all that apply.  [   ] Metabolic Encephalopathy - Due to electrolyte imbalance, metabolic derangements, or infectious processes, includes Septic Encephalopathy, Uremic Encephalopathy   [  X ] Hepatic Encephalopathy - Due to liver disease/failure   [   ] Other Encephalopathy (please specify): ____________________   [   ] Other neurological condition- Includes Post-ictal altered mental status (please specify condition): __________     Please document in your progress notes daily for the duration of treatment until resolved, and include in your discharge summary.    References:  RUTHIE Ledesma RN, CCDS. (2018, June 9). Notes from the Instructor: Encephalopathy tips. Retrieved October 22, 2020, from [email or web address]://acdis.[email or web address]/articles/note-instructor-encephalopathy-tips    ICD-9-CM Coding Clinic First Quarter 2013, Effective with discharges: October 21, 2013 [facility] § Seizure with encephalopathy due to postictal state (2013).    ICD-10-CM/PCS [employer] Integrated Codebook (Version V.20.8.10.0) [Computer software]. (2020). Retrieved October 21, 2020.    National [place] of Neurological Disorders and Stroke. (2019, March 27). Retrieved October 22, 2020, from https://www.ninds.nih.gov/Disorders/All-Disorders/[place]    Form No. 40773

## 2023-03-14 NOTE — PT/OT/SLP EVAL
Physical Therapy Evaluation    Patient Name:  Korin Vivas   MRN:  [id number]    Recommendations:     Discharge Recommendations: home health PT   Discharge Equipment Recommendations: none   Barriers to discharge: None    Assessment:     Korin Vivas is a 77 y.o. male admitted with a medical diagnosis of Hepatic encephalopathy.  He presents with the following impairments/functional limitations: weakness, impaired endurance, impaired balance, gait instability, decreased lower extremity function, impaired functional mobility, impaired self care skills .    Rehab Prognosis: Good; patient would benefit from acute skilled PT services to address these deficits and reach maximum level of function.    Recent Surgery: Procedure(s) (LRB):  TIPS revision (N/A) 1 Day Post-Op    Plan:     During this hospitalization, patient to be seen 3 x/week to address the identified rehab impairments via gait training, therapeutic activities, therapeutic exercises and progress toward the following goals:    Plan of Care Expires:  03/28/23    Subjective     Chief Complaint: WEAKNESS   Patient/Family Comments/goals: INC MOBILITY   Pain/Comfort:  Pain Rating 1: 0/10  Pain Rating Post-Intervention 1: 0/10    Patients cultural, spiritual, Judaism conflicts given the current situation:      Living Environment:  PT LIVES AT HOME WITH WIFE IN A ONE STORY HOME WITH NO STEPS TO ENTER   Prior to admission, patients level of function was IND AND DRIVES .  Equipment used at home: none.  DME owned (not currently used): rolling walker.  Upon discharge, patient will have assistance from WIFE .    Objective:     Communicated with NURSE AND Epic CHART REVIEW  prior to session.  Patient found supine with peripheral IV, telemetry  upon PT entry to room.    General Precautions: Standard, fall  Orthopedic Precautions:N/A   Braces: N/A  Respiratory Status: Room air    Exams:  Cognitive Exam:  Patient is oriented to Person, Place, Time, and Situation  RLE ROM:  "WFL  RLE Strength: LIMITED  LLE ROM: WFL  LLE Strength: LIMITED    Functional Mobility:  Bed Mobility:     Rolling Right: stand by assistance  Supine to Sit: stand by assistance  Transfers:     Sit to Stand:  contact guard assistance with rolling walker  Bed to Chair: contact guard assistance with  rolling walker  using  Stand Pivot  Gait: PT GT TRAINED X 150' WITH RW AND CGA       AM-PAC 6 CLICK MOBILITY  Total Score:18       Treatment & Education:   PT RETURNED TO  T/F TO EOB. PT SOILED AND P.T. CALLED FOR PCT TO ASSIST PT WITH CLEANING. PT EDUCATED ON RISK FOR FALLS DUE TO GENERALIZED WEAKNESS, EDUCATED ON "CALL DON'T FALL", ENCOURAGED TO CALL FOR ASSISTANCE WITH ALL NEEDS SUCH AS BED<>CHAIR TRANSFERS OR TRIPS TO BATHROOM. P.T. EDUCATED PT AND WIFE TO INC TIME OOB TO CHAIR AND THEY AGREED.     Patient left sitting edge of bed with call button in reach.    GOALS:   Multidisciplinary Problems       Physical Therapy Goals          Problem: Physical Therapy    Goal Priority Disciplines Outcome Goal Variances Interventions   Physical Therapy Goal     PT, PT/OT      Description: LTG: 3/28/23  1. PT WILL COMPLETE BED MOBILITY IND  2. PT WILL STAND T/F TO CHAIR WITH RW RW MOD I  3. PT WILL GT TRAIN X 500' WITH LRAD AFTAB>S                       History:     Past Medical History:   Diagnosis Date    Arm fracture, right 2022, 2010    CAD (coronary artery disease)     Diabetes     GERD (gastroesophageal reflux disease)     HTN (hypertension)     Hyperlipidemia        Past Surgical History:   Procedure Laterality Date    CATARACT EXTRACTION      COLONOSCOPY      COLONOSCOPY N/A 7/18/2016    Procedure: COLONOSCOPY;  Surgeon: Bryon Hickey MD;  Location: [facility] ENDO;  Service: Endoscopy;  Laterality: N/A;    COLONOSCOPY N/A 10/6/2020    Procedure: COLONOSCOPY;  Surgeon: Kait Isaacs MD;  Location: [facility] ENDO;  Service: Endoscopy;  Laterality: N/A;    CORONARY STENT PLACEMENT      ELBOW SURGERY      " ESOPHAGOGASTRODUODENOSCOPY N/A 10/6/2020    Procedure: ESOPHAGOGASTRODUODENOSCOPY (EGD);  Surgeon: Kait Isaacs MD;  Location: [facility] ENDO;  Service: Endoscopy;  Laterality: N/A;    FLUOROSCOPY N/A 1/30/2023    Procedure: FLUOROSCOPY/TIPS;  Surgeon: Pedro Luis Wesley MD;  Location: [facility] CATH LAB;  Service: General;  Laterality: N/A;    FLUOROSCOPY N/A 3/13/2023    Procedure: TIPS revision;  Surgeon: Pedro Luis Wesley MD;  Location: [facility] CATH LAB;  Service: General;  Laterality: N/A;    HERNIA REPAIR      2022    TONSILLECTOMY      VASECTOMY         Time Tracking:     PT Received On: 03/14/23  PT Start Time: 1025     PT Stop Time: 1050  PT Total Time (min): 25 min     Billable Minutes: Evaluation 15 and Therapeutic Activity 10      03/14/2023

## 2023-03-14 NOTE — ASSESSMENT & PLAN NOTE
Significant improvement in mental status today. He is doing well s/p TIPS downsizing.  -Ok to d/c from liver perspective  -Continue on lactulose with goal of 3BMs/day  -Continue rifaxmin  -Message sent to nurse for patient to f/u with Dr. Goodman  Next week

## 2023-03-14 NOTE — PLAN OF CARE
OT ki completed. Recommends HHOT.  CGA for bed mobility, t/fs with RW, and ambulation 150ft with RW.

## 2023-03-14 NOTE — PT/OT/SLP EVAL
Occupational Therapy   Evaluation    Name: Korin Vivas  MRN: [id number]  Admitting Diagnosis: Hepatic encephalopathy  Recent Surgery: Procedure(s) (LRB):  TIPS revision (N/A) 1 Day Post-Op    Recommendations:     Discharge Recommendations: home health OT  Discharge Equipment Recommendations:  none  Barriers to discharge:  None    Assessment:     Korin Vivas is a 77 y.o. male with a medical diagnosis of Hepatic encephalopathy.  He presents with the following performance deficits affecting function: weakness, impaired endurance, impaired sensation, impaired self care skills, impaired functional mobility, gait instability, impaired balance, decreased upper extremity function, decreased ROM, impaired skin.      Rehab Prognosis: Good; patient would benefit from acute skilled OT services to address these deficits and reach maximum level of function.       Plan:     Patient to be seen 2 x/week to address the above listed problems via therapeutic activities, therapeutic exercises, self-care/home management  Plan of Care Expires: 03/28/23  Plan of Care Reviewed with: patient, spouse    Subjective     Chief Complaint: weakness  Patient/Family Comments/goals: return home    Occupational Profile:  Living Environment: lives with wife in a 1 story house with no steps to enter. Pt has a built in shower chair.  Previous level of function: Pt (I) with ADLs and functional mobility.  Roles and Routines: drives  Equipment Used at Home: none  Pt has but does not use RW.  Assistance upon Discharge: wife    Pain/Comfort:  Pain Rating 1: 0/10  Objective:     Communicated with: Nurse and epic chart review prior to session.  Patient found supine with peripheral IV, telemetry upon OT entry to room.    General Precautions: Standard, fall  Orthopedic Precautions: N/A  Braces: N/A  Respiratory Status: Room air    Occupational Performance:    Bed Mobility:    Patient completed Rolling/Turning to Right with contact guard assistance  Patient  completed Scooting/Bridging with contact guard assistance  Patient completed Supine to Sit with contact guard assistance    Functional Mobility/Transfers:  Patient completed Sit <> Stand Transfer with contact guard assistance  with  rolling walker   Functional Mobility: Patient completed x150ft functional mobility with CGA and RW to increase dynamic standing balance and activity tolerance needed for ADL completion.   Stand pivot t/f to EOB with CGA and RW.    Activities of Daily Living:  Lower Body Dressing: modified independence doff/christopher socks EOB    Cognitive/Visual Perceptual:  Cognitive/Psychosocial Skills:     -       Oriented to: Person, Place, and Time   -       Follows Commands/attention:Follows multistep  commands  -       Communication: clear/fluent  -       Safety awareness/insight to disability: impaired     Physical Exam:  Skin integrity: Wound BUE, bandaged  Sensation:    -       Impaired  pt reports tingling in B feet  Dominant hand:    -       right  Upper Extremity Range of Motion:     -       Right Upper Extremity: limited AROM in shoulder, elbow WNL  -       Left Upper Extremity: limited AROM in shoulder, elbow WNL  Upper Extremity Strength:    -       Right Upper Extremity: 3-/5 shoulder, 3+/5 elbow  -       Left Upper Extremity: 3-/5 shoulder, 3+/5 elbow   Strength:    -       Right Upper Extremity: WFL  -       Left Upper Extremity: WFL    AMPAC 6 Click ADL:  AMPAC Total Score: 20    Treatment & Education:  Patient educated on role of OT in acute setting and benefits of participation. Educated on techniques to use to increase independence and decrease fall risk with functional transfers. Educated on importance of OOB activity and calling for A to transfer to/from bed. Encouraged completion of B UE AROM therex throughout the day to tolerance to increase functional strength and activity tolerance. Educated patient on importance of increased tolerance to upright position and direct impact on  CV endurance and strength. Patient encouraged to sit up in chair for a minimum of 2 consecutive hours per day. Patient stated understanding and in agreement with POC.     Patient left sitting edge of bed with all lines intact, call button in reach, nurse notified, and wife present    GOALS:   Multidisciplinary Problems       Occupational Therapy Goals          Problem: Occupational Therapy    Goal Priority Disciplines Outcome Interventions   Occupational Therapy Goal     OT, PT/OT     Description: Goals to be met by: 3/28/23     Patient will increase functional independence with ADLs by performing:    Toileting from toilet with [place] for hygiene and clothing management.   Stand pivot transfers with Modified [place].  Upper extremity exercise program x20 reps per handout, with independence.                         History:     Past Medical History:   Diagnosis Date    Arm fracture, right 2022, 2010    CAD (coronary artery disease)     Diabetes     GERD (gastroesophageal reflux disease)     HTN (hypertension)     Hyperlipidemia          Past Surgical History:   Procedure Laterality Date    CATARACT EXTRACTION      COLONOSCOPY      COLONOSCOPY N/A 7/18/2016    Procedure: COLONOSCOPY;  Surgeon: Bryon Hickey MD;  Location: [facility];  Service: Endoscopy;  Laterality: N/A;    COLONOSCOPY N/A 10/6/2020    Procedure: COLONOSCOPY;  Surgeon: Kait Isaacs MD;  Location: [facility];  Service: Endoscopy;  Laterality: N/A;    CORONARY STENT PLACEMENT      ELBOW SURGERY      ESOPHAGOGASTRODUODENOSCOPY N/A 10/6/2020    Procedure: ESOPHAGOGASTRODUODENOSCOPY (EGD);  Surgeon: Kait Isaacs MD;  Location: [facility];  Service: Endoscopy;  Laterality: N/A;    FLUOROSCOPY N/A 1/30/2023    Procedure: FLUOROSCOPY/TIPS;  Surgeon: Pedro Luis Wesley MD;  Location: [facility] CATH LAB;  Service: General;  Laterality: N/A;    FLUOROSCOPY N/A 3/13/2023    Procedure: TIPS revision;  Surgeon: Pedro Luis Wesley MD;  Location: [facility]  CATH LAB;  Service: General;  Laterality: N/A;    HERNIA REPAIR      2022    TONSILLECTOMY      VASECTOMY         Time Tracking:     OT Date of Treatment: 03/14/23  OT Start Time: 0945  OT Stop Time: 1010  OT Total Time (min): 25 min    Billable Minutes:Evaluation 15  Therapeutic Activity 10    Elisa Begum, OT

## 2023-03-14 NOTE — ASSESSMENT & PLAN NOTE
- Hepatology consulted   - Ammonia downtrended and mental status improving with Lactulose + Rifaximin  - Continue lactulose titrated to 2-3 BM daily   - IR consulted; s/p TIPS downsizing 03/13/23

## 2023-03-14 NOTE — ASSESSMENT & PLAN NOTE
- likely in setting of cirrhosis and diuresis, stable at 129   - monitor BMP   - Discussed with brianna Angel to continue lasix for now, will follow up in hepatology clinic next week

## 2023-03-15 ENCOUNTER — PES CALL (OUTPATIENT)
Dept: ADMINISTRATIVE | Facility: CLINIC | Age: 78
End: 2023-03-15
Payer: MEDICARE

## 2023-03-15 ENCOUNTER — PATIENT OUTREACH (OUTPATIENT)
Dept: ADMINISTRATIVE | Facility: CLINIC | Age: 78
End: 2023-03-15
Payer: MEDICARE

## 2023-03-15 NOTE — TELEPHONE ENCOUNTER
CoQ10 300mg - Takes one tablet once a day  Milk thistle - 175mg - Takes one tablet once a day  B-12 1000mcg - Takes one tablet once a day

## 2023-03-15 NOTE — PROGRESS NOTES
C3 nurse spoke with Korin Vivas for a TCC post hospital discharge follow up call. The patient reports does not have a scheduled HOSFU appointment. C3 nurse was unable to schedule HOSFU appointment for [facility]. Patient advised to contact their PCP to schedule a HOSPFU within 5-7 days.      Decline home NP visit.

## 2023-03-15 NOTE — PROGRESS NOTES
C3 nurse attempted to contact Korin Vivas for a TCC post hospital discharge follow up call. The patient is unable to conduct the call @ this time. The patient requested a callback.    The patient does not have a scheduled HOSFU appointment within 5-7 days post hospital discharge date 03/14/23. Cannot route Non-[facility] PCP.

## 2023-03-19 ENCOUNTER — DOCUMENT SCAN (OUTPATIENT)
Dept: HOME HEALTH SERVICES | Facility: HOSPITAL | Age: 78
End: 2023-03-19
Payer: MEDICARE

## 2023-03-23 ENCOUNTER — OFFICE VISIT (OUTPATIENT)
Dept: HEPATOLOGY | Facility: CLINIC | Age: 78
End: 2023-03-23
Payer: OTHER GOVERNMENT

## 2023-03-23 VITALS
DIASTOLIC BLOOD PRESSURE: 60 MMHG | HEIGHT: 72 IN | HEART RATE: 70 BPM | BODY MASS INDEX: 20.59 KG/M2 | SYSTOLIC BLOOD PRESSURE: 118 MMHG | WEIGHT: 152 LBS

## 2023-03-23 DIAGNOSIS — K75.81 LIVER CIRRHOSIS SECONDARY TO NASH: Primary | ICD-10-CM

## 2023-03-23 DIAGNOSIS — K76.82 HEPATIC ENCEPHALOPATHY: ICD-10-CM

## 2023-03-23 DIAGNOSIS — Z95.828 S/P TIPS (TRANSJUGULAR INTRAHEPATIC PORTOSYSTEMIC SHUNT): ICD-10-CM

## 2023-03-23 DIAGNOSIS — K74.60 LIVER CIRRHOSIS SECONDARY TO NASH: Primary | ICD-10-CM

## 2023-03-23 PROCEDURE — 99999 PR PBB SHADOW E&M-EST. PATIENT-LVL III: ICD-10-PCS | Mod: PBBFAC,,, | Performed by: INTERNAL MEDICINE

## 2023-03-23 PROCEDURE — 99215 OFFICE O/P EST HI 40 MIN: CPT | Mod: S$PBB,,, | Performed by: INTERNAL MEDICINE

## 2023-03-23 PROCEDURE — 99213 OFFICE O/P EST LOW 20 MIN: CPT | Mod: PBBFAC | Performed by: INTERNAL MEDICINE

## 2023-03-23 PROCEDURE — 99999 PR PBB SHADOW E&M-EST. PATIENT-LVL III: CPT | Mod: PBBFAC,,, | Performed by: INTERNAL MEDICINE

## 2023-03-23 PROCEDURE — 99215 PR OFFICE/OUTPT VISIT, EST, LEVL V, 40-54 MIN: ICD-10-PCS | Mod: S$PBB,,, | Performed by: INTERNAL MEDICINE

## 2023-03-24 ENCOUNTER — DOCUMENTATION ONLY (OUTPATIENT)
Dept: HEMATOLOGY/ONCOLOGY | Facility: CLINIC | Age: 78
End: 2023-03-24
Payer: MEDICARE

## 2023-04-20 ENCOUNTER — DOCUMENT SCAN (OUTPATIENT)
Dept: HOME HEALTH SERVICES | Facility: HOSPITAL | Age: 78
End: 2023-04-20
Payer: MEDICARE

## 2023-06-19 PROBLEM — N17.9 AKI (ACUTE KIDNEY INJURY): Status: RESOLVED | Noted: 2023-02-13 | Resolved: 2023-06-19

## 2023-08-15 NOTE — SUBJECTIVE & OBJECTIVE
Past Medical History:   Diagnosis Date    Arm fracture, right 2022, 2010    CAD (coronary artery disease)     Diabetes     GERD (gastroesophageal reflux disease)     HTN (hypertension)     Hyperlipidemia        Past Surgical History:   Procedure Laterality Date    CATARACT EXTRACTION      COLONOSCOPY      COLONOSCOPY N/A 7/18/2016    Procedure: COLONOSCOPY;  Surgeon: Bryon Hickey MD;  Location: [facility] ENDO;  Service: Endoscopy;  Laterality: N/A;    COLONOSCOPY N/A 10/6/2020    Procedure: COLONOSCOPY;  Surgeon: Kait Isaacs MD;  Location: [facility] ENDO;  Service: Endoscopy;  Laterality: N/A;    CORONARY STENT PLACEMENT      ELBOW SURGERY      ESOPHAGOGASTRODUODENOSCOPY N/A 10/6/2020    Procedure: ESOPHAGOGASTRODUODENOSCOPY (EGD);  Surgeon: Kait Isaacs MD;  Location: [facility] ENDO;  Service: Endoscopy;  Laterality: N/A;    HERNIA REPAIR      2022    TONSILLECTOMY      VASECTOMY         Review of patient's allergies indicates:   Allergen Reactions    Lipitor [atorvastatin] Other (See Comments)     Left arm/shooulder pain    Statins-hmg-coa reductase inhibitors Other (See Comments)     muscle aches, joint pain  Joint pain  Joint pain         No current facility-administered medications on file prior to encounter.     Current Outpatient Medications on File Prior to Encounter   Medication Sig    busPIRone (BUSPAR) 15 MG tablet Take 15 mg by mouth 2 (two) times daily as needed (anxiety).    carvediloL (COREG) 6.25 MG tablet Take 6.25 mg by mouth 2 (two) times daily.    empagliflozin (JARDIANCE) 25 mg tablet Take 1 tablet by mouth every morning.    ezetimibe (ZETIA) 10 mg tablet Take 10 mg by mouth once daily.    famotidine (PEPCID) 40 MG tablet Take 40 mg by mouth once daily.    furosemide (LASIX) 40 MG tablet Take 1 tablet (40 mg total) by mouth once daily at 6am.    hydrOXYzine HCL (ATARAX) 25 MG tablet Take 1 tablet (25 mg total) by mouth 3 (three) times daily as needed for Itching.    lactulose (CHRONULAC) 10  gram/15 mL solution Take 30 mLs (20 g total) by mouth 2 (two) times daily.    lovastatin (MEVACOR) 40 MG tablet Take 40 mg by mouth every evening.    metformin (GLUCOPHAGE) 1000 MG tablet Take 1,000 mg by mouth 2 (two) times daily with meals.    metoprolol tartrate (LOPRESSOR) 50 MG tablet Take 50 mg by mouth 2 (two) times daily.    multivitamin capsule Take 1 capsule by mouth once daily.    spironolactone (ALDACTONE) 100 MG tablet Take 1 tablet (100 mg total) by mouth 2 (two) times daily.    trazodone (DESYREL) 100 MG tablet Take 100 mg by mouth every evening.    lisinopril (PRINIVIL,ZESTRIL) 40 MG tablet Take 40 mg by mouth once daily.    [DISCONTINUED] BLOOD SUGAR DIAGNOSTIC (ACCU-CHEK SHANTELL PLUS TEST STRP MISC) by Misc.(Non-Drug; Combo Route) route.    [DISCONTINUED] ranitidine (ZANTAC) 75 MG tablet Take 75 mg by mouth 2 (two) times daily.     Family History       Problem Relation (Age of Onset)    Colon cancer Brother    Heart disease Father    No Known Problems Mother          Tobacco Use    Smoking status: Former     Packs/day: 1.00     Years: 40.00     Pack years: 40.00     Types: Cigarettes    Smokeless tobacco: Never    Tobacco comments:     quit 3 years ago   Substance and Sexual Activity    Alcohol use: No    Drug use: No    Sexual activity: Not on file     Review of Systems   Constitutional:  Positive for activity change, appetite change and fatigue. Negative for chills, diaphoresis, fever and unexpected weight change.   HENT:  Negative for congestion, hearing loss, nosebleeds, postnasal drip, rhinorrhea and trouble swallowing.    Eyes:  Negative for discharge and visual disturbance.   Respiratory:  Negative for cough, chest tightness and shortness of breath.    Cardiovascular:  Negative for chest pain, palpitations and leg swelling.   Gastrointestinal:  Positive for abdominal distention, anal bleeding, blood in stool and diarrhea. Negative for abdominal pain, constipation, nausea and vomiting.    Endocrine: Negative for cold intolerance and heat intolerance.   Genitourinary:  Negative for difficulty urinating, dysuria, frequency and hematuria.   Musculoskeletal:  Positive for arthralgias and back pain. Negative for gait problem and myalgias.   Skin: Negative.    Neurological:  Negative for dizziness, weakness, light-headedness and headaches.   Hematological:  Negative for adenopathy. Does not bruise/bleed easily.   Psychiatric/Behavioral:  The patient is nervous/anxious.    Objective:     Vital Signs (Most Recent):  Temp: 97.8 °F (36.6 °C) (11/14/22 1358)  Pulse: (!) 59 (11/14/22 1358)  Resp: 18 (11/14/22 1358)  BP: (!) 106/55 (11/14/22 1358)  SpO2: 100 % (11/14/22 1358)   Vital Signs (24h Range):  Temp:  [97.4 °F (36.3 °C)-97.8 °F (36.6 °C)] 97.8 °F (36.6 °C)  Pulse:  [55-65] 59  Resp:  [16-18] 18  SpO2:  [100 %] 100 %  BP: ()/(42-55) 106/55     Weight: 77.2 kg (170 lb 3.1 oz)  Body mass index is 23.08 kg/m².    Physical Exam  Vitals and nursing note reviewed.   Constitutional:       General: He is not in acute distress.     Appearance: He is well-developed. He is ill-appearing. He is not diaphoretic.   HENT:      Head: Normocephalic and atraumatic.      Right Ear: Hearing and external ear normal.      Left Ear: Hearing and external ear normal.      Nose: Nose normal. No mucosal edema or rhinorrhea.      Mouth/Throat:      Pharynx: Uvula midline.   Eyes:      General:         Right eye: No discharge.         Left eye: No discharge.      Conjunctiva/sclera: Conjunctivae normal.      Right eye: No chemosis.     Left eye: No chemosis.     Pupils: Pupils are equal, round, and reactive to light.   Neck:      Thyroid: No thyroid mass or thyromegaly.      Trachea: Trachea normal.   Cardiovascular:      Rate and Rhythm: Normal rate and regular rhythm.      Pulses:           Dorsalis pedis pulses are 2+ on the right side and 2+ on the left side.      Heart sounds: Normal heart sounds. No murmur  heard.  Pulmonary:      Effort: Pulmonary effort is normal. No respiratory distress.      Breath sounds: Normal breath sounds. No decreased breath sounds or wheezing.   Abdominal:      General: Bowel sounds are normal. There is distension (Mild).      Palpations: Abdomen is soft.      Tenderness: There is no abdominal tenderness.   Musculoskeletal:         General: Normal range of motion.      Cervical back: Normal range of motion and neck supple.   Lymphadenopathy:      Cervical: No cervical adenopathy.      Upper Body:      Right upper body: No supraclavicular adenopathy.      Left upper body: No supraclavicular adenopathy.   Skin:     General: Skin is warm and dry.      Capillary Refill: Capillary refill takes less than 2 seconds.      Coloration: Skin is pale.      Findings: No rash.   Neurological:      Mental Status: He is alert and oriented to person, place, and time.   Psychiatric:         Mood and Affect: Mood is not anxious.         Speech: Speech normal.         Behavior: Behavior normal.         Thought Content: Thought content normal.         Judgment: Judgment normal.         CRANIAL NERVES     CN III, IV, VI   Pupils are equal, round, and reactive to light.     Significant Labs: All pertinent labs within the past 24 hours have been reviewed.  CBC:   Recent Labs   Lab 11/14/22  1115   WBC 8.21   HGB 8.6*   HCT 27.0*        CMP:   Recent Labs   Lab 11/14/22  1115   *   K 5.6*      CO2 14*   *   BUN 42*   CREATININE 1.7*   CALCIUM 8.9   PROT 6.7   ALBUMIN 2.8*   BILITOT 0.4   ALKPHOS 176*   AST 36   ALT 26   ANIONGAP 7*       Significant Imaging: I have reviewed all pertinent imaging results/findings within the past 24 hours.   Resident

## 2024-01-31 NOTE — ED NOTES
Back from CT.  
Bed: 17  Expected date:   Expected time:   Means of arrival:   Comments:  EMS   
Called pharmacy, they said they would have to bring sodium bicarb, notified that patient was going to floor.  
EKG attempted. No EKG machine available.   
Pt reports he is hungry, ate a ham sandwich on wheat bread and drank apple juice.  
Pt went to CT before I could get EKG and labs.  
Pt wife reports pt has been altered at home and requires help to get around. This morning pt didn't want to get up to go to the [place] for ultrasound. Was recently hospitalized for a week.  
Pt wife reports pt has small puddle of urine under him. Pt cleaned, dry sheets placed, adult brief placed, 2 warm gowns placed. Urine sample sent to lab. Side rails up x 2, call bell in reach, wife at bedside. Continue to monitor.  
H

## 2024-06-06 NOTE — PROGRESS NOTES
REX was consulted to assist with hospice referral. REX met with MD and she approved ADAMAR faxing referral to [facility]. REX faxed to [facility] at [phone]. REX will remain available.     
PAIN SCALE 8 OF 10.

## 2024-07-24 ENCOUNTER — PATIENT MESSAGE (OUTPATIENT)
Dept: HEPATOLOGY | Facility: CLINIC | Age: 79
End: 2024-07-24
Payer: MEDICARE

## 2025-06-25 NOTE — PHYSICIAN QUERY
PT Name: Korin Vivas  MR #: [id number]     DOCUMENTATION CLARIFICATION     CDS/: Tiesha Grande RN, CDI            Contact information:[email or web address]   This form is a permanent document in the medical record.     Query Date: March 14, 2023    By submitting this query, we are merely seeking further clarification of documentation.  Please utilize your independent clinical judgment when addressing the question(s) below.    The Medical Record contains the following:   Indicators   Supporting Clinical Findings Location in Medical Record    Non-blanchable erythema/redness     x Ulcer/Injury/Skin Breakdown    Altered Skin Integrity 03/10/23 Coccyx Partial thickness tissue loss. Shallow open ulcer with a red or pink wound bed, without slough. Intact or Open/Ruptured Serum-filled blister.  Description of Altered Skin Integrity Partial thickness tissue loss. Shallow open ulcer with a red or pink wound bed, without slough. Intact or Open/Ruptured Serum-filled blister.   Dressing Appearance Open to air   Drainage Amount Scant   Drainage Characteristics/Odor Serous   Appearance Red;Moist   Tissue loss description Partial thickness   Red (%), Wound Tissue Color 100 %   Periwound Area Redness   Wound Edges Open   Wound Length (cm) 2 cm   Wound Width (cm) 1 cm   Wound Depth (cm) 0.1 cm   Wound Volume (cm^3) 0.2 cm^3   Wound Surface Area (cm^2) 2 cm^2    Wound care consult 3/10    Deep Tissue Injury     x Wound care consult Consulted on this 76 y/o M patient due to present on admission skin breakdown. He is awake and alert, restless. S/o at bedside.     Patient turned to left side for sacral assessement.   Stage 2 PI to coccyx noted with moist red wound bed, measures 2x1x0.1cm.   Wound care consult 3/10   x Acute/Chronic Illness 77 y.o. male patient with a PMHx of CAD, DM, GERD, and HTn who presents to the Emergency Department for evaluation of AMS which onset gradually 2 days ago.     Admitted for hepatic encephalopathy  [facility] her [facility] was denied, writer could not confirm thru the chart and attempted to transfer to nursing and was unsuccessful, patient would like a call back     s/t unintentional med noncompliance   H&P 3/9   x Medication/Treatment cleansed with saline and foam dressing applied.   Recommend turn q2 hours.     Wound care consult 3/10    Other       The clinical guidelines noted are only a system guideline. It does not replace the providers clinical judgment.    Per the National Pressure Injury Advisory Panel:   A pressure injury is localized damage to the skin and underlying soft tissue usually over a bony prominence or related to a medical or other device. The injury can present as intact skin or an open ulcer and may be painful. The injury occurs as a result of intense and/or prolonged pressure or pressure in combination with shear. The tolerance of soft tissue for pressure and shear may also be affected by microclimate, nutrition, perfusion, co-morbidities and condition of the soft tissue.       Stage 1 Pressure Injury:  Intact skin with a localized area of non-blanchable erythema, which may appear differently in darkly pigmented skin. Color changes do not include purple or maroon discoloration; these may indicate deep tissue pressure injury.    Stage 2 Pressure Injury:  Partial-thickness loss of skin with exposed dermis. The wound bed is viable, pink or red, moist, and may also present as an intact or ruptured serum-filled blister.    Stage 3 Pressure Injury:  Full-thickness loss of skin, in which adipose (fat) is visible in the ulcer and granulation tissue and epibole (rolled wound edges) are often present. Slough and/or eschar may be visible. Undermining and tunneling may occur.    Stage 4 Pressure Injury:  Full-thickness skin and tissue loss with exposed or directly palpable fascia, muscle, tendon, ligament, cartilage or bone in the ulcer. Slough and/or eschar may be visible. Epibole (rolled edges), undermining and/or tunneling often occur.    Unstageable Pressure Injury:  Full-thickness skin and tissue loss in which the extent of tissue damage within the ulcer  cannot be confirmed because it is obscured by slough or eschar. If slough or eschar is removed, a Stage 3 or Stage 4 pressure injury will be revealed.    Deep Tissue Pressure Injury:  Intact or non-intact skin with localized area of persistent non-blanchable deep red, maroon, purple discoloration or epidermal separation revealing a dark wound bed or blood filled blister. This injury results from intense and/or prolonged pressure and shear forces at the bone-muscle interface. The wound may evolve rapidly to reveal the actual extent of tissue injury, or may resolve without tissue loss. If necrotic tissue, subcutaneous tissue, granulation tissue, fascia, muscle or other underlying structures are visible, this indicates a full thickness pressure injury (Unstageable, Stage 3 or Stage 4). Do not use DTPI to describe vascular, traumatic, neuropathic, or dermatologic conditions.       Provider, please provide the integumentary diagnosis related to the documentation of coccyx:     [   X] Pressure Injury/Decubitus Ulcer, Stage 2   [   ] Other Integumentary Diagnosis (please specify):______________       Please document in your progress notes daily for the duration of treatment until resolved and include in your discharge summary.    Reference:    RICARDO Stahl., LEN Dean., Goldberg, M., TASIA Martin., TASIA Thomas., & CHRIS Huerta. (2016). Revised National Pressure Ulcer Advisory Panel Pressure Injury Staging System: Revised Pressure Injury Staging System. J Wound Ostomy Continence Nurs, 43(6), 585-597. doi:10.1097/[id number]    Form No.[id number]

## (undated) DEVICE — Device

## (undated) DEVICE — KIT INTRODUCER STIFFEN MICRO

## (undated) DEVICE — CATH SIZING 5F 70CM W/20CM SEG

## (undated) DEVICE — KIT MANIFOLD LOW PRESS TUBING

## (undated) DEVICE — PACK HEART CATH BR

## (undated) DEVICE — FLOWSWITCH HP 1-W W/O LL

## (undated) DEVICE — CATH ULTRAVERSE 035 4X80X130

## (undated) DEVICE — SHEET THYROID W/ISO-BAC

## (undated) DEVICE — GLIDE CATH ANGLED 4FR 65CM

## (undated) DEVICE — SHEATH INTRODUCER 6FR 11CM

## (undated) DEVICE — KIT SYR REUSABLE

## (undated) DEVICE — ANGIOTOUCH KIT

## (undated) DEVICE — NDL BIOPSY CHIBA 22G X 15CM

## (undated) DEVICE — OMNIPAQUE 300MG 150ML VIAL

## (undated) DEVICE — DILATOR 8FR

## (undated) DEVICE — GUIDEWIRE AMPLATZ .035X145 STR

## (undated) DEVICE — GUIDEWIRE STF .035X150CM ANG

## (undated) DEVICE — KIT SITE-RITE NDL GUIDE 21G

## (undated) DEVICE — GUIDEWIRE ANGLED .035 150CM

## (undated) DEVICE — ACCESS SET RNG INTRAHEPATIC 10

## (undated) DEVICE — GUIDEWIRE AMPLATZ .035X260 SS

## (undated) DEVICE — CATH GLIDE 4F 65CM